# Patient Record
Sex: MALE | Race: WHITE | Employment: OTHER | URBAN - METROPOLITAN AREA
[De-identification: names, ages, dates, MRNs, and addresses within clinical notes are randomized per-mention and may not be internally consistent; named-entity substitution may affect disease eponyms.]

---

## 2017-05-04 DIAGNOSIS — M25.551 RIGHT HIP PAIN: Primary | ICD-10-CM

## 2017-05-04 PROCEDURE — 73502 X-RAY EXAM HIP UNI 2-3 VIEWS: CPT | Performed by: ORTHOPAEDIC SURGERY

## 2017-08-21 ENCOUNTER — TELEPHONE (OUTPATIENT)
Dept: CARDIOLOGY CLINIC | Age: 72
End: 2017-08-21

## 2017-08-31 ENCOUNTER — OFFICE VISIT (OUTPATIENT)
Dept: CARDIOLOGY CLINIC | Age: 72
End: 2017-08-31

## 2017-08-31 VITALS
WEIGHT: 161.2 LBS | HEIGHT: 74 IN | OXYGEN SATURATION: 98 % | SYSTOLIC BLOOD PRESSURE: 118 MMHG | BODY MASS INDEX: 20.69 KG/M2 | HEART RATE: 85 BPM | DIASTOLIC BLOOD PRESSURE: 62 MMHG

## 2017-08-31 DIAGNOSIS — E78.00 PURE HYPERCHOLESTEROLEMIA: ICD-10-CM

## 2017-08-31 DIAGNOSIS — I21.4 NSTEMI (NON-ST ELEVATED MYOCARDIAL INFARCTION) (HCC): Primary | ICD-10-CM

## 2017-08-31 DIAGNOSIS — C01 CARCINOMA OF BASE OF TONGUE (HCC): ICD-10-CM

## 2017-08-31 PROCEDURE — 1036F TOBACCO NON-USER: CPT | Performed by: NURSE PRACTITIONER

## 2017-08-31 PROCEDURE — 3017F COLORECTAL CA SCREEN DOC REV: CPT | Performed by: NURSE PRACTITIONER

## 2017-08-31 PROCEDURE — G8427 DOCREV CUR MEDS BY ELIG CLIN: HCPCS | Performed by: NURSE PRACTITIONER

## 2017-08-31 PROCEDURE — 99214 OFFICE O/P EST MOD 30 MIN: CPT | Performed by: NURSE PRACTITIONER

## 2017-08-31 PROCEDURE — G8420 CALC BMI NORM PARAMETERS: HCPCS | Performed by: NURSE PRACTITIONER

## 2017-08-31 PROCEDURE — 1111F DSCHRG MED/CURRENT MED MERGE: CPT | Performed by: NURSE PRACTITIONER

## 2017-08-31 PROCEDURE — 1123F ACP DISCUSS/DSCN MKR DOCD: CPT | Performed by: NURSE PRACTITIONER

## 2017-08-31 PROCEDURE — 4040F PNEUMOC VAC/ADMIN/RCVD: CPT | Performed by: NURSE PRACTITIONER

## 2017-08-31 RX ORDER — SIMVASTATIN 40 MG
40 TABLET ORAL DAILY
COMMUNITY
Start: 2016-09-01 | End: 2021-06-09

## 2017-12-05 DIAGNOSIS — R52 PAIN: Primary | ICD-10-CM

## 2018-11-29 DIAGNOSIS — M25.551 RIGHT HIP PAIN: Primary | ICD-10-CM

## 2019-07-30 ENCOUNTER — HOSPITAL ENCOUNTER (OUTPATIENT)
Dept: GENERAL RADIOLOGY | Age: 74
Discharge: HOME | End: 2019-07-30
Payer: MEDICARE

## 2019-07-30 ENCOUNTER — HOSPITAL ENCOUNTER (OUTPATIENT)
Dept: SPEECH THERAPY | Age: 74
Setting detail: THERAPIES SERIES
Discharge: HOME | End: 2019-07-30
Payer: MEDICARE

## 2019-07-30 DIAGNOSIS — R91.8 PULMONARY INFILTRATE: ICD-10-CM

## 2019-07-30 DIAGNOSIS — C02.9 CANCER OF TONGUE (HCC): ICD-10-CM

## 2019-07-30 PROCEDURE — 92611 MOTION FLUOROSCOPY/SWALLOW: CPT

## 2019-07-30 PROCEDURE — 74230 X-RAY XM SWLNG FUNCJ C+: CPT

## 2019-07-30 PROCEDURE — 92526 ORAL FUNCTION THERAPY: CPT

## 2019-12-05 DIAGNOSIS — M25.551 RIGHT HIP PAIN: Primary | ICD-10-CM

## 2020-08-26 ENCOUNTER — HOSPITAL ENCOUNTER (INPATIENT)
Age: 75
LOS: 1 days | Discharge: HOME | DRG: 871 | End: 2020-08-28
Attending: EMERGENCY MEDICINE | Admitting: INTERNAL MEDICINE
Payer: MEDICARE

## 2020-08-26 ENCOUNTER — APPOINTMENT (OUTPATIENT)
Dept: GENERAL RADIOLOGY | Age: 75
DRG: 871 | End: 2020-08-26
Payer: MEDICARE

## 2020-08-26 LAB
ANION GAP SERPL CALCULATED.3IONS-SCNC: 10 MMOL/L (ref 3–16)
BASOPHILS ABSOLUTE: 0 K/UL (ref 0–0.2)
BASOPHILS RELATIVE PERCENT: 0.2 %
BUN BLDV-MCNC: 21 MG/DL (ref 7–20)
CALCIUM SERPL-MCNC: 9.3 MG/DL (ref 8.3–10.6)
CHLORIDE BLD-SCNC: 99 MMOL/L (ref 99–110)
CO2: 26 MMOL/L (ref 21–32)
CREAT SERPL-MCNC: 0.9 MG/DL (ref 0.8–1.3)
EOSINOPHILS ABSOLUTE: 0.1 K/UL (ref 0–0.6)
EOSINOPHILS RELATIVE PERCENT: 0.8 %
GFR AFRICAN AMERICAN: >60
GFR NON-AFRICAN AMERICAN: >60
GLUCOSE BLD-MCNC: 173 MG/DL (ref 70–99)
HCT VFR BLD CALC: 41.3 % (ref 40.5–52.5)
HEMOGLOBIN: 13.4 G/DL (ref 13.5–17.5)
LACTIC ACID, SEPSIS: 1.1 MMOL/L (ref 0.4–1.9)
LYMPHOCYTES ABSOLUTE: 0.3 K/UL (ref 1–5.1)
LYMPHOCYTES RELATIVE PERCENT: 4.2 %
MCH RBC QN AUTO: 26.5 PG (ref 26–34)
MCHC RBC AUTO-ENTMCNC: 32.4 G/DL (ref 31–36)
MCV RBC AUTO: 81.6 FL (ref 80–100)
MONOCYTES ABSOLUTE: 0.3 K/UL (ref 0–1.3)
MONOCYTES RELATIVE PERCENT: 4.1 %
NEUTROPHILS ABSOLUTE: 6.9 K/UL (ref 1.7–7.7)
NEUTROPHILS RELATIVE PERCENT: 90.7 %
PDW BLD-RTO: 14.6 % (ref 12.4–15.4)
PLATELET # BLD: 178 K/UL (ref 135–450)
PMV BLD AUTO: 7.4 FL (ref 5–10.5)
POTASSIUM REFLEX MAGNESIUM: 3.9 MMOL/L (ref 3.5–5.1)
RBC # BLD: 5.07 M/UL (ref 4.2–5.9)
SODIUM BLD-SCNC: 135 MMOL/L (ref 136–145)
WBC # BLD: 7.6 K/UL (ref 4–11)

## 2020-08-26 PROCEDURE — 71046 X-RAY EXAM CHEST 2 VIEWS: CPT

## 2020-08-26 PROCEDURE — 84484 ASSAY OF TROPONIN QUANT: CPT

## 2020-08-26 PROCEDURE — 2580000003 HC RX 258: Performed by: EMERGENCY MEDICINE

## 2020-08-26 PROCEDURE — 83605 ASSAY OF LACTIC ACID: CPT

## 2020-08-26 PROCEDURE — 85025 COMPLETE CBC W/AUTO DIFF WBC: CPT

## 2020-08-26 PROCEDURE — 84145 PROCALCITONIN (PCT): CPT

## 2020-08-26 PROCEDURE — 99285 EMERGENCY DEPT VISIT HI MDM: CPT

## 2020-08-26 PROCEDURE — 80048 BASIC METABOLIC PNL TOTAL CA: CPT

## 2020-08-26 RX ORDER — 0.9 % SODIUM CHLORIDE 0.9 %
30 INTRAVENOUS SOLUTION INTRAVENOUS ONCE
Status: COMPLETED | OUTPATIENT
Start: 2020-08-26 | End: 2020-08-27

## 2020-08-26 RX ORDER — ACETAMINOPHEN 325 MG/1
650 TABLET ORAL EVERY 6 HOURS PRN
Status: DISCONTINUED | OUTPATIENT
Start: 2020-08-26 | End: 2020-08-27

## 2020-08-26 RX ORDER — ACETAMINOPHEN 650 MG/1
650 SUPPOSITORY RECTAL EVERY 6 HOURS PRN
Status: DISCONTINUED | OUTPATIENT
Start: 2020-08-26 | End: 2020-08-28 | Stop reason: HOSPADM

## 2020-08-26 RX ADMIN — SODIUM CHLORIDE 2067 ML: 9 INJECTION, SOLUTION INTRAVENOUS at 23:59

## 2020-08-27 PROBLEM — A41.9 SEPSIS (HCC): Status: ACTIVE | Noted: 2020-08-27

## 2020-08-27 PROBLEM — E44.0 MODERATE PROTEIN-CALORIE MALNUTRITION (HCC): Chronic | Status: ACTIVE | Noted: 2020-08-27

## 2020-08-27 LAB
A/G RATIO: 0.8 (ref 1.1–2.2)
ABO/RH: NORMAL
ALBUMIN SERPL-MCNC: 3 G/DL (ref 3.4–5)
ALP BLD-CCNC: 41 U/L (ref 40–129)
ALT SERPL-CCNC: 7 U/L (ref 10–40)
ANION GAP SERPL CALCULATED.3IONS-SCNC: 7 MMOL/L (ref 3–16)
ANTIBODY SCREEN: NORMAL
APTT: 28.6 SEC (ref 24.2–36.2)
AST SERPL-CCNC: 11 U/L (ref 15–37)
BASOPHILS ABSOLUTE: 0 K/UL (ref 0–0.2)
BASOPHILS RELATIVE PERCENT: 0.5 %
BILIRUB SERPL-MCNC: 0.5 MG/DL (ref 0–1)
BILIRUBIN URINE: NEGATIVE
BLOOD, URINE: ABNORMAL
BUN BLDV-MCNC: 20 MG/DL (ref 7–20)
CALCIUM SERPL-MCNC: 8.4 MG/DL (ref 8.3–10.6)
CHLORIDE BLD-SCNC: 102 MMOL/L (ref 99–110)
CLARITY: CLEAR
CO2: 24 MMOL/L (ref 21–32)
COLOR: YELLOW
CREAT SERPL-MCNC: 0.9 MG/DL (ref 0.8–1.3)
D DIMER: 737 NG/ML DDU (ref 0–229)
EKG ATRIAL RATE: 73 BPM
EKG DIAGNOSIS: NORMAL
EKG P AXIS: 4 DEGREES
EKG P-R INTERVAL: 158 MS
EKG Q-T INTERVAL: 408 MS
EKG QRS DURATION: 94 MS
EKG QTC CALCULATION (BAZETT): 449 MS
EKG R AXIS: 20 DEGREES
EKG T AXIS: 44 DEGREES
EKG VENTRICULAR RATE: 73 BPM
EOSINOPHILS ABSOLUTE: 0.1 K/UL (ref 0–0.6)
EOSINOPHILS RELATIVE PERCENT: 1 %
FERRITIN: 132.2 NG/ML (ref 30–400)
FIBRINOGEN: 432 MG/DL (ref 200–397)
GFR AFRICAN AMERICAN: >60
GFR NON-AFRICAN AMERICAN: >60
GLOBULIN: 3.9 G/DL
GLUCOSE BLD-MCNC: 136 MG/DL (ref 70–99)
GLUCOSE URINE: 100 MG/DL
HCT VFR BLD CALC: 35.8 % (ref 40.5–52.5)
HEMOGLOBIN: 11.7 G/DL (ref 13.5–17.5)
INR BLD: 1.16 (ref 0.86–1.14)
KETONES, URINE: NEGATIVE MG/DL
LACTATE DEHYDROGENASE: 111 U/L (ref 100–190)
LACTIC ACID, SEPSIS: 0.9 MMOL/L (ref 0.4–1.9)
LACTIC ACID, SEPSIS: 1 MMOL/L (ref 0.4–1.9)
LEUKOCYTE ESTERASE, URINE: NEGATIVE
LYMPHOCYTES ABSOLUTE: 0.6 K/UL (ref 1–5.1)
LYMPHOCYTES RELATIVE PERCENT: 7.8 %
MAGNESIUM: 1.8 MG/DL (ref 1.8–2.4)
MCH RBC QN AUTO: 26.7 PG (ref 26–34)
MCHC RBC AUTO-ENTMCNC: 32.5 G/DL (ref 31–36)
MCV RBC AUTO: 82.1 FL (ref 80–100)
MICROSCOPIC EXAMINATION: YES
MONOCYTES ABSOLUTE: 0.5 K/UL (ref 0–1.3)
MONOCYTES RELATIVE PERCENT: 5.8 %
NEUTROPHILS ABSOLUTE: 6.8 K/UL (ref 1.7–7.7)
NEUTROPHILS RELATIVE PERCENT: 84.9 %
NITRITE, URINE: NEGATIVE
PDW BLD-RTO: 14.3 % (ref 12.4–15.4)
PH UA: 6 (ref 5–8)
PHOSPHORUS: 3.3 MG/DL (ref 2.5–4.9)
PLATELET # BLD: 144 K/UL (ref 135–450)
PMV BLD AUTO: 7.2 FL (ref 5–10.5)
POTASSIUM REFLEX MAGNESIUM: 4 MMOL/L (ref 3.5–5.1)
PROCALCITONIN: 0.37 NG/ML (ref 0–0.15)
PROTEIN UA: NEGATIVE MG/DL
PROTHROMBIN TIME: 13.5 SEC (ref 10–13.2)
RBC # BLD: 4.36 M/UL (ref 4.2–5.9)
RBC UA: NORMAL /HPF (ref 0–4)
SARS-COV-2, PCR: NOT DETECTED
SODIUM BLD-SCNC: 133 MMOL/L (ref 136–145)
SPECIFIC GRAVITY UA: 1.01 (ref 1–1.03)
TOTAL PROTEIN: 6.9 G/DL (ref 6.4–8.2)
TROPONIN: 0.15 NG/ML
TROPONIN: 0.16 NG/ML
URINE TYPE: ABNORMAL
UROBILINOGEN, URINE: 1 E.U./DL
WBC # BLD: 8 K/UL (ref 4–11)
WBC UA: NORMAL /HPF (ref 0–5)

## 2020-08-27 PROCEDURE — 96368 THER/DIAG CONCURRENT INF: CPT

## 2020-08-27 PROCEDURE — 85025 COMPLETE CBC W/AUTO DIFF WBC: CPT

## 2020-08-27 PROCEDURE — 85610 PROTHROMBIN TIME: CPT

## 2020-08-27 PROCEDURE — 93005 ELECTROCARDIOGRAM TRACING: CPT | Performed by: PHYSICIAN ASSISTANT

## 2020-08-27 PROCEDURE — 87040 BLOOD CULTURE FOR BACTERIA: CPT

## 2020-08-27 PROCEDURE — 1200000000 HC SEMI PRIVATE

## 2020-08-27 PROCEDURE — U0003 INFECTIOUS AGENT DETECTION BY NUCLEIC ACID (DNA OR RNA); SEVERE ACUTE RESPIRATORY SYNDROME CORONAVIRUS 2 (SARS-COV-2) (CORONAVIRUS DISEASE [COVID-19]), AMPLIFIED PROBE TECHNIQUE, MAKING USE OF HIGH THROUGHPUT TECHNOLOGIES AS DESCRIBED BY CMS-2020-01-R: HCPCS

## 2020-08-27 PROCEDURE — 92526 ORAL FUNCTION THERAPY: CPT

## 2020-08-27 PROCEDURE — 6370000000 HC RX 637 (ALT 250 FOR IP): Performed by: INTERNAL MEDICINE

## 2020-08-27 PROCEDURE — 82728 ASSAY OF FERRITIN: CPT

## 2020-08-27 PROCEDURE — 6360000002 HC RX W HCPCS: Performed by: INTERNAL MEDICINE

## 2020-08-27 PROCEDURE — 2580000003 HC RX 258: Performed by: EMERGENCY MEDICINE

## 2020-08-27 PROCEDURE — 6360000002 HC RX W HCPCS: Performed by: EMERGENCY MEDICINE

## 2020-08-27 PROCEDURE — 83605 ASSAY OF LACTIC ACID: CPT

## 2020-08-27 PROCEDURE — 86901 BLOOD TYPING SEROLOGIC RH(D): CPT

## 2020-08-27 PROCEDURE — 2580000003 HC RX 258: Performed by: INTERNAL MEDICINE

## 2020-08-27 PROCEDURE — 86900 BLOOD TYPING SEROLOGIC ABO: CPT

## 2020-08-27 PROCEDURE — 81001 URINALYSIS AUTO W/SCOPE: CPT

## 2020-08-27 PROCEDURE — 85384 FIBRINOGEN ACTIVITY: CPT

## 2020-08-27 PROCEDURE — 83735 ASSAY OF MAGNESIUM: CPT

## 2020-08-27 PROCEDURE — 85730 THROMBOPLASTIN TIME PARTIAL: CPT

## 2020-08-27 PROCEDURE — 6370000000 HC RX 637 (ALT 250 FOR IP): Performed by: EMERGENCY MEDICINE

## 2020-08-27 PROCEDURE — 84100 ASSAY OF PHOSPHORUS: CPT

## 2020-08-27 PROCEDURE — 83615 LACTATE (LD) (LDH) ENZYME: CPT

## 2020-08-27 PROCEDURE — 93010 ELECTROCARDIOGRAM REPORT: CPT | Performed by: INTERNAL MEDICINE

## 2020-08-27 PROCEDURE — 96365 THER/PROPH/DIAG IV INF INIT: CPT

## 2020-08-27 PROCEDURE — 99222 1ST HOSP IP/OBS MODERATE 55: CPT | Performed by: INTERNAL MEDICINE

## 2020-08-27 PROCEDURE — 36415 COLL VENOUS BLD VENIPUNCTURE: CPT

## 2020-08-27 PROCEDURE — 84484 ASSAY OF TROPONIN QUANT: CPT

## 2020-08-27 PROCEDURE — 92610 EVALUATE SWALLOWING FUNCTION: CPT

## 2020-08-27 PROCEDURE — 85379 FIBRIN DEGRADATION QUANT: CPT

## 2020-08-27 PROCEDURE — 2580000003 HC RX 258

## 2020-08-27 PROCEDURE — 87449 NOS EACH ORGANISM AG IA: CPT

## 2020-08-27 PROCEDURE — 80053 COMPREHEN METABOLIC PANEL: CPT

## 2020-08-27 PROCEDURE — 86850 RBC ANTIBODY SCREEN: CPT

## 2020-08-27 RX ORDER — ACETAMINOPHEN 325 MG/1
650 TABLET ORAL EVERY 6 HOURS PRN
Status: DISCONTINUED | OUTPATIENT
Start: 2020-08-27 | End: 2020-08-28 | Stop reason: HOSPADM

## 2020-08-27 RX ORDER — ASPIRIN 81 MG/1
81 TABLET ORAL DAILY
Status: DISCONTINUED | OUTPATIENT
Start: 2020-08-27 | End: 2020-08-28 | Stop reason: HOSPADM

## 2020-08-27 RX ORDER — DEXAMETHASONE SODIUM PHOSPHATE 10 MG/ML
6 INJECTION, SOLUTION INTRAMUSCULAR; INTRAVENOUS EVERY 8 HOURS
Status: DISCONTINUED | OUTPATIENT
Start: 2020-08-27 | End: 2020-08-27

## 2020-08-27 RX ORDER — LEVOTHYROXINE SODIUM 0.12 MG/1
125 TABLET ORAL DAILY
Status: DISCONTINUED | OUTPATIENT
Start: 2020-08-27 | End: 2020-08-28 | Stop reason: HOSPADM

## 2020-08-27 RX ORDER — ACETAMINOPHEN 650 MG/1
650 SUPPOSITORY RECTAL EVERY 6 HOURS PRN
Status: DISCONTINUED | OUTPATIENT
Start: 2020-08-27 | End: 2020-08-27

## 2020-08-27 RX ORDER — PANTOPRAZOLE SODIUM 40 MG/1
40 TABLET, DELAYED RELEASE ORAL
Status: DISCONTINUED | OUTPATIENT
Start: 2020-08-27 | End: 2020-08-28 | Stop reason: HOSPADM

## 2020-08-27 RX ORDER — SODIUM CHLORIDE 0.9 % (FLUSH) 0.9 %
10 SYRINGE (ML) INJECTION PRN
Status: DISCONTINUED | OUTPATIENT
Start: 2020-08-27 | End: 2020-08-28 | Stop reason: HOSPADM

## 2020-08-27 RX ORDER — ACETAMINOPHEN 500 MG
1000 TABLET ORAL ONCE
Status: DISCONTINUED | OUTPATIENT
Start: 2020-08-27 | End: 2020-08-27

## 2020-08-27 RX ORDER — SODIUM CHLORIDE 0.9 % (FLUSH) 0.9 %
10 SYRINGE (ML) INJECTION EVERY 12 HOURS SCHEDULED
Status: DISCONTINUED | OUTPATIENT
Start: 2020-08-27 | End: 2020-08-28 | Stop reason: HOSPADM

## 2020-08-27 RX ORDER — SODIUM CHLORIDE 9 MG/ML
INJECTION, SOLUTION INTRAVENOUS
Status: COMPLETED
Start: 2020-08-27 | End: 2020-08-27

## 2020-08-27 RX ORDER — ATORVASTATIN CALCIUM 40 MG/1
40 TABLET, FILM COATED ORAL NIGHTLY
Status: DISCONTINUED | OUTPATIENT
Start: 2020-08-27 | End: 2020-08-28 | Stop reason: HOSPADM

## 2020-08-27 RX ADMIN — ENOXAPARIN SODIUM 30 MG: 30 INJECTION SUBCUTANEOUS at 15:39

## 2020-08-27 RX ADMIN — ATORVASTATIN CALCIUM 40 MG: 40 TABLET, FILM COATED ORAL at 21:13

## 2020-08-27 RX ADMIN — PIPERACILLIN SODIUM AND TAZOBACTAM SODIUM 4.5 G: 4; .5 INJECTION, POWDER, LYOPHILIZED, FOR SOLUTION INTRAVENOUS at 01:22

## 2020-08-27 RX ADMIN — Medication 10 ML: at 08:23

## 2020-08-27 RX ADMIN — DEXAMETHASONE SODIUM PHOSPHATE 6 MG: 10 INJECTION, SOLUTION INTRAMUSCULAR; INTRAVENOUS at 04:28

## 2020-08-27 RX ADMIN — LEVOTHYROXINE SODIUM 125 MCG: 125 TABLET ORAL at 13:06

## 2020-08-27 RX ADMIN — PANTOPRAZOLE SODIUM 40 MG: 40 TABLET, DELAYED RELEASE ORAL at 13:06

## 2020-08-27 RX ADMIN — ACETAMINOPHEN 650 MG: 650 SUPPOSITORY RECTAL at 01:23

## 2020-08-27 RX ADMIN — SODIUM CHLORIDE 250 ML: 9 INJECTION, SOLUTION INTRAVENOUS at 18:38

## 2020-08-27 RX ADMIN — SODIUM CHLORIDE 250 ML: 9 INJECTION, SOLUTION INTRAVENOUS at 10:55

## 2020-08-27 RX ADMIN — PIPERACILLIN SODIUM AND TAZOBACTAM SODIUM 3.38 G: 3; .375 INJECTION, POWDER, LYOPHILIZED, FOR SOLUTION INTRAVENOUS at 10:55

## 2020-08-27 RX ADMIN — DEXAMETHASONE SODIUM PHOSPHATE 6 MG: 10 INJECTION, SOLUTION INTRAMUSCULAR; INTRAVENOUS at 10:54

## 2020-08-27 RX ADMIN — ASPIRIN 81 MG: 81 TABLET, COATED ORAL at 13:06

## 2020-08-27 RX ADMIN — VANCOMYCIN HYDROCHLORIDE 1250 MG: 10 INJECTION, POWDER, LYOPHILIZED, FOR SOLUTION INTRAVENOUS at 00:30

## 2020-08-27 RX ADMIN — PIPERACILLIN SODIUM AND TAZOBACTAM SODIUM 3.38 G: 3; .375 INJECTION, POWDER, LYOPHILIZED, FOR SOLUTION INTRAVENOUS at 18:38

## 2020-08-27 RX ADMIN — ENOXAPARIN SODIUM 30 MG: 30 INJECTION SUBCUTANEOUS at 04:29

## 2020-08-27 RX ADMIN — VANCOMYCIN HYDROCHLORIDE 1250 MG: 10 INJECTION, POWDER, LYOPHILIZED, FOR SOLUTION INTRAVENOUS at 18:39

## 2020-08-27 ASSESSMENT — PAIN SCALES - GENERAL
PAINLEVEL_OUTOF10: 0
PAINLEVEL_OUTOF10: 0

## 2020-08-27 NOTE — PLAN OF CARE
Problem: Falls - Risk of:  Goal: Will remain free from falls  Description: Will remain free from falls  Outcome: Ongoing  Goal: Absence of physical injury  Description: Absence of physical injury  Outcome: Ongoing     Problem: Infection:  Goal: Will remain free from infection  Description: Will remain free from infection  Outcome: Ongoing     Problem: Safety:  Goal: Free from accidental physical injury  Description: Free from accidental physical injury  Outcome: Ongoing     Problem: Daily Care:  Goal: Daily care needs are met  Description: Daily care needs are met  Outcome: Ongoing     Problem: Pain:  Goal: Patient's pain/discomfort is manageable  Description: Patient's pain/discomfort is manageable  Outcome: Ongoing     Problem: Skin Integrity:  Goal: Skin integrity will stabilize  Description: Skin integrity will stabilize  Outcome: Ongoing     Problem: Discharge Planning:  Goal: Patients continuum of care needs are met  Description: Patients continuum of care needs are met  Outcome: Ongoing     Problem: Discharge Planning:  Goal: Discharged to appropriate level of care  Description: Discharged to appropriate level of care  Outcome: Ongoing     Problem: Gas Exchange - Impaired:  Goal: Levels of oxygenation will improve  Description: Levels of oxygenation will improve  Outcome: Ongoing     Problem: Infection, Septic Shock:  Goal: Will show no infection signs and symptoms  Description: Will show no infection signs and symptoms  Outcome: Ongoing     Problem: Serum Glucose Level - Abnormal:  Goal: Ability to maintain appropriate glucose levels will improve  Description: Ability to maintain appropriate glucose levels will improve  Outcome: Ongoing     Problem: Tissue Perfusion, Altered:  Goal: Circulatory function within specified parameters  Description: Circulatory function within specified parameters  Outcome: Ongoing     Problem: Venous Thromboembolism:  Goal: Will show no signs or symptoms of venous thromboembolism  Description: Will show no signs or symptoms of venous thromboembolism  Outcome: Ongoing  Goal: Absence of signs or symptoms of impaired coagulation  Description: Absence of signs or symptoms of impaired coagulation  Outcome: Ongoing

## 2020-08-27 NOTE — CARE COORDINATION
Case Management Assessment  Initial Evaluation      Patient Name: Rivka Banuelos  YOB: 1945  Diagnosis: Sepsis [facility]) [A41.9]  Date / Time: 8/26/2020 10:52 PM    Admission status/Date:  08/27/2020  Chart Reviewed: Yes      Patient Ramos Young: NO- did not answer phone  Family Interviewed:  William Aschoff via phone    Hospitalization in the last 30 days:  No    Health Care Decision Maker:       \"Who would you like to name as your primary health care decision-maker? \"               Name: Ginger Gillette       Relationship: spouse         Phone number: [phone]  Ethan Stanleyer this person be reached easily? \" Yes  \"Who would you like to name as your back-up decision maker? \"   Name: Mackenzie Swann        Relationship: Child       Phone number: [phone]  Ethan Stanleyer this person be reached easily? \" Yes    Met with: patients spouse  Interview conducted  (bedside/phone): phone    Current PCP: Sarah Zhou DO    Financial  [address] required for SNF : NONE        3 night stay required - WAIVER    ADLS  Support Systems/Care Needs: [facility], Children, Spouse/Significant Other  Transportation: family    Meal Preparation: self    Housing  Living Arrangements: lives w/sp Steps: NA  Intent for return to present living arrangements: Yes  Identified Issues: Lives here 6 months per year /[place] x 6 months/year    Home Care Information  Active with [address] : No Agency:(Services)  Type of Home Care Services: None  Passport/Waiver : No  :                      Phone Number:    Passport/Waiver Services: NA          Durable Medical Equiptment   DME Provider: YOEL  Equipment: NA  Walker___Cane___RTS___ BSC___Shower Chair___Hospital Bed___W/C____Other________  02 at ____Liter(s)---wears(frequency)_______ HHN ___ CPAP___ BiPap___   N/A____      Home O2 Use :  No - 97% RA      Community Service Affiliation  Dialysis:  No    · Agency:  · Location:  · Dialysis Schedule:  · Phone:   · Fax:     Other Community Services:  DISCHARGE PLAN: Explained Case Management role/services. Chart reviewed. Met with pt's spouse at bedside and explained the role of the CM. Plan: to return home. No HHC needs. IPTA.  +CM following

## 2020-08-27 NOTE — ED TRIAGE NOTES
Pt states he received injections today and now has a fever, has Hx of asp pneumonia and wanted to be evaluated for fever

## 2020-08-27 NOTE — PROGRESS NOTES
Am assessment completed. See flowsheet. Pt denies needs at this time. Call light within reach. Jalen Navarrete RN\

## 2020-08-27 NOTE — ED PROVIDER NOTES
[address] ED  EMERGENCY DEPARTMENT ENCOUNTER        Pt Name: Bell Rodgers  MRN: [id number]  [place] 1945  Date of evaluation: 8/26/2020  Provider: REHAN Phoenix  PCP: Paige Hemphill,     This patient was seen and evaluated by the attending physician Joaquín Glez MD.    I have evaluated this patient. My supervising physician was available for consultation. CHIEF COMPLAINT       Chief Complaint   Patient presents with    Fever       HISTORY OF PRESENT ILLNESS   (Location/Symptom, Timing/Onset, Context/Setting, Quality, Duration, Modifying Factors, Severity)  Note limiting factors. Bell Rodgers is a 76 y.o. 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 Patient developed a fever earlier today, it was 102 at home. He denies any recent change in symptoms however he notes that he knows that he aspirates, he commonly coughs after eating and his doctor always told him to come to the ER if he developed a fever from this. He was told that he needed a G-tube and that he needed to be put on a dysphagia diet however he refused. He denies any worsening cough. He denies any headaches runny nose nasal congestion sore throat. He denies any abdominal pain or vomiting but notes mild nausea when he spoke spiked a fever, he denies any nausea currently. He denies any dysuria hematuria urinary frequency urgency or diarrhea. He denies any recent travel or contact with significantly ill contacts. Nursing Notes were all reviewed and agreed with or any disagreements were addressed  in the HPI. REVIEW OF SYSTEMS    (2-9 systems for level 4, 10 or more for level 5)     Review of Systems    Positives and Pertinent negatives as per HPI.   Except as noted abovein the ROS, all other systems were reviewed and negative. PAST MEDICAL HISTORY     Past Medical History:   Diagnosis Date    Arthritis     Benign hypertrophy of prostate     Cancer ([facility]     growth on tongue    Dry mouth     s/p radiation treatment    DVT, lower extremity ([facility]     5/2011    GERD (gastroesophageal reflux disease)     acid reflux    Hip fracture ([facility] [address] [facility]     5/2011, RIGHT    Hx of blood clots     Hyperlipidemia     Pneumonia 6/2/2011    Prolonged emergence from general anesthesia     Thyroid disease          SURGICAL HISTORY     Past Surgical History:   Procedure Laterality Date    ABDOMEN SURGERY      gallbladder removed    CHOLECYSTECTOMY      COLONOSCOPY      EYE SURGERY Right     TORN RETINA, LASER    HIP ARTHROPLASTY Right 08/08/2016    HIP FRACTURE SURGERY Right     OTHER SURGICAL HISTORY  05/18/2011    right hip IM nailing    SHOULDER SURGERY Right rotater cuff    TONGUE BIOPSY  10/2010         CURRENTMEDICATIONS       Previous Medications    ASPIRIN 81 MG EC TABLET    Take 1 tablet by mouth daily    CALCIUM 500 MG CHEW CHEWABLE TABLET    Take 500 mg by mouth daily    COENZYME Q10 (COQ10 PO)    Take  by mouth. LEVOTHYROXINE (SYNTHROID) 88 MCG TABLET    Take 125 mcg by mouth daily. OMEPRAZOLE (PRILOSEC) 20 MG CAPSULE    Take 20 mg by mouth daily.       PROBIOTIC PRODUCT (PRO-BIOTIC BLEND) CAPS    Take 1 tablet by mouth daily    SIMVASTATIN (ZOCOR) 40 MG TABLET    Take 40 mg by mouth daily    VITAMIN B-12 (CYANOCOBALAMIN) 500 MCG TABLET    Take 500 mcg by mouth daily         ALLERGIES     Tamsulosin hcl and Tetanus toxoids    FAMILYHISTORY       Family History   Problem Relation Age of Onset    Cancer Mother     Depression Mother     High Blood Pressure Mother     Heart Disease Father     Stroke Maternal Grandmother     Diabetes Other           SOCIAL HISTORY       Social History     Socioeconomic History    Marital status:      Spouse name: None    Number of children: None  Years of education: None    Highest education level: None   Occupational History    None   Social Needs    Financial resource strain: None    Food insecurity     Worry: None     Inability: None    Transportation needs     Medical: None     Non-medical: None   Tobacco Use    Smoking status: Former Smoker     Years: 40.00     Last attempt to quit: 1971     Years since quittin.3    Smokeless tobacco: Never Used   Substance and Sexual Activity    Alcohol use: No     Alcohol/week: 0.0 standard drinks    Drug use: No    Sexual activity: Yes     Partners: Female   Lifestyle    Physical activity     Days per week: None     Minutes per session: None    Stress: None   Relationships    Social connections     Talks on phone: None     Gets together: None     Attends [employer] service: None     Active member of club or organization: None     Attends meetings of clubs or organizations: None     Relationship status: None    Intimate partner violence     Fear of current or ex partner: None     Emotionally abused: None     Physically abused: None     Forced sexual activity: None   Other Topics Concern    None   Social History Narrative    None       SCREENINGS             PHYSICAL EXAM    (up to 7 for level 4, 8 or more for level 5)     ED Triage Vitals [20 2150]   BP Temp Temp Source Pulse Resp SpO2 Height Weight   119/75 101.1 °F (38.4 °C) Oral 120 16 95 % 6' 2\" (1.88 m) 152 lb (68.9 kg)       Physical Exam  PHYSICAL EXAM  BP (!) 157/100   Pulse 94   Temp 101.1 °F (38.4 °C) (Oral)   Resp 21   Ht 6' 2\" (1.88 m)   Wt 152 lb (68.9 kg)   SpO2 94%   BMI 19.52 kg/m²   GENERAL APPEARANCE: Awake and alert. Cooperative. Thin adult male sitting upright in exam bed here he is nondiaphoretic breathing comfortably on room air showing no sign of acute respiratory distress. HEAD: Normocephalic. Atraumatic. EYES: PERRL. EOM's grossly intact.    ENT: Mucous membranes are moist.  Oropharynx nonerythematous nonedematous uvula midline. TMs intact bilaterally without effusion or drainage or canal edema. Tonye Cogan NECK: Supple. No JVD. No anterior cervical lymphadenopathy. HEART: RRR. No murmurs. Radial pulses 2+ symmetric, PT pulses 1+ symmetric  LUNGS: Respirations unlabored. CTAB, no cough or wheeze appreciated. Good air exchange. Speaking comfortably in full sentences. ABDOMEN: Soft. Non-distended. Non-tender. No masses. No organomegaly. No guarding or rebound. EXTREMITIES: No peripheral edema. Moves all extremities equally. All extremities neurovascularly intact. SKIN: Warm and dry. No acute rashes. NEUROLOGICAL: Alert and oriented. No gross facial drooping. Power intact upper and lower extremity sensation intact x4 no tremors or ataxia. PSYCHIATRIC: Normal mood and affect.     DIAGNOSTIC RESULTS   LABS:    Labs Reviewed   CBC WITH AUTO DIFFERENTIAL - Abnormal; Notable for the following components:       Result Value    Hemoglobin 13.4 (*)     Lymphocytes Absolute 0.3 (*)     All other components within normal limits    Narrative:     Performed at:  [place] [address],  [facility]   Phone [phone]   BASIC METABOLIC PANEL W/ REFLEX TO MG FOR LOW K - Abnormal; Notable for the following components:    Sodium 135 (*)     Glucose 173 (*)     BUN 21 (*)     All other components within normal limits    Narrative:     Performed at:  [place] [address],  [facility]   Phone [phone]   TROPONIN - Abnormal; Notable for the following components:    Troponin 0.16 (*)     All other components within normal limits    Narrative:     Performed at:  [place] [address],  [facility]   Phone [phone]   PROCALCITONIN - Abnormal; Notable for the following components:    Procalcitonin 0.37 (*)     All other components within normal limits PROVIDED HISTORY: Reason for exam:->sob Reason for Exam: Fever Acuity: Acute Type of Exam: Initial FINDINGS: There is infiltrate present in both lung bases. No pneumothorax. There is a trace amount of right pleural fluid. Normal cardiac size     Bibasilar pneumonia. Both bacterial and viral etiologies are considered.  Trace right pleural effusion          PROCEDURES   Unless otherwise noted below, none     Procedures    CRITICAL CARE TIME   N/A    CONSULTS:  IP CONSULT TO HOSPITALIST  PHARMACY TO DOSE VANCOMYCIN      EMERGENCY DEPARTMENT COURSE and DIFFERENTIALDIAGNOSIS/MDM:   Vitals:    Vitals:    08/26/20 2150 08/26/20 2357 08/26/20 2358   BP: 119/75  (!) 157/100   Pulse: 120 92 94   Resp: 16 23 21   Temp: 101.1 °F (38.4 °C)     TempSrc: Oral     SpO2: 95% 96% 94%   Weight: 152 lb (68.9 kg)     Height: 6' 2\" (1.88 m)         Patient was given thefollowing medications:  Medications   acetaminophen (TYLENOL) suppository 650 mg (650 mg Rectal Given 8/27/20 0123)   vancomycin (VANCOCIN) 1,250 mg in dextrose 5 % 250 mL IVPB (has no administration in time range)   sodium chloride flush 0.9 % injection 10 mL (has no administration in time range)   sodium chloride flush 0.9 % injection 10 mL (has no administration in time range)   acetaminophen (TYLENOL) tablet 650 mg (has no administration in time range)     Or   acetaminophen (TYLENOL) suppository 650 mg (has no administration in time range)   piperacillin-tazobactam (ZOSYN) 3.375 g in sodium chloride 0.9 % 100 mL IVPB extended infusion (mini-bag) (has no administration in time range)   vancomycin 1000 mg IVPB in 250 mL D5W addavial (has no administration in time range)   enoxaparin (LOVENOX) injection 30 mg (has no administration in time range)   dexamethasone (PF) (DECADRON) injection 6 mg (has no administration in time range)   0.9 % sodium chloride IV bolus 2,067 mL (2,067 mLs Intravenous New Bag 8/26/20 7069)   piperacillin-tazobactam (ZOSYN) 4.5 g in sodium chloride 0.9 % 100 mL IVPB (mini-bag) (4.5 g Intravenous New Bag 8/27/20 0122)       PDMP Monitoring:    Last PDMP Zayra Delgado as Reviewed [facility]):  Review User Review Instant Review Result          Last Controlled Substance Monitoring Documentation      ED from 8/9/2017 in [facility] ED   Comments  MHA filed at 08/09/2017 1539        Urine Drug Screenings (1 yr)     No resulted procedures found. Medication Contract and Consent for Opioid Use Documents Filed      No documents found                MDM:   Patient seen and evaluated. Old records reviewed. Diagnostic testing reviewed and results discussed. Patient is a 42-year-old male who presents for evaluation of fever. On exam he is alert oriented, febrile well-perfused. He is tachycardic, mildly tachypneic. Blood pressure with adequate map however he was started on IV fluids, he is meeting sirs sepsis criteria. In regards to source, chest x-ray reveals bibasilar pneumonia. I cannot rule out COVID at this time. Will start patient on broad-spectrum antibiotics for aspiration pneumonia. In the process of the work-up, troponin was ordered to assess for severity of potential COVID-19, patient does have an elevated troponin however he is not complaining of any chest pain. I recommend trending. He does have an elevated BUN, creatinine within normal limits at this time. His lactic is within normal limits, no acute severe sepsis at this time. All information including ED workup, results, treatment, diagnosis has been reviewed and discussed with ED attending physician and directly discussed with Hospitalist who is the admitting physician. Pt will be admitted in stable condition. Pt advised of admission and is in full agreement.       SEP-1 CORE MEASURE DATA    Classification: sepsis    Amount of fluids ordered: at least 30mL/kg based on entered actual weight at time of triage    Time at which sepsis was identified:  2250    Broad-spectrum

## 2020-08-27 NOTE — PROGRESS NOTES
4 Eyes Skin Assessment     The patient is being assess for   Admission    I agree that 2 RN's have performed a thorough Head to Toe Skin Assessment on the patient. ALL assessment sites listed below have been assessed. Areas assessed by both nurses:   [x]   Head, Face, and Ears   [x]   Shoulders, Back, and Chest, Abdomen  [x]   Arms, Elbows, and Hands   [x]   Coccyx, Sacrum, and Ischium  [x]   Legs, Feet, and Heels        Scattered scabs. 3 biopsy sites: small circular. BLE, outer. Left outer thigh. **SHARE this note so that the co-signing nurse is able to place an eSignature**    Co-signer eSignature: Electronically signed by Kimberly Deal RN on 8/27/20 at 6:47 AM EDT    Does the Patient have Skin Breakdown?   No          Eugene Prevention initiated:  No   Wound Care Orders initiated:  No      [facility] nurse consulted for Pressure Injury (Stage 3,4, Unstageable, DTI, NWPT, Complex wounds)and New or Established Ostomies:  No      Primary Nurse eSignature: Electronically signed by Musa Taylor RN on 8/27/20 at 5:57 AM EDT

## 2020-08-27 NOTE — FLOWSHEET NOTE
Pt A/Ox4. VSS. Pt orientated to room & call light. unlabored, respirations even & easy. No distress noted. Admission assessment complete. See flowsheet. AM meds given. See MAR. Pt educated to call out for assistance for bathroom needs. Fall contract signed, placed in [employer]. Denies needs at this time. Bed in low position. Bed alarm on. Call light within reach. Will continue to monitor.        08/27/20 0300   Vital Signs   Temp 97.7 °F (36.5 °C)   Temp Source Oral   Pulse 75   Heart Rate Source Monitor   Resp 18   /79   BP Location Right upper arm   Patient Position Semi fowlers   Level of Consciousness 0   MEWS Score 1   Patient Currently in Pain Denies   Height and Weight   Height 6' 2\" (1.88 m)   Weight 156 lb 3.2 oz (70.9 kg)   Weight Method Actual;Bed scale   BSA (Calculated - sq m) 1.92 sq meters   BMI (Calculated) 20.1   Pain Assessment   Pain Assessment 0-10   Pain Level 0   Oxygen Therapy   SpO2 98 %   O2 Device None (Room air)

## 2020-08-27 NOTE — CONSULTS
Pharmacy to Dose Vancomycin    Dx: PNA  Goal trough = > 15-20 mcg/mL  Pt wt = 68.9 kg  Estimated Creatinine Clearance: 69 mL/min (based on SCr of 0.9 mg/dL). Start Vancomycin 1250 mg IVPB q18h. Vancomycin trough prior to 4th dose (8/29 0800).   Janel Orta, Pharm D.8/27/2020 2:13 AM

## 2020-08-27 NOTE — CONSULTS
Pysch: Normal mood and affect   Neurologic: Normal gross motor and sensory exam.         Labs  CBC:   Lab Results   Component Value Date    WBC 8.0 08/27/2020    RBC 4.36 08/27/2020    RBC 4.97 06/21/2017    HGB 11.7 08/27/2020    HCT 35.8 08/27/2020    MCV 82.1 08/27/2020    RDW 14.3 08/27/2020     08/27/2020     CMP:    Lab Results   Component Value Date     08/27/2020    K 4.0 08/27/2020     08/27/2020    CO2 24 08/27/2020    BUN 20 08/27/2020    CREATININE 0.9 08/27/2020    GFRAA >60 08/27/2020    GFRAA >60 04/18/2013    AGRATIO 0.8 08/27/2020    LABGLOM >60 08/27/2020    GLUCOSE 136 08/27/2020    GLUCOSE 89 06/21/2017    PROT 6.9 08/27/2020    PROT 6.8 06/21/2017    CALCIUM 8.4 08/27/2020    BILITOT 0.5 08/27/2020    ALKPHOS 41 08/27/2020    AST 11 08/27/2020    ALT 7 08/27/2020     PT/INR:  No results found for: PTINR  Lab Results   Component Value Date    TROPONINI 0.15 (H) 08/27/2020       EKG:  I have reviewed EKG with the following interpretation:  Impression: 8.27.20  Diagnosis    Final  08/27/2020  1:30 AM  14    Normal sinus rhythmNonspecific T wave abnormalityAbnormal ECGNo previous ECGs availableConfirmed by Bailey Lozano MD, SHENA ([id number]) on 8/27/2020 6:35:43 AM          Summary echo 8.10.17   Technically difficult examination. Limited echo views. Left ventricular systolic function is normal with an ejection fraction of   60-65%. No wall motion abnormalities noted. Normal left ventricular size and wall   thickness. Normal left ventricular diastolic filling pressure.      CATH AUGUST 2017    Assessment  Acute myocardial injury  Acute sepsis due to pneumonia  Non obstructive CAD  Patient Active Problem List   Diagnosis    Pneumonia due to suspected gram-negative bacteria    Fever and chills    SOB (shortness of breath)    BPH (benign prostatic hyperplasia)    GERD (gastroesophageal reflux disease)    Benign prostatic hyperplasia    Hyperlipidemia    DVT, lower extremity ([address]  Hip fracture (HCC)    Dry mouth    Carcinoma of base of tongue ([facility]    Status post total replacement of right hip    NSTEMI (non-ST elevated myocardial infarction) ([facility] [facility]    Sepsis ([facility] [facility]    Thyroid disease    Moderate protein-calorie malnutrition ([facility] [facility]         Plan:    I had the opportunity to review the clinical symptoms and presentation of Erin Larios. I recommend that the patient undergo treatment of pneumonia  Rule out COVID once ruled out get an echocardiogram with doppler to assess left ventricular functon  Asa   statin. I will address the patient's cardiac risk factors and adjusted pharmacologic treatment as needed. In addition, I have reinforced the need for patient directed risk factor modification. Tobacco use was discussed with the patient and educated on the negative effects. I have asked the patient to not utilize these agents. Thank you for allowing to us to participate in the care or Erin Larios. Further evaluation will be based upon the patient's clinical course and testing results. All questions and concerns were addressed to the patient/family. Alternatives to my treatment were discussed. The note was completed using EMR. Every effort was made to ensure accuracy; however, inadvertent computerized transcription errors may be present.

## 2020-08-27 NOTE — PLAN OF CARE
Problem: Nutrition  Intervention: Swallowing evaluation  Note: SLP evaluation completed. All further notes may be found in EMR. Dalton Mauro. MANNY. [id number]    Intervention: Aspiration precautions  Note: SLP evaluation completed. All further notes may be found in EMR. Dalton Mauro. MANNY.  [address] [facility]  Speech-Language Pathologist [id number]

## 2020-08-27 NOTE — PROGRESS NOTES
Speech Language Pathology  Facility/Department: [facility]   CLINICAL BEDSIDE SWALLOW EVALUATION    NAME: Binh Kee  : 1945  MRN: [id number]    Recommendations:  -D/t hx of dysphagia, recommend NPO. Patient not in agreement with recommendation and continues to deny alternative means of nutrition. -D/t patient hx, recommend continuation of Regular textures with thin liquids with use of increased oral care prior to and after meals and head turn with simultaneous chin tuck  -Recommend FEES assessment to assess compensatory strategies use with compensatory strategies    ADMISSION DATE: 2020  ADMITTING DIAGNOSIS: has Pneumonia due to suspected gram-negative bacteria; Fever and chills; SOB (shortness of breath); BPH (benign prostatic hyperplasia); GERD (gastroesophageal reflux disease); Benign prostatic hyperplasia; Hyperlipidemia; DVT, lower extremity ([address] Hip fracture ([address] Dry mouth; Carcinoma of base of tongue ([address] Status post total replacement of right hip; NSTEMI (non-ST elevated myocardial infarction) ([address] Sepsis ([address] Thyroid disease; and Moderate protein-calorie malnutrition ([address] on their problem list.  ONSET DATE: 20 admission, dysphagia for approx 10 years    Recent Chest Xray/CT of Chest:   FINDINGS:    There is infiltrate present in both lung bases.  No pneumothorax. Jose Moat is a    trace amount of right pleural fluid.  Normal cardiac size              Impression    Bibasilar pneumonia.  Both bacterial and viral etiologies are considered.         Trace right pleural effusion       Date of Eval: 2020  Evaluating Therapist: Devin Patten    Current Diet level:  Current Diet : Regular(previous recommendation of NPO, but refused recommendation)  Current Liquid Diet : Thin      Primary Complaint  Per MD H&P \"This is a 76 y.o. male presenting with fever, 1 day, sudden onset, 101.2 °F, unchanged, no aggravators and no relievers.   Review of system is positive for chronic productive cough, exertional dyspnea, brownish-green phlegm, poor appetite, runny nose, neck pain, and bilateral neuropathic foot pain -all of which he reports is unchanged. He denies headache, neck stiffness, change in vision, lateral weakness, loss of consciousness, sore throat, change in appetite, chest pain, hemoptysis, vomiting, diarrhea, dysuria, hematuria, urgency. He has a medical history significant for recurrent aspiration pneumonia following head and neck cancer treatment and he was last on antibiotics in May. \"    Pain:  Pain Assessment  Pain Assessment: 0-10  Pain Level: 0    Reason for Referral  Cy Beasley was referred for a bedside swallow evaluation to assess the efficiency of his swallow function, identify signs and symptoms of aspiration and make recommendations regarding safe dietary consistencies, effective compensatory strategies, and safe eating environment. Impression  Dysphagia Diagnosis: Moderate oral stage dysphagia; Severe pharyngeal stage dysphagia  Dysphagia Outcome Severity Scale: Level 3: Moderate dysphagia- Total assisstance, supervision or strategies. Two or more diet consistencies restricted     RN approved entry to room. Patient with previous recommendation of NPO from [facility] 7/30/19 with summary copied below. Patient with significant improvement from MBSS. This date, patient presents with delayed throat clear with thin liquids this date. Did not occur with head turn with chin tuck to either side. Patient reports increased pharyngeal clearance with head turns and puree/solids. Oxygen stable and no change in RR during all PO trials. D/t significant dysphagia hx, recommend NPO, but d/t patient still refusing NPO recommendation, recommend continuation of Thin liquids and regular textures with use of a head turn. Recommend FEES assessment with use of compensatory strategies to reduce risk of aspiration while maintaining nutritional status.  RN made aware of current diet recommendations. If patient presents with overt s/s of aspiration or change in respiratory status, please downgrade to strict NPO and re-contact SLP    MBSS 7/30/19  \"Pt demonstrates mild oral dysphagia and severe pharyngeal dysphagia  · Pt's oral deficits characterized by weak lingual manipulation, reduced bolus control, and piecemeal swallowing with all PO trials. · Pt's severe pharyngeal deficits characterized by reduced hyolaryngeal elevation, minimal-absent epiglottic inversion, and poor pharyngeal peristalsis resulting in minimal passage of bolus to UES (majority of bolus remaining in pharynx with continued deep, silent penetration to level of vocal folds noted with subsequent swallows d/t severe pharyngeal residue). Eventual trace, silent aspiration observed post-swallow with puree trials. Cued cough was ineffective / did not successfully clear penetrated or aspirated material.  Also noted regurgitation of puree trial back into oral cavity several times with pt attempting repeat swallows d/t inability to clear bolus through pharynx. Pt stated \"I can't swallow it, I might have to spit it out\". Moderate-severe residue noted at valleculae and pyriforms and minimal also noted along PPW post-swallow with all PO trials. Cued effortful, double swallow and use of chin tuck strategy were ineffective at clearing residue. Suspect continued passive aspiration of PO after completion of study. · Minimal PO trials observed during study (x1 tsp NTL, x1 tsp TL, and x1 tsp puree) d/t pt's severe pharyngeal deficits and high aspiration risk. SLP recommended strict NPO with alternative means of nutrition and intensive dysphagia tx. SLP also recommended that pt immediately go to ED for further evaluation d/t severe dysphagia / high aspiration risk. Pt indicated that he would call his PCP.   \"    Treatment Plan  Requires SLP Intervention: Yes  Duration/Frequency of Treatment: 2-4x/wk for los  D/C Dysphagia History: Patient with significant dysphagia hx. Previous recommendaiton of NPO, view full report summary above  Consistencies Administered: Reg solid; Dysphagia Pureed (Dysphagia I); Thin - cup    Oral Motor Deficits  Oral/Motor  Oral Motor: Exceptions to [facility]  Lingual ROM: Reduced left; Reduced right    Oral Phase Dysfunction  Oral Phase  Oral Phase: Exceptions  Oral Phase Dysfunction  Impaired Mastication: Reg Solid(able to center with lingual sweep to swallow)  Decreased Anterior to Posterior Transit: All  Lingual/Palatal Residue: Reg solid  Oral Phase  Oral Phase - Comment: Patient presents with prolonged mastication, with requiring lingual sweep to center to increase ap propulsion along with liquid wash     Indicators of Pharyngeal Phase Dysfunction   Pharyngeal Phase  Pharyngeal Phase: Exceptions  Indicators of Pharyngeal Phase Dysfunction  Delayed Swallow: Thin - cup(3-5s)  Decreased Laryngeal Elevation: All(delayed as well with slight pause prior to anterior motion)  Wet Vocal Quality: Thin - cup  Throat Clearing - Delayed: Thin - cup  Pharyngeal Phase   Pharyngeal: Patient presents with delayed throat clearing with thin liquids via cup. Patient reports increased pharyngeal clearance with head turn right and left, states no difference from side-side. Reports occasionally a chin tuck helps as well    Prognosis  Prognosis  Prognosis for safe diet advancement: fair  Barriers to reach goals: severity of dysphagia;time post onset  Individuals consulted  Consulted and agree with results and recommendations: Patient;MD;RN    Education  Patient Education: Role of SLP, FEES assessment, POC, diet recommendations and compensatory strategies. Patient Education Response: Verbalizes understanding  Safety Devices in place: Yes  Type of devices: Bed alarm in place;Call light within reach;Nurse notified; Left in bed       Therapy Time  SLP Individual Minutes  Time In: 0605  Time Out: [facility]  Minutes: [address] Dee Dee HADLEY  CCC-SLP  Speech-Language Pathologist [id number]  Matthew Crenshaw, SLP  8/27/2020 1:37 PM

## 2020-08-27 NOTE — PLAN OF CARE
Pt was seen by nocturnist physician this AM (8/27/2020). Please see complete H&P from this AM. Chart reviewed as below:    HPI:  This is a 76 y.o. male presenting with fever, 1 day, sudden onset, 101.2 °F, unchanged, no aggravators and no relievers. Review of system is positive for chronic productive cough, exertional dyspnea, brownish-green phlegm, poor appetite, runny nose, neck pain, and bilateral neuropathic foot pain -all of which he reports is unchanged. He denies headache, neck stiffness, change in vision, lateral weakness, loss of consciousness, sore throat, change in appetite, chest pain, hemoptysis, vomiting, diarrhea, dysuria, hematuria, urgency. He has a medical history significant for recurrent aspiration pneumonia following head and neck cancer treatment and he was last on antibiotics in May.     Vitals:  /72   Pulse 74   Temp 96.8 °F (36 °C) (Oral)   Resp 16   Ht 6' 2\" (1.88 m)   Wt 156 lb 3.2 oz (70.9 kg)   SpO2 97%   BMI 20.05 kg/m²      Medications:  Current Facility-Administered Medications   Medication Dose Route Frequency Provider Last Rate Last Dose    sodium chloride flush 0.9 % injection 10 mL  10 mL Intravenous 2 times per day Suzie Olvera MD   10 mL at 08/27/20 0823    sodium chloride flush 0.9 % injection 10 mL  10 mL Intravenous PRN Suzie Olvera MD        acetaminophen (TYLENOL) tablet 650 mg  650 mg Oral Q6H PRN Suzie Olvera MD        piperacillin-tazobactam (ZOSYN) 3.375 g in sodium chloride 0.9 % 100 mL IVPB extended infusion (mini-bag)  3.375 g Intravenous Q8H Suzie Olvera MD 25 mL/hr at 08/27/20 1055 3.375 g at 08/27/20 1055    vancomycin (VANCOCIN) 1,250 mg in dextrose 5 % 250 mL IVPB  1,250 mg Intravenous Q18H Suzie Olvera MD        enoxaparin (LOVENOX) injection 30 mg  30 mg Subcutaneous Q12H Suzie Olvera MD   30 mg at 08/27/20 4458    aspirin EC tablet 81 mg  81 mg Oral Daily Faye Gomez MD   81 mg at 08/27/20 1306  atorvastatin (LIPITOR) tablet 40 mg  40 mg Oral Nightly Hammad Johnson MD        levothyroxine (SYNTHROID) tablet 125 mcg  125 mcg Oral Daily Hammad Johnson MD   125 mcg at 08/27/20 1306    pantoprazole (PROTONIX) tablet 40 mg  40 mg Oral QAM AC Hammad Johnson MD   40 mg at 08/27/20 1306    acetaminophen (TYLENOL) suppository 650 mg  650 mg Rectal Q6H PRN Piero Veras MD   650 mg at 08/27/20 0123     Microbiology:    Blood cx x2: pending    Urine legionella: pending    Strep pneumo antigen: pending    Radiology:    XR CHEST (2 VW)   Final Result   Bibasilar pneumonia. Both bacterial and viral etiologies are considered. Trace right pleural effusion           Assessment/Plan:    No Sepsis    Febrile Illness  COVID R/O  Bibasilar PNA -  Viral vs bacterial - GP, poss 2/2 aspiration  Hx of Silent Aspiration 2/2 tongue cancer  - CXR with bibasilar PNA, PCT: 0.37; on room air  - Admit to Med Surg  - droplet plus precautions, tele  - COVID test pending, strep pneumo, urine legionella, blood cx x 2  - cover with Vanc and Zosyn D#1, PRN Tylenol    Hx of Tongue Cancer  - s/p chemo & radiation > 10 years ago  - currently in remission    Elevated Troponin  Hx of NSTEMI  - cath 2017 non-obstructive  - 0.16, has been elevated previously in 2017  - will trend trops - 0.15  - ASA and statin    Hypothyroidism  - home dose of synthroid 125 mcg      Neuropathy  - has had side effects with Cymbalta and Gabapentin in past    GERD  - cont Protonix    Protein Calorie Malnutrition  - noted by PCP  - takes protein shakes on daily basis    DVT Prophylaxis: Lovenox  Code Status: Full  Diet: General    Pt seen this AM by nocturnist physician. Please see complete H&P from 8/27/2020. Internal medicine team will examine patient tomorrow during AM rounds.     uKsum Tanner PA-C 2:56 PM 8/27/2020

## 2020-08-27 NOTE — ED NOTES
Pt taken to floor via stretcher with COVID precautions. Vanc infusing in patent right PIV. Pt report given to Sherly Buckley RN.       Ramesh Velez RN  08/27/20 4369

## 2020-08-27 NOTE — H&P
History and Physical    Admit Date: 8/26/2020    Patient's PCP: Dr. Melquiades Zafar DO    Chief complaint:   Chief Complaint   Patient presents with    Fever       HISTORY OF PRESENT ILLNESS:      This is a 76 y.o. male presenting with fever, 1 day, sudden onset, 101.2 °F, unchanged, no aggravators and no relievers. Review of system is positive for chronic productive cough, exertional dyspnea, brownish-green phlegm, poor appetite, runny nose, neck pain, and bilateral neuropathic foot pain -all of which he reports is unchanged. He denies headache, neck stiffness, change in vision, lateral weakness, loss of consciousness, sore throat, change in appetite, chest pain, hemoptysis, vomiting, diarrhea, dysuria, hematuria, urgency. He has a medical history significant for recurrent aspiration pneumonia following head and neck cancer treatment and he was last on antibiotics in May. Past Medical / Surgical History:    Past Medical History:   Diagnosis Date    Arthritis     Benign hypertrophy of prostate     [facility]     growth on tongue    Dry mouth     s/p radiation treatment    DVT, lower extremity ([address]     5/2011    GERD (gastroesophageal reflux disease)     acid reflux    Hip fracture ([facility]     5/2011, RIGHT    Hx of blood clots     Hyperlipidemia     Pneumonia 6/2/2011    Prolonged emergence from general anesthesia     Thyroid disease        Past Surgical History:   Procedure Laterality Date    ABDOMEN SURGERY      gallbladder removed    CHOLECYSTECTOMY      COLONOSCOPY      EYE SURGERY Right     TORN RETINA, LASER    HIP ARTHROPLASTY Right 08/08/2016    HIP FRACTURE SURGERY Right     OTHER SURGICAL HISTORY  05/18/2011    right hip IM nailing    SHOULDER SURGERY Right rotater cuff    TONGUE BIOPSY  10/2010       Medications Prior to Admission:    No current facility-administered medications on file prior to encounter.       Current Outpatient Medications on File Prior to Encounter Medication Sig Dispense Refill    Probiotic Product (PRO-BIOTIC BLEND) CAPS Take 1 tablet by mouth daily      simvastatin (ZOCOR) 40 MG tablet Take 40 mg by mouth daily      aspirin 81 MG EC tablet Take 1 tablet by mouth daily 30 tablet 0    Calcium 500 MG CHEW chewable tablet Take 500 mg by mouth daily      vitamin B-12 (CYANOCOBALAMIN) 500 MCG tablet Take 500 mcg by mouth daily      Coenzyme Q10 (COQ10 PO) Take  by mouth.  omeprazole (PRILOSEC) 20 MG capsule Take 20 mg by mouth daily.  levothyroxine (SYNTHROID) 88 MCG tablet Take 125 mcg by mouth daily. Allergies:  Tamsulosin hcl and Tetanus toxoids    Social History:   TOBACCO:   reports that he quit smoking about 49 years ago. He quit after 40.00 years of use. He has never used smokeless tobacco.     ETOH:   reports no history of alcohol use. Family History:       Problem Relation Age of Onset   [facility] Cancer Mother     Depression Mother     High Blood Pressure Mother     Heart Disease Father     Stroke Maternal Grandmother     Diabetes Other        ROS: As stated in the HPI and the 12 point review of system is otherwise negative    PHYSICAL EXAM:  /79   Pulse 75   Temp 97.7 °F (36.5 °C) (Oral)   Resp 18   Ht 6' 2\" (1.88 m)   Wt 156 lb 3.2 oz (70.9 kg)   SpO2 98%   BMI 20.05 kg/m²     No results for input(s): POCGLU in the last 72 hours. General appearance: alert and interactive, no respiratory distress, he is cachectic  Eyes: Lids and conjunctiva normal, sclera anicteric, cornea clear, pupils equal and reactive to light,  Throat: Oral mucosa pink and moist, oropharynx patent and clear,  Neck: no JVD, trachea central, no goiter,  Lungs: Breath sounds symmetrically equal, bronchovesicular in quality, crackles are heard bilaterally from both lower lobes, expiratory wheezes are heard from the left lower lobe.   Heart: Pulse rhythm is regular, volume and rate are normal, S1 and S2 are normal, no murmurs  Abdomen: soft, non-tender; bowel sounds normal; no masses,  no organomegaly  Extremities: Warm, no edema, no clubbing, no cyanosis,  Skin: Warm, no jaundice, no rash, no wounds are ulcers  Lymph nodes: No cervical, axillary, or inguinal adenopathy  Neurologic: No dystonia, no dysarthria, cranial nerves II through XII grossly intact, motor and sensory exam are nonfocal, coordination grossly intact  MSK: No gross deformity of the spine or major joints, no effusions or tenderness, range of motion is grossly normal  Psychiatric: Alert and fully oriented, speech is coherent, mood is sad and affect is congruent, no hallucinations evident      LABS:  Recent Labs     08/26/20 2217   WBC 7.6   HGB 13.4*   HCT 41.3                                                                     Recent Labs     08/26/20 2217   *   K 3.9   CL 99   CO2 26   BUN 21*   CREATININE 0.9   GLUCOSE 173*     No results for input(s): AST, ALT, ALB, BILITOT, ALKPHOS in the last 72 hours. Recent Labs     08/26/20 2217   TROPONINI 0.16*     No results for input(s): BNP in the last 72 hours. No results found for: PHART, KAF5EKW, PO2ART  No results for input(s): INR in the last 72 hours. No results for input(s): NITRITE, COLORU, PHUR, LABCAST, WBCUA, RBCUA, MUCUS, TRICHOMONAS, YEAST, BACTERIA, CLARITYU, SPECGRAV, LEUKOCYTESUR, UROBILINOGEN, BILIRUBINUR, BLOODU, GLUCOSEU, AMORPHOUS in the last 72 hours. Invalid input(s): Kathy Nicole     EKG Independently reviewed: Sinus tachycardia    PCXR Independently reviewed: Bilateral lower lobe pneumonic infiltrate        Assessment & Plan:       Active Hospital Problems    Diagnosis Date Noted    Sepsis ([facility] [A41.9] 08/27/2020     Priority: High    Pneumonia due to suspected gram-negative bacteria [J18.9] 06/02/2011     Priority: High    Moderate protein-calorie malnutrition ([facility] [E44.0] 08/27/2020    Thyroid disease [E07.9]            1. IV fluid resuscitation therapy, blood culture and urine antigen, empiric broad-spectrum IV antibiotics  2. PRN supplemental oxygen, high-dose IV steroid burst, frequent inhaled bronchodilators, respiratory viral PCR studies including COVID-19  3. Continue Synthroid and check TSH  4. Dietary protein and vitamin supplementation, nutritionist consult invited  5. The patient and / or the family were informed of the results of any tests, a time was given to answer questions, a plan was proposed and they agreed with plan. Thank you Dr. Lia Armenta DO for the opportunity to be involved in this patients care. If you have any questions or concerns please feel free to contact me at [phone].   Full Code    Janelle Mckeon MD  8/27/2020

## 2020-08-28 VITALS
DIASTOLIC BLOOD PRESSURE: 75 MMHG | BODY MASS INDEX: 20.05 KG/M2 | RESPIRATION RATE: 16 BRPM | OXYGEN SATURATION: 98 % | WEIGHT: 156.2 LBS | TEMPERATURE: 97.3 F | SYSTOLIC BLOOD PRESSURE: 105 MMHG | HEIGHT: 74 IN | HEART RATE: 70 BPM

## 2020-08-28 LAB
A/G RATIO: 0.9 (ref 1.1–2.2)
ALBUMIN SERPL-MCNC: 3.3 G/DL (ref 3.4–5)
ALP BLD-CCNC: 39 U/L (ref 40–129)
ALT SERPL-CCNC: 6 U/L (ref 10–40)
ANION GAP SERPL CALCULATED.3IONS-SCNC: 7 MMOL/L (ref 3–16)
AST SERPL-CCNC: 9 U/L (ref 15–37)
BASOPHILS ABSOLUTE: 0 K/UL (ref 0–0.2)
BASOPHILS RELATIVE PERCENT: 0.2 %
BILIRUB SERPL-MCNC: 0.3 MG/DL (ref 0–1)
BUN BLDV-MCNC: 18 MG/DL (ref 7–20)
CALCIUM SERPL-MCNC: 8.8 MG/DL (ref 8.3–10.6)
CHLORIDE BLD-SCNC: 103 MMOL/L (ref 99–110)
CO2: 25 MMOL/L (ref 21–32)
CREAT SERPL-MCNC: 0.9 MG/DL (ref 0.8–1.3)
EOSINOPHILS ABSOLUTE: 0 K/UL (ref 0–0.6)
EOSINOPHILS RELATIVE PERCENT: 0 %
GFR AFRICAN AMERICAN: >60
GFR NON-AFRICAN AMERICAN: >60
GLOBULIN: 3.7 G/DL
GLUCOSE BLD-MCNC: 122 MG/DL (ref 70–99)
HCT VFR BLD CALC: 36.2 % (ref 40.5–52.5)
HEMOGLOBIN: 11.6 G/DL (ref 13.5–17.5)
L. PNEUMOPHILA SEROGP 1 UR AG: NORMAL
LYMPHOCYTES ABSOLUTE: 0.7 K/UL (ref 1–5.1)
LYMPHOCYTES RELATIVE PERCENT: 6.3 %
MCH RBC QN AUTO: 26 PG (ref 26–34)
MCHC RBC AUTO-ENTMCNC: 32.1 G/DL (ref 31–36)
MCV RBC AUTO: 80.7 FL (ref 80–100)
MONOCYTES ABSOLUTE: 0.6 K/UL (ref 0–1.3)
MONOCYTES RELATIVE PERCENT: 5.4 %
NEUTROPHILS ABSOLUTE: 9.2 K/UL (ref 1.7–7.7)
NEUTROPHILS RELATIVE PERCENT: 88.1 %
PDW BLD-RTO: 14.6 % (ref 12.4–15.4)
PLATELET # BLD: 179 K/UL (ref 135–450)
PMV BLD AUTO: 7.3 FL (ref 5–10.5)
POTASSIUM REFLEX MAGNESIUM: 4.1 MMOL/L (ref 3.5–5.1)
RBC # BLD: 4.49 M/UL (ref 4.2–5.9)
SODIUM BLD-SCNC: 135 MMOL/L (ref 136–145)
STREP PNEUMONIAE ANTIGEN, URINE: NORMAL
TOTAL PROTEIN: 7 G/DL (ref 6.4–8.2)
WBC # BLD: 10.5 K/UL (ref 4–11)

## 2020-08-28 PROCEDURE — 6370000000 HC RX 637 (ALT 250 FOR IP): Performed by: INTERNAL MEDICINE

## 2020-08-28 PROCEDURE — 92526 ORAL FUNCTION THERAPY: CPT

## 2020-08-28 PROCEDURE — 6360000002 HC RX W HCPCS: Performed by: INTERNAL MEDICINE

## 2020-08-28 PROCEDURE — 99232 SBSQ HOSP IP/OBS MODERATE 35: CPT | Performed by: INTERNAL MEDICINE

## 2020-08-28 PROCEDURE — 92612 ENDOSCOPY SWALLOW (FEES) VID: CPT

## 2020-08-28 PROCEDURE — 36415 COLL VENOUS BLD VENIPUNCTURE: CPT

## 2020-08-28 PROCEDURE — 2580000003 HC RX 258: Performed by: INTERNAL MEDICINE

## 2020-08-28 PROCEDURE — 80053 COMPREHEN METABOLIC PANEL: CPT

## 2020-08-28 PROCEDURE — 99238 HOSP IP/OBS DSCHRG MGMT 30/<: CPT | Performed by: INTERNAL MEDICINE

## 2020-08-28 PROCEDURE — 85025 COMPLETE CBC W/AUTO DIFF WBC: CPT

## 2020-08-28 RX ORDER — ASPIRIN 81 MG/1
81 TABLET ORAL DAILY
Qty: 30 TABLET | Refills: 0
Start: 2020-08-28 | End: 2020-09-14

## 2020-08-28 RX ORDER — AMOXICILLIN AND CLAVULANATE POTASSIUM 250; 62.5 MG/5ML; MG/5ML
25 POWDER, FOR SUSPENSION ORAL 2 TIMES DAILY
Qty: 247.8 ML | Refills: 0 | Status: SHIPPED | OUTPATIENT
Start: 2020-08-28 | End: 2020-09-04

## 2020-08-28 RX ADMIN — PIPERACILLIN SODIUM AND TAZOBACTAM SODIUM 3.38 G: 3; .375 INJECTION, POWDER, LYOPHILIZED, FOR SOLUTION INTRAVENOUS at 10:15

## 2020-08-28 RX ADMIN — ASPIRIN 81 MG: 81 TABLET, COATED ORAL at 10:15

## 2020-08-28 RX ADMIN — PIPERACILLIN SODIUM AND TAZOBACTAM SODIUM 3.38 G: 3; .375 INJECTION, POWDER, LYOPHILIZED, FOR SOLUTION INTRAVENOUS at 02:09

## 2020-08-28 RX ADMIN — PANTOPRAZOLE SODIUM 40 MG: 40 TABLET, DELAYED RELEASE ORAL at 07:18

## 2020-08-28 RX ADMIN — Medication 10 ML: at 10:15

## 2020-08-28 RX ADMIN — ENOXAPARIN SODIUM 30 MG: 30 INJECTION SUBCUTANEOUS at 02:09

## 2020-08-28 RX ADMIN — LEVOTHYROXINE SODIUM 125 MCG: 125 TABLET ORAL at 07:18

## 2020-08-28 NOTE — PROGRESS NOTES
Speech Language Pathology  Facility/Department: [facility]  Dysphagia Daily Treatment Note      Recommendations:  -D/t hx of dysphagia, recommend NPO. Patient not in agreement with recommendation and continues to deny alternative means of nutrition. -D/t patient hx, recommend continuation of Regular textures with thin liquids with use of increased oral care prior to and after meals and head turn with simultaneous chin tuck  -Recommend FEES assessment to assess compensatory strategies use with compensatory strategies. FEES to be completed later this date with results and recs to follow -- pt in agreement to study, signed consent form. RN aware. NAME: Raymond Zaldivar  : 1945  MRN: [id number]    Patient Diagnosis(es):   Patient Active Problem List    Diagnosis Date Noted    Pneumonia due to suspected gram-negative bacteria 2011     Priority: High    Carcinoma of base of tongue ([address] 10/19/2010     Priority: High    Sepsis ([address] 2020    Moderate protein-calorie malnutrition ([address] 2020    Thyroid disease     NSTEMI (non-ST elevated myocardial infarction) ([address]     Status post total replacement of right hip 2016    Fever and chills 2011    SOB (shortness of breath) 2011    BPH (benign prostatic hyperplasia) 2011    GERD (gastroesophageal reflux disease) 2011    Benign prostatic hyperplasia     Hyperlipidemia     DVT, lower extremity (HCC)     Hip fracture (HCC)     Dry mouth      Allergies: Allergies   Allergen Reactions    Tamsulosin Hcl Other (See Comments)    Tetanus Toxoids Nausea And Vomiting     Subjective: Pt seen upright in bed, alert and agreeable to f/u, RN OK'd SLP entry and therapy. Pain: No c/o pain    Current Diet: DIET GENERAL;    Diet Tolerance:  Patient tolerating current diet level without signs/symptoms of penetration / aspiration per chart.   Pt reports \"choking everyday since my cancer\"    P.O. Trials: no PO trials directly observed this date  Thin       Nectar / Mildly Thick       Honey / Moderately Thick       Pudding / Extremely Thick       Puree       Solid         Dysphagia Treatment and Impressions:  No PO trials directly observed this date, session spent on education with pt. SLP and pt discussed compensatory swallow strategies and pt's known oropharyngeal dysphagia at length. SLP encouraged pt to choose softer foods, take small bites / sips, alternate bites / sips, and use of double swallow with PO. He reported that he avoids certain foods d/t increased dysphagia (I.e. meats, hard fruits). SLP also emphasized completion of oral care at least 3x daily. Pt verbalized understanding regarding his increased risk of aspiration with PO intake. He is agreeable to completion of FEES procedure to trial compensatory strategies during PO intake (I.e. head turn, chin tuck) to assess for possible improved pharyngeal clearance, reduced risk of penetration / aspiration, etc.  Pt signed consent form. FEES to be completed later this date with results and recs to follow. RN aware. Dysphagia Goals:  Timeframe for Long-term Goals: 7 days (9/3)  Goal 1: Pt will tolerate LRD w/o s/s of dysphagia. 08/28: ongoing, see above. Short-term Goals  Timeframe for Short-term Goals: 6 days (9/2)  1) The patient will tolerate thin liquids without signs and symptoms of aspiration 10/10 via cup. 08/28: did not directly target this date. Session spent on education, FEES recommendation. 2) The patient will tolerate regular consistency solids 10/10. 08/28: did not directly trial this date. 3) The patient will tolerate instrumental swallowing procedure. 08/28: FEES to be completed later this date  4) The patient will recall and perform compensatory strategies, with no cues. 08/28: pt verbalized understanding of all reviewed information / strategies.     Speech/Language/Cog Goals: n/a    Recommendations:  -D/t hx of dysphagia, recommend NPO. Patient not in agreement with recommendation and continues to deny alternative means of nutrition. -D/t patient hx, recommend continuation of Regular textures with thin liquids with use of increased oral care prior to and after meals and head turn with simultaneous chin tuck  -Recommend FEES assessment to assess compensatory strategies use with compensatory strategies. FEES to be completed later this date with results and recs to follow -- pt in agreement to study, signed consent form. RN aware. Patient/Family/Caregiver Education:  See above    Compensatory Strategies:  HOB 90* and 30\" after meals; small bites/sips; alternate solids/liquids every 3-5 bites; oral care after every meal; choose softer foods; head turn with use of chin tuck with PO    Plan:    Continued Dysphagia treatment with goals per plan of care. Discharge Recommendations: Pt would benefit from continued dysphagia tx after discharge if agreeable    If pt discharges from hospital prior to Speech/Swallowing discharge, this note serves as tx and discharge summary.      Total Treatment Time / Charges     Time in Time out Total Time / units   Cognitive Tx         Speech Tx      Dysphagia Tx 0805 [id number] 30 min / 1 unit     Signature:  C[place] Raphael M.S. [id number] [facility]  Speech-language pathologist  [id number]

## 2020-08-28 NOTE — PROGRESS NOTES
08/28/20 0835   Vital Signs   Temp 97.1 °F (36.2 °C)   Temp Source Oral   Pulse 64   Heart Rate Source Monitor   Resp 16   /75   BP Location Right upper arm   Patient Position Semi fowlers   Level of Consciousness 0   MEWS Score 1   Oxygen Therapy   SpO2 98 %   O2 Device None (Room air)     Assessment completed. Patient seen sitting up in bed, alert and oriented. Patient denies needs, has call light within reach, and took his aspirin in applesauce with no complications. Patient no longer in droplet plus precautions and patient made aware of visitor policy.

## 2020-08-28 NOTE — PLAN OF CARE
Problem: Safety  Goal: Will remain free from injury  Note:   Bed in low locked position room near nurses station,      Problem: Pain Management  Goal: Pain level will decrease to patient's comfort goal  Note:   Pt medicated per mar and active MD order      Problem: Falls - Risk of:  Goal: Will remain free from falls  Description: Will remain free from falls  Outcome: Ongoing  Goal: Absence of physical injury  Description: Absence of physical injury  Outcome: Ongoing     Problem: Infection:  Goal: Will remain free from infection  Description: Will remain free from infection  Outcome: Ongoing     Problem: Safety:  Goal: Free from accidental physical injury  Description: Free from accidental physical injury  Outcome: Ongoing     Problem: Daily Care:  Goal: Daily care needs are met  Description: Daily care needs are met  Outcome: Ongoing     Problem: Pain:  Goal: Patient's pain/discomfort is manageable  Description: Patient's pain/discomfort is manageable  Outcome: Ongoing     Problem: Skin Integrity:  Goal: Skin integrity will stabilize  Description: Skin integrity will stabilize  Outcome: Ongoing     Problem: Discharge Planning:  Goal: Patients continuum of care needs are met  Description: Patients continuum of care needs are met  Outcome: Ongoing     Problem: Discharge Planning:  Goal: Discharged to appropriate level of care  Description: Discharged to appropriate level of care  Outcome: Ongoing     Problem: Gas Exchange - Impaired:  Goal: Levels of oxygenation will improve  Description: Levels of oxygenation will improve  Outcome: Ongoing     Problem: Infection, Septic Shock:  Goal: Will show no infection signs and symptoms  Description: Will show no infection signs and symptoms  Outcome: Ongoing     Problem: Serum Glucose Level - Abnormal:  Goal: Ability to maintain appropriate glucose levels will improve  Description: Ability to maintain appropriate glucose levels will improve  Outcome: Ongoing     Problem: Tissue Perfusion, Altered:  Goal: Circulatory function within specified parameters  Description: Circulatory function within specified parameters  Outcome: Ongoing     Problem: Venous Thromboembolism:  Goal: Will show no signs or symptoms of venous thromboembolism  Description: Will show no signs or symptoms of venous thromboembolism  Outcome: Ongoing  Goal: Absence of signs or symptoms of impaired coagulation  Description: Absence of signs or symptoms of impaired coagulation  Outcome: Ongoing

## 2020-08-28 NOTE — PROGRESS NOTES
Patient's COVID test came back negative. Can he be taken out of droplet plus precautions? Thank you. Above messages sent to Dr. Alla Bowling.

## 2020-08-28 NOTE — CARE COORDINATION
INTERDISCIPLINARY PLAN OF CARE CONFERENCE    Date/Time: 8/28/2020 10:52 AM  Completed by: Emmy Isaac Case Management      Patient Name:  Edil Andrews  YOB: 1945  Admitting Diagnosis: Sepsis [facility]) [A41.9]     Admit Date/Time:  8/26/2020 10:52 PM    Chart reviewed. Interdisciplinary team contacted or reviewed plan related to patient progress and discharge plans. Disciplines included Case Management, Nursing, and Dietitian. Current Status: IP 08/27/2020  PT/OT recommendation for discharge plan of care: NA    Expected D/C Disposition:  Home  Confirmed plan: Chart review    Discharge Plan Comments: Pt IPTA. Lives w/sp. Plans to return home. No DCP needs identified, CM following from periphery and will reassess.      Home O2 in place on admit: No

## 2020-08-28 NOTE — DISCHARGE SUMMARY
Name:  Patience Muñoz  Room:  0561/8351-53  MRN:    [id number]    IM Discharge Summary    Discharging Physician:  Rachid Ibrara MD    Admit: 8/26/2020    Discharge:      PCP      Lucía Perkins DO    Diagnoses and hospital course  this Admission        Sepsis - early  -sec to pna,  fever tachycardia. No hypotension, quickly improved symptoms . Fevers resolved. cx remain neg  covid neg    Pneumonia - likely aspirational with hx of dysphagia . Pt has hx of tongue ca s.p chemo and radiation. Refused any changes in diet per him  Imaging with right basilar infiltrate  SLP eval done, FEES procedure done and recommendations made  Treated with zosyn and vanc for possible gram positive organism  Fevers resolved. Pt remained stable on RA . covid neg  Dc home with aspiration precautions and augmentin suspension   Need repeat imaging , pt wishes to f.w Dr. Laurita Landis as outpt    Hypothyroid - on synthroid    Elevated troponin - thought to be from sepsis . Cards consulted. Pt denied any chest pain   No workup done, previous cath neg in 2017  Pt remained stable       HPI   76 y.o. male presenting with fever, 1 day, sudden onset, 101.2 °F, unchanged, no aggravators and no relievers. Review of system is positive for chronic productive cough, exertional dyspnea, brownish-green phlegm, poor appetite, runny nose, neck pain, and bilateral neuropathic foot pain -all of which he reports is unchanged. He denies headache, neck stiffness, change in vision, lateral weakness, loss of consciousness, sore throat, change in appetite, chest pain, hemoptysis, vomiting, diarrhea, dysuria, hematuria, urgency. He has a medical history significant for recurrent aspiration pneumonia following head and neck cancer treatment and he was last on antibiotics in May.     Physical Exam at Discharge:          General: thin elderly male,  Awake, alert and oriented.  Appears to be not in any distress  Mucous Membranes:  Pink , anicteric  Neck: No JVD, no Condition/Location: stable/*home      Follow Up:    Aspiration precautions -RECOMMENDATIONS:  · Pt is considered at a high risk of aspiration with all PO intake at this time. · Recommendation has been NPO with alternative means of nutrition, however, patient not in agreement with recommendation and continues to deny alternative means of nutrition. · Pt requests to continue on Regular solid textures with thin liquids with use of increased oral care prior to and after meals and head turn with simultaneous chin tuck. SLP emphasized importance of adherence to strict aspiration and reflux precautions and choosing softer foods. · Backflow of white-tinged secretions noted during study (pt endorses hx of GERD). Recommend continuing GI follow-up. ST to continue to follow for 1-2 total f/us for education and training of laryngeal / pharyngeal strengthening and TBR exercises      PCP in 1 weeks  F.w Dr. Piyush Gifford as planned. Need repeat imaging in 4 weeks    Time Spent on discharge is more than 28 minutes in the examination, evaluation, counseling and review of medications and discharge plan.       Ricki Richards MD 8/28/2020 1:27 PM

## 2020-08-28 NOTE — PROGRESS NOTES
[facility] Daily Progress Note      Admit Date:  8/26/2020    Subjective:  Mr. Sherri Melton is seen for cardiology   Reason for Consultation/Chief Complaint: \"I have been asked to see him for elevated troponin . \"  Covid testing neg. He has history of head and neck radiation for tongue cancer. Patient says he has chronic troponin elevation  Denies chest pain, shortness of breath, edema, dizziness, palpitations and syncope. History of Present Illness:  Virgen Steven is a 76 y.o. patient who presented to the hospital with complaints of fever 101.2 one day duration. He has chronic productive cough [place] brownish green flegm poor appetite runny nose neck pain. There is history of recurrent aspiration pneumonia. He history of head and neck cancer treatment with radiation for tongue cancer. He has hyperlipidemia and takes simvastatin.     I have been asked to provide consultation regarding further management and testing.     ROS:  12 point ROS negative in all areas as listed below except as in [place]  Constitutional, EENT, Cardiovascular, pulmonary, GI, , Musculoskeletal, skin, neurological, hematological, endocrine, Psychiatric    Past Medical History:   Diagnosis Date    Arthritis     Benign hypertrophy of prostate     Cancer ([address]     growth on tongue    Dry mouth     s/p radiation treatment    DVT, lower extremity ([address]     5/2011    GERD (gastroesophageal reflux disease)     acid reflux    Hip fracture ([address]     5/2011, RIGHT    Hx of blood clots     Hyperlipidemia     Pneumonia 6/2/2011    Prolonged emergence from general anesthesia     Thyroid disease      Past Surgical History:   Procedure Laterality Date    ABDOMEN SURGERY      gallbladder removed    CHOLECYSTECTOMY      COLONOSCOPY      EYE SURGERY Right     TORN RETINA, LASER    HERNIA REPAIR      HIP ARTHROPLASTY Right 08/08/2016    HIP FRACTURE SURGERY Right     JOINT REPLACEMENT      OTHER SURGICAL HISTORY  05/18/2011 CREATININE 0.9 0.9 0.9     LIVER PROFILE:   Recent Labs     08/27/20  0358 08/28/20  0610   AST 11* 9*   ALT 7* 6*   BILITOT 0.5 0.3   ALKPHOS 41 39*     PT/INR:   Recent Labs     08/27/20  0358   PROTIME 13.5*   INR 1.16*     APTT:   Recent Labs     08/27/20  0358   APTT 28.6     BNP:  No results for input(s): BNP in the last 72 hours. IMAGING:   EKG 8/26/20  Normal sinus rhythmNonspecific T wave abnormalityAbnormal ECGNo previous ECGs availableConfirmed by Merline Delton MD, SHENA ([id number]) on 8/27/2020 6:35:43 AM     CXR 8/26/20   Bibasilar pneumonia. Both bacterial and viral etiologies are considered. Trace right pleural effusion         Summary echo 8.10.17   Technically difficult examination. Limited echo views.   Left ventricular systolic function is normal with an ejection fraction of   60-65%.   No wall motion abnormalities noted. Normal left ventricular size and wall   thickness.   Normal left ventricular diastolic filling pressure.     CATH AUGUST 2017    Assessment:    Chronic troponin elevation in setting of pneumonia bilat  Non obstructive CAD  Patient Active Problem List    Diagnosis Date Noted    Pneumonia due to suspected gram-negative bacteria 06/02/2011     Priority: High    Carcinoma of base of tongue [facility] 10/19/2010     Priority: High    Sepsis ([facility] 08/27/2020    Moderate protein-calorie malnutrition ([facility] 08/27/2020    Thyroid disease     NSTEMI (non-ST elevated myocardial infarction) [facility]     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 Recommend asa and statin  2.  Treatment of pneumonia  Will sign off please call if needed    · I have spent 25   minutes of face to face time with the patient with more than 50% spent counseling and coordinating care for this patient  ·     Damaris Callejas MD 8/28/2020 9:15 AM

## 2020-08-28 NOTE — PROGRESS NOTES
Speech Language Pathology  THE [facility] THERAPY  Fiberoptic Endoscopic Evaluation of Swallowing  (FEES)        RECOMMENDATIONS:  · Pt is considered at a high risk of aspiration with all PO intake at this time. · Recommendation has been NPO with alternative means of nutrition, however, patient not in agreement with recommendation and continues to deny alternative means of nutrition. · Pt requests to continue on Regular solid textures with thin liquids with use of increased oral care prior to and after meals and head turn with simultaneous chin tuck. SLP emphasized importance of adherence to strict aspiration and reflux precautions and choosing softer foods. · Backflow of white-tinged secretions noted during study (pt endorses hx of GERD). Recommend continuing GI follow-up. · ST to continue to follow for 1-2 total f/us for education and training of laryngeal / pharyngeal strengthening and TBR exercises      NAME: Deanne Yao OF BIRTH: 1945  GENDER: male  MRN: [id number]  ACCOUNT #: [de-identified]  REFERRING PHYSICIAN: Dr. Ama Wolfe  DIAGNOSIS: Septicemia, PNA  Onset Date: Pt admitted to [facility] on 8/26/20  PAIN: No c/o pain    EXAM DATE: 8/28/20  EXAM LOCATION: [facility] [place]    CASE HISTORY: Pt is a pleasant 76year-old man. He has hx of tongue base cancer and recurrent aspiration PNA per chart. Per MD H&P, \"This is a 76 y.o. male presenting with fever, 1 day, sudden onset, 101.2 °F, unchanged, no aggravators and no relievers. Review of system is positive for chronic productive cough, exertional dyspnea, brownish-green phlegm, poor appetite, runny nose, neck pain, and bilateral neuropathic foot pain -all of which he reports is unchanged. He denies headache, neck stiffness, change in vision, lateral weakness, loss of consciousness, sore throat, change in appetite, chest pain, hemoptysis, vomiting, diarrhea, dysuria, hematuria, urgency.   He has a medical history significant for recurrent aspiration pneumonia following head and neck cancer treatment and he was last on antibiotics in May\". Per BSE completed yesterday (8/27), \"RN approved entry to room. Patient with previous recommendation of NPO from [facility] 7/30/19 with summary copied below. Patient with significant improvement from MBSS. This date, patient presents with delayed throat clear with thin liquids this date. Did not occur with head turn with chin tuck to either side. Patient reports increased pharyngeal clearance with head turns and puree/solids. Oxygen stable and no change in RR during all PO trials. D/t significant dysphagia hx, recommend NPO, but d/t patient still refusing NPO recommendation, recommend continuation of Thin liquids and regular textures with use of a head turn. Recommend FEES assessment with use of compensatory strategies to reduce risk of aspiration while maintaining nutritional status. RN made aware of current diet recommendations. If patient presents with overt s/s of aspiration or change in respiratory status, please downgrade to strict NPO and re-contact SLP\". Pt had MBSS completed on 7/30/19 with severe pharyngeal dysphagia reported and recommendation of strict NPO with alternative means of nutrition.         FLEXIBLE ENDOSCOPIC EXAM: Endoscope placed in pt's L nare and passed without difficulty. No bleeding. TESTING POSITION: Pt seated upright in bed, on RA. ORAL MOTOR FUNCTION:  WFL    BASE OF TONGUE RANGE OF MOTION: Reduced; of note, small yellow circular protrusion noted midline of BOT which remained throughout duration of study.     LATERAL PHARYNGEAL WALL RANGE OF MOTION: Reduced    VELOPHARYNGEAL FUNCTION: Mild narrowing    TRUE VOCAL FOLDS: Mobile bilaterally, apparent increased thickness of R TVF vs L    FALSE VOCAL FOLDS: Mild edema    ARYTENOID CARTILAGE: Edema noted     ARYEPIGLOTTIC FOLDS: Edema noted    INTERARYTENOID SPACE: Edema with posterior commissure hypertrophy    BASELINE SECRETIONS: No significant baseline secretions noted upon endoscope entry.     EPIGLOTTIC RIM RESTING POSITION: Off BOT    VOCAL PITCH: Adequate    VOCAL INTENSITY: Adequate    VOCAL QUALITY: Hoarse, somewhat rough    RESPIRATORY SUPPORT FOR PHONATION: Adequate    SUSTAINED PHONATION: ~9 seconds    COUGH ABILITY (VOLITIONAL / SPONTANEOUS): Pt demonstrated intermittent throat clearing during study with no penetration / aspiration observed (noted bilateral residual in pyriforms post-swallow with thin liquids, which may have lead to throat clear post-swallow). Adequate strong cued cough completed with pt expectorating thick white secretions from trachea. SALIVA SWALLOW ABILITY (VOLITIONAL / SPONTANEOUS): Pt completed spontaneous saliva swallows upon endoscope entry. Pt demonstrates weak cued swallow with signifcantly reduced ability to successfully clear pharyngeal residues (particularly with puree trials). See findings below. THROAT CLEAR ON CUE: NT    THROAT DISCOMFORT: None reported    SPEECH INTELLIGIBILITY: WFL        PO TRIALS    THIN LIQUID (CUP/STRAW): Premature spillage to the valleculae and to the pyriforms at times with pt completing multiple swallows per bolus. x1 instance of silent shallow penetration before the swallow that cleared with the swallow. No aspiration noted. Consistent min-mod residual at bilateral pyriforms (R > L)  and UES (instances reaching interarytenoid space) and minimal at valleculae with pt clearing majority of residual independently with repeat swallows. No bolus-colored backflow observed, however, noted backflow of white-tinged secretions at UES. PUREE FOOD TSP (x1 tsp only d/t severity of residuals initially): Swallow triggered at BOT, no penetration / aspiration observed. However, severe residual noted initially which filled entire valleculae. Residual noted along epiglottic rim and BOT as well (trace along PPW).   Pt completed multiple cued hard swallows, head Material does not enter the airway      IMPRESSION:  Thin liquids via cup and straw and x1 tsp of puree trialed during study. Soft and regular solid trials held during study d/t significant diffuse pharyngeal residue noted post-swallow with single tsp trial of puree. Pt required multiple cued effortful swallows, use of chin tuck, and head turn to assist with clearance of majority of puree residual.  x1 instance of silent shallow penetration before the swallow noted with thin liquid (cup, straw) in isolation. When thin liquid utilized for liquid wash in an effort to clear residual from puree, silent deep penetration over interarytenoid space and along underside of epiglottic rim, which did not successfully clear despite repeat swallow and cued throat clear. Eventual deep penetration with puree after use of liquid wash with staining of thin liquid noted at anterior aspect of TVFs -- possible trace aspiration. Backflow of white-tinged secretions noted during study (pt endorses hx of GERD). Recommend continuing GI follow-up. Pt is considered at a high risk of aspiration with all PO intake at this time. Recommendation has been NPO with alternative means of nutrition, however, patient not in agreement with recommendation and continues to deny alternative means of nutrition. Pt requests to continue on Regular solid textures with thin liquids with use of increased oral care prior to and after meals and head turn with simultaneous chin tuck. SLP emphasized importance of adherence to strict aspiration and reflux precautions and choosing softer foods. Dysphagia Treatment Goals  Long-term Goal:  Goal 1: Pt will tolerate LRD w/o s/s of dysphagia. Short-term Goals  1) The patient will tolerate thin liquids without signs and symptoms of aspiration 10/10 via cup  2) The patient will tolerate regular consistency solids 10/10. 3) The patient will tolerate instrumental swallowing procedure.    4) The patient will recall and perform compensatory strategies, with no cues. ENDOSCOPE REMOVAL: Endoscope was removed without incident, no adverse reactions. No bleeding. PRE TEST HR: 64  PRE TEST O2: 94%  POST TEST HR: 97%  POST TEST O2: 72    RECOMMENDATIONS:  Pt is considered at a high risk of aspiration with all PO intake at this time. Recommendation has been NPO with alternative means of nutrition, however, patient not in agreement with recommendation and continues to deny alternative means of nutrition. Pt requests to continue on Regular solid textures with thin liquids with use of increased oral care prior to and after meals and head turn with simultaneous chin tuck. SLP emphasized importance of adherence to strict aspiration and reflux precautions and choosing softer foods. RECOMMENDED COMPENSATORY STRATEGIES / POSTURAL CHANGES:  NPO recommendation with oral care at least 3x/day recommended per ST  Pt desires PO intake despite education / aspiration risk: HOB 90* and 30\" after meals; small bites/sips; alternate solids/liquids every 3-5 bites; oral care after every meal; head turn and chin tuck, double swallow, effortful swallow    EDUCATION:  Reviewed results and recommendations of this evaluation, signs, symptoms, and risk of aspiration, as well as diet recommendations and strategies. Reviewed and discussed goals and plan of care. SLP reviewed FEES video with pt at bedside after completion of study. He verbalized understanding of all reviewed recommendations / strategies.      TREATMENT TIME:  9565-1971 (40 minutes); FEES procedure and dysphagia tx    ELECTRONIC SIGNATURE:  Castillo Betts M.S. [id number] [facility]  Speech-language pathologist  [id number]

## 2020-08-28 NOTE — PROGRESS NOTES
IVs removed. Telemetry monitor removed. Patient verbalized understanding of discharge instructions. Patient denies needs at time of discharge, has his discharge paperwork and verbalized instructions about where to  his antibiotic. Patient will use call light to tell us when his ride arrives.

## 2020-08-31 LAB
BLOOD CULTURE, ROUTINE: NORMAL
CULTURE, BLOOD 2: NORMAL

## 2020-09-08 ENCOUNTER — HOSPITAL ENCOUNTER (OUTPATIENT)
Dept: GENERAL RADIOLOGY | Age: 75
Discharge: HOME | End: 2020-09-08
Payer: MEDICARE

## 2020-09-08 ENCOUNTER — HOSPITAL ENCOUNTER (OUTPATIENT)
Age: 75
Discharge: HOME | End: 2020-09-08
Payer: MEDICARE

## 2020-09-08 PROCEDURE — 71046 X-RAY EXAM CHEST 2 VIEWS: CPT

## 2020-09-14 ENCOUNTER — HOSPITAL ENCOUNTER (INPATIENT)
Age: 75
LOS: 4 days | Discharge: HOME HEALTH | DRG: 871 | End: 2020-09-18
Attending: STUDENT IN AN ORGANIZED HEALTH CARE EDUCATION/TRAINING PROGRAM | Admitting: INTERNAL MEDICINE
Payer: MEDICARE

## 2020-09-14 ENCOUNTER — APPOINTMENT (OUTPATIENT)
Dept: GENERAL RADIOLOGY | Age: 75
DRG: 871 | End: 2020-09-14
Payer: MEDICARE

## 2020-09-14 ENCOUNTER — TELEPHONE (OUTPATIENT)
Dept: OTHER | Facility: CLINIC | Age: 75
End: 2020-09-14

## 2020-09-14 PROBLEM — J18.9 PNEUMONIA: Status: ACTIVE | Noted: 2020-09-14

## 2020-09-14 LAB
A/G RATIO: 0.9 (ref 1.1–2.2)
ALBUMIN SERPL-MCNC: 3.9 G/DL (ref 3.4–5)
ALP BLD-CCNC: 62 U/L (ref 40–129)
ALT SERPL-CCNC: 9 U/L (ref 10–40)
ANION GAP SERPL CALCULATED.3IONS-SCNC: 12 MMOL/L (ref 3–16)
AST SERPL-CCNC: 16 U/L (ref 15–37)
BASOPHILS ABSOLUTE: 0 K/UL (ref 0–0.2)
BASOPHILS RELATIVE PERCENT: 0.3 %
BILIRUB SERPL-MCNC: 0.4 MG/DL (ref 0–1)
BILIRUBIN URINE: NEGATIVE
BLOOD, URINE: ABNORMAL
BUN BLDV-MCNC: 14 MG/DL (ref 7–20)
CALCIUM SERPL-MCNC: 9.7 MG/DL (ref 8.3–10.6)
CHLORIDE BLD-SCNC: 97 MMOL/L (ref 99–110)
CLARITY: CLEAR
CO2: 27 MMOL/L (ref 21–32)
COLOR: YELLOW
CREAT SERPL-MCNC: 0.9 MG/DL (ref 0.8–1.3)
EKG ATRIAL RATE: 95 BPM
EKG DIAGNOSIS: NORMAL
EKG P AXIS: 19 DEGREES
EKG P-R INTERVAL: 144 MS
EKG Q-T INTERVAL: 352 MS
EKG QRS DURATION: 90 MS
EKG QTC CALCULATION (BAZETT): 442 MS
EKG R AXIS: 17 DEGREES
EKG T AXIS: 64 DEGREES
EKG VENTRICULAR RATE: 95 BPM
EOSINOPHILS ABSOLUTE: 0.3 K/UL (ref 0–0.6)
EOSINOPHILS RELATIVE PERCENT: 3.9 %
EPITHELIAL CELLS, UA: ABNORMAL /HPF (ref 0–5)
GFR AFRICAN AMERICAN: >60
GFR NON-AFRICAN AMERICAN: >60
GLOBULIN: 4.5 G/DL
GLUCOSE BLD-MCNC: 96 MG/DL (ref 70–99)
GLUCOSE URINE: NEGATIVE MG/DL
HCT VFR BLD CALC: 39.5 % (ref 40.5–52.5)
HEMOGLOBIN: 13 G/DL (ref 13.5–17.5)
KETONES, URINE: 15 MG/DL
LACTIC ACID: 0.6 MMOL/L (ref 0.4–2)
LEUKOCYTE ESTERASE, URINE: NEGATIVE
LYMPHOCYTES ABSOLUTE: 0.3 K/UL (ref 1–5.1)
LYMPHOCYTES RELATIVE PERCENT: 4.6 %
MCH RBC QN AUTO: 26.5 PG (ref 26–34)
MCHC RBC AUTO-ENTMCNC: 33 G/DL (ref 31–36)
MCV RBC AUTO: 80.3 FL (ref 80–100)
MICROSCOPIC EXAMINATION: YES
MONOCYTES ABSOLUTE: 0.4 K/UL (ref 0–1.3)
MONOCYTES RELATIVE PERCENT: 6.3 %
MUCUS: ABNORMAL /LPF
NEUTROPHILS ABSOLUTE: 5.9 K/UL (ref 1.7–7.7)
NEUTROPHILS RELATIVE PERCENT: 84.9 %
NITRITE, URINE: NEGATIVE
PDW BLD-RTO: 14.5 % (ref 12.4–15.4)
PH UA: 7 (ref 5–8)
PLATELET # BLD: 226 K/UL (ref 135–450)
PMV BLD AUTO: 6.8 FL (ref 5–10.5)
POTASSIUM REFLEX MAGNESIUM: 4.7 MMOL/L (ref 3.5–5.1)
PRO-BNP: 148 PG/ML (ref 0–449)
PROCALCITONIN: 0.15 NG/ML (ref 0–0.15)
PROTEIN UA: NEGATIVE MG/DL
RBC # BLD: 4.92 M/UL (ref 4.2–5.9)
RBC UA: ABNORMAL /HPF (ref 0–4)
SODIUM BLD-SCNC: 136 MMOL/L (ref 136–145)
SPECIFIC GRAVITY UA: 1.01 (ref 1–1.03)
TOTAL PROTEIN: 8.4 G/DL (ref 6.4–8.2)
TROPONIN: 0.14 NG/ML
TROPONIN: 0.15 NG/ML
URINE TYPE: ABNORMAL
UROBILINOGEN, URINE: 0.2 E.U./DL
WBC # BLD: 7 K/UL (ref 4–11)
WBC UA: ABNORMAL /HPF (ref 0–5)

## 2020-09-14 PROCEDURE — 6360000002 HC RX W HCPCS: Performed by: STUDENT IN AN ORGANIZED HEALTH CARE EDUCATION/TRAINING PROGRAM

## 2020-09-14 PROCEDURE — 83605 ASSAY OF LACTIC ACID: CPT

## 2020-09-14 PROCEDURE — 80053 COMPREHEN METABOLIC PANEL: CPT

## 2020-09-14 PROCEDURE — 96368 THER/DIAG CONCURRENT INF: CPT

## 2020-09-14 PROCEDURE — 2580000003 HC RX 258: Performed by: NURSE PRACTITIONER

## 2020-09-14 PROCEDURE — 83880 ASSAY OF NATRIURETIC PEPTIDE: CPT

## 2020-09-14 PROCEDURE — 71045 X-RAY EXAM CHEST 1 VIEW: CPT

## 2020-09-14 PROCEDURE — U0003 INFECTIOUS AGENT DETECTION BY NUCLEIC ACID (DNA OR RNA); SEVERE ACUTE RESPIRATORY SYNDROME CORONAVIRUS 2 (SARS-COV-2) (CORONAVIRUS DISEASE [COVID-19]), AMPLIFIED PROBE TECHNIQUE, MAKING USE OF HIGH THROUGHPUT TECHNOLOGIES AS DESCRIBED BY CMS-2020-01-R: HCPCS

## 2020-09-14 PROCEDURE — 2580000003 HC RX 258: Performed by: STUDENT IN AN ORGANIZED HEALTH CARE EDUCATION/TRAINING PROGRAM

## 2020-09-14 PROCEDURE — 1200000000 HC SEMI PRIVATE

## 2020-09-14 PROCEDURE — 84484 ASSAY OF TROPONIN QUANT: CPT

## 2020-09-14 PROCEDURE — 99285 EMERGENCY DEPT VISIT HI MDM: CPT

## 2020-09-14 PROCEDURE — 84145 PROCALCITONIN (PCT): CPT

## 2020-09-14 PROCEDURE — 85025 COMPLETE CBC W/AUTO DIFF WBC: CPT

## 2020-09-14 PROCEDURE — 99223 1ST HOSP IP/OBS HIGH 75: CPT | Performed by: INTERNAL MEDICINE

## 2020-09-14 PROCEDURE — 93010 ELECTROCARDIOGRAM REPORT: CPT | Performed by: INTERNAL MEDICINE

## 2020-09-14 PROCEDURE — 6370000000 HC RX 637 (ALT 250 FOR IP): Performed by: NURSE PRACTITIONER

## 2020-09-14 PROCEDURE — 6360000002 HC RX W HCPCS: Performed by: NURSE PRACTITIONER

## 2020-09-14 PROCEDURE — 81001 URINALYSIS AUTO W/SCOPE: CPT

## 2020-09-14 PROCEDURE — 87040 BLOOD CULTURE FOR BACTERIA: CPT

## 2020-09-14 PROCEDURE — 93005 ELECTROCARDIOGRAM TRACING: CPT | Performed by: STUDENT IN AN ORGANIZED HEALTH CARE EDUCATION/TRAINING PROGRAM

## 2020-09-14 PROCEDURE — 99222 1ST HOSP IP/OBS MODERATE 55: CPT | Performed by: PHYSICIAN ASSISTANT

## 2020-09-14 PROCEDURE — 96365 THER/PROPH/DIAG IV INF INIT: CPT

## 2020-09-14 PROCEDURE — 36415 COLL VENOUS BLD VENIPUNCTURE: CPT

## 2020-09-14 RX ORDER — PANTOPRAZOLE SODIUM 40 MG/1
40 TABLET, DELAYED RELEASE ORAL
Status: DISCONTINUED | OUTPATIENT
Start: 2020-09-15 | End: 2020-09-18 | Stop reason: HOSPADM

## 2020-09-14 RX ORDER — SODIUM CHLORIDE 0.9 % (FLUSH) 0.9 %
10 SYRINGE (ML) INJECTION PRN
Status: DISCONTINUED | OUTPATIENT
Start: 2020-09-14 | End: 2020-09-18 | Stop reason: HOSPADM

## 2020-09-14 RX ORDER — SODIUM CHLORIDE 0.9 % (FLUSH) 0.9 %
10 SYRINGE (ML) INJECTION EVERY 12 HOURS SCHEDULED
Status: DISCONTINUED | OUTPATIENT
Start: 2020-09-14 | End: 2020-09-18 | Stop reason: HOSPADM

## 2020-09-14 RX ORDER — ACETAMINOPHEN 650 MG/1
650 SUPPOSITORY RECTAL EVERY 6 HOURS PRN
Status: DISCONTINUED | OUTPATIENT
Start: 2020-09-14 | End: 2020-09-18 | Stop reason: HOSPADM

## 2020-09-14 RX ORDER — ATORVASTATIN CALCIUM 40 MG/1
40 TABLET, FILM COATED ORAL DAILY
Status: DISCONTINUED | OUTPATIENT
Start: 2020-09-14 | End: 2020-09-18 | Stop reason: HOSPADM

## 2020-09-14 RX ORDER — CHOLECALCIFEROL (VITAMIN D3) 125 MCG
500 CAPSULE ORAL DAILY
Status: DISCONTINUED | OUTPATIENT
Start: 2020-09-15 | End: 2020-09-18 | Stop reason: HOSPADM

## 2020-09-14 RX ORDER — LEVOTHYROXINE SODIUM 0.12 MG/1
125 TABLET ORAL
Status: DISCONTINUED | OUTPATIENT
Start: 2020-09-14 | End: 2020-09-18 | Stop reason: HOSPADM

## 2020-09-14 RX ORDER — POLYETHYLENE GLYCOL 3350 17 G/17G
17 POWDER, FOR SOLUTION ORAL DAILY PRN
Status: DISCONTINUED | OUTPATIENT
Start: 2020-09-14 | End: 2020-09-18 | Stop reason: HOSPADM

## 2020-09-14 RX ORDER — PROMETHAZINE HYDROCHLORIDE 25 MG/1
12.5 TABLET ORAL EVERY 6 HOURS PRN
Status: DISCONTINUED | OUTPATIENT
Start: 2020-09-14 | End: 2020-09-18 | Stop reason: HOSPADM

## 2020-09-14 RX ORDER — ACETAMINOPHEN 325 MG/1
650 TABLET ORAL EVERY 6 HOURS PRN
Status: DISCONTINUED | OUTPATIENT
Start: 2020-09-14 | End: 2020-09-18 | Stop reason: HOSPADM

## 2020-09-14 RX ORDER — ONDANSETRON 2 MG/ML
4 INJECTION INTRAMUSCULAR; INTRAVENOUS EVERY 6 HOURS PRN
Status: DISCONTINUED | OUTPATIENT
Start: 2020-09-14 | End: 2020-09-18 | Stop reason: HOSPADM

## 2020-09-14 RX ADMIN — VANCOMYCIN HYDROCHLORIDE 1250 MG: 10 INJECTION, POWDER, LYOPHILIZED, FOR SOLUTION INTRAVENOUS at 13:08

## 2020-09-14 RX ADMIN — CEFEPIME 2 G: 2 INJECTION, POWDER, FOR SOLUTION INTRAVENOUS at 13:08

## 2020-09-14 RX ADMIN — Medication 10 ML: at 21:12

## 2020-09-14 RX ADMIN — ACETAMINOPHEN 650 MG: 325 TABLET ORAL at 21:12

## 2020-09-14 RX ADMIN — ACETAMINOPHEN 650 MG: 325 TABLET ORAL at 15:15

## 2020-09-14 RX ADMIN — ENOXAPARIN SODIUM 40 MG: 40 INJECTION SUBCUTANEOUS at 15:14

## 2020-09-14 ASSESSMENT — PAIN SCALES - GENERAL
PAINLEVEL_OUTOF10: 0
PAINLEVEL_OUTOF10: 4
PAINLEVEL_OUTOF10: 0
PAINLEVEL_OUTOF10: 5
PAINLEVEL_OUTOF10: 8

## 2020-09-14 ASSESSMENT — PAIN DESCRIPTION - LOCATION
LOCATION: CHEST;FOOT
LOCATION: GENERALIZED

## 2020-09-14 ASSESSMENT — PAIN DESCRIPTION - FREQUENCY: FREQUENCY: CONTINUOUS

## 2020-09-14 ASSESSMENT — PAIN - FUNCTIONAL ASSESSMENT: PAIN_FUNCTIONAL_ASSESSMENT: PREVENTS OR INTERFERES SOME ACTIVE ACTIVITIES AND ADLS

## 2020-09-14 ASSESSMENT — PAIN DESCRIPTION - PAIN TYPE
TYPE: ACUTE PAIN;CHRONIC PAIN;NEUROPATHIC PAIN
TYPE: ACUTE PAIN

## 2020-09-14 ASSESSMENT — PAIN DESCRIPTION - ONSET
ONSET: ON-GOING
ONSET: ON-GOING

## 2020-09-14 ASSESSMENT — PAIN DESCRIPTION - DESCRIPTORS: DESCRIPTORS: ACHING

## 2020-09-14 NOTE — ED PROVIDER NOTES
Surgical History:   Procedure Laterality Date    ABDOMEN SURGERY      gallbladder removed    CHOLECYSTECTOMY      COLONOSCOPY      EYE SURGERY Right     TORN RETINA, LASER    HERNIA REPAIR      HIP ARTHROPLASTY Right 2016    HIP FRACTURE SURGERY Right     JOINT REPLACEMENT      OTHER SURGICAL HISTORY  2011    right hip IM nailing    SHOULDER SURGERY Right rotater cuff    TONGUE BIOPSY  10/2010     Family History   Problem Relation Age of Onset    Cancer Mother     Depression Mother     High Blood Pressure Mother     Heart Disease Father     Stroke Maternal Grandmother     Diabetes Other      Social History     Socioeconomic History    Marital status:      Spouse name: Not on file    Number of children: Not on file    Years of education: Not on file    Highest education level: Not on file   Occupational History    Not on file   Social Needs    Financial resource strain: Not on file    Food insecurity     Worry: Not on file     Inability: Not on file    Transportation needs     Medical: Not on file     Non-medical: Not on file   Tobacco Use    Smoking status: Former Smoker     Years: 40.00     Last attempt to quit: 1971     Years since quittin.4    Smokeless tobacco: Never Used   Substance and Sexual Activity    Alcohol use:  Yes     Alcohol/week: 0.0 standard drinks     Comment: x1 half glass before bed    Drug use: No    Sexual activity: Yes     Partners: Female   Lifestyle    Physical activity     Days per week: Not on file     Minutes per session: Not on file    Stress: Not on file   Relationships    Social connections     Talks on phone: Not on file     Gets together: Not on file     Attends Congregation service: Not on file     Active member of club or organization: Not on file     Attends meetings of clubs or organizations: Not on file     Relationship status: Not on file    Intimate partner violence     Fear of current or ex partner: Not on file Emotionally abused: Not on file     Physically abused: Not on file     Forced sexual activity: Not on file   Other Topics Concern    Not on file   Social History Narrative    Not on file     No current facility-administered medications for this encounter. Current Outpatient Medications   Medication Sig Dispense Refill    Probiotic Product (PRO-BIOTIC BLEND) CAPS Take 1 tablet by mouth daily      simvastatin (ZOCOR) 40 MG tablet Take 40 mg by mouth daily      vitamin B-12 (CYANOCOBALAMIN) 500 MCG tablet Take 500 mcg by mouth daily      omeprazole (PRILOSEC) 20 MG capsule Take 20 mg by mouth daily.  levothyroxine (SYNTHROID) 88 MCG tablet Take 125 mcg by mouth daily.  Coenzyme Q10 (COQ10 PO) Take  by mouth. Allergies   Allergen Reactions    Tamsulosin Hcl Other (See Comments)    Tetanus Toxoids Nausea And Vomiting       REVIEW OF SYSTEMS  10 systems reviewed, pertinent positives per HPI otherwise noted to be negative. PHYSICAL EXAM  /77   Pulse 105   Temp 101.3 °F (38.5 °C) (Oral)   Resp 18   Ht 6' 2\" (1.88 m)   Wt 152 lb (68.9 kg)   SpO2 91%   BMI 19.52 kg/m²    GENERAL APPEARANCE: Awake and alert. Cooperative. In no acute distress. HENT: Normocephalic. Atraumatic. Mucous membranes are dry  NECK: Supple. Full range of motion of the neck without stiffness or pain. EYES: PERRL. EOM's grossly intact. HEART/CHEST: RRR. No murmurs. LUNGS: Respirations unlabored. Coarse breath sounds bilaterally. Speaking comfortably in full sentences. ABDOMEN: No tenderness. Soft. Non-distended. No masses. No organomegaly. No guarding or rebound. MUSCULOSKELETAL: No extremity edema. Compartments soft. No deformity. No tenderness in the extremities. All extremities neurovascularly intact. SKIN: Warm and dry. No acute rashes. NEUROLOGICAL: Alert and oriented. CN's 2-12 intact. No gross facial drooping. Strength 5/5, sensation intact.    PSYCHIATRIC: Normal mood and affect. LABS  I have reviewed all labs for this visit.    Results for orders placed or performed during the hospital encounter of 09/14/20   CBC Auto Differential   Result Value Ref Range    WBC 7.0 4.0 - 11.0 K/uL    RBC 4.92 4.20 - 5.90 M/uL    Hemoglobin 13.0 (L) 13.5 - 17.5 g/dL    Hematocrit 39.5 (L) 40.5 - 52.5 %    MCV 80.3 80.0 - 100.0 fL    MCH 26.5 26.0 - 34.0 pg    MCHC 33.0 31.0 - 36.0 g/dL    RDW 14.5 12.4 - 15.4 %    Platelets 992 561 - 766 K/uL    MPV 6.8 5.0 - 10.5 fL    Neutrophils % 84.9 %    Lymphocytes % 4.6 %    Monocytes % 6.3 %    Eosinophils % 3.9 %    Basophils % 0.3 %    Neutrophils Absolute 5.9 1.7 - 7.7 K/uL    Lymphocytes Absolute 0.3 (L) 1.0 - 5.1 K/uL    Monocytes Absolute 0.4 0.0 - 1.3 K/uL    Eosinophils Absolute 0.3 0.0 - 0.6 K/uL    Basophils Absolute 0.0 0.0 - 0.2 K/uL   Comprehensive Metabolic Panel w/ Reflex to MG   Result Value Ref Range    Sodium 136 136 - 145 mmol/L    Potassium reflex Magnesium 4.7 3.5 - 5.1 mmol/L    Chloride 97 (L) 99 - 110 mmol/L    CO2 27 21 - 32 mmol/L    Anion Gap 12 3 - 16    Glucose 96 70 - 99 mg/dL    BUN 14 7 - 20 mg/dL    CREATININE 0.9 0.8 - 1.3 mg/dL    GFR Non-African American >60 >60    GFR African American >60 >60    Calcium 9.7 8.3 - 10.6 mg/dL    Total Protein 8.4 (H) 6.4 - 8.2 g/dL    Alb 3.9 3.4 - 5.0 g/dL    Albumin/Globulin Ratio 0.9 (L) 1.1 - 2.2    Total Bilirubin 0.4 0.0 - 1.0 mg/dL    Alkaline Phosphatase 62 40 - 129 U/L    ALT 9 (L) 10 - 40 U/L    AST 16 15 - 37 U/L    Globulin 4.5 g/dL   Troponin   Result Value Ref Range    Troponin 0.15 (H) <0.01 ng/mL   Urinalysis, reflex to microscopic   Result Value Ref Range    Color, UA Yellow Straw/Yellow    Clarity, UA Clear Clear    Glucose, Ur Negative Negative mg/dL    Bilirubin Urine Negative Negative    Ketones, Urine 15 (A) Negative mg/dL    Specific Gravity, UA 1.015 1.005 - 1.030    Blood, Urine MODERATE (A) Negative    pH, UA 7.0 5.0 - 8.0    Protein, UA Negative Negative mg/dL Urobilinogen, Urine 0.2 <2.0 E.U./dL    Nitrite, Urine Negative Negative    Leukocyte Esterase, Urine Negative Negative    Microscopic Examination YES     Urine Type NotGiven    Microscopic Urinalysis   Result Value Ref Range    Mucus, UA 1+ (A) None Seen /LPF    WBC, UA 3-5 0 - 5 /HPF    RBC, UA 21-50 (A) 0 - 4 /HPF    Epithelial Cells, UA 2-5 0 - 5 /HPF   Lactic acid, plasma   Result Value Ref Range    Lactic Acid 0.6 0.4 - 2.0 mmol/L   Procalcitonin   Result Value Ref Range    Procalcitonin 0.15 0.00 - 0.15 ng/mL   EKG 12 Lead   Result Value Ref Range    Ventricular Rate 95 BPM    Atrial Rate 95 BPM    P-R Interval 144 ms    QRS Duration 90 ms    Q-T Interval 352 ms    QTc Calculation (Bazett) 442 ms    P Axis 19 degrees    R Axis 17 degrees    T Axis 64 degrees    Diagnosis       Normal sinus rhythmBaseline artifactNonspecific ST abnormalityWhen compared with ECG of 14-SEP-2020 10:12, (unconfirmed)No significant change was foundConfirmed by APOLONIA SELF MD ([id number]) on 9/14/2020 11:51:33 AM       ECG  The Ekg interpreted by me shows  normal sinus rhythm with a rate of 95  Axis is   Normal  QTc is  normal  Intervals and Durations are unremarkable. ST Segments: nonspecific changes  No significant change from prior EKG dated 8/27/20    RADIOLOGY    XR CHEST PORTABLE   Final Result   Slight increase in bibasilar infiltrates when compared to September 8, 2020              ED COURSE  Patient seen and evaluated. Old records reviewed. Labs and imaging reviewed and results discussed with patient. Overall patient in no acute distress, presenting for fever, cough, and shortness of breath. History of present illness significant for pneumonia and sepsis. Physical exam remarkable for coarse breath sounds bilaterally and tachycardia.     Differential diagnosis includes but is not limited to: Pneumonia, pneumothorax, pleural effusion, ACS, CHF exacerbation, COPD exacerbation, asthma, pulmonary embolism, arrhythmia, anemia     EKG, laboratory studies, and chest x-ray obtained. During the patient's ED course, the patient was given:  Medications   cefepime (MAXIPIME) 2 g IVPB minibag (0 g Intravenous Stopped 9/14/20 1338)   vancomycin (VANCOCIN) 1,250 mg in dextrose 5 % 250 mL IVPB (0 mg Intravenous Stopped 9/14/20 1438)        MDM  This x-ray showed evidence of pneumonia. More importantly, it showed that it has progressed since his repeat chest x-ray on 9/8. This paired with his symptoms of fever, cough, and shortness of breath make pneumonia unlikely cause of his symptoms. Patient placed on antibiotics. Coronavirus swab obtained symptoms could also be related to coronavirus. Patient at this time is not requiring oxygen. At this time I have lower concern for pulmonary embolism. Patient has had a previous blood clot but it was when he was having active malignancy. Patient has been in remission for 11 years. Patient denies other risk factors for pulmonary embolism. Patient does not have any evidence of DVT on exam.  Patient is moderate risk on Wells criteria. At this time, do feel that patient more likely has pneumonia as he does have fever and infiltrates on chest x-ray as well as recent diagnosis of pneumonia. However, if patient has hemodynamic instability or lack of improvement on antibiotics, further evaluation for pulmonary embolism would be reasonable. Furthermore, if patient is coronavirus positive, this would place him at high risk for PE and further consideration of pulmonary embolism would be appropriate. EKG showed no evidence of ischemia. Troponin was elevated, stable from previous admission. Patient is not complaining of chest pain. Suspect that his symptoms are related to pneumonia rather than ACS. P troponin downtrending. BNP was within normal limits, low suspicion for CHF. Chest x-ray showed no evidence of pleural effusion or pneumothorax.     Patient does not have a history of COPD. He has no wheezing on exam.    Low suspicion for aortic pathology as patient is not having any abdominal pain. Patient is not hypertensive. Patient has strong pulses in the bilateral radial and femoral arteries. Pain was not maximal at onset. There is no evidence of mediastinal widening on chest x-ray. Patient does not have any neurologic deficits. The evidence indicates that the patient is very low risk for Aortic Dissection and this is consistent with my clinical intuition. The risk of further workup or hospitalization for aortic dissection is likely higher than the risk of the patient having an aortic dissection. Urinalysis does show evidence of blood, no evidence of infection. The cause of the patient's hematuria should be futher investigated on an inpatient basis. No significant electrolyte abnormalities or evidence of kidney dysfunction. No leukocytosis, anemia, or thrombocytopenia. Based on results of work-up, I am concerned for pneumonia. Patient's age as well as his recent sepsis from pneumonia with recent antibiotics make me consider this complicated high risk pneumonia. At this time, do feel the patient requires admission for further work-up and management. Patient does not show improvement, evaluation for other etiologies such as pulmonary embolism would be appropriate. Discussed the patient with hospital team.    CLINICAL IMPRESSION  1. Pneumonia due to organism        Blood pressure 129/77, pulse 105, temperature 101.3 °F (38.5 °C), temperature source Oral, resp. rate 18, height 6' 2\" (1.88 m), weight 152 lb (68.9 kg), SpO2 91 %. Benito King was admitted in stable condition. DISCLAIMER: This chart was created using Dragon dictation software. Efforts were made by me to ensure accuracy, however some errors may be present due to limitations of this technology and occasionally words are not transcribed correctly.             Gen Garsia MD  09/14/20 2030

## 2020-09-14 NOTE — PROGRESS NOTES
Patient admitted to room 0206-01, alert and oriented, in good spirits. VSS. Oriented to room, call light within reach, bed in low position and bed brake on.

## 2020-09-14 NOTE — H&P
Hospital Medicine History & Physical      PCP: Romayne Skiff, DO    Date of Admission: 9/14/2020    Date of Service: Pt seen/examined on 9/14/2020    Chief Complaint:    Chief Complaint   Patient presents with    Cough    Fever     recent admit for pneumonia     History Of Present Illness: The patient is a 76 y.o. male with BPH, prior DVT, GERD, hyperlipidemia, hypothyroidism who presented to [facility] ED with complaint of fever and shortness of breath. Patient reports that he was recently admitted for pneumonia and was discharged home on Augmentin. He states he took the full course of the antibiotics but never felt any better. He states over the last week he has been feeling worse with increased shortness of breath and cough does report that it is a nonproductive cough. He states he does have silent aspiration has been noticing some coughing with eating at home but has been trying to use the techniques taught to him by speech therapy during his last admission. He reports fevers up to 100 at home. He states he did have a repeat chest x-ray last week which showed no significant improvement in his pneumonia despite completing his antibiotics. Patient denies any associated chest pain. He has been out in public but has been wearing a mask. He has not had any known covert exposures. He does not use supplemental O2 at home.     Past Medical History:        Diagnosis Date    Arthritis     Benign hypertrophy of prostate     Cancer ([address]     growth on tongue    Dry mouth     s/p radiation treatment    DVT, lower extremity ([address]     5/2011    GERD (gastroesophageal reflux disease)     acid reflux    Hip fracture ([address]     5/2011, RIGHT    Hx of blood clots     Hyperlipidemia     Pneumonia 6/2/2011    Prolonged emergence from general anesthesia     Thyroid disease        Past Surgical History:        Procedure Laterality Date    ABDOMEN SURGERY      gallbladder removed    CHOLECYSTECTOMY      Hematological: Negative for adenopathy   Psychiatric/Behavorial: Negative for anxiety    PHYSICAL EXAM:    /77   Pulse 105   Temp 101.3 °F (38.5 °C) (Oral)   Resp 18   Ht 6' 2\" (1.88 m)   Wt 152 lb (68.9 kg)   SpO2 91%   BMI 19.52 kg/m²     Gen: No distress. Alert. Eyes: No conjunctival injection. ENT: No discharge. Pharynx clear. Neck:  Trachea midline. Resp: No accessory muscle use. No crackles. + Wheezes. No rhonchi. Diminished breath sounds in bilateral bases  CV: Regular rate. Regular rhythm. No murmur. No rub. No edema. Capillary Refill: Brisk,< 3 seconds   Peripheral Pulses: +2 palpable, equal bilaterally   GI: Non-tender. Non-distendedNormal bowel sounds. Skin: Warm and dry. M/S: No cyanosis. No joint deformity. No clubbing. Neuro: Awake. Grossly nonfocal    Psych: Oriented x 3. No anxiety or agitation.      CBC:   Recent Labs     09/14/20  1104   WBC 7.0   HGB 13.0*   HCT 39.5*   MCV 80.3        BMP:   Recent Labs     09/14/20  1104      K 4.7   CL 97*   CO2 27   BUN 14   CREATININE 0.9     LIVER PROFILE:   Recent Labs     09/14/20  1104   AST 16   ALT 9*   BILITOT 0.4   ALKPHOS 62       Recent Labs     09/14/20  1323   COLORU Yellow   PHUR 7.0   WBCUA 3-5   RBCUA 21-50*   MUCUS 1+*   CLARITYU Clear   SPECGRAV 1.015   LEUKOCYTESUR Negative   UROBILINOGEN 0.2   BILIRUBINUR Negative   BLOODU MODERATE*   GLUCOSEU Negative     CARDIAC ENZYMES  Recent Labs     09/14/20  1104   TROPONINI 0.15*       CULTURES  Blood Cx: pending   Resp Cx: pending   COVID-19: pending     EKG:  I have reviewed the EKG with the following interpretation:   Normal sinus rhythm  Baseline artifact  Nonspecific ST abnormality    RADIOLOGY  XR CHEST PORTABLE   Final Result   Slight increase in bibasilar infiltrates when compared to September 8, 2020           Pertinent previous results reviewed   None     ASSESSMENT/PLAN:  HCAP vs aspiration PNA  SOB  Acute Hypoxic RF  -Recent admission for pneumonia, suspected to be secondary to dysphasia  -Was treated with Vanco and Zosyn and discharged home on Augmentin  -Now with worsening bibasilar infiltrates on chest x-ray, cover for gram negative and possible MRSA, continue Vanc/Cefepime D#1  - SLP evaluation after COVID results   - Pulmonology consult   - Resp Cx  - CAN NOT RULE OUT COVID-19   - DROPLET + PRECAUTIONS   - COVID-19 TESTING PENDING     Chronically Elevated troponin  -Troponin on admission 0.15  - Baseline troponin ~0.13  -Telemetry monitor  -EKG with no acute changes   -Trend troponin    Hematuria  - monitor   - Can follow up OP for repeat labs     Hypothyroidism  - Continue Synthroid     Dysphagia  - SLP when COVID is ruled out   - Hx of silent aspiration    Hx of head and neck cancer/lymphoepithelial carcinoma  - s/p chemo and radiation     DVT Prophylaxis: Lovenox  Diet: DIET GENERAL;   Code Status: Full Code      Marianne Ch PA-C  9/14/2020 6:03 PM

## 2020-09-14 NOTE — TELEPHONE ENCOUNTER
Writer contacted Dr. Blayne Carcamo,  ED provider to inform of 30 day readmission risk. ED provider informed writer of readmission.

## 2020-09-14 NOTE — CONSULTS
Patient is being seen at the request of Danilo Keane for a consultation for worsening pneumonia    HISTORY OF PRESENT ILLNESS: This is a 70-year-old male with history of head and neck cancer who was recently admitted 8/26/2020-8/28/2020 with suspected aspiration pneumonia treated with Zosyn and vancomycin and discharged with p.o. Augmentin. While admitted, he was seen by speech therapy and a FEES was performed with a recommendation for n.p.o. status. The patient refused that recommendation. He represented to the emergency department on 9/14/2020 with at least a one-week history of not feeling well, he feels he did not recover completely from his prior pneumonia. He then developed a 1 day history of acute onset fever, at home, T-max 101, associated with shortness of breath, similar to prior pneumonia. He does not feel better after treatment in the emergency department with antibiotics.     PAST MEDICAL HISTORY:  Past Medical History:   Diagnosis Date    Arthritis     Benign hypertrophy of prostate     Cancer ([address]     growth on tongue    Dry mouth     s/p radiation treatment    DVT, lower extremity ([address]     5/2011    GERD (gastroesophageal reflux disease)     acid reflux    Hip fracture ([address]     5/2011, RIGHT    Hx of blood clots     Hyperlipidemia     Pneumonia 6/2/2011    Prolonged emergence from general anesthesia     Thyroid disease      PAST SURGICAL HISTORY:  Past Surgical History:   Procedure Laterality Date    ABDOMEN SURGERY      gallbladder removed    CHOLECYSTECTOMY      COLONOSCOPY      EYE SURGERY Right     TORN RETINA, LASER    HERNIA REPAIR      HIP ARTHROPLASTY Right 08/08/2016    HIP FRACTURE SURGERY Right     JOINT REPLACEMENT      OTHER SURGICAL HISTORY  05/18/2011    right hip IM nailing    SHOULDER SURGERY Right rotater cuff    TONGUE BIOPSY  10/2010       FAMILY HISTORY:  family history includes Cancer in his mother; Depression in his mother; Diabetes in an other family member; Heart Disease in his father; High Blood Pressure in his mother; Stroke in his maternal grandmother. SOCIAL HISTORY:   reports that he quit smoking about 49 years ago. He quit after 40.00 years of use. He has never used smokeless tobacco.    Scheduled Meds:   levothyroxine  125 mcg Oral QAM AC    [START ON 9/15/2020] pantoprazole  40 mg Oral QAM AC    atorvastatin  40 mg Oral Daily    [START ON 9/15/2020] vitamin B-12  500 mcg Oral Daily    sodium chloride flush  10 mL Intravenous 2 times per day    enoxaparin  40 mg Subcutaneous Daily    [START ON 9/15/2020] cefepime  2 g Intravenous Q12H    [START ON 9/15/2020] vancomycin  1,000 mg Intravenous Q12H     Continuous Infusions:    PRN Meds:  sodium chloride flush, acetaminophen **OR** acetaminophen, polyethylene glycol, promethazine **OR** ondansetron    ALLERGIES:  Patient is allergic to tamsulosin hcl and tetanus toxoids. REVIEW OF SYSTEMS:  Constitutional: + for fever  HENT: Negative for sore throat  Eyes: Negative for redness   Respiratory: + for dyspnea, cough  Cardiovascular: Negative for chest pain  Gastrointestinal: Negative for vomiting, diarrhea   Genitourinary: Negative for hematuria   Musculoskeletal: Negative for arthralgias   Skin: Negative for rash  Neurological: Negative for syncope  Hematological: Negative for adenopathy  Psychiatric/Behavorial: Negative for anxiety    PHYSICAL EXAM:  Blood pressure 129/77, pulse 105, temperature 102.1 °F (38.9 °C), temperature source Oral, resp. rate 18, height 6' 2\" (1.88 m), weight 152 lb (68.9 kg), SpO2 91 %.' on RA  Gen: No distress. Eyes: PERRL. No sclera icterus. No conjunctival injection. ENT: No discharge. Pharynx clear. Neck: Trachea midline. No obvious mass. Resp: No accessory muscle use. No crackles. No wheezes. + rhonchi. No dullness on percussion. CV: Regular rate. Regular rhythm. No murmur or rub. No edema. Peripheral pulses are 2+.   Capillary refill is less than 3 seconds. GI: Non-tender. Non-distended. No hernia. Skin: Warm and dry. No nodule on exposed extremities. Lymph: No cervical LAD. No supraclavicular LAD. M/S: No cyanosis. No joint deformity. No clubbing. Neuro: Awake. Alert. Moves all four extremities. Psych: Oriented x 3. No anxiety. LABS:  CBC:   Recent Labs     09/14/20  1104   WBC 7.0   HGB 13.0*   HCT 39.5*   MCV 80.3        BMP:   Recent Labs     09/14/20  1104      K 4.7   CL 97*   CO2 27   BUN 14   CREATININE 0.9     LIVER PROFILE:   Recent Labs     09/14/20  1104   AST 16   ALT 9*   BILITOT 0.4   ALKPHOS 62     PT/INR: No results for input(s): PROTIME, INR in the last 72 hours. APTT: No results for input(s): APTT in the last 72 hours. UA:  Recent Labs     09/14/20  1323   COLORU Yellow   PHUR 7.0   WBCUA 3-5   RBCUA 21-50*   MUCUS 1+*   CLARITYU Clear   SPECGRAV 1.015   LEUKOCYTESUR Negative   UROBILINOGEN 0.2   BILIRUBINUR Negative   BLOODU MODERATE*   GLUCOSEU Negative     No results for input(s): PHART, KBA3OCI, PO2ART in the last 72 hours. Chest imaging was reviewed by me and showed   9/14/2020 CXR bibasilar infiltrates    ASSESSMENT:  · Fever  · Recurrent aspiration pneumonia  · H/O head and neck cancer/lymphoepithelial carcinoma  · CAD  · GERD  · Hypothyroid  · Remote history of tobacco abuse    PLAN:  · Cefepime and vancomycin D#1. Will need to complete course of IV antibiotics  · Monitoring of vancomycin levels to prevent toxicity. · Speech therapy to see  · Pulmonary f/u will be with Dr. Stacy Acosta.

## 2020-09-14 NOTE — PROGRESS NOTES
Pharmacy Note  Vancomycin Consult    Edil Andrews is a 76 y.o. male started on Vancomycin for pna; consult received from Holland Valenzuela to manage therapy. Also receiving the following antibiotics: Cefepime. Patient Active Problem List   Diagnosis    Pneumonia due to organism    Fever and chills    SOB (shortness of breath)    BPH (benign prostatic hyperplasia)    GERD (gastroesophageal reflux disease)    Benign prostatic hyperplasia    Hyperlipidemia    DVT, lower extremity (HCC)    Hip fracture (HCC)    Dry mouth    Carcinoma of base of tongue (HCC)    Status post total replacement of right hip    NSTEMI (non-ST elevated myocardial infarction) ([facility] [address]    Septicemia (HCC)    Thyroid disease    Moderate protein-calorie malnutrition (HCC)    Elevated troponin    Pneumonia       Allergies:  Tamsulosin hcl and Tetanus toxoids     Temp max:     Recent Labs     09/14/20  1104   BUN 14       Recent Labs     09/14/20  1104   CREATININE 0.9       Recent Labs     09/14/20  1104   WBC 7.0       No intake or output data in the 24 hours ending 09/14/20 1515    Culture Date      Source                       Results  NGTD    Ht Readings from Last 1 Encounters:   09/14/20 6' 2\" (1.88 m)        Wt Readings from Last 1 Encounters:   09/14/20 152 lb (68.9 kg)         Body mass index is 19.52 kg/m². Estimated Creatinine Clearance: 69 mL/min (based on SCr of 0.9 mg/dL).     Goal Trough Level: 15-18 mcg/mL    Assessment/Plan:  Vanc 1250mg times one given in ED, will start Vanc 1gm q12h and get trough on 9/16 at [address] 9/14/20203:17 PM  .

## 2020-09-14 NOTE — PROGRESS NOTES
Admit to med-surg  Worsening pneumonia  COVID rule out    [facility]  9/14/2020

## 2020-09-15 LAB
A/G RATIO: 0.9 (ref 1.1–2.2)
ALBUMIN SERPL-MCNC: 3.5 G/DL (ref 3.4–5)
ALP BLD-CCNC: 47 U/L (ref 40–129)
ALT SERPL-CCNC: 9 U/L (ref 10–40)
ANION GAP SERPL CALCULATED.3IONS-SCNC: 10 MMOL/L (ref 3–16)
AST SERPL-CCNC: 13 U/L (ref 15–37)
BILIRUB SERPL-MCNC: 0.4 MG/DL (ref 0–1)
BUN BLDV-MCNC: 17 MG/DL (ref 7–20)
CALCIUM SERPL-MCNC: 8.9 MG/DL (ref 8.3–10.6)
CHLORIDE BLD-SCNC: 97 MMOL/L (ref 99–110)
CO2: 28 MMOL/L (ref 21–32)
CREAT SERPL-MCNC: 1 MG/DL (ref 0.8–1.3)
GFR AFRICAN AMERICAN: >60
GFR NON-AFRICAN AMERICAN: >60
GLOBULIN: 4 G/DL
GLUCOSE BLD-MCNC: 99 MG/DL (ref 70–99)
HCT VFR BLD CALC: 36.8 % (ref 40.5–52.5)
HEMOGLOBIN: 12.1 G/DL (ref 13.5–17.5)
MCH RBC QN AUTO: 26.6 PG (ref 26–34)
MCHC RBC AUTO-ENTMCNC: 32.7 G/DL (ref 31–36)
MCV RBC AUTO: 81.2 FL (ref 80–100)
PDW BLD-RTO: 14.7 % (ref 12.4–15.4)
PLATELET # BLD: 212 K/UL (ref 135–450)
PMV BLD AUTO: 6.6 FL (ref 5–10.5)
POTASSIUM REFLEX MAGNESIUM: 4.3 MMOL/L (ref 3.5–5.1)
RBC # BLD: 4.54 M/UL (ref 4.2–5.9)
SARS-COV-2, PCR: NOT DETECTED
SODIUM BLD-SCNC: 135 MMOL/L (ref 136–145)
TOTAL PROTEIN: 7.5 G/DL (ref 6.4–8.2)
TROPONIN: 0.15 NG/ML
WBC # BLD: 8.6 K/UL (ref 4–11)

## 2020-09-15 PROCEDURE — 2580000003 HC RX 258: Performed by: INTERNAL MEDICINE

## 2020-09-15 PROCEDURE — 1200000000 HC SEMI PRIVATE

## 2020-09-15 PROCEDURE — 80053 COMPREHEN METABOLIC PANEL: CPT

## 2020-09-15 PROCEDURE — 99233 SBSQ HOSP IP/OBS HIGH 50: CPT | Performed by: INTERNAL MEDICINE

## 2020-09-15 PROCEDURE — 6360000002 HC RX W HCPCS: Performed by: INTERNAL MEDICINE

## 2020-09-15 PROCEDURE — 99232 SBSQ HOSP IP/OBS MODERATE 35: CPT | Performed by: INTERNAL MEDICINE

## 2020-09-15 PROCEDURE — 6370000000 HC RX 637 (ALT 250 FOR IP): Performed by: NURSE PRACTITIONER

## 2020-09-15 PROCEDURE — 36415 COLL VENOUS BLD VENIPUNCTURE: CPT

## 2020-09-15 PROCEDURE — 85027 COMPLETE CBC AUTOMATED: CPT

## 2020-09-15 PROCEDURE — 2580000003 HC RX 258: Performed by: NURSE PRACTITIONER

## 2020-09-15 PROCEDURE — 6360000002 HC RX W HCPCS: Performed by: NURSE PRACTITIONER

## 2020-09-15 PROCEDURE — 84484 ASSAY OF TROPONIN QUANT: CPT

## 2020-09-15 RX ADMIN — Medication 10 ML: at 09:32

## 2020-09-15 RX ADMIN — ENOXAPARIN SODIUM 40 MG: 40 INJECTION SUBCUTANEOUS at 18:50

## 2020-09-15 RX ADMIN — VANCOMYCIN HYDROCHLORIDE 1000 MG: 1 INJECTION, POWDER, LYOPHILIZED, FOR SOLUTION INTRAVENOUS at 13:57

## 2020-09-15 RX ADMIN — ACETAMINOPHEN 650 MG: 325 TABLET ORAL at 12:52

## 2020-09-15 RX ADMIN — ACETAMINOPHEN 650 MG: 325 TABLET ORAL at 18:50

## 2020-09-15 RX ADMIN — LEVOTHYROXINE SODIUM 125 MCG: 125 TABLET ORAL at 06:48

## 2020-09-15 RX ADMIN — CYANOCOBALAMIN TAB 500 MCG 500 MCG: 500 TAB at 09:32

## 2020-09-15 RX ADMIN — VANCOMYCIN HYDROCHLORIDE 1000 MG: 1 INJECTION, POWDER, LYOPHILIZED, FOR SOLUTION INTRAVENOUS at 01:20

## 2020-09-15 RX ADMIN — CEFEPIME 2 G: 2 INJECTION, POWDER, FOR SOLUTION INTRAVENOUS at 12:45

## 2020-09-15 RX ADMIN — MEROPENEM 1 G: 1 INJECTION, POWDER, FOR SOLUTION INTRAVENOUS at 21:19

## 2020-09-15 RX ADMIN — ATORVASTATIN CALCIUM 40 MG: 40 TABLET, FILM COATED ORAL at 09:32

## 2020-09-15 RX ADMIN — PANTOPRAZOLE SODIUM 40 MG: 40 TABLET, DELAYED RELEASE ORAL at 06:48

## 2020-09-15 RX ADMIN — Medication 10 ML: at 12:45

## 2020-09-15 RX ADMIN — CEFEPIME 2 G: 2 INJECTION, POWDER, FOR SOLUTION INTRAVENOUS at 01:20

## 2020-09-15 ASSESSMENT — PAIN SCALES - GENERAL
PAINLEVEL_OUTOF10: 0
PAINLEVEL_OUTOF10: 0

## 2020-09-15 NOTE — PROGRESS NOTES
Progress Note    Admit Date:  9/14/2020       pt with oral CA, S/P Chemo/ XRT . with chronic aspiration. refuses NPO recommendation     Subjective:  Mr. Olivier Rosario is spiking fevers to 101.7 °F.   complains of fevers and chills    cough and shortness of breath better. Refuses n.p.o. status,  he is on a regular diet. Appears ill    Rule out COVID-19,  patient is on droplet plus precautions      Objective:   /70   Pulse 91   Temp 101.7 °F (38.7 °C) (Oral)   Resp 16   Ht 6' 2\" (1.88 m)   Wt 152 lb (68.9 kg)   SpO2 92%   BMI 19.52 kg/m²       Intake/Output Summary (Last 24 hours) at 9/15/2020 1333  Last data filed at 9/14/2020 2112  Gross per 24 hour   Intake 430 ml   Output 250 ml   Net 180 ml         Physical Exam:  General:  Awake, alert, NAD  Oriented X3   appears ill  Skin:  Warm and dry  Neck:  JVD absent. Neck supple  Chest: diminished breath sounds , mild rhonchi . No wheezes, rales  Cardiovascular:  RRR ,S1S2 normal  Abdomen:  Soft, non tender, non distended, BS +  Extremities:  No edema. Intact peripheral pulses. Brisk cap refill, < 2 secs  Neuro: non focal      Medications:   Scheduled Meds:   levothyroxine  125 mcg Oral QAM AC    pantoprazole  40 mg Oral QAM AC    atorvastatin  40 mg Oral Daily    vitamin B-12  500 mcg Oral Daily    sodium chloride flush  10 mL Intravenous 2 times per day    enoxaparin  40 mg Subcutaneous Daily    cefepime  2 g Intravenous Q12H    vancomycin  1,000 mg Intravenous Q12H       Continuous Infusions:      Data:  CBC:   Recent Labs     09/14/20  1104 09/15/20  0707   WBC 7.0 8.6   RBC 4.92 4.54   HGB 13.0* 12.1*   HCT 39.5* 36.8*   MCV 80.3 81.2   RDW 14.5 14.7    212     BMP:   Recent Labs     09/14/20  1104 09/15/20  0707    135*   K 4.7 4.3   CL 97* 97*   CO2 27 28   BUN 14 17   CREATININE 0.9 1.0     BNP: No results for input(s): BNP in the last 72 hours. PT/INR: No results for input(s): PROTIME, INR in the last 72 hours.   APTT: No results for input(s): APTT in the last 72 hours. CARDIAC ENZYMES:   Recent Labs     09/14/20  1104 09/14/20  1939 09/15/20  0155   TROPONINI 0.15* 0.14* 0.15*     FASTING LIPID PANEL:  Lab Results   Component Value Date    CHOL 98 08/10/2017    HDL 45 08/10/2017    TRIG 67 08/10/2017     LIVER PROFILE:   Recent Labs     09/14/20  1104 09/15/20  0707   AST 16 13*   ALT 9* 9*   BILITOT 0.4 0.4   ALKPHOS 62 47        Radiology  XR CHEST PORTABLE   Final Result   Slight increase in bibasilar infiltrates when compared to September 8, 2020               Assessment:  Active Problems:    Hypothyroidism    Pneumonia    Fever    Hematuria    Dysphagia  Resolved Problems:    * No resolved hospital problems.  *      Plan:    HCAP vs aspiration PNA  SOB  Acute Hypoxic RF  -Recent admission for pneumonia, suspected to be secondary to dysphagia  -Was treated with Vanco and Zosyn and discharged home on Augmentin  -Now with worsening bibasilar infiltrates on chest x-ray, cover for gram negative and possible MRSA  - continue Vanc/Cefepime D#2  - SLP evaluation after COVID results   - Pulmonology consult   - Resp Cx  - CAN NOT RULE OUT COVID-19   - DROPLET + PRECAUTIONS   - COVID-19 TESTING PENDING    Sepsis  - POA  - due to PNA       Chronically Elevated troponin  -Troponin on admission 0.15, 0.14, 0.15  - Baseline troponin ~0.13  -Telemetry monitor  -EKG with no acute changes   -Trended troponin     Hematuria  - monitor   - Can follow up OP for repeat labs      Hypothyroidism  - Continue Synthroid      Dysphagia  - SLP when COVID is ruled out   - Hx of silent aspiration     Hx of head and neck cancer/lymphoepithelial carcinoma  - s/p chemo and radiation      DVT Prophylaxis: Lovenox  Diet: DIET GENERAL;   Code Status: Full Code          Kristopher Simon MD 9/15/2020 1:33 PM

## 2020-09-15 NOTE — PROGRESS NOTES
Notified my charge nurse Thi Prieto RN, Norberto Avila, and clinical  [facility] of patient's MEWs score, and fever. Per Juventino Griffin, cannot give ibuprofen or aspirin for fever due to hematuria. Awaiting next Tylenol dose to treat fever.

## 2020-09-15 NOTE — PROGRESS NOTES
Physician Progress Note      Michaelle RICHEY #:                  [id number]  :                       1945  ADMIT DATE:       2020 9:42 AM  DISCH DATE:  RESPONDING  PROVIDER #:        Janes Justin MD          QUERY TEXT:    Pt admitted with aspiration pneumonitis versus HCAP possible MRSA pneumonia. Noted documentation of sepsis with unclear status in ER record as follows: \"History of present illness significant for pneumonia and sepsis. \"  If   possible, please document in progress notes and discharge summary:    The medical record reflects the following:  Risk Factors: 76 y.o. male with recurrent aspiration pneumonitis possible   HCAP/MRSA pneumonia, hx head and neck cancer with silent aspiration  Clinical Indicators: day 1 hr 110s, tmax 102.1, wbc 7 _->8.6, LA 0.6, pct 0.15  Treatment: blood cultures, IV antibiotics, serial labs, imaging, monitoring   and ongoing supportive care    Thank you,  Son Jimenez RN CDS  [phone]  Options provided:  -- Sepsis confirmed present on arrival  -- Sepsis ruled out  -- Other - I will add my own diagnosis  -- Disagree - Not applicable / Not valid  -- Disagree - Clinically unable to determine / Unknown  -- Refer to Clinical Documentation Reviewer    PROVIDER RESPONSE TEXT:    The diagnosis of sepsis was confirmed as present on arrival.    Query created by:  Kenya Delgado on 9/15/2020 1:46 PM      Electronically signed by:  Janes Justin MD 9/15/2020 1:50 PM

## 2020-09-15 NOTE — PROGRESS NOTES
Pulmonary Progress Note    CC: Fever    Subjective: Persistent fever, actually worse    IV line: Peripheral      Intake/Output Summary (Last 24 hours) at 9/15/2020 2023  Last data filed at 9/15/2020 1953  Gross per 24 hour   Intake 370 ml   Output 550 ml   Net -180 ml       Exam:   BP (!) 98/58   Pulse 88   Temp 100.6 °F (38.1 °C) (Oral)   Resp 17   Ht 6' 2\" (1.88 m)   Wt 152 lb (68.9 kg)   SpO2 92%   BMI 19.52 kg/m²  on room air  Gen: No distress. Alert. Eyes: PERRL. No sclera icterus. No conjunctival injection. ENT: No discharge. Pharynx clear. Neck: Trachea midline. Normal thyroid. Resp: No accessory muscle use. No crackles. No wheezes. Bilateral rhonchi. No dullness on percussion. CV: Regular rate. Regular rhythm. No murmur or rub. No edema. GI: Non-tender. Non-distended. No masses. No organomegaly. Normal bowel sounds. No hernia. Skin: Warm and dry. No nodule on exposed extremities. No rash on exposed extremities. Lymph: No cervical LAD. No supraclavicular LAD. M/S: No cyanosis. No joint deformity. No clubbing. Neuro: Awake. Moves all extremities. CN grossly intact. Psych: Oriented x 3. No anxiety or agitation.      Scheduled Meds:   levothyroxine  125 mcg Oral QAM AC    pantoprazole  40 mg Oral QAM AC    atorvastatin  40 mg Oral Daily    vitamin B-12  500 mcg Oral Daily    sodium chloride flush  10 mL Intravenous 2 times per day    enoxaparin  40 mg Subcutaneous Daily    cefepime  2 g Intravenous Q12H    vancomycin  1,000 mg Intravenous Q12H     Continuous Infusions:    PRN Meds:  sodium chloride flush, acetaminophen **OR** acetaminophen, polyethylene glycol, promethazine **OR** ondansetron    Labs:  CBC:   Recent Labs     09/14/20  1104 09/15/20  0707   WBC 7.0 8.6   HGB 13.0* 12.1*   HCT 39.5* 36.8*   MCV 80.3 81.2    212     BMP:   Recent Labs     09/14/20  1104 09/15/20  0707    135*   K 4.7 4.3   CL 97* 97*   CO2 27 28   BUN 14 17   CREATININE 0.9 1.0 LIVER PROFILE:   Recent Labs     09/14/20  1104 09/15/20  0707   AST 16 13*   ALT 9* 9*   BILITOT 0.4 0.4   ALKPHOS 62 47     PT/INR: No results for input(s): PROTIME, INR in the last 72 hours. APTT: No results for input(s): APTT in the last 72 hours. UA:  Recent Labs     09/14/20  1323   COLORU Yellow   PHUR 7.0   WBCUA 3-5   RBCUA 21-50*   MUCUS 1+*   CLARITYU Clear   SPECGRAV 1.015   LEUKOCYTESUR Negative   UROBILINOGEN 0.2   BILIRUBINUR Negative   BLOODU MODERATE*   GLUCOSEU Negative     No results for input(s): PHART, BDH2EQP, PO2ART in the last 72 hours. Cultures:   9/14/2020 SARS-CoV-2 negative  9/14/2020 blood no growth to date  Unable to bring up sputum    Films:  9/14/2020 CXR bibasilar infiltrates    ASSESSMENT:  · Fever  · Recurrent aspiration pneumonia  · H/O head and neck cancer/lymphoepithelial carcinoma  · CAD  · GERD  · Hypothyroid  · Remote history of tobacco abuse    PLAN:  · Cefepime and vancomycin D#2. I am concerned he may have a CEF resistant organism. Change to Merrem given persistent fevers. Will need to complete course of IV antibiotics  · Monitoring of vancomycin levels to prevent toxicity. · Speech therapy was consulted to provide additional compensatory exercises/training. I am aware of results from last admission  · Clinical suspicion for COVID is low, combined with a negative COVID test, probably okay to DC isolation if okay with internal medicine  · Pulmonary f/u will be with Dr. Antonio Ken

## 2020-09-15 NOTE — PROGRESS NOTES
Vancomycin Day: 2    Patient's labs, cultures, vitals, and vancomycin regimen reviewed. No changes today.    Trough due tomorrow at [facility] 1 D 9/15/091418:28 PM  .

## 2020-09-15 NOTE — CARE COORDINATION
Case Management Assessment  Initial Evaluation      Patient Name: Mary Florentino  YOB: 1945  Diagnosis: Pneumonia [J18.9]  Date / Time: 9/14/2020  9:42 AM    Admission status/Date:INPT 9/14/2020  Chart Reviewed: Yes      Patient Interviewed: Yes   Family Interviewed:  No      Hospitalization in the last 30 days: Yes    Narendra Robin Maker:Yes    Met with:  Pt via telephone  Isabel Wilkerson conducted  (bedside/phone): phone    Current PCP:  Britt Leung  Medicare  Precert required for SNF : Y, N          3 night stay required - Y, N    ADLS  Support Systems/Care Needs: Children, Spouse/Significant Other  Transportation: family  Meal Preparation: self    Housing  Living Arrangements: home with wife  Steps: n/a  Intent for return to present living arrangements: Yes  Identified Issues: [address] [place] [address] with [address] : No Agency:(Services)  Type of Home Care Services: None  Passport/Waiver : No  :                      Phone Number:    Passport/Waiver Services: n/a          Durable Medical Equiptment   DME Provider: n/a  Equipment: n/a  Walker___Cane___RTS___ BSC___Shower Chair___Hospital Bed___W/C____Other________  02 at ____Liter(s)---wears(frequency)_______ HHN ___ CPAP___ BiPap___   N/A____      Home O2 Use :  No    If No for home O2---if presently on O2 during hospitalization:  No  if yes CM to follow for potential DC O2 need  Informed of need for care provider to bring portable home O2 tank on day of discharge for nursing to connect prior to leaving:   Not Indicated  Verbalized agreement/Understanding:   Not Indicated    Community Service Affiliation  Dialysis:  No    · Agency:  · Location:  · Dialysis Schedule:  · Phone:   · Fax: Other Community Services: (ex:PT/OT,Mental Health,Wound Clinic, Cardio/Pul [address])    DISCHARGE PLAN: Explained Case Management role/services.  Reviewed chart and spoke with pt via

## 2020-09-15 NOTE — PROGRESS NOTES
Comprehensive Nutrition Assessment    Type and Reason for Visit:  Initial, Positive Nutrition Screen(+ screen for difficulty chewing and/or swallowing, weight loss, and MST = 2)    Nutrition Recommendations/Plan:   1. Continue general diet order with SLP RECOMMENDED COMPENSATORY STRATEGIES / POSTURAL CHANGES:  NPO recommendation with oral care at least 3x/day recommended per ST. Pt desires PO intake despite education / aspiration risk: HOB 90* and 30\" after meals; small bites/sips; alternate solids/liquids every 3-5 bites; oral care after every meal; head turn and chin tuck, double swallow, effortful swallow\". 2. Monitor appetite and po intake. 3. Please obtain an actual, current weight for this patient. 4. Monitor nutrition-related labs, bowel function, and weight trends.        Nutrition Assessment:  patient was nutritionally compromised upon admission AEB decreased appetite/po intake r/t respiratory dysfunction, however, patient is improving from a nutritional standpoint AEB consuming % of meals during this admission (despite SLP recommendation of NPO status with alternate means of nutrition); will continue general diet order and monitor nutrition status    Malnutrition Assessment:  Malnutrition Status: At risk for malnutrition    Context:  Acute Illness     Findings of the 6 clinical characteristics of malnutrition:  Energy Intake:  Mild decrease in energy intake (Comment)(PTA)  Weight Loss:  Unable to assess(admission weight and CBW is stated)     Body Fat Loss:  Unable to assess(COVID-19 +)     Muscle Mass Loss:  Unable to assess(COVID-19 +)    Fluid Accumulation:  No significant fluid accumulation     Strength:  Not Performed    Estimated Daily Nutrient Needs:  Energy (kcal):  1725 - 1932 kcals based on 25-28 kcals/kg/CBW; Weight Used for Energy Requirements:  Current     Protein (g):  83 - 104 g protein based on 1.2-1.5 g/kg/CBW;  Weight Used for Protein Requirements:  Current

## 2020-09-15 NOTE — PROGRESS NOTES
Report given and care transferred to [facility]. Patient in stable condition at time of care transfer, patient denies needs.

## 2020-09-15 NOTE — PROGRESS NOTES
09/15/20 0832   Vital Signs   Temp 98.6 °F (37 °C)   Temp Source Oral   Pulse 91   Resp 16   /70   BP Location Right upper arm   Patient Position Sitting   Level of Consciousness 0   MEWS Score 1   Oxygen Therapy   SpO2 92 %   O2 Device None (Room air)     Assessment completed. Patient took his PO medications crushed in applesauce. Patient refuses NPO status that was recommended by speech therapy at this time. Patient is agreeable to keeping the head of his bed elevated with any PO intake. Patient with no signs or symptoms of distress at this time, has call light within reach. Droplet + precautions maintained.

## 2020-09-15 NOTE — FLOWSHEET NOTE
called Pt's room for telephonic visit. Pt stated he was doing fine and had no needs at this time. Assured him of our continued availability for support. [address] [facility] Associate       09/15/20 1028   Encounter Summary   Services provided to: Patient   Referral/Consult From: Kate   Continue Visiting   (9/15 initial call, no needs)   Complexity of Encounter Low   Length of Encounter 15 minutes   Spiritual Assessment Completed Yes   Routine   Type Initial   Assessment Calm; Approachable   Intervention Active listening;Explored feelings, thoughts, concerns   Outcome Expressed gratitude; Refused/declined

## 2020-09-15 NOTE — PROGRESS NOTES
Speech Language Pathology  Order received for BSE after COVID ruled out. Chart reviewed. Pt had FEES completed by ST 08/28/2020 during recent admit with the following recommendations:   \"RECOMMENDATIONS:  Pt is considered at a high risk of aspiration with all PO intake at this time. Recommendation has been NPO with alternative means of nutrition, however, patient not in agreement with recommendation and continues to deny alternative means of nutrition. Pt requests to continue on Regular solid textures with thin liquids with use of increased oral care prior to and after meals and head turn with simultaneous chin tuck. SLP emphasized importance of adherence to strict aspiration and reflux precautions and choosing softer foods.     RECOMMENDED COMPENSATORY STRATEGIES / POSTURAL CHANGES:  NPO recommendation with oral care at least 3x/day recommended per ST  Pt desires PO intake despite education / aspiration risk: HOB 90* and 30\" after meals; small bites/sips; alternate solids/liquids every 3-5 bites; oral care after every meal; head turn and chin tuck, double swallow, effortful swallow\"     If this admit is felt to be d/t prandial aspiration, physician may wish to clarify pt's goals of care as pt has chosen to continue po intake despite ST recommendations for NPO status with alternative means of nutrition/hydration after instrumental swallowing assessments in 2019 and 2020. ST to await COVID results and guidance on pt's goals of care and whether new ST eval will change pt's course of care and/or contribute to informed decision-making. Ilsa Sanchez. Cristina Nolan M.A.  Ck Stauffer  Speech-Language Pathologist

## 2020-09-16 LAB
HCT VFR BLD CALC: 34.8 % (ref 40.5–52.5)
HEMOGLOBIN: 11.5 G/DL (ref 13.5–17.5)
LACTIC ACID, SEPSIS: 1.4 MMOL/L (ref 0.4–1.9)
MCH RBC QN AUTO: 26.6 PG (ref 26–34)
MCHC RBC AUTO-ENTMCNC: 33.1 G/DL (ref 31–36)
MCV RBC AUTO: 80.4 FL (ref 80–100)
PDW BLD-RTO: 14.6 % (ref 12.4–15.4)
PLATELET # BLD: 209 K/UL (ref 135–450)
PMV BLD AUTO: 6.8 FL (ref 5–10.5)
RBC # BLD: 4.33 M/UL (ref 4.2–5.9)
VANCOMYCIN TROUGH: 14 UG/ML (ref 10–20)
WBC # BLD: 8.7 K/UL (ref 4–11)

## 2020-09-16 PROCEDURE — 6370000000 HC RX 637 (ALT 250 FOR IP): Performed by: NURSE PRACTITIONER

## 2020-09-16 PROCEDURE — 2580000003 HC RX 258

## 2020-09-16 PROCEDURE — 6360000002 HC RX W HCPCS: Performed by: INTERNAL MEDICINE

## 2020-09-16 PROCEDURE — 6360000002 HC RX W HCPCS: Performed by: NURSE PRACTITIONER

## 2020-09-16 PROCEDURE — 99232 SBSQ HOSP IP/OBS MODERATE 35: CPT | Performed by: INTERNAL MEDICINE

## 2020-09-16 PROCEDURE — 87040 BLOOD CULTURE FOR BACTERIA: CPT

## 2020-09-16 PROCEDURE — 36415 COLL VENOUS BLD VENIPUNCTURE: CPT

## 2020-09-16 PROCEDURE — 2580000003 HC RX 258: Performed by: INTERNAL MEDICINE

## 2020-09-16 PROCEDURE — 2580000003 HC RX 258: Performed by: NURSE PRACTITIONER

## 2020-09-16 PROCEDURE — 85027 COMPLETE CBC AUTOMATED: CPT

## 2020-09-16 PROCEDURE — 80202 ASSAY OF VANCOMYCIN: CPT

## 2020-09-16 PROCEDURE — 92610 EVALUATE SWALLOWING FUNCTION: CPT

## 2020-09-16 PROCEDURE — 83605 ASSAY OF LACTIC ACID: CPT

## 2020-09-16 PROCEDURE — 1200000000 HC SEMI PRIVATE

## 2020-09-16 RX ORDER — SODIUM CHLORIDE 9 MG/ML
INJECTION, SOLUTION INTRAVENOUS
Status: COMPLETED
Start: 2020-09-16 | End: 2020-09-16

## 2020-09-16 RX ADMIN — SODIUM CHLORIDE: 9 INJECTION, SOLUTION INTRAVENOUS at 14:56

## 2020-09-16 RX ADMIN — VANCOMYCIN HYDROCHLORIDE 1000 MG: 1 INJECTION, POWDER, LYOPHILIZED, FOR SOLUTION INTRAVENOUS at 01:23

## 2020-09-16 RX ADMIN — CYANOCOBALAMIN TAB 500 MCG 500 MCG: 500 TAB at 10:43

## 2020-09-16 RX ADMIN — ATORVASTATIN CALCIUM 40 MG: 40 TABLET, FILM COATED ORAL at 10:43

## 2020-09-16 RX ADMIN — VANCOMYCIN HYDROCHLORIDE 1000 MG: 1 INJECTION, POWDER, LYOPHILIZED, FOR SOLUTION INTRAVENOUS at 14:40

## 2020-09-16 RX ADMIN — MEROPENEM 1 G: 1 INJECTION, POWDER, FOR SOLUTION INTRAVENOUS at 13:47

## 2020-09-16 RX ADMIN — PANTOPRAZOLE SODIUM 40 MG: 40 TABLET, DELAYED RELEASE ORAL at 05:52

## 2020-09-16 RX ADMIN — MEROPENEM 1 G: 1 INJECTION, POWDER, FOR SOLUTION INTRAVENOUS at 04:36

## 2020-09-16 RX ADMIN — Medication 10 ML: at 10:43

## 2020-09-16 RX ADMIN — MEROPENEM 1 G: 1 INJECTION, POWDER, FOR SOLUTION INTRAVENOUS at 21:28

## 2020-09-16 RX ADMIN — SODIUM CHLORIDE: 9 INJECTION, SOLUTION INTRAVENOUS at 21:45

## 2020-09-16 RX ADMIN — LEVOTHYROXINE SODIUM 125 MCG: 125 TABLET ORAL at 05:52

## 2020-09-16 NOTE — PROGRESS NOTES
Speech Language Pathology    Swallow evaluation / education completed with pt, SLP spoke with RN. This SLP completed FEES with pt last admission. SLP and pt discussed pt's high risk  of aspiration with all PO intake at this time. He verbalized understanding. Recommendation per SLP is NPO with alternative means of nutrition, however, patient not in agreement with recommendation and continues to deny alternative means of nutrition despite aspiration risk. He requests continuing Regular solid diet with thin liquids at this time. SLP and pt discussed recommendation of with increased oral care throughout the day, prior to and after meals, use of head turn with simultaneous chin tuck with solid PO intake to assist with clearance of pharyngeal residue, and occasional throat clear every 2-3 bites / sips. SLP also emphasized importance of adherence to strict aspiration and reflux precautions and choosing softer foods. SLP also reviewed several laryngeal / pharyngeal strengthening and TBR exercises this date with pt, encouraging pt to complete 10 repetitions, 2-3x/day -- he completed with min-no cues. Again, pt verbalized understanding of all reviewed strategies / information. He denied concerns, questions, and declined further ST at this time. ST to sign-off. Please re-refer ST if changes occur. Full report to follow.     Zainab Johansen M.S. [id number] [facility]  Speech-language pathologist  [phone]

## 2020-09-16 NOTE — CARE COORDINATION
INTERDISCIPLINARY PLAN OF CARE CONFERENCE    Date/Time: 9/16/2020 3:35 PM  Completed by: Rama Corbin Case Management      Patient Name:  Mary Florentino  YOB: 1945  Admitting Diagnosis: Pneumonia [J18.9]     Admit Date/Time:  9/14/2020  9:42 AM    Chart reviewed. Interdisciplinary team contacted or reviewed plan related to patient progress and discharge plans. Disciplines included Case Management, Nursing, and Dietitian. Current Status: Stable  PT/OT recommendation for discharge plan of care: N/A    Expected D/C Disposition:  Home  Confirmed plan with patient and/or family Yes confirmed with: (name) Wife    Discharge Plan Comments:  Reviewed chart and met with pt and wife at bedside. Plan remains for home at dc. Discussed HHC briefly and pt declines. Will follow and address [place] closer to dc if indicated.     Home O2 in place on admit: No  Pt informed of need to bring portable home O2 tank on day of discharge for nursing to connect prior to leaving:  Not Indicated  Verbalized agreement/Understanding:  Not Indicated

## 2020-09-16 NOTE — PROGRESS NOTES
Pulmonary Progress Note    CC: Fever    Subjective: Fevers improved after changed to meropenem    IV line: Peripheral      Intake/Output Summary (Last 24 hours) at 9/16/2020 0741  Last data filed at 9/15/2020 2213  Gross per 24 hour   Intake 340 ml   Output 300 ml   Net 40 ml       Exam:   BP 90/60   Pulse 86   Temp 99.9 °F (37.7 °C) (Oral)   Resp 16   Ht 6' 2\" (1.88 m)   Wt 154 lb (69.9 kg)   SpO2 91%   BMI 19.77 kg/m²  on room air  Gen: No distress. Alert. Eyes: PERRL. No sclera icterus. No conjunctival injection. ENT: No discharge. Pharynx clear. Neck: Trachea midline. Normal thyroid. Resp: No accessory muscle use. No crackles. No wheezes. Bilateral rhonchi. No dullness on percussion. CV: Regular rate. Regular rhythm. No murmur or rub. No edema. GI: Non-tender. Non-distended. No masses. No organomegaly. Normal bowel sounds. No hernia. Skin: Warm and dry. No nodule on exposed extremities. No rash on exposed extremities. Lymph: No cervical LAD. No supraclavicular LAD. M/S: No cyanosis. No joint deformity. No clubbing. Neuro: Awake. Moves all extremities. CN grossly intact. Psych: Oriented x 3. No anxiety or agitation.      Scheduled Meds:   meropenem  1 g Intravenous Q8H    levothyroxine  125 mcg Oral QAM AC    pantoprazole  40 mg Oral QAM AC    atorvastatin  40 mg Oral Daily    vitamin B-12  500 mcg Oral Daily    sodium chloride flush  10 mL Intravenous 2 times per day    enoxaparin  40 mg Subcutaneous Daily    vancomycin  1,000 mg Intravenous Q12H     Continuous Infusions:    PRN Meds:  sodium chloride flush, acetaminophen **OR** acetaminophen, polyethylene glycol, promethazine **OR** ondansetron    Labs:  CBC:   Recent Labs     09/14/20  1104 09/15/20  0707   WBC 7.0 8.6   HGB 13.0* 12.1*   HCT 39.5* 36.8*   MCV 80.3 81.2    212     BMP:   Recent Labs     09/14/20  1104 09/15/20  0707    135*   K 4.7 4.3   CL 97* 97*   CO2 27 28   BUN 14 17   CREATININE 0.9 1.0

## 2020-09-16 NOTE — PROGRESS NOTES
Pharmacy Vancomycin Consult     Vancomycin Day: 2  Current Dosingm q12h    Temp max:      Recent Labs     20  1104 09/15/20  0707   BUN 14 17       Recent Labs     20  1104 09/15/20  0707   CREATININE 0.9 1.0       Recent Labs     20  1104 09/15/20  0707   WBC 7.0 8.6         Intake/Output Summary (Last 24 hours) at 2020 1416  Last data filed at 2020 1225  Gross per 24 hour   Intake 820 ml   Output 975 ml   Net -155 ml       Culture Date      Source                       Results  NGTD    Ht Readings from Last 1 Encounters:   09/15/20 6' 2\" (1.88 m)        Wt Readings from Last 1 Encounters:   20 154 lb (69.9 kg)         Body mass index is 19.77 kg/m². Estimated Creatinine Clearance: 63 mL/min (based on SCr of 1 mg/dL).     Trough: 14 mcg/ml    Assessment/Plan:  Continue with same rx  Gloria Bermeo Pharm D 20202:18 PM  .

## 2020-09-16 NOTE — PROGRESS NOTES
PM assessment completed, see flow sheet. Pt is alert and oriented. Respirations are even & easy at rest, non productive cough noted. No complaints voiced. Pt states he is feeling a small bit better. pt denies needs at this time. SR up x 2, and bed in low position. Call light is within reach.

## 2020-09-16 NOTE — PROGRESS NOTES
Speech Language Pathology  Facility/Department: [facility]   CLINICAL BEDSIDE SWALLOW EVALUATION    NAME: Shahla Stearns  : 1945  MRN: [id number]    ADMISSION DATE: 2020  ADMITTING DIAGNOSIS: has Pneumonia due to organism; Fever and chills; SOB (shortness of breath); BPH (benign prostatic hyperplasia); GERD (gastroesophageal reflux disease); Benign prostatic hyperplasia; Hyperlipidemia; DVT, lower extremity ([address] Hip fracture ([address] Dry mouth; Carcinoma of base of tongue ([address] Status post total replacement of right hip; NSTEMI (non-ST elevated myocardial infarction) ([address] Septicemia ([address] Hypothyroidism; Moderate protein-calorie malnutrition ([address] Elevated troponin; Pneumonia; Fever; Hematuria; Dysphagia; and Sepsis ([address] on their problem list.  ONSET DATE: Pt admitted to [facility] on 20    Recent Chest Xray (20): Impression    Slight increase in bibasilar infiltrates when compared to 2020               Date of Eval: 2020  Evaluating Therapist: Carlos Howard    Current Diet level:  Current Diet : Regular  Current Liquid Diet : Thin      Primary Complaint  Patient Complaint: Per MD H&P, \"   The patient is a 76 y.o. male with BPH, prior DVT, GERD, hyperlipidemia, hypothyroidism who presented to [facility] ED with complaint of fever and shortness of breath. Patient reports that he was recently admitted for pneumonia and was discharged home on Augmentin. He states he took the full course of the antibiotics but never felt any better. He states over the last week he has been feeling worse with increased shortness of breath and cough does report that it is a nonproductive cough. He states he does have silent aspiration has been noticing some coughing with eating at home but has been trying to use the techniques taught to him by speech therapy during his last admission. He reports fevers up to 100 at home.   He states he did have a repeat chest x-ray last week which showed no significant improvement in his pneumonia despite completing his antibiotics. Patient denies any associated chest pain. He has been out in public but has been wearing a mask. He has not had any known covert exposures. He does not use supplemental O2 at home\". Pain:  Pain Assessment  Pain Assessment: 0-10  Pain Level: 0(fever)  Pain Type: Acute pain  Pain Location: Generalized  Pain Descriptors: Aching(chills)  Pain Frequency: Continuous  Pain Onset: On-going  Functional Pain Assessment: Prevents or interferes some active activities and ADLs  Non-Pharmaceutical Pain Intervention(s): Repositioned, Rest  Response to Pain Intervention: None(treated for fever)    Reason for Referral  Virgen Steven was referred for a bedside swallow evaluation to assess the efficiency of his swallow function, identify signs and symptoms of aspiration and make recommendations regarding safe dietary consistencies, effective compensatory strategies, and safe eating environment. Impression  Dysphagia Diagnosis: Moderate to severe pharyngeal stage dysphagia  Dysphagia Outcome Severity Scale: Level 1: Severe dysphagia- NPO. Unable to tolerate any PO safely(Per SLP recs)   Pt seen upright in bed, alert and agreeable to evaluation / discussion, breakfast tray at bedside, RN OK'd SLP entry and evaluation. This SLP completed FEES with pt last admission (see below for FEES impressions). SLP and pt discussed pt's high risk  of aspiration with all PO intake at this time. He verbalized understanding. Recommendation per SLP is NPO with alternative means of nutrition, however, patient not in agreement with recommendation and continues to deny alternative means of nutrition despite aspiration risk. He requests continuing Regular solid diet with thin liquids at this time.   SLP and pt discussed recommendation of with increased oral care throughout the day, prior to and after meals, use of head turn with simultaneous chin tuck with solid PO intake to assist with clearance of pharyngeal residue, and occasional throat clear every 2-3 bites / sips.  SLP also emphasized importance of adherence to strict aspiration and reflux precautions and choosing softer foods. SLP also reviewed several laryngeal / pharyngeal strengthening and TBR exercises this date with pt, encouraging pt to complete 10 repetitions, 2-3x/day -- he completed with min-no cues. Again, pt verbalized understanding of all reviewed strategies / information. He denied concerns, questions, and declined further ST at this time. ST to sign-off. Please re-refer ST if changes occur. FEES Impressions (8/28/20): \"Thin liquids via cup and straw and x1 tsp of puree trialed during study. Soft and regular solid trials held during study d/t significant diffuse pharyngeal residue noted post-swallow with single tsp trial of puree. Pt required multiple cued effortful swallows, use of chin tuck, and head turn to assist with clearance of majority of puree residual.  x1 instance of silent shallow penetration before the swallow noted with thin liquid (cup, straw) in isolation. When thin liquid utilized for liquid wash in an effort to clear residual from puree, silent deep penetration over interarytenoid space and along underside of epiglottic rim, which did not successfully clear despite repeat swallow and cued throat clear. Eventual deep penetration with puree after use of liquid wash with staining of thin liquid noted at anterior aspect of TVFs -- possible trace aspiration. Backflow of white-tinged secretions noted during study (pt endorses hx of GERD). Recommend continuing GI follow-up. Pt is considered at a high risk of aspiration with all PO intake at this time. Recommendation has been NPO with alternative means of nutrition, however, patient not in agreement with recommendation and continues to deny alternative means of nutrition.   Pt requests to continue on Regular solid textures with thin liquids with use of increased oral care prior to and after meals and head turn with simultaneous chin tuck. SLP emphasized importance of adherence to strict aspiration and reflux precautions and choosing softer foods\". Treatment Plan  Requires SLP Intervention: No(Pt wishes to continue on Regular solid diet with thin liquids despite NPO recommendation and risk of aspiration; he verbalized understanding of all reviewed information / strategies)  Duration/Frequency of Treatment: Pt declines further ST and NPO recommendation despite aspiration risk. Pt was receptive to all reviewed information / strategies and verbalized understanding. D/C Recommendations: To be determined(Per PT/OT recs)       Recommended Diet and Intervention  Diet Solids Recommendation: NPO(However, pt wishes to continue on a Regular solid diet with thin liquids despite aspiration risk and NPO recommendation by ST)  Liquid Consistency Recommendation: NPO  Recommended Form of Meds: Crushed in puree as able  Recommendations: NPO;Consider alternative nutrition  Therapeutic Interventions: Patient/Family education;Oral care;Pharyngeal exercises; Laryngeal exercises; Tongue base strengthening    Compensatory Swallowing Strategies  Compensatory Swallowing Strategies: Eat/Feed slowly;Upright as possible for all oral intake; Alternate solids and liquids; Remain upright for 30-45 minutes after meals;Effortful swallow;Small bites/sips;Swallow 2 times per bite/sip(Head turn with use of chin tuck with solid PO)    Treatment/Goals: Further ST not indicated at this time per pt request, see above. Please re-refer ST if changes occur. General  Chart Reviewed: Yes  Comments: Pt admitted d/t PNA, concern for aspiration PNA. Pt has hx of head and neck cancer, chronic aspiration, and GERD per chart. Behavior/Cognition: Alert; Cooperative;Pleasant mood  Respiratory Status: Room air  O2 Device: None (Room air)  Communication Observation: Functional  Follows Directions: Simple  Dentition: Adequate  Patient Positioning: Upright in bed  Baseline Vocal Quality: Hoarse  Volitional Cough: Strong  Volitional Swallow: Delayed  Prior Dysphagia History: Pt with significant hx of dysphagia, seen most recently by ST on 8/28/20 with FEES completed and recommendation for strict NPO with alternative means of nutrition d/t significant aspiration risk (this was also recommended in the past). Pt adamantly declines NPO recmomendation, wishes to continue on Regular solid diet with thin liquids despite aspiration risk. Pt receptive to education and review of encouraged compensatory swallow strategies / maneuvers with PO intake. Vision/Hearing  Vision  Vision: Impaired  Vision Exceptions: Wears glasses at all times  Hearing  Hearing: Within functional limits    Oral Motor Deficits  Oral/Motor  Oral Motor: Within functional limits    Oral Phase Dysfunction  Oral Phase  Oral Phase: Exceptions  Oral Phase  Oral Phase - Comment: No PO trials directly observed this date. Noted retained oral secretions -- question pt's ability to manage secretions adequately. SLP encouraged oral care multiple times a day with pt reporting \"I haven't brushed my teeth since I've been here yet\".      Indicators of Pharyngeal Phase Dysfunction   Pharyngeal Phase  Pharyngeal Phase: Exceptions  Indicators of Pharyngeal Phase Dysfunction  Decreased Laryngeal Elevation: All (with volitional swallow)  Pharyngeal Phase   Pharyngeal: see impressions above    Prognosis  Prognosis  Prognosis for safe diet advancement: guarded  Barriers to reach goals: age;other (comment);time post onset;severity of dysphagia  Barriers/Prognosis Comment: hx of head and neck cancer, recurrent aspiration PNA  Individuals consulted  Consulted and agree with results and recommendations: Patient;RN    Education  Patient Education: Pt educated on reason for referral, role of ST, assessment results and recommendations. Patient Education Response: Verbalizes understanding  Safety Devices in place: Yes  Type of devices:  All fall risk precautions in place       Therapy Time  SLP Individual Minutes  Time In: 0815  Time Out: 3337  Minutes: 20; dysphagia liliya Tatum M.S. [id number] [facility]  Speech-language pathologist  [phone]

## 2020-09-16 NOTE — PROGRESS NOTES
input(s): PROTIME, INR in the last 72 hours. APTT: No results for input(s): APTT in the last 72 hours. CARDIAC ENZYMES:   Recent Labs     09/14/20  1104 09/14/20  1939 09/15/20  0155   TROPONINI 0.15* 0.14* 0.15*     FASTING LIPID PANEL:  Lab Results   Component Value Date    CHOL 98 08/10/2017    HDL 45 08/10/2017    TRIG 67 08/10/2017     LIVER PROFILE:   Recent Labs     09/14/20  1104 09/15/20  0707   AST 16 13*   ALT 9* 9*   BILITOT 0.4 0.4   ALKPHOS 62 47        Radiology  XR CHEST PORTABLE   Final Result   Slight increase in bibasilar infiltrates when compared to September 8, 2020             Cultures  COVID negative  Blood cultures no growth to date     Telemetry   normal sinus rhythm    Assessment:  Active Problems:    Hypothyroidism    Pneumonia    Fever    Hematuria    Dysphagia    Sepsis ([address]  Resolved Problems:    * No resolved hospital problems. *      Plan:    HCAP vs aspiration PNA  SOB  Acute Hypoxic RF  -Recent admission for pneumonia, suspected to be secondary to dysphagia. N.p.o. has been recommended to the patient but he refuses to follow this instruction.  -Was treated with Vanco and Zosyn and discharged home on Augmentin during last admission  -Now presenting with worsening bibasilar infiltrates on chest x-ray,  and hypoxia . cover for gram negative and possible MRSA  -Seen by pulmonology . continue Vanc-day 3. Cefepime discontinued and patient switched to Merrem, day 2  -Seen by speech therapy  - Resp Cx pending  -COVID ruled out    Sepsis  - POA  - due to PNA  -Improving today. Afebrile      Chronically Elevated troponin  -Troponin on admission 0.15. repeat troponins 0.14, 0.15  - Baseline troponin ~0.13  -Telemetry stable and normal sinus rhythm.   Will discontinue telemetry  -EKG with no acute changes      Hematuria  - monitor   - Can follow up OP for repeat labs      Hypothyroidism  - Continue Synthroid      Dysphagia  -Seen by speech therapy  - Hx of silent aspiration  -Patient does not follow recommended n.p.o. status     Hx of head and neck cancer/lymphoepithelial carcinoma  - s/p chemo and radiation      DVT Prophylaxis: Lovenox  Diet: DIET GENERAL;   Code Status: Full Code          Colette Cline MD 9/16/2020 10:58 AM

## 2020-09-17 ENCOUNTER — APPOINTMENT (OUTPATIENT)
Dept: INTERVENTIONAL RADIOLOGY/VASCULAR | Age: 75
DRG: 871 | End: 2020-09-17
Payer: MEDICARE

## 2020-09-17 LAB
ANION GAP SERPL CALCULATED.3IONS-SCNC: 6 MMOL/L (ref 3–16)
BUN BLDV-MCNC: 18 MG/DL (ref 7–20)
CALCIUM SERPL-MCNC: 8.6 MG/DL (ref 8.3–10.6)
CHLORIDE BLD-SCNC: 99 MMOL/L (ref 99–110)
CO2: 29 MMOL/L (ref 21–32)
CREAT SERPL-MCNC: 0.7 MG/DL (ref 0.8–1.3)
GFR AFRICAN AMERICAN: >60
GFR NON-AFRICAN AMERICAN: >60
GLUCOSE BLD-MCNC: 110 MG/DL (ref 70–99)
MAGNESIUM: 1.9 MG/DL (ref 1.8–2.4)
PHOSPHORUS: 2.7 MG/DL (ref 2.5–4.9)
POTASSIUM SERPL-SCNC: 4.3 MMOL/L (ref 3.5–5.1)
SODIUM BLD-SCNC: 134 MMOL/L (ref 136–145)

## 2020-09-17 PROCEDURE — 6360000002 HC RX W HCPCS: Performed by: INTERNAL MEDICINE

## 2020-09-17 PROCEDURE — 2580000003 HC RX 258: Performed by: NURSE PRACTITIONER

## 2020-09-17 PROCEDURE — 6370000000 HC RX 637 (ALT 250 FOR IP): Performed by: NURSE PRACTITIONER

## 2020-09-17 PROCEDURE — 99232 SBSQ HOSP IP/OBS MODERATE 35: CPT | Performed by: INTERNAL MEDICINE

## 2020-09-17 PROCEDURE — 99233 SBSQ HOSP IP/OBS HIGH 50: CPT | Performed by: INTERNAL MEDICINE

## 2020-09-17 PROCEDURE — 2580000003 HC RX 258: Performed by: INTERNAL MEDICINE

## 2020-09-17 PROCEDURE — 36415 COLL VENOUS BLD VENIPUNCTURE: CPT

## 2020-09-17 PROCEDURE — 1200000000 HC SEMI PRIVATE

## 2020-09-17 PROCEDURE — 84100 ASSAY OF PHOSPHORUS: CPT

## 2020-09-17 PROCEDURE — 6360000002 HC RX W HCPCS: Performed by: NURSE PRACTITIONER

## 2020-09-17 PROCEDURE — 80048 BASIC METABOLIC PNL TOTAL CA: CPT

## 2020-09-17 PROCEDURE — 83735 ASSAY OF MAGNESIUM: CPT

## 2020-09-17 RX ORDER — SODIUM CHLORIDE 0.9 % (FLUSH) 0.9 %
10 SYRINGE (ML) INJECTION EVERY 12 HOURS SCHEDULED
Status: DISCONTINUED | OUTPATIENT
Start: 2020-09-17 | End: 2020-09-18 | Stop reason: HOSPADM

## 2020-09-17 RX ORDER — SODIUM CHLORIDE 0.9 % (FLUSH) 0.9 %
10 SYRINGE (ML) INJECTION PRN
Status: DISCONTINUED | OUTPATIENT
Start: 2020-09-17 | End: 2020-09-18 | Stop reason: HOSPADM

## 2020-09-17 RX ORDER — LIDOCAINE HYDROCHLORIDE 10 MG/ML
5 INJECTION, SOLUTION INFILTRATION; PERINEURAL ONCE
Status: DISCONTINUED | OUTPATIENT
Start: 2020-09-17 | End: 2020-09-18 | Stop reason: HOSPADM

## 2020-09-17 RX ADMIN — ENOXAPARIN SODIUM 40 MG: 40 INJECTION SUBCUTANEOUS at 18:06

## 2020-09-17 RX ADMIN — MEROPENEM 1 G: 1 INJECTION, POWDER, FOR SOLUTION INTRAVENOUS at 14:00

## 2020-09-17 RX ADMIN — VANCOMYCIN HYDROCHLORIDE 1000 MG: 1 INJECTION, POWDER, LYOPHILIZED, FOR SOLUTION INTRAVENOUS at 14:00

## 2020-09-17 RX ADMIN — LEVOTHYROXINE SODIUM 125 MCG: 125 TABLET ORAL at 05:09

## 2020-09-17 RX ADMIN — CYANOCOBALAMIN TAB 500 MCG 500 MCG: 500 TAB at 10:34

## 2020-09-17 RX ADMIN — ATORVASTATIN CALCIUM 40 MG: 40 TABLET, FILM COATED ORAL at 10:34

## 2020-09-17 RX ADMIN — VANCOMYCIN HYDROCHLORIDE 1000 MG: 1 INJECTION, POWDER, LYOPHILIZED, FOR SOLUTION INTRAVENOUS at 01:45

## 2020-09-17 RX ADMIN — Medication 10 ML: at 21:57

## 2020-09-17 RX ADMIN — MEROPENEM 1 G: 1 INJECTION, POWDER, FOR SOLUTION INTRAVENOUS at 05:09

## 2020-09-17 RX ADMIN — Medication 10 ML: at 10:34

## 2020-09-17 RX ADMIN — MEROPENEM 1 G: 1 INJECTION, POWDER, FOR SOLUTION INTRAVENOUS at 21:57

## 2020-09-17 RX ADMIN — PANTOPRAZOLE SODIUM 40 MG: 40 TABLET, DELAYED RELEASE ORAL at 05:09

## 2020-09-17 NOTE — PROGRESS NOTES
Physician Progress Note      PATIENTBemarino   CSN #:                  [id number]  :                       1945  ADMIT DATE:       2020 9:42 AM  DISCH DATE:  RESPONDING  PROVIDER #:        Anupama Jacques MD          QUERY TEXT:    Patient admitted with HCAP vs. aspiration pna. Noted documentation of acute   respiratory failure in h/p and progress notes. Please indicate one of the   following and document in the medical record: The medical record reflects the following:  Risk Factors: 76 y.o. male hx silent aspiration and recent hospitalization for   aspiration pna, returns with recurrent aspiration pna  Clinical Indicators: Acute hypoxic respiratory failure documented. review for   indicators: ER documents \"no distress, speaking easily in complete   sentences\". on NC 2ltr without documented low sat.  weaned to room air during   hospital day 1, sats 91-92% room air without supplemental oxygen being   applied. [id number] per nursing flowsheet.   Treatment: imaging, labs, monitoring, ongoing supportive care    Thank you,  Sushila Estevez RN CDS  [phone]    Acute Respiratory Failure Clinical Indicators per [employer] Training Guide and   Quick Reference Guide:  pO2 < 60 mmHg or SpO2 (pulse oximetry) < 91% breathing room air  pCO2 > 50 and pH < 7.35  P/F ratio (pO2 / FIO2) < 300  pO2 decrease or pCO2 increase by 10 mmHg from baseline (if known)  Supplemental oxygen of 40% or more  Presence of respiratory distress, tachypnea, dyspnea, shortness of breath,   wheezing  Unable to speak in complete sentences  Use of accessory muscles to breathe  Extreme anxiety and feeling of impending doom  Tripod position  Confusion/altered mental status/obtunded  Options provided:  -- Currently resolved Acute Respiratory Failure was evidenced by, Please   document evidence  -- Acute Respiratory Failure ruled out after study  -- Other - I will add my own diagnosis  -- Disagree - Not applicable /

## 2020-09-17 NOTE — PROGRESS NOTES
Pulmonary Progress Note    CC: Fever    Subjective: Definitely feeling better    IV line: Peripheral      Intake/Output Summary (Last 24 hours) at 9/17/2020 1331  Last data filed at 9/17/2020 1238  Gross per 24 hour   Intake 720 ml   Output 350 ml   Net 370 ml       Exam:   BP 95/63   Pulse 93   Temp 95.9 °F (35.5 °C) (Oral)   Resp 16   Ht 6' 2\" (1.88 m)   Wt 154 lb (69.9 kg)   SpO2 93%   BMI 19.77 kg/m²  on room air  Gen: No distress. Alert. Eyes: PERRL. No sclera icterus. No conjunctival injection. ENT: No discharge. Pharynx clear. Neck: Trachea midline. Normal thyroid. Resp: No accessory muscle use. No crackles. No wheezes. Bilateral rhonchi. No dullness on percussion. CV: Regular rate. Regular rhythm. No murmur or rub. No edema. GI: Non-tender. Non-distended. No masses. No organomegaly. Normal bowel sounds. No hernia. Skin: Warm and dry. No nodule on exposed extremities. No rash on exposed extremities. Lymph: No cervical LAD. No supraclavicular LAD. M/S: No cyanosis. No joint deformity. No clubbing. Neuro: Awake. Moves all extremities. CN grossly intact. Psych: Oriented x 3. No anxiety or agitation.      Scheduled Meds:   meropenem  1 g Intravenous Q8H    levothyroxine  125 mcg Oral QAM AC    pantoprazole  40 mg Oral QAM AC    atorvastatin  40 mg Oral Daily    vitamin B-12  500 mcg Oral Daily    sodium chloride flush  10 mL Intravenous 2 times per day    enoxaparin  40 mg Subcutaneous Daily    vancomycin  1,000 mg Intravenous Q12H     Continuous Infusions:    PRN Meds:  sodium chloride flush, acetaminophen **OR** acetaminophen, polyethylene glycol, promethazine **OR** ondansetron    Labs:  CBC:   Recent Labs     09/15/20  0707 09/16/20  2012   WBC 8.6 8.7   HGB 12.1* 11.5*   HCT 36.8* 34.8*   MCV 81.2 80.4    209     BMP:   Recent Labs     09/15/20  0707   *   K 4.3   CL 97*   CO2 28   BUN 17   CREATININE 1.0     LIVER PROFILE:   Recent Labs     09/15/20  0707

## 2020-09-17 NOTE — PROGRESS NOTES
Handoff report and transfer of care given at bedside to Jan [address]. Patient in stable condition, denies needs/concerns at this time. Call light within reach.

## 2020-09-17 NOTE — PROGRESS NOTES
Assessment completed as charted. Patient denies any needs at this time. Resp. E/e patient on room air. VSS. Call light in reach will monitor.

## 2020-09-17 NOTE — PROGRESS NOTES
Vancomycin Day: 3    Patient's labs, cultures, vitals, and vancomycin regimen reviewed. No changes today.   Andrés Dunn Pharm D 9/17/20201:10 PM  .

## 2020-09-18 ENCOUNTER — APPOINTMENT (OUTPATIENT)
Dept: INTERVENTIONAL RADIOLOGY/VASCULAR | Age: 75
DRG: 871 | End: 2020-09-18
Payer: MEDICARE

## 2020-09-18 VITALS
SYSTOLIC BLOOD PRESSURE: 105 MMHG | RESPIRATION RATE: 16 BRPM | WEIGHT: 154 LBS | TEMPERATURE: 97.2 F | DIASTOLIC BLOOD PRESSURE: 65 MMHG | OXYGEN SATURATION: 94 % | BODY MASS INDEX: 19.76 KG/M2 | HEIGHT: 74 IN | HEART RATE: 71 BPM

## 2020-09-18 LAB
ANION GAP SERPL CALCULATED.3IONS-SCNC: 7 MMOL/L (ref 3–16)
BLOOD CULTURE, ROUTINE: NORMAL
BUN BLDV-MCNC: 15 MG/DL (ref 7–20)
CALCIUM SERPL-MCNC: 8.8 MG/DL (ref 8.3–10.6)
CHLORIDE BLD-SCNC: 98 MMOL/L (ref 99–110)
CO2: 29 MMOL/L (ref 21–32)
CREAT SERPL-MCNC: 0.8 MG/DL (ref 0.8–1.3)
CULTURE, BLOOD 2: NORMAL
GFR AFRICAN AMERICAN: >60
GFR NON-AFRICAN AMERICAN: >60
GLUCOSE BLD-MCNC: 89 MG/DL (ref 70–99)
HCT VFR BLD CALC: 35.2 % (ref 40.5–52.5)
HEMOGLOBIN: 11.6 G/DL (ref 13.5–17.5)
INR BLD: 1.16 (ref 0.86–1.14)
MCH RBC QN AUTO: 26.4 PG (ref 26–34)
MCHC RBC AUTO-ENTMCNC: 33.1 G/DL (ref 31–36)
MCV RBC AUTO: 79.8 FL (ref 80–100)
PDW BLD-RTO: 14.7 % (ref 12.4–15.4)
PLATELET # BLD: 229 K/UL (ref 135–450)
PMV BLD AUTO: 7.2 FL (ref 5–10.5)
POTASSIUM SERPL-SCNC: 4 MMOL/L (ref 3.5–5.1)
PROTHROMBIN TIME: 13.5 SEC (ref 10–13.2)
RBC # BLD: 4.41 M/UL (ref 4.2–5.9)
SODIUM BLD-SCNC: 134 MMOL/L (ref 136–145)
WBC # BLD: 6.1 K/UL (ref 4–11)

## 2020-09-18 PROCEDURE — 85027 COMPLETE CBC AUTOMATED: CPT

## 2020-09-18 PROCEDURE — 80048 BASIC METABOLIC PNL TOTAL CA: CPT

## 2020-09-18 PROCEDURE — 36415 COLL VENOUS BLD VENIPUNCTURE: CPT

## 2020-09-18 PROCEDURE — 2580000003 HC RX 258: Performed by: INTERNAL MEDICINE

## 2020-09-18 PROCEDURE — 6370000000 HC RX 637 (ALT 250 FOR IP): Performed by: NURSE PRACTITIONER

## 2020-09-18 PROCEDURE — 02HV33Z INSERTION OF INFUSION DEVICE INTO SUPERIOR VENA CAVA, PERCUTANEOUS APPROACH: ICD-10-PCS | Performed by: INTERNAL MEDICINE

## 2020-09-18 PROCEDURE — C1751 CATH, INF, PER/CENT/MIDLINE: HCPCS

## 2020-09-18 PROCEDURE — 6360000002 HC RX W HCPCS: Performed by: NURSE PRACTITIONER

## 2020-09-18 PROCEDURE — 6360000002 HC RX W HCPCS: Performed by: INTERNAL MEDICINE

## 2020-09-18 PROCEDURE — 99232 SBSQ HOSP IP/OBS MODERATE 35: CPT | Performed by: INTERNAL MEDICINE

## 2020-09-18 PROCEDURE — 36573 INSJ PICC RS&I 5 YR+: CPT

## 2020-09-18 PROCEDURE — 85610 PROTHROMBIN TIME: CPT

## 2020-09-18 PROCEDURE — 99238 HOSP IP/OBS DSCHRG MGMT 30/<: CPT | Performed by: INTERNAL MEDICINE

## 2020-09-18 PROCEDURE — 2580000003 HC RX 258: Performed by: NURSE PRACTITIONER

## 2020-09-18 RX ADMIN — Medication 10 ML: at 09:50

## 2020-09-18 RX ADMIN — VANCOMYCIN HYDROCHLORIDE 1000 MG: 1 INJECTION, POWDER, LYOPHILIZED, FOR SOLUTION INTRAVENOUS at 00:52

## 2020-09-18 RX ADMIN — VANCOMYCIN HYDROCHLORIDE 1000 MG: 1 INJECTION, POWDER, LYOPHILIZED, FOR SOLUTION INTRAVENOUS at 13:13

## 2020-09-18 RX ADMIN — LEVOTHYROXINE SODIUM 125 MCG: 125 TABLET ORAL at 05:01

## 2020-09-18 RX ADMIN — CYANOCOBALAMIN TAB 500 MCG 500 MCG: 500 TAB at 09:49

## 2020-09-18 RX ADMIN — MEROPENEM 1 G: 1 INJECTION, POWDER, FOR SOLUTION INTRAVENOUS at 13:13

## 2020-09-18 RX ADMIN — MEROPENEM 1 G: 1 INJECTION, POWDER, FOR SOLUTION INTRAVENOUS at 04:56

## 2020-09-18 RX ADMIN — PANTOPRAZOLE SODIUM 40 MG: 40 TABLET, DELAYED RELEASE ORAL at 05:01

## 2020-09-18 RX ADMIN — ATORVASTATIN CALCIUM 40 MG: 40 TABLET, FILM COATED ORAL at 09:49

## 2020-09-18 RX ADMIN — ACETAMINOPHEN 650 MG: 325 TABLET ORAL at 10:41

## 2020-09-18 ASSESSMENT — PAIN SCALES - GENERAL
PAINLEVEL_OUTOF10: 0
PAINLEVEL_OUTOF10: 5

## 2020-09-18 NOTE — CARE COORDINATION
[facility]  Received referral regarding Home IV infusion from [address] [place] [address]. LM for [facility] requesting IV abx information form benefit verification.     Electronically signed by Claudette Mercado RN on 9/18/2020 at 1:48 PM

## 2020-09-18 NOTE — FLOWSHEET NOTE
09/18/20 0945   Vital Signs   Temp 97.9 °F (36.6 °C)   Temp Source Oral   Pulse 76   Heart Rate Source Monitor   Resp 14   BP 92/61   BP Location Left upper arm   BP Upper/Lower Upper   Patient Position Semi fowlers   Level of Consciousness 0   MEWS Score 1   Patient Currently in Pain Denies   Oxygen Therapy   SpO2 93 %   O2 Device None (Room air)     Patient BP is low this morning but pts BP does run low on the usual basis. Plan is for patient to discharge home on Iv antibiotics today with home health care. PICC line was placed in patients right arm. Morning medications were given crushed in applesauce. Remains on room air with a spontaneous cough. Denied any other questions or needs this AM. Call light in reach with bed alarm on.

## 2020-09-18 NOTE — PROGRESS NOTES
Pulmonary Progress Note    CC: Fever    Subjective: Definitely feeling better, ready to go home    IV line: Peripheral      Intake/Output Summary (Last 24 hours) at 9/18/2020 1430  Last data filed at 9/18/2020 0502  Gross per 24 hour   Intake --   Output 475 ml   Net -475 ml       Exam:   BP (!) 83/55   Pulse 70   Temp 97.2 °F (36.2 °C) (Oral)   Resp 16   Ht 6' 2\" (1.88 m)   Wt 154 lb (69.9 kg)   SpO2 94%   BMI 19.77 kg/m²  on room air  Constitutional:  No acute distress. HENT:  Oropharynx is clear and moist.   Neck: No tracheal deviation present. Cardiovascular: Normal heart sounds. No lower extremity edema. Pulmonary/Chest: No wheezes. No rhonchi. No rales. No decreased breath sounds. No accessory muscle usage or stridor. Musculoskeletal: No cyanosis. No clubbing. Skin: Skin is warm and dry. Psychiatric: Normal mood and affect.   Neurologic: speech fluent, alert and oriented, strength symmetric      Scheduled Meds:   lidocaine 1 % injection  5 mL Intradermal Once    sodium chloride flush  10 mL Intravenous 2 times per day    meropenem  1 g Intravenous Q8H    levothyroxine  125 mcg Oral QAM AC    pantoprazole  40 mg Oral QAM AC    atorvastatin  40 mg Oral Daily    vitamin B-12  500 mcg Oral Daily    sodium chloride flush  10 mL Intravenous 2 times per day    enoxaparin  40 mg Subcutaneous Daily    vancomycin  1,000 mg Intravenous Q12H     Continuous Infusions:    PRN Meds:  sodium chloride flush, sodium chloride flush, acetaminophen **OR** acetaminophen, polyethylene glycol, promethazine **OR** ondansetron    Labs:  CBC:   Recent Labs     09/16/20 2012 09/18/20  0542   WBC 8.7 6.1   HGB 11.5* 11.6*   HCT 34.8* 35.2*   MCV 80.4 79.8*    229     BMP:   Recent Labs     09/17/20  1610 09/18/20  0542   * 134*   K 4.3 4.0   CL 99 98*   CO2 29 29   PHOS 2.7  --    BUN 18 15   CREATININE 0.7* 0.8     LIVER PROFILE:   No results for input(s): AST, ALT, LIPASE, BILIDIR, BILITOT, ALKPHOS in the last 72 hours. Invalid input(s): AMYLASE,  ALB  PT/INR:   Recent Labs     09/18/20  0818   PROTIME 13.5*   INR 1.16*     APTT: No results for input(s): APTT in the last 72 hours. UA:  No results for input(s): NITRITE, COLORU, PHUR, LABCAST, WBCUA, RBCUA, MUCUS, TRICHOMONAS, YEAST, BACTERIA, CLARITYU, SPECGRAV, LEUKOCYTESUR, UROBILINOGEN, BILIRUBINUR, BLOODU, GLUCOSEU, AMORPHOUS in the last 72 hours. Invalid input(s): KETONESU  No results for input(s): PHART, QWP8INS, PO2ART in the last 72 hours. Cultures:   9/14/2020 SARS-CoV-2 negative  9/14/2020 blood no growth to date  Unable to bring up sputum    Films:  9/14/2020 CXR bibasilar infiltrates    ASSESSMENT:  · Fever  · Recurrent aspiration pneumonia  · H/O head and neck cancer/lymphoepithelial carcinoma  · CAD  · GERD  · Hypothyroid  · Remote history of tobacco abuse    PLAN:  · Meropenem D#4/14, Vancomycin D#5/7. Had persistent fevers while on Cefepime/Vanc. Will plan to complete course of IV antibiotics  · Monitoring of vancomycin levels to prevent toxicity. · Pulmonary f/u will be with Dr. Laurita Landis.   · D/C planning as long as remains afebrile

## 2020-09-18 NOTE — CARE COORDINATION
DISCHARGE ORDER  Date/Time 2020 10:03 AM  Completed by: Lowell Brewster, Case Management    Patient Name: Evelyn Cheema      : 1945  Admitting Diagnosis: Pneumonia [J18.9]      Admit order Date and Status: 20 inpt  (verify MD's last order for status of admission)      Noted discharge order. If applicable PT/OT recommendation at Discharge: N/A  DME recommendation by PT/OT:N/A  Confirmed discharge plan  : Yes  with patient  If pt confirmed DC plan does family need to be contacted by CM No   Discharge Plan: Reviewed chart and order for dc noted. Spoke with pt as will dc on IVABx and pt is agreeable. Declines list and chooses [facility] for [facility] and [facility] for home infusion. Referral called to [facility] who will check benefits and arrange [facility]. Chart reviewed and no other dc needs identified. Reviewed chart. Role of discharge planner explained and patient verbalized understanding. Discharge order is noted. Has Home O2 in place on admit:  No  Informed of need to bring portable home O2 tank on day of discharge for nursing to connect prior to leaving:   Not Indicated  Verbalized agreement/Understanding:   Not Indicated  Pt is being d/c'd to home today. Pt's O2 sats are 93% on RA. Discharge timeout done with nsg, CM and pt. All discharge needs and concerns addressed.

## 2020-09-18 NOTE — DISCHARGE INSTR - COC
Continuity of Care Form    Patient Name: Rivka Banuelos   :  1945  MRN:  [id number]    Admit date:  2020  Discharge date:  20    Code Status Order: Full Code   Advance Directives:   Advance Care Flowsheet Documentation       Date/Time Healthcare Directive Type of Healthcare Directive Copy in [address] Agent's Name Healthcare Agent's Phone Number    20 1532  Yes, patient has an advance directive for healthcare treatment  Living will  No, copy requested from family  Spouse  --  --            Admitting Physician:  Martita Zimmerman MD  PCP: Kim Yoon DO    Discharging Nurse: Jeanine Salazar 23 Unit/Room#: 0206/0206-01  Discharging Unit Phone Number: [phone]    Emergency Contact:   Extended Emergency Contact Information  Primary Emergency Contact: Wendy Valverde  Address: [address] [place] [address] [place]  Phone: [phone]  Mobile Phone: [phone]  Relation: Spouse  Secondary Emergency Contact: [address] Phone: [phone]  Relation: Child    Past Surgical History:  Past Surgical History:   Procedure Laterality Date    ABDOMEN SURGERY      gallbladder removed    CHOLECYSTECTOMY      COLONOSCOPY      EYE SURGERY Right     TORN RETINA, LASER    HERNIA REPAIR      HIP ARTHROPLASTY Right 2016    HIP FRACTURE SURGERY Right     JOINT REPLACEMENT      OTHER SURGICAL HISTORY  2011    right hip IM nailing    SHOULDER SURGERY Right rotater cuff    TONGUE BIOPSY  10/2010       Immunization History:   Immunization History   Administered Date(s) Administered    Influenza Whole 10/06/2010    Pneumococcal Polysaccharide ([id number]) 10/06/2010       Active Problems:  Patient Active Problem List   Diagnosis Code    Pneumonia due to organism J18.9    Fever and chills R50.9    SOB (shortness of breath) R06.02    BPH (benign prostatic hyperplasia) N40.0    GERD (gastroesophageal reflux disease) K21.9    Benign prostatic hyperplasia N40.0    Hyperlipidemia E78.5    DVT, lower extremity ([facility]) I82.409    Hip fracture ([facility]) S72.009A    Dry mouth R68.2    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    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    NSTEMI (non-ST elevated myocardial infarction) ([facility] [address] I21.4    Septicemia ([facility]) A41.9    Hypothyroidism E03.9    Moderate protein-calorie malnutrition (HCC) E44.0    Elevated troponin R79.89    Pneumonia J18.9    Fever R50.9    Hematuria R31.9    Dysphagia R13.10    Sepsis ([facility]) A41.9       Isolation/Infection:   Isolation            No Isolation          Patient Infection Status       Infection Onset Added Last Indicated Last Indicated By Review Planned Expiration Resolved Resolved By    None active    Resolved    COVID-19 Rule Out 09/14/20 09/14/20 09/14/20 COVID-19 (Ordered)   09/15/20 Rule-Out Test Resulted    COVID-19 Rule Out 08/27/20 08/27/20 08/27/20 COVID-19 (Ordered)   08/27/20 Rule-Out Test Resulted            Nurse Assessment:  Last Vital Signs: BP 92/61   Pulse 76   Temp 97.9 °F (36.6 °C) (Oral)   Resp 14   Ht 6' 2\" (1.88 m)   Wt 154 lb (69.9 kg)   SpO2 93%   BMI 19.77 kg/m²     Last documented pain score (0-10 scale): Pain Level: 0  Last Weight:   Wt Readings from Last 1 Encounters:   09/16/20 154 lb (69.9 kg)     Mental Status:  alert    IV Access:  - PICC - site  R Upper Arm, insertion date: 9/18/20    Nursing Mobility/ADLs:  Walking   Independent  Transfer  Independent  Bathing  Independent  Dressing  Independent  Toileting  Independent  Feeding  [address]  Independent  Med Delivery   whole    Wound Care Documentation and Therapy:        Elimination:  Continence:   · Bowel:  Yes  · Bladder: Yes  Urinary Catheter: None   Colostomy/Ileostomy/Ileal Conduit: No         Intake/Output Summary (Last 24 hours) at 9/18/2020 0958  Last data filed at 9/18/2020 0502  Gross per 24 hour   Intake signature: Electronically signed by Austyn Galloway, DES on 9/18/20 at 10:01 AM EDT    PHYSICIAN SECTION    Prognosis: Good    Condition at Discharge: Stable    Rehab Potential (if transferring to Rehab): Good    Recommended Labs or Other Treatments After Discharge:    Home IV abx    Vancomycin 1 gm IV q 12 for 5 more doses . Vanc troughnlevel to be drawn  prior to  3rd dose    Merrem 1 gm IV q 8 for 11 days . Can DC PICC line after completion of IV abx    Physician Certification: I certify the above information and transfer of Sheeba Pryor  is necessary for the continuing treatment of the diagnosis listed and that he requires [address] for less 30 days.      Update Admission H&P: No change in H&P    PHYSICIAN SIGNATURE:  Electronically signed by Tana Dela Cruz MD on 9/18/20 at 12:54 PM EDT

## 2020-09-18 NOTE — CARE COORDINATION
[facility]  Spoke with Nilda Coley regarding home IV infusion. Sent benefit verification to [facility] on Merrem 1g IV Q8 and Vancomycin 1g IV Q12. Liaison to follow up with CM when coverage information received. Sent request to [facility] for IV SOC coverage for this evening. [facility] is able to see pt tonight for IV SOC.    2:12 Received coverage information back from Chrissie Rush with [facility]. Pt will have a copay for Merrem of $427 per week and Vancomycin $228 per week. [facility] to cover IV supplies. Insurance Company: Part D/ Nursing  Primary / Secondary  Contact Name: [facility] Reference #:  Phone#:  Therapy: Merrem 1 gm Q8 Vanco 1 gm Q12    Billed Amt: Merrem = 427/week Vanco = 228/week Supplies= 20/day    Pt. Copay: Kathlean Samples = 427/week Vanco = 228/week Supplies= 0/day  Additional Comments: Drug copays are being applied to Part D deductible. LM for Nilda Coley with coverage information. Spoke with pt in follow up. Pt is aware of out of pocket cost for home IV abx as stated above and is agreeable. Pt and wife are teachable for home IV abx. Verified demographics. IV SOC in am 9/19. Spoke with Jaron COLEY in follow up. CM agree with plan for IV SOC in am 9/19 due to when next doses are due. Faxed script to [facility]. Faxed referral with orders to [facility] for IV SOC.       Electronically signed by Gillermo Epley, RN on 9/18/2020 at 1:50 PM

## 2020-09-19 ENCOUNTER — CARE COORDINATION (OUTPATIENT)
Dept: CASE MANAGEMENT | Age: 75
End: 2020-09-19

## 2020-09-19 NOTE — CARE COORDINATION
[facility] Transitions Initial Follow Up Call    Call within 2 business days of discharge: Yes    Patient: Antoni Adkins Patient : 1945   MRN: <[id number]>  Reason for Admission: Pneumonia due to organism  Discharge Date: 20 RARS: Readmission Risk Score: 19      Last Discharge [facility]       Complaint Diagnosis Description Type Department Provider    20 Cough; Fever Pneumonia due to organism ED to Hosp-Admission (Discharged) (ADMITTED) [facility] MD Jamaal; [facility],... Attempted 24 hour call. Left message to return my call. Care Transitions 24 Hour Call    Care Transitions Interventions         Follow Up  No future appointments.     Carmen Cespedes RN

## 2020-09-20 ENCOUNTER — HOSPITAL ENCOUNTER (OUTPATIENT)
Age: 75
Discharge: HOME | End: 2020-09-20
Payer: MEDICARE

## 2020-09-20 LAB
BLOOD CULTURE, ROUTINE: NORMAL
CULTURE, BLOOD 2: NORMAL
VANCOMYCIN TROUGH: 11.5 UG/ML (ref 10–20)

## 2020-09-20 PROCEDURE — 80202 ASSAY OF VANCOMYCIN: CPT

## 2020-09-20 PROCEDURE — 36415 COLL VENOUS BLD VENIPUNCTURE: CPT

## 2020-09-20 NOTE — DISCHARGE SUMMARY
Name:  Editha Lesches  Room:  0206/0206-01  MRN:    [id number]    Discharge Summary      This discharge summary is in conjunction with a complete physical exam done on the day of discharge. Discharging Physician: Dr. Urbano Ground: 9/14/2020  Discharge:  9/18/2020    HPI taken from admission H&P:    The patient is a 76 y.o. male with BPH, prior DVT, GERD, hyperlipidemia, hypothyroidism who presented to [facility] ED with complaint of fever and shortness of breath. Patient reports that he was recently admitted for pneumonia and was discharged home on Augmentin. He states he took the full course of the antibiotics but never felt any better. He states over the last week he has been feeling worse with increased shortness of breath and cough does report that it is a nonproductive cough. He states he does have silent aspiration has been noticing some coughing with eating at home but has been trying to use the techniques taught to him by speech therapy during his last admission. He reports fevers up to 100 at home. He states he did have a repeat chest x-ray last week which showed no significant improvement in his pneumonia despite completing his antibiotics. Patient denies any associated chest pain. He has been out in public but has been wearing a mask. He has not had any known covert exposures. He does not use supplemental O2 at home. Diagnoses this Admission and Hospital Course     HCAP vs aspiration PNA  SOB  Acute Hypoxic RF-- > ruled out  -Recent admission for pneumonia, suspected to be secondary to dysphagia. N.p.o. has been recommended to the patient but he refuses to follow this instruction.  -Was treated with Vanco and Zosyn and discharged home on Augmentin during last admission  -Now presenting with worsening bibasilar infiltrates on chest x-ray, and hypoxia . covered for gram negative and possible MRSA  -Seen by pulmonology . continued Vanc-day 4.   Cefepime discontinued and patient switched to Merrem, day 3  -Seen by speech therapy  -COVID ruled out  -Oxygen saturations are stable, on room air  - Improved, Plan is to discharge home with IV antibiotics vancomycin and Merrem. PICC line was placed      Sepsis  - POA  - due to PNA  -Improving today. Afebrile     Chronically Elevated troponin  -Troponin on admission 0.15. repeat troponins 0.14, 0.15  - Baseline troponin ~0.13  -Telemetry stable and normal sinus rhythm. Will discontinue telemetry  -EKG with no acute changes      Hematuria  - monitor   - Can follow up OP for repeat labs      Hypothyroidism  - Continue Synthroid      Dysphagia  -Seen by speech therapy  - Hx of silent aspiration  -Patient does not follow recommended n.p.o. status     Hx of head and neck cancer/lymphoepithelial carcinoma  - s/p chemo and radiation        Procedures (Please Review Full Report for Details)  PICC Line placement     Consults    pulm  cardio    Physical Exam at Discharge:    /65   Pulse 71   Temp 97.2 °F (36.2 °C) (Oral)   Resp 16   Ht 6' 2\" (1.88 m)   Wt 154 lb (69.9 kg)   SpO2 94%   BMI 19.77 kg/m²     General:  Awake, alert, NAD  Oriented X3  Skin:  Warm and dry  Neck:  JVD absent. Neck supple  Chest: diminished breath sounds. No wheezes rales or rhonchi today  Cardiovascular:  RRR ,S1S2 normal  Abdomen:  Soft, non tender, non distended, BS +  Extremities:  No edema. Intact peripheral pulses. Brisk cap refill, < 2 secs  Neuro: non focal       CBC:   Recent Labs     09/18/20  0542   WBC 6.1   HGB 11.6*   HCT 35.2*   MCV 79.8*        BMP:   Recent Labs     09/17/20  1610 09/18/20  0542   * 134*   K 4.3 4.0   CL 99 98*   CO2 29 29   PHOS 2.7  --    BUN 18 15   CREATININE 0.7* 0.8     LIVER PROFILE: No results for input(s): AST, ALT, LIPASE, BILIDIR, BILITOT, ALKPHOS in the last 72 hours. Invalid input(s): AMYLASE,  ALB  PT/INR:   Recent Labs     09/18/20  0818   PROTIME 13.5*   INR 1.16*     APTT: No results for input(s):  APTT in the

## 2020-09-21 ENCOUNTER — CARE COORDINATION (OUTPATIENT)
Dept: CASE MANAGEMENT | Age: 75
End: 2020-09-21

## 2020-09-21 NOTE — CARE COORDINATION
healthcare. Medication reconciliation was performed with patient, who verbalizes understanding of administration of home medications. Covid Risk Education    Patient has following COVID-19 risk factors of: heart failure, pneumonia and cancer. Education provided regarding infection prevention, and signs and symptoms of COVID-19 and when to seek medical attention with patient who verbalized understanding. Discussed exposure protocols and quarantine From CDC: Are you at higher risk for severe illness?   and given an opportunity for questions and concerns. The patient agrees to contact the COVID-19 hotline [phone] or PCP office for questions related to COVID-19. For more information on steps you can take to protect yourself, see CDC's How to Protect Yourself     Discussed follow-up appointments. If no appointment was previously scheduled, appointment scheduling offered: Yes. Is follow up appointment scheduled within 7 days of discharge? No  Non-[facility] follow up appointment(s): he plans to establish with Dr Tracy Pan as pulm and CTN provided him with office number for scheduling follow up    Plan for follow-up call in 7-10 days based on severity of symptoms and risk factors. Plan for next call: self management-PNA    Still weak, but no more fever. Able to work in the yard today. Does not intend to pursue ST for aspiration pna - states he has done that before and didn't feel the benefit. States he is \"very careful\" and knows exercises to do. [facility] has been out. He completed one abx but another continues for another week. IV line is WNL without concerns. He knows he can contact [facility] 24/7 if concerns regarding line or symptoms. He denies needs today. CTN provided contact information for future needs.     Care Transitions 24 Hour Call    Schedule Follow Up Appointment with PCP:  Completed  Do you have any ongoing symptoms?:  No  Do you have a copy of your discharge instructions?:  Yes  Do you have all of your prescriptions and are they filled?:  Yes  Have you been contacted by a [address]?:  No  Have you scheduled your follow up appointment?:  Yes  How are you going to get to your appointment?:  Car - drive self  Were you discharged with any Home Care or Post Acute Services:  Yes  Post Acute Services:  Home Health (Comment: [facility] and [facility])  Do you feel like you have everything you need to keep you well at home?:  Yes  Care Transitions Interventions     Other Therapy Services:  Declined          Follow Up  No future appointments.     Agnieszka Pompa RN

## 2020-09-29 ENCOUNTER — TELEPHONE (OUTPATIENT)
Dept: PULMONOLOGY | Age: 75
End: 2020-09-29

## 2020-09-29 NOTE — TELEPHONE ENCOUNTER
Patient calling in asking if he needs to make a fua with Dr. Paul Hall due to the recent hospitalization. I informed him in the note it states to follow up with his current pulmonologist Dr. Sherryle Dawes. Patient is asking if he can transfer from Dr. Sherryle Dawes states he has to drive to [facility] which is a drive for him. He states he only lives a few minutes from our [address]. He stated he was very happy with the care he had received from Dr. Paul Hall while in the hospital. Please advise. LOV: Hospital consult 9/14/20    ASSESSMENT:  · Fever  · Recurrent aspiration pneumonia  · H/O head and neck cancer/lymphoepithelial carcinoma  · CAD  · GERD  · Hypothyroid  · Remote history of tobacco abuse     PLAN:  · Cefepime and vancomycin D#1. Will need to complete course of IV antibiotics  ? Monitoring of vancomycin levels to prevent toxicity.    · Speech therapy to see  · Pulmonary f/u will be with Dr. Sherryle Dawes.

## 2020-09-29 NOTE — TELEPHONE ENCOUNTER
Within this Telehealth Consent, the terms you and yours refer to the person using the Telehealth Service (Service), or in the case of a use of the Service by or on behalf of a minor, you and yours refer to and include (i) the parent or legal guardian who provides consent to the use of the Service by such minor or uses the Service on behalf of such minor, and (ii) the minor for whom consent is being provided or on whose behalf the Service is being utilized. When using Service, you will be consulting with your health care providers via the use of Telehealth.   Telehealth involves the delivery of healthcare services using electronic communications, information technology or other means between a healthcare provider and a patient who are not in the same physical location. Telehealth may be used for diagnosis, treatment, follow-up and/or patient education, and may include, but is not limited to, one or more of the following:    Electronic transmission of medical records, photo images, personal health information or other data between a patient and a healthcare provider    Interactions between a patient and healthcare provider via audio, video and/or data communications    Use of output data from medical devices, sound and video files    Anticipated Benefits   The use of Telehealth by your Provider(s) through the Service may have the following possible benefits:    Making it easier and more efficient for you to access medical care and treatment for the conditions treated by such Provider(s) utilizing the Service    Allowing you to obtain medical care and treatment by Provider(s) at times that are convenient for you    Enabling you to interact with Provider(s) without the necessity of an in-office appointment     Possible Risks   While the use of Telehealth can provide potential benefits for you, there are also potential risks associated with the use of Telehealth.  These risks include, but may not be limited to the following:    Your Provider(s) may not able to provide medical treatment for your particular condition and you may be required to seek alternative healthcare or emergency care services.  The electronic systems or other security protocols or safeguards used in the Service could fail, causing a breach of privacy of your medical or other information.  Given regulatory requirements in certain jurisdictions, your Provider(s) diagnosis and/or treatment options, especially pertaining to certain prescriptions, may be limited. Acceptance   1. You understand that Services will be provided via Telehealth. This process involves the use of HIPAA compliant and secure, real-time audio-visual interfacing with a qualified and appropriately trained provider located at [facility]. 2. You understand that, under no circumstances, will this session be recorded. 3. You understand that the Provider(s) at [facility] and other clinical participants will be party to the information obtained during the Telehealth session in accordance with best medical practices. 4. You understand that the information obtained during the Telehealth session will be used to help determine the most appropriate treatment options. 5. You understand that You have the right to revoke this consent at any point in time. 6. You understand that Telehealth is voluntary, and that continued treatment is not dependent upon consent. 7. You understand that, in the event of non-consent to Telehealth services and/or technical difficulties, you will obtain services as typically provided in the absence of Telehealth technology. 8. You understand that this consent will be kept in Your medical record. 9. No potential benefits from the use of Telehealth or specific results can be guaranteed. Your condition may not be cured or improved and, in some cases, may get worse.    10. There are limitations in the provision of medical care and treatment via Telehealth and the Service and you may not be able to receive diagnosis and/or treatment through the Service for every condition for which you seek diagnosis and/or treatment. 11. There are potential risks to the use of Telehealth, including but not limited to the risks described in this Telehealth Consent. 12. Your Provider(s) have discussed the use of Telehealth and the Service with you, including the benefits and risks of such and you have provided oral consent to your Provider(s) for the use of Telehealth and the Service. 15. You understand that it is your duty to provide your Provider(s) truthful, accurate and complete information, including all relevant information regarding care that you may have received or may be receiving from other healthcare providers outside of the Service. 14. You understand that each of your Provider(s) may determine in his or sole discretion that your condition is not suitable for diagnosis and/or treatment using the Service, and that you may need to seek medical care and treatment a specialist or other healthcare provider, outside of the Service. 15. You understand that you are fully responsible for payment for all services provided by Provider(s) or through use of the Service and that you may not be able to use third-party insurance. 16. You represent that (a) you have read this Telehealth Consent carefully, (b) you understand the risks and benefits of the Service and the use of Telehealth in the medical care and treatment provided to you by Provider(s) using the Service, and (c) you have the legal capacity and authority to provide this consent for yourself and/or the minor for which you are consenting under applicable federal and state laws, including laws relating to the age of [de-identified] and/or parental/guardian consent.    17. You give your informed consent to the use of Telehealth by Provider(s) using the Service under the terms described in the Terms of Service and this Telehealth Consent. The patient was read the following statement and has consented to the visit as of 9/29/20. The patient has been scheduled for their first telehealth visit on 10/28/20 with Dr Jeffery Love.

## 2020-09-30 ENCOUNTER — CARE COORDINATION (OUTPATIENT)
Dept: CASE MANAGEMENT | Age: 75
End: 2020-09-30

## 2020-09-30 NOTE — CARE COORDINATION
[facility] Transitions Follow Up Call    2020    Patient: Quintin Muhammad  Patient : 1945   MRN: [id number]  Reason for Admission:   Discharge Date: 20 RARS: Readmission Risk Score: 19    Follow Up: Attempted to contact patient for BPCI-A follow up. Unable to reach patient. Left message with contact information and request for call back.       Future Appointments   Date Time Provider Mary Snell   10/28/2020  9:15 AM Nava Thomas MD [facility]       Rogelio Luna RN

## 2020-09-30 NOTE — CARE COORDINATION
[facility] Transitions Follow Up Call    2020    Patient: Monet Hicks  Patient : 1945   MRN: [id number]  Reason for Admission:   Discharge Date: 20 RARS: Readmission Risk Score: 19      Needs to be reviewed by the provider   Additional needs identified to be addressed with provider No  none  Discussed COVID-19 related testing which was available at this time. Test results were negative. Patient informed of results, if available? Yes         Method of communication with provider : none    Care Transition Nurse (CTN) contacted the patient by telephone to follow up after admission on 20 and 20. Verified name and  with patient as identifiers. Addressed changes since last contact: symptom management-continues to have thrush. using nystatin swish. Discharged needs reviewed: none  Follow up appointment completed? Yes    CTN reviewed discharge instructions, medical action plan and red flags with patient and discussed any barriers to care and/or understanding of plan of care after discharge. Discussed appropriate site of care based on symptoms and resources available to patient including: PCP. The patient agrees to contact the PCP office for questions related to their healthcare. Patients top risk factors for readmission: medical condition  Interventions to address risk factors: Reviewed signs/symptoms and when to contact MD    Discussed follow-up appointments. If no appointment was previously scheduled, appointment scheduling offered: Yes Is follow up appointment scheduled within 7 days of discharge? Yes    Plan for follow-up call in 7-10 days based on severity of symptoms and risk factors. Plan for next call: symptom management-Follow up on thrush and any new or worsening symptoms  CTN provided contact information for future needs.     Care Transitions Subsequent and Final Call    Subsequent and Final Calls  Have your medications changed?:  No  Do you have any questions related to your medications?:  No  Are you currently active with any services?:  Home Health  Do you have any needs or concerns that I can assist you with?:  No  Identified Barriers:  None  Care Transitions Interventions     Other Therapy Services:  Declined   Other Interventions: Follow Up:  Received return phone call from patient for BPCI-A follow up. Cas Johnson stated he is doing okay besides the thrush. Continues to have thrush. Will be getting a refill on Nystatin swish today. He denies having any chest pain/discomfort, increased shortness of breath, cough, fever, chills. Reports he is eating but not as much as he use to. He is on a soft diet because he was having issues with aspiration. Patient stated he is currently at the neurology office waiting. Discussed signs/symptoms and when to contact MD.  He verbalized understanding. He does not have any questions or concerns at this time. Will continue to follow.       Future Appointments   Date Time Provider Mary Snell   10/28/2020  9:15 AM Chari Gorman MD [facility]       Grecia Mc RN

## 2020-10-09 ENCOUNTER — CARE COORDINATION (OUTPATIENT)
Dept: CASE MANAGEMENT | Age: 75
End: 2020-10-09

## 2020-10-27 ENCOUNTER — HOSPITAL ENCOUNTER (OUTPATIENT)
Age: 75
Discharge: HOME | End: 2020-10-27
Payer: MEDICARE

## 2020-10-27 ENCOUNTER — HOSPITAL ENCOUNTER (OUTPATIENT)
Dept: GENERAL RADIOLOGY | Age: 75
Discharge: HOME | End: 2020-10-27
Payer: MEDICARE

## 2020-10-27 PROCEDURE — 71046 X-RAY EXAM CHEST 2 VIEWS: CPT

## 2020-10-28 ENCOUNTER — VIRTUAL VISIT (OUTPATIENT)
Dept: PULMONOLOGY | Age: 75
End: 2020-10-28
Payer: MEDICARE

## 2020-10-28 PROCEDURE — 99442 PR PHYS/QHP TELEPHONE EVALUATION 11-20 MIN: CPT | Performed by: INTERNAL MEDICINE

## 2020-10-28 RX ORDER — ACETAMINOPHEN,DIPHENHYDRAMINE HCL 500; 25 MG/1; MG/1
2 TABLET, FILM COATED ORAL NIGHTLY PRN
COMMUNITY
End: 2021-06-09

## 2020-10-28 RX ORDER — FLUTICASONE PROPIONATE 50 MCG
2 SPRAY, SUSPENSION (ML) NASAL DAILY
Status: ON HOLD | COMMUNITY
Start: 2020-05-11 | End: 2022-09-19 | Stop reason: HOSPADM

## 2020-10-28 RX ORDER — ALBUTEROL SULFATE 90 UG/1
2 AEROSOL, METERED RESPIRATORY (INHALATION) 4 TIMES DAILY PRN
Qty: 1 INHALER | Refills: 0 | Status: SHIPPED | OUTPATIENT
Start: 2020-10-28 | End: 2020-11-16

## 2020-10-28 NOTE — PROGRESS NOTES
minutes. I affirm this is a Patient Initiated Episode with a Patient who has not had a related appointment within my department in the past 7 days or scheduled within the next 24 hours. Patient identification was verified at the start of the visit: YES  Pursuant to the emergency declaration under the [facility], [address] [place] [address] authority and the [employer] Act, this Telephone Visit was conducted with patient's (and/or legal guardian's) consent, to reduce the patient's risk of exposure to COVID-19 and provide necessary medical care.   The patient (and/or legal guardian) his advised to contact this office for worsening conditions or problems, and seek emergency medical treatment and/or call 911 if urgent care needed,

## 2020-10-29 ENCOUNTER — CARE COORDINATION (OUTPATIENT)
Dept: CASE MANAGEMENT | Age: 75
End: 2020-10-29

## 2020-10-29 NOTE — CARE COORDINATION
[facility] Transitions Follow Up Call    10/29/2020    Patient: Lucero Ochoa  Patient : 1945   MRN: <[id number]>  Reason for Admission:   Discharge Date: 20 RARS: Readmission Risk Score: 19      Attempted to reach patient by phone for follow up; BPCI-A. HIPAA compliant message left on patient's voicemail; CTN contact information provided.         Brandie Pacheco RN

## 2020-10-29 NOTE — CARE COORDINATION
CTN received message from patient sating that he is doing fine.   -checked in with Pulmonologist yesterday  -states needs no further assistance

## 2020-11-03 ENCOUNTER — CARE COORDINATION (OUTPATIENT)
Dept: CASE MANAGEMENT | Age: 75
End: 2020-11-03

## 2020-11-03 NOTE — CARE COORDINATION
[facility] Transitions Follow Up Call    11/3/2020    Patient: Blue Cohen  Patient : 1945   MRN: <[id number]>  Reason for Admission:   Discharge Date: 20 RARS: Readmission Risk Score: 19       Attempted to contact patient for follow up BPCI-A transition call. Left voicemail message to return call with an update on condition since discharge. Contact information provided. Will continue to follow up. Patient returned call. Patient reports he is doing fine. He has been in contact with his pulmonologist. He was told to call if he needed him. He has scarring in his lungs from previously. Patient states he has sob. He has an inhaler but he hasn't used it. He eats just because he has to. He doesn't have a desire to eat. Fluid intake is ok. Bladder and bowel elimination normal. Patient request that no more calls be made to him. Care Transitions Subsequent and Final Call    Subsequent and Final Calls  Care Transitions Interventions  Other Interventions: Follow Up  No future appointments.     Yue Lloyd LPN

## 2020-11-16 RX ORDER — ALBUTEROL SULFATE 90 UG/1
2 AEROSOL, METERED RESPIRATORY (INHALATION) 4 TIMES DAILY PRN
Qty: 1 INHALER | Refills: 5 | Status: ON HOLD | OUTPATIENT
Start: 2020-11-16 | End: 2022-09-19 | Stop reason: HOSPADM

## 2020-11-24 ENCOUNTER — CARE COORDINATION (OUTPATIENT)
Dept: CASE MANAGEMENT | Age: 75
End: 2020-11-24

## 2020-11-24 NOTE — CARE COORDINATION
[facility] Transitions Follow Up Call    2020    Patient: Scarlet Schirmer  Patient : 1945   MRN: <[id number]>  Reason for Admission:   Discharge Date: 20 RARS: Readmission Risk Score: 19      Attempted to reach patient by phone for follow up; BPCI-A. HIPAA compliant message left on patient's voicemail; CTN contact information provided.         Gemma Driver RN

## 2020-12-07 ENCOUNTER — APPOINTMENT (OUTPATIENT)
Dept: GENERAL RADIOLOGY | Age: 75
End: 2020-12-07
Payer: MEDICARE

## 2020-12-07 ENCOUNTER — APPOINTMENT (OUTPATIENT)
Dept: CT IMAGING | Age: 75
End: 2020-12-07
Payer: MEDICARE

## 2020-12-07 ENCOUNTER — HOSPITAL ENCOUNTER (EMERGENCY)
Age: 75
Discharge: HOME | End: 2020-12-07
Attending: STUDENT IN AN ORGANIZED HEALTH CARE EDUCATION/TRAINING PROGRAM
Payer: MEDICARE

## 2020-12-07 VITALS
DIASTOLIC BLOOD PRESSURE: 79 MMHG | TEMPERATURE: 99.8 F | WEIGHT: 147 LBS | HEIGHT: 74 IN | BODY MASS INDEX: 18.87 KG/M2 | RESPIRATION RATE: 14 BRPM | OXYGEN SATURATION: 95 % | HEART RATE: 103 BPM | SYSTOLIC BLOOD PRESSURE: 135 MMHG

## 2020-12-07 LAB
A/G RATIO: 0.9 (ref 1.1–2.2)
ALBUMIN SERPL-MCNC: 3.8 G/DL (ref 3.4–5)
ALP BLD-CCNC: 57 U/L (ref 40–129)
ALT SERPL-CCNC: 8 U/L (ref 10–40)
ANION GAP SERPL CALCULATED.3IONS-SCNC: 10 MMOL/L (ref 3–16)
AST SERPL-CCNC: 14 U/L (ref 15–37)
BASE EXCESS VENOUS: 1.1 MMOL/L (ref -3–3)
BASOPHILS ABSOLUTE: 0 K/UL (ref 0–0.2)
BASOPHILS RELATIVE PERCENT: 0.5 %
BILIRUB SERPL-MCNC: 0.4 MG/DL (ref 0–1)
BUN BLDV-MCNC: 16 MG/DL (ref 7–20)
CALCIUM SERPL-MCNC: 9.1 MG/DL (ref 8.3–10.6)
CARBOXYHEMOGLOBIN: 3.1 % (ref 0–1.5)
CHLORIDE BLD-SCNC: 99 MMOL/L (ref 99–110)
CO2: 26 MMOL/L (ref 21–32)
CREAT SERPL-MCNC: 0.9 MG/DL (ref 0.8–1.3)
EKG ATRIAL RATE: 90 BPM
EKG DIAGNOSIS: NORMAL
EKG P AXIS: 73 DEGREES
EKG P-R INTERVAL: 152 MS
EKG Q-T INTERVAL: 364 MS
EKG QRS DURATION: 94 MS
EKG QTC CALCULATION (BAZETT): 445 MS
EKG R AXIS: 57 DEGREES
EKG T AXIS: 77 DEGREES
EKG VENTRICULAR RATE: 90 BPM
EOSINOPHILS ABSOLUTE: 0.1 K/UL (ref 0–0.6)
EOSINOPHILS RELATIVE PERCENT: 1.6 %
GFR AFRICAN AMERICAN: >60
GFR NON-AFRICAN AMERICAN: >60
GLOBULIN: 4.4 G/DL
GLUCOSE BLD-MCNC: 127 MG/DL (ref 70–99)
HCO3 VENOUS: 25.7 MMOL/L (ref 23–29)
HCT VFR BLD CALC: 37.4 % (ref 40.5–52.5)
HEMOGLOBIN: 12.1 G/DL (ref 13.5–17.5)
LACTIC ACID, SEPSIS: 1.2 MMOL/L (ref 0.4–1.9)
LYMPHOCYTES ABSOLUTE: 0.2 K/UL (ref 1–5.1)
LYMPHOCYTES RELATIVE PERCENT: 3.6 %
MCH RBC QN AUTO: 26.3 PG (ref 26–34)
MCHC RBC AUTO-ENTMCNC: 32.5 G/DL (ref 31–36)
MCV RBC AUTO: 80.9 FL (ref 80–100)
METHEMOGLOBIN VENOUS: 0.3 %
MONOCYTES ABSOLUTE: 0.5 K/UL (ref 0–1.3)
MONOCYTES RELATIVE PERCENT: 9.3 %
NEUTROPHILS ABSOLUTE: 4.3 K/UL (ref 1.7–7.7)
NEUTROPHILS RELATIVE PERCENT: 85 %
O2 CONTENT, VEN: 17 VOL %
O2 SAT, VEN: 91 %
O2 THERAPY: ABNORMAL
PCO2, VEN: 40.6 MMHG (ref 40–50)
PDW BLD-RTO: 14.3 % (ref 12.4–15.4)
PH VENOUS: 7.42 (ref 7.35–7.45)
PLATELET # BLD: 183 K/UL (ref 135–450)
PMV BLD AUTO: 7.2 FL (ref 5–10.5)
PO2, VEN: 59.5 MMHG (ref 25–40)
POTASSIUM REFLEX MAGNESIUM: 4.1 MMOL/L (ref 3.5–5.1)
PRO-BNP: 225 PG/ML (ref 0–449)
PROCALCITONIN: 0.29 NG/ML (ref 0–0.15)
RAPID INFLUENZA  B AGN: NEGATIVE
RAPID INFLUENZA A AGN: NEGATIVE
RBC # BLD: 4.62 M/UL (ref 4.2–5.9)
SARS-COV-2, NAAT: DETECTED
SODIUM BLD-SCNC: 135 MMOL/L (ref 136–145)
TCO2 CALC VENOUS: 27 MMOL/L
TOTAL PROTEIN: 8.2 G/DL (ref 6.4–8.2)
TROPONIN: 0.14 NG/ML
WBC # BLD: 5.1 K/UL (ref 4–11)

## 2020-12-07 PROCEDURE — 84484 ASSAY OF TROPONIN QUANT: CPT

## 2020-12-07 PROCEDURE — 71260 CT THORAX DX C+: CPT

## 2020-12-07 PROCEDURE — 71045 X-RAY EXAM CHEST 1 VIEW: CPT

## 2020-12-07 PROCEDURE — 93010 ELECTROCARDIOGRAM REPORT: CPT | Performed by: INTERNAL MEDICINE

## 2020-12-07 PROCEDURE — 6360000004 HC RX CONTRAST MEDICATION: Performed by: STUDENT IN AN ORGANIZED HEALTH CARE EDUCATION/TRAINING PROGRAM

## 2020-12-07 PROCEDURE — 82803 BLOOD GASES ANY COMBINATION: CPT

## 2020-12-07 PROCEDURE — U0002 COVID-19 LAB TEST NON-CDC: HCPCS

## 2020-12-07 PROCEDURE — 99284 EMERGENCY DEPT VISIT MOD MDM: CPT

## 2020-12-07 PROCEDURE — 96366 THER/PROPH/DIAG IV INF ADDON: CPT

## 2020-12-07 PROCEDURE — 87040 BLOOD CULTURE FOR BACTERIA: CPT

## 2020-12-07 PROCEDURE — 87804 INFLUENZA ASSAY W/OPTIC: CPT

## 2020-12-07 PROCEDURE — 2580000003 HC RX 258: Performed by: PHYSICIAN ASSISTANT

## 2020-12-07 PROCEDURE — 6360000002 HC RX W HCPCS: Performed by: PHYSICIAN ASSISTANT

## 2020-12-07 PROCEDURE — 96365 THER/PROPH/DIAG IV INF INIT: CPT

## 2020-12-07 PROCEDURE — 83880 ASSAY OF NATRIURETIC PEPTIDE: CPT

## 2020-12-07 PROCEDURE — 85025 COMPLETE CBC W/AUTO DIFF WBC: CPT

## 2020-12-07 PROCEDURE — 80053 COMPREHEN METABOLIC PANEL: CPT

## 2020-12-07 PROCEDURE — 84145 PROCALCITONIN (PCT): CPT

## 2020-12-07 PROCEDURE — 93005 ELECTROCARDIOGRAM TRACING: CPT | Performed by: PHYSICIAN ASSISTANT

## 2020-12-07 PROCEDURE — 83605 ASSAY OF LACTIC ACID: CPT

## 2020-12-07 PROCEDURE — 96367 TX/PROPH/DG ADDL SEQ IV INF: CPT

## 2020-12-07 RX ORDER — LEVOFLOXACIN 750 MG/1
750 TABLET ORAL DAILY
Qty: 5 TABLET | Refills: 0 | Status: SHIPPED | OUTPATIENT
Start: 2020-12-07 | End: 2020-12-12

## 2020-12-07 RX ORDER — 0.9 % SODIUM CHLORIDE 0.9 %
1000 INTRAVENOUS SOLUTION INTRAVENOUS ONCE
Status: COMPLETED | OUTPATIENT
Start: 2020-12-07 | End: 2020-12-07

## 2020-12-07 RX ADMIN — VANCOMYCIN HYDROCHLORIDE 1000 MG: 1 INJECTION, POWDER, LYOPHILIZED, FOR SOLUTION INTRAVENOUS at 13:04

## 2020-12-07 RX ADMIN — IOPAMIDOL 85 ML: 755 INJECTION, SOLUTION INTRAVENOUS at 10:49

## 2020-12-07 RX ADMIN — SODIUM CHLORIDE 1000 ML: 9 INJECTION, SOLUTION INTRAVENOUS at 10:11

## 2020-12-07 RX ADMIN — PIPERACILLIN SODIUM AND TAZOBACTAM SODIUM 4.5 G: 4; .5 INJECTION, POWDER, LYOPHILIZED, FOR SOLUTION INTRAVENOUS at 11:51

## 2020-12-07 ASSESSMENT — ENCOUNTER SYMPTOMS
COUGH: 1
GASTROINTESTINAL NEGATIVE: 1

## 2020-12-07 NOTE — ED NOTES
Spoke with patients wife about patient being discharged.  She states that she will have to pick patient up, but she is currently at the doctor getting a covid test.      Sandra Cohen RN  12/07/20 [id number]

## 2020-12-07 NOTE — ED PROVIDER NOTES
[address] ED  EMERGENCY DEPARTMENT ENCOUNTER        Pt Name: Armando Barraza  MRN: [id number]  [place] 1945  Date of evaluation: 12/7/2020  Provider: Mahsa Milan PA-C  PCP: Consuelo Castillo, DO     I have seen and evaluated this patient with my supervising physician Socorro Gillis, [facility]       Chief Complaint   Patient presents with    Fever     recent admit for pneumonia       HISTORY OF PRESENT ILLNESS   (Location, Timing/Onset, Context/Setting, Quality, Duration, Modifying Factors, Severity, Associated Signs and Symptoms)  Note limiting factors. Armando Barraza is a 76 y.o. 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 Patient reports he started running a temperature late last night. Onset of symptoms started 1 day ago. Duration symptoms have been persistent since onset. Patient reports he always has a cough and does admit to a dry cough today. Context includes fever at home x1 day. No aggravating or alleviating complaints. He took Tylenol about 40 minutes prior to arrival.  Patient has not taken anything else at this time for symptomatic relief. Nothing seems to make symptoms better or worse at this time. Denies any shortness of breath or chest pain. Denies nausea vomiting or diarrhea. Patient otherwise denies any other complaints. Patient lives with his wife at home. Denies any known sick contacts or exposure to COVID-19. Patient was also recently admitted in September for pneumonia. He does follow with pulmonology. Nursing Notes were all reviewed and agreed with or any disagreements were addressed in the HPI. REVIEW OF SYSTEMS    (2-9 systems for level 4, 10 or more for level 5)     Review of Systems   Constitutional: Positive for fever. HENT: Negative.     Respiratory: Positive for cough. Cardiovascular: Negative. Gastrointestinal: Negative. Genitourinary: Negative. Neurological: Negative. Positives and Pertinent negatives as per HPI. Except as noted above in the ROS, all other systems were reviewed and negative.        PAST MEDICAL HISTORY     Past Medical History:   Diagnosis Date    Arthritis     Benign hypertrophy of prostate     Cancer ([address]     growth on tongue    COVID-19 12/07/2020    Dry mouth     s/p radiation treatment    DVT, lower extremity ([address]     5/2011    GERD (gastroesophageal reflux disease)     acid reflux    Hip fracture ([address]     5/2011, RIGHT    Hx of blood clots     Hyperlipidemia     Pneumonia 6/2/2011    Prolonged emergence from general anesthesia     Thyroid disease          SURGICAL HISTORY     Past Surgical History:   Procedure Laterality Date    ABDOMEN SURGERY      gallbladder removed    CHOLECYSTECTOMY      COLONOSCOPY      EYE SURGERY Right     TORN RETINA, LASER    HERNIA REPAIR      HIP ARTHROPLASTY Right 08/08/2016    HIP FRACTURE SURGERY Right     JOINT REPLACEMENT      OTHER SURGICAL HISTORY  05/18/2011    right hip IM nailing    SHOULDER SURGERY Right rotater cuff    TONGUE BIOPSY  10/2010         [address]       Discharge Medication List as of 12/7/2020  4:42 PM      CONTINUE these medications which have NOT CHANGED    Details   albuterol sulfate  (90 Base) MCG/ACT inhaler INHALE 2 PUFFS INTO THE LUNGS 4 TIMES DAILY AS NEEDED (PRIOR TO ACTIVITY THAT CAUSES SHORTNESS OF BREATH), Disp-1 Inhaler,R-5Normal      diphenhydrAMINE-APAP, sleep, (TYLENOL PM EXTRA STRENGTH)  MG tablet Take 2 tablets by mouth nightly as neededHistorical Med      fluticasone (FLONASE) 50 MCG/ACT nasal spray 2 sprays by Nasal route dailyHistorical Med      Probiotic Product (PRO-BIOTIC BLEND) CAPS Take 1 tablet by mouth dailyHistorical Med      simvastatin (ZOCOR) 40 MG tablet Take 40 mg by mouth dailyHistorical Med      vitamin B-12 (CYANOCOBALAMIN) 500 MCG tablet Take 500 mcg by mouth daily      Coenzyme Q10 (COQ10 PO) Take  by mouth. omeprazole (PRILOSEC) 20 MG capsule Take 20 mg by mouth daily. levothyroxine (SYNTHROID) 88 MCG tablet Take 125 mcg by mouth daily. ALLERGIES     Tamsulosin hcl and Tetanus toxoids    FAMILYHISTORY       Family History   Problem Relation Age of Onset   Alexandra Roger Cancer Mother     Depression Mother     High Blood Pressure Mother     Heart Disease Father     Stroke Maternal Grandmother     Diabetes Other           SOCIAL HISTORY       Social History     Tobacco Use    Smoking status: Former Smoker     Years: 40.00     Last attempt to quit: 1971     Years since quittin.6    Smokeless tobacco: Never Used   Substance Use Topics    Alcohol use: Not Currently     Alcohol/week: 0.0 standard drinks     Comment: x1 half glass before bed    Drug use: No       SCREENINGS             PHYSICAL EXAM    (up to 7 for level 4, 8 or more for level 5)     ED Triage Vitals   BP Temp Temp src Pulse Resp SpO2 Height Weight   -- -- -- -- -- -- -- --       Physical Exam  Vitals signs and nursing note reviewed. Constitutional:       General: He is awake. He is not in acute distress. Appearance: Normal appearance. He is well-developed. He is not ill-appearing, toxic-appearing or diaphoretic. HENT:      Head: Normocephalic and atraumatic. Nose: Nose normal.   Eyes:      General:         Right eye: No discharge. Left eye: No discharge. Neck:      Musculoskeletal: Normal range of motion and neck supple. Cardiovascular:      Rate and Rhythm: Normal rate and regular rhythm. Pulses:           Radial pulses are 2+ on the right side and 2+ on the left side. Heart sounds: Normal heart sounds. No murmur. No gallop. Pulmonary:      Effort: Pulmonary effort is normal. No respiratory distress. Breath sounds: Normal breath sounds.  No decreased breath sounds, wheezing, rhonchi or rales. Chest:      Chest wall: No tenderness. Abdominal:      General: Abdomen is flat. Bowel sounds are normal.      Palpations: Abdomen is soft. Tenderness: There is no abdominal tenderness. There is no guarding or rebound. Musculoskeletal: Normal range of motion. General: No deformity. Right lower leg: No edema. Left lower leg: No edema. Skin:     General: Skin is warm and dry. Neurological:      General: No focal deficit present. Mental Status: He is alert and oriented to person, place, and time. GCS: GCS eye subscore is 4. GCS verbal subscore is 5. GCS motor subscore is 6. Psychiatric:         Behavior: Behavior normal. Behavior is cooperative.          DIAGNOSTIC RESULTS   LABS:    Labs Reviewed   CBC WITH AUTO DIFFERENTIAL - Abnormal; Notable for the following components:       Result Value    Hemoglobin 12.1 (*)     Hematocrit 37.4 (*)     Lymphocytes Absolute 0.2 (*)     All other components within normal limits    Narrative:     Performed at:  [place] [address],  [employer], [facility]   Phone [phone]   COMPREHENSIVE METABOLIC PANEL W/ REFLEX TO MG FOR LOW K - Abnormal; Notable for the following components:    Sodium 135 (*)     Glucose 127 (*)     Albumin/Globulin Ratio 0.9 (*)     ALT 8 (*)     AST 14 (*)     All other components within normal limits    Narrative:     Performed at:  [facility]) - [place] [address],  [place], [facility]   Phone [phone]   TROPONIN - Abnormal; Notable for the following components:    Troponin 0.14 (*)     All other components within normal limits    Narrative:     Performed at:  [facility]) - [place] [address],  [place], [facility]   Phone [phone]   BLOOD GAS, VENOUS - Abnormal; Notable for the following components:    pO2, Jan 59.5 (*)     Carboxyhemoglobin 3.1 (*)     All other components within normal limits    Narrative:     Performed at:  [facility]) [facility],  [place]   Phone [phone]   PROCALCITONIN - Abnormal; Notable for the following components:    Procalcitonin 0.29 (*)     All other components within normal limits    Narrative:     Performed at:  [facility],  [place], [place]   Phone [phone] - Abnormal; Notable for the following components:    SARS-CoV-2, NAAT DETECTED (*)     All other components within normal limits    Narrative:     ORDER WAS CANCELLED 12/07/2020 11:34, Cancelled: In-house testing. CALL  [facility]  [facility] tel. [phone],  Paola Lee rn er, 12/07/2020 12:05, by Olga Segovia  Performed at:  [place] [address],  [place], [place]   Phone [phone]   RAPID INFLUENZA A/B ANTIGENS    Narrative:     Performed at:  [facility]) [place] [address],  [place], [place]   Phone [phone]   CULTURE, BLOOD 1   CULTURE, BLOOD 2   BRAIN NATRIURETIC PEPTIDE    Narrative:     Performed at:  [facility],  [place], [place]   Phone [phone]   LACTATE, SEPSIS    Narrative:     Performed at:  [facility]) [facility],  [place], [place]   Phone [phone]   LACTATE, SEPSIS       All other labs were within normal range or not returned as of this dictation. EKG: All EKG's are interpreted by the Emergency Department Physician in the absence of a cardiologist.  Please see their note for interpretation of EKG.       RADIOLOGY:   Non-plain film images such as CT, Ultrasound and MRI are read by the radiologist. Plain radiographic images are visualized and preliminarily interpreted by the ED Provider with the below findings:        Interpretation per the Radiologist below, if available at the time of this note:    CT CHEST PULMONARY EMBOLISM W CONTRAST   Final Result   1. Negative for pulmonary embolus. 2. Multifocal bibasilar bronchiolitis with superimposed bibasilar pneumonia. Aspiration is a strong diagnostic consideration. XR CHEST PORTABLE   Final Result   Bibasilar opacity is slightly greater than prior, which can reflect acute on   chronic airspace disease. Linear opacity at the left apex, likely artifact, less likely though not   excluded small left apical pneumothorax. Continued attention on follow-up   suggested. No results found. PROCEDURES   Unless otherwise noted below, none     Procedures    CRITICAL CARE TIME   N/A    CONSULTS:  IP CONSULT TO HOSPITALIST      EMERGENCY DEPARTMENT COURSE and DIFFERENTIAL DIAGNOSIS/MDM:   Vitals:    Vitals:    12/07/20 1406 12/07/20 1436 12/07/20 1605 12/07/20 1614   BP: 137/77 119/70 (!) 141/85 135/79   Pulse: 101 100 98 103   Resp: 16  18 14   Temp:       TempSrc:       SpO2: 94% 94% 95% 95%   Weight:       Height:           Patient was given the following medications:  Medications   0.9 % sodium chloride bolus (0 mLs Intravenous Stopped 12/7/20 1303)   iopamidol (ISOVUE-370) 76 % injection 85 mL (85 mLs Intravenous Given 12/7/20 1049)   vancomycin 1000 mg IVPB in 250 mL D5W addavial (0 mg Intravenous Stopped 12/7/20 1452)   piperacillin-tazobactam (ZOSYN) 4.5 g in sodium chloride 0.9 % 100 mL IVPB (mini-bag) (0 g Intravenous Stopped 12/7/20 1303)           Patient brought in today by private vehicle for complaints of a fever and feeling SOB at home. He did take Tylenol just prior to arrival to the ED. On exam he is alert and oriented tachycardic at 108 breathing on room air at 93%. Patient appears nontoxic. No acute respiratory distress. Old labs and records reviewed at this time. Patient seen by myself as well as my attending, Aishwarya Dennis.   Patient given a liter of fluids upon arrival to ED. CBC reveals no acute leukocytosis. Hemoglobin of 12. VBG unremarkable. EKG reviewed by my attending see note for dictation. No acute electrolyte abnormalities. Kidney function unremarkable. Troponin of 0.14. However this appears to be baseline for patient. BNP unremarkable. Lactic acid of 1.2. Rapid influenza is negative. COVID-19 is Positive. Patient ambulated here without difficultly; maintained oxygen saturations. Procalcitonin mildly elevated at 0.29. CT chest PE study is negative for PE. Multifocal bibasilar bronchiolitis with superimposed bibasilar pneumonia. Patient is concerned high risk, therefore will talk to hospitalist in regards to admission for this patient. I spoke to the hospitalist the PA, Yudi Tripp who spoke with her attending and they both did not feel as though patient needed to be admitted as patient was not hypoxic and ambulated without becoming hypoxic. Patient did receive IV vancomycin and IV Zosyn giving chest x-ray finding. I did have shared decision making with this patient and patient instructed to buy a pulse ox and to check his oxygen saturation at home. He was told that if it dropped below 90% he is to return immediately to the ED for re-evaluation. Patient was discharged with Levaquin for five days. Patient told to quarantine for 10 days as he is COVID-19 positive. Patient told to return immediately to the ED with any new or worsening symptoms including but not limited to chest pain worsening shortness of breath hypoxia or any new or worsening symptoms. Patient verbalized understanding and was stable at time of discharge. I did feel comfortable sending this patient home with close follow-up instructions and strict return precautions. Patient instructed to follow-up with PCP in the next 1 day for recheck as well. FINAL IMPRESSION      1. COVID-19    2.  Bronchiolitis          DISPOSITION/PLAN   DISPOSITION        PATIENT REFERREDTO:  [facility]    Schedule an appointment as soon as possible for a visit in 1 day      [facility]  [address] [place] [address]  [phone]  Schedule an appointment as soon as possible for a visit   As needed, If symptoms worsen      DISCHARGE MEDICATIONS:  Discharge Medication List as of 12/7/2020  4:42 PM      START taking these medications    Details   levoFLOXacin (LEVAQUIN) 750 MG tablet Take 1 tablet by mouth daily for 5 days, Disp-5 tablet,R-0Print             DISCONTINUED MEDICATIONS:  Discharge Medication List as of 12/7/2020  4:42 PM                 (Please note that portions of this note were completed with a voice recognition program.  Efforts were made to edit the dictations but occasionally words are mis-transcribed.)    Yaz Oliver PA-C (electronically signed)              Yaz Oliver PA-C  12/07/20 Catarina Diamond PA-C  12/08/20 [id number]

## 2020-12-07 NOTE — ED NOTES
Patient ambulated around ED, oxygen sats remained at 96% on room air.       Bernardo Mcardle, RN  12/07/20 0204

## 2020-12-07 NOTE — ED PROVIDER NOTES
I independently examined and evaluated Tangela Bullock. All diagnostic, treatment, and disposition decisions were made by myself in conjunction with the advanced practice provider. For all further details of the patient's emergency department visit, please see the advanced practice provider's documentation. Primary Care Physician: eFlisa Diez DO    History: This is a 76 y.o. male with a past medical history of hyperlipidemia oral pharyngeal cancer, DVT, thyroid disease who presents to the Emergency Department with complaint of fever shortness of breath, weakness. On my evaluation patient reporting fever at home as high as 101.5. Patient states that he was instructed previously by his primary care doctor and cancer doctor that if his fever ever got 201 he needs to present to the emergency department. Is complaining of some shortness of breath at triage however he now states \"not really \". Also complains of generalized weakness. Denies chest pain, abdominal pain nausea vomiting diarrhea, loss of taste or smell. No known Covid exposure. Patient does not wear oxygen at home. Denies chronic lung history. Patient is tachycardic on arrival low 100s, history of DVT, no obvious clinical signs of lower extremity blood clot however it does not appear patient on anticoagulation. Grossly recent admission for pneumonia. H&H stable, no leukocytosis, patient's troponin is elevated today at 0.14, no acute findings on EKG however compared to troponins previously checked, this remains consistent and unchanged. Creatinine 0.9, no significant electrolyte abnormality. Physical:     height is 6' 2\" (1.88 m) and weight is 147 lb (66.7 kg). His oral temperature is 99.8 °F (37.7 °C). His blood pressure is 115/79 and his pulse is 108. His respiration is 18 and oxygen saturation is 93%.     76 y.o. male   Alert and oriented  Heart tachycardic but regular  Lungs clear to auscultation without wheeze rales or rhonchi  Abdomen soft nontender palpation  MSK no significant lower extremity edema or asymmetry or posterior calf pain. Impression: Fever, tachycardia    Plan: Covid labs, evaluate and rule out PE, possible admission    EKG Interpretation    The Ekg interpreted by me shows  normal sinus rhythm with a rate of 90  Axis is   Normal  QTc is  normal  Intervals and Durations are unremarkable. ST Segments: no acute change    No significant change from prior EKG dated Tober 14th 2020          CRITICAL CARE: There was a high probability of clinically significant/life threatening deterioration in this patient's condition which required my urgent intervention. Total critical care time was 0 minutes. This excludes any time for separately reportable procedures. Luis Eduardo Amaral DO  Emergency Physician        Comment: Please note this report has been produced using speech recognition software and may contain errors related to that system including errors in grammar, punctuation, and spelling, as well as words and phrases that may be inappropriate. If there are any questions or concerns please feel free to contact the dictating provider for clarification.           Luis Eduardo Amaral, DO  12/07/20 Miroslava Angulo, DO  12/07/20 1027

## 2020-12-08 ENCOUNTER — CARE COORDINATION (OUTPATIENT)
Dept: CARE COORDINATION | Age: 75
End: 2020-12-08

## 2020-12-08 NOTE — CARE COORDINATION
Patient contacted regarding recent discharge and COVID-19 risk. Discussed COVID-19 related testing which was available at this time. Test results were positive. Patient informed of results, if available? Yes     Care Transition Nurse/ Ambulatory Care Manager contacted the patient by telephone to perform post discharge assessment. Verified name and  with patient as identifiers. Patient has following risk factors of: nstemi, pneumonia. CTN/ACM reviewed discharge instructions, medical action plan and red flags related to discharge diagnosis. Reviewed and educated them on any new and changed medications related to discharge diagnosis. Advised obtaining a 90-day supply of all daily and as-needed medications. Education provided regarding infection prevention, and signs and symptoms of COVID-19 and when to seek medical attention with patient who verbalized understanding. Discussed exposure protocols and quarantine from [address] you at higher risk for severe illness  and given an opportunity for questions and concerns. The patient agrees to contact the COVID-19 hotline [phone] or PCP office for questions related to their healthcare. CTN/ACM provided contact information for future reference. From CDC: Are you at higher risk for severe illness?  Wash your hands often.  Avoid close contact (6 feet, which is about two arm lengths) with people who are sick.  Put distance between yourself and other people if COVID-19 is spreading in your community.  Clean and disinfect frequently touched surfaces.  Avoid all cruise travel and non-essential air travel.  Call your healthcare professional if you have concerns about COVID-19 and your underlying condition or if you are sick. For more information on steps you can take to protect yourself, see CDC's How to Protect Yourself    Received return call from pt, states he is feeling about the same achy, and tired.  Has pulse and states his sats this am was 94% . Reviewed pulse ox instructions and parameters, covid safety precaution, when to seek care and self care with verbal understanding. Pt reports he has already contacted Dr Avelar Chol office and is awaiting a return call, no other questions or concerns noted  Plan for follow-up call in 7-14 days based on severity of symptoms and risk factors.

## 2020-12-11 LAB
BLOOD CULTURE, ROUTINE: NORMAL
CULTURE, BLOOD 2: NORMAL

## 2021-01-06 ENCOUNTER — CARE COORDINATION (OUTPATIENT)
Dept: CARE COORDINATION | Age: 76
End: 2021-01-06

## 2021-01-06 NOTE — CARE COORDINATION
You Patient resolved from the Care Transitions episode on 1/6/21  Patient/family has been provided the following resources and education related to COVID-19:                         Signs, symptoms and red flags related to COVID-19            CDC exposure and quarantine guidelines            [facility] exposure contact - [phone]            Contact for their local Department of Health                 No further outreach scheduled with this CTN/ACM. Episode of Care resolved. Patient has this CTN/ACM contact information if future needs arise.

## 2021-06-03 ENCOUNTER — HOSPITAL ENCOUNTER (EMERGENCY)
Age: 76
Discharge: HOME | End: 2021-06-03
Payer: MEDICARE

## 2021-06-03 ENCOUNTER — APPOINTMENT (OUTPATIENT)
Dept: CT IMAGING | Age: 76
End: 2021-06-03
Payer: MEDICARE

## 2021-06-03 VITALS
RESPIRATION RATE: 15 BRPM | HEIGHT: 74 IN | TEMPERATURE: 97.7 F | OXYGEN SATURATION: 100 % | WEIGHT: 140 LBS | BODY MASS INDEX: 17.97 KG/M2 | HEART RATE: 86 BPM | SYSTOLIC BLOOD PRESSURE: 159 MMHG | DIASTOLIC BLOOD PRESSURE: 102 MMHG

## 2021-06-03 DIAGNOSIS — R10.9 ABDOMINAL CRAMPING: ICD-10-CM

## 2021-06-03 DIAGNOSIS — Z85.01 HISTORY OF ESOPHAGEAL CANCER: ICD-10-CM

## 2021-06-03 DIAGNOSIS — K59.00 CONSTIPATION, UNSPECIFIED CONSTIPATION TYPE: Primary | ICD-10-CM

## 2021-06-03 LAB
A/G RATIO: 0.9 (ref 1.1–2.2)
ALBUMIN SERPL-MCNC: 4.1 G/DL (ref 3.4–5)
ALP BLD-CCNC: 77 U/L (ref 40–129)
ALT SERPL-CCNC: 12 U/L (ref 10–40)
ANION GAP SERPL CALCULATED.3IONS-SCNC: 9 MMOL/L (ref 3–16)
AST SERPL-CCNC: 16 U/L (ref 15–37)
BASOPHILS ABSOLUTE: 0 K/UL (ref 0–0.2)
BASOPHILS RELATIVE PERCENT: 0.3 %
BILIRUB SERPL-MCNC: 0.5 MG/DL (ref 0–1)
BILIRUBIN URINE: NEGATIVE
BLOOD, URINE: ABNORMAL
BUN BLDV-MCNC: 24 MG/DL (ref 7–20)
CALCIUM SERPL-MCNC: 9.4 MG/DL (ref 8.3–10.6)
CHLORIDE BLD-SCNC: 96 MMOL/L (ref 99–110)
CLARITY: CLEAR
CO2: 30 MMOL/L (ref 21–32)
COLOR: YELLOW
CREAT SERPL-MCNC: 0.6 MG/DL (ref 0.8–1.3)
EOSINOPHILS ABSOLUTE: 0 K/UL (ref 0–0.6)
EOSINOPHILS RELATIVE PERCENT: 0.2 %
EPITHELIAL CELLS, UA: ABNORMAL /HPF (ref 0–5)
GFR AFRICAN AMERICAN: >60
GFR NON-AFRICAN AMERICAN: >60
GLOBULIN: 4.8 G/DL
GLUCOSE BLD-MCNC: 104 MG/DL (ref 70–99)
GLUCOSE URINE: NEGATIVE MG/DL
HCT VFR BLD CALC: 40.4 % (ref 40.5–52.5)
HEMOGLOBIN: 13.4 G/DL (ref 13.5–17.5)
KETONES, URINE: NEGATIVE MG/DL
LEUKOCYTE ESTERASE, URINE: NEGATIVE
LIPASE: 19 U/L (ref 13–60)
LYMPHOCYTES ABSOLUTE: 0.3 K/UL (ref 1–5.1)
LYMPHOCYTES RELATIVE PERCENT: 3.7 %
MCH RBC QN AUTO: 27.1 PG (ref 26–34)
MCHC RBC AUTO-ENTMCNC: 33.1 G/DL (ref 31–36)
MCV RBC AUTO: 81.7 FL (ref 80–100)
MICROSCOPIC EXAMINATION: YES
MONOCYTES ABSOLUTE: 0.4 K/UL (ref 0–1.3)
MONOCYTES RELATIVE PERCENT: 4.9 %
NEUTROPHILS ABSOLUTE: 7.9 K/UL (ref 1.7–7.7)
NEUTROPHILS RELATIVE PERCENT: 90.9 %
NITRITE, URINE: NEGATIVE
PDW BLD-RTO: 16.6 % (ref 12.4–15.4)
PH UA: 7.5 (ref 5–8)
PLATELET # BLD: 180 K/UL (ref 135–450)
PMV BLD AUTO: 8.4 FL (ref 5–10.5)
POTASSIUM SERPL-SCNC: 4.8 MMOL/L (ref 3.5–5.1)
PROTEIN UA: NEGATIVE MG/DL
RBC # BLD: 4.94 M/UL (ref 4.2–5.9)
RBC UA: ABNORMAL /HPF (ref 0–4)
SODIUM BLD-SCNC: 135 MMOL/L (ref 136–145)
SPECIFIC GRAVITY UA: 1.01 (ref 1–1.03)
TOTAL PROTEIN: 8.9 G/DL (ref 6.4–8.2)
URINE REFLEX TO CULTURE: ABNORMAL
URINE TYPE: ABNORMAL
UROBILINOGEN, URINE: 0.2 E.U./DL
WBC # BLD: 8.7 K/UL (ref 4–11)
WBC UA: ABNORMAL /HPF (ref 0–5)

## 2021-06-03 PROCEDURE — 6360000004 HC RX CONTRAST MEDICATION: Performed by: NURSE PRACTITIONER

## 2021-06-03 PROCEDURE — 81001 URINALYSIS AUTO W/SCOPE: CPT

## 2021-06-03 PROCEDURE — 80053 COMPREHEN METABOLIC PANEL: CPT

## 2021-06-03 PROCEDURE — 83690 ASSAY OF LIPASE: CPT

## 2021-06-03 PROCEDURE — 85025 COMPLETE CBC W/AUTO DIFF WBC: CPT

## 2021-06-03 PROCEDURE — 51798 US URINE CAPACITY MEASURE: CPT

## 2021-06-03 PROCEDURE — 74177 CT ABD & PELVIS W/CONTRAST: CPT

## 2021-06-03 PROCEDURE — 99284 EMERGENCY DEPT VISIT MOD MDM: CPT

## 2021-06-03 RX ORDER — MORPHINE SULFATE 4 MG/ML
4 INJECTION, SOLUTION INTRAMUSCULAR; INTRAVENOUS ONCE
Status: DISCONTINUED | OUTPATIENT
Start: 2021-06-03 | End: 2021-06-03 | Stop reason: HOSPADM

## 2021-06-03 RX ORDER — ONDANSETRON 2 MG/ML
4 INJECTION INTRAMUSCULAR; INTRAVENOUS ONCE
Status: DISCONTINUED | OUTPATIENT
Start: 2021-06-03 | End: 2021-06-03 | Stop reason: HOSPADM

## 2021-06-03 RX ORDER — SENNOSIDES 8.8 MG/5ML
5 LIQUID ORAL DAILY
Qty: 1 BOTTLE | Refills: 0 | Status: ON HOLD | OUTPATIENT
Start: 2021-06-03 | End: 2022-09-19 | Stop reason: HOSPADM

## 2021-06-03 RX ADMIN — IOPAMIDOL 75 ML: 755 INJECTION, SOLUTION INTRAVENOUS at 17:22

## 2021-06-03 ASSESSMENT — ENCOUNTER SYMPTOMS
SHORTNESS OF BREATH: 0
COUGH: 0
CONSTIPATION: 1
ABDOMINAL PAIN: 1
DIARRHEA: 0
VOMITING: 0
COLOR CHANGE: 0
BACK PAIN: 0
WHEEZING: 0
NAUSEA: 1

## 2021-06-03 ASSESSMENT — PAIN SCALES - GENERAL: PAINLEVEL_OUTOF10: 9

## 2021-06-03 NOTE — ED PROVIDER NOTES
dizziness. He denies taking any medicines for his symptoms. He also reports frequency in urination, states he does not feel like he can relieve his bladder fully, he does have a history of BPH. He denies any additional complaints, no additional aggravating relieving factors. He presents awake, alert and in no acute distress or toxic appearance. PastMedical/Surgical History:      Diagnosis Date    Arthritis     Benign hypertrophy of prostate     Cancer ([facility]     growth on tongue    COVID-19 12/07/2020    Dry mouth     s/p radiation treatment    DVT, lower extremity ([address]     5/2011    GERD (gastroesophageal reflux disease)     acid reflux    Hip fracture ([facility]     5/2011, RIGHT    Hx of blood clots     Hyperlipidemia     Pneumonia 6/2/2011    Prolonged emergence from general anesthesia     Thyroid disease          Procedure Laterality Date    ABDOMEN SURGERY      gallbladder removed    CHOLECYSTECTOMY      COLONOSCOPY      EYE SURGERY Right     TORN RETINA, LASER    HERNIA REPAIR      HIP ARTHROPLASTY Right 08/08/2016    HIP FRACTURE SURGERY Right     JOINT REPLACEMENT      OTHER SURGICAL HISTORY  05/18/2011    right hip IM nailing    SHOULDER SURGERY Right rotater cuff    TONGUE BIOPSY  10/2010       Medications:  Previous Medications    ALBUTEROL SULFATE  (90 BASE) MCG/ACT INHALER    INHALE 2 PUFFS INTO THE LUNGS 4 TIMES DAILY AS NEEDED (PRIOR TO ACTIVITY THAT CAUSES SHORTNESS OF BREATH)    COENZYME Q10 (COQ10 PO)    Take  by mouth. DIPHENHYDRAMINE-APAP, SLEEP, (TYLENOL PM EXTRA STRENGTH)  MG TABLET    Take 2 tablets by mouth nightly as needed    FLUTICASONE (FLONASE) 50 MCG/ACT NASAL SPRAY    2 sprays by Nasal route daily    LEVOTHYROXINE (SYNTHROID) 88 MCG TABLET    Take 125 mcg by mouth daily. OMEPRAZOLE (PRILOSEC) 20 MG CAPSULE    Take 20 mg by mouth daily.       PROBIOTIC PRODUCT (PRO-BIOTIC BLEND) CAPS    Take 1 tablet by mouth daily    SIMVASTATIN (ZOCOR) 40 MG TABLET    Take 40 mg by mouth daily    VITAMIN B-12 (CYANOCOBALAMIN) 500 MCG TABLET    Take 500 mcg by mouth daily         Review of Systems:  (2-9 systems needed)  Review of Systems   Constitutional: Negative for chills and fever. HENT: Negative for congestion. Respiratory: Negative for cough, shortness of breath and wheezing. Cardiovascular: Negative for chest pain. Gastrointestinal: Positive for abdominal pain, constipation and nausea. Negative for diarrhea and vomiting. Patient complains of generalized abdominal cramping associate with nausea, no vomiting or diarrhea, also reports having feelings of constipation, has not had a bowel movement in 2 days. States he has a lot of pressure when he tries to have a BM. Genitourinary: Positive for frequency. Negative for difficulty urinating, dysuria and flank pain. Patient reports frequency of urination last night, states he just got up about 15 times to urinate but did not feel like it relieved his bladder completely. Does report have a history of enlarged prostate. Musculoskeletal: Negative for back pain. Skin: Negative for color change. Neurological: Negative for weakness, numbness and headaches. He reports having fluctuation of the blood pressure going up and down however he denies any lightheadedness or dizziness, no one-sided weakness or neglect. No chest pain or shortness of breath   Psychiatric/Behavioral: Negative for confusion. Physical Exam:  Physical Exam  Vitals and nursing note reviewed. Constitutional:       Appearance: He is well-developed. He is not diaphoretic. HENT:      Head: Normocephalic. Right Ear: External ear normal.      Left Ear: External ear normal.      Mouth/Throat:      Comments: Patient's speech is slightly unclear, however patient has a history of esophageal cancer which is why he has the feeding tube, this makes sense why the patient articulates his words.   Eyes:      General: components:       Result Value    Blood, Urine TRACE-INTACT (*)     All other components within normal limits    Narrative:     Performed at:  [facility],  [place]   Phone [phone]   CBC WITH AUTO DIFFERENTIAL - Abnormal; Notable for the following components:    Hemoglobin 13.4 (*)     Hematocrit 40.4 (*)     RDW 16.6 (*)     Neutrophils Absolute 7.9 (*)     Lymphocytes Absolute 0.3 (*)     All other components within normal limits    Narrative:     Performed at:  [facility],  [place] [facility]   Phone [phone]   COMPREHENSIVE METABOLIC PANEL - Abnormal; Notable for the following components:    Sodium 135 (*)     Chloride 96 (*)     Glucose 104 (*)     BUN 24 (*)     CREATININE 0.6 (*)     Total Protein 8.9 (*)     Albumin/Globulin Ratio 0.9 (*)     All other components within normal limits    Narrative:     Performed at:  [facility]) - [facility],  [place], [employer]   Phone [phone]   MICROSCOPIC URINALYSIS - Abnormal; Notable for the following components:    RBC, UA 21-50 (*)     All other components within normal limits    Narrative:     Performed at:  [facility],  [place]   Phone [phone]   LIPASE    Narrative:     Performed at:  [facility]) [facility] acute hospital [facility],  [place], [employer]   Phone [phone] of labs reviewed and were negative at this time or not returned at the time of this note.     RADIOLOGY:   Non-plain film images such as CT, Ultrasound and MRI are read by the radiologist. Neisha YUEN APRN - CNP have directly visualized the radiologic plain film image(s) with the below findings:      Interpretation per the Radiologist below, if available at the time of this note:    Alan Additional Contrast? None   Final Result   Mild colonic and moderate rectal prominence of stool, consistent with      Gastrostomy tube is in normal position. There fluid-filled loops of   nondistended small bowel, nonspecific. This may be physiological.  Enteritis   is alternatively considered. Small bilateral pleural effusions. Barium aspiration pneumonitis in the lung bases, greater on the right. Additional small airways nodules may indicate chronic aspiration pneumonitis. Bronchopneumonia or follicular bronchiolitis are alternatively considered. MEDICAL DECISION MAKING / ED COURSE:    Because of high probability of sudden clinical deterioration of the patient's condition and risk of further deterioration, critical care time required my full attention to the patient's condition; which included chart data review, documentation, medication ordering, reviewing the patient's old records, reevaluation patient's cardiac, pulmonary and neurological status. Reevaluation of vital signs. Consultations with ED attending and admitting physician. Ordering, interpreting reviewing diagnostic testing. Therefore a critical care time was 18 minutes of direct attention to the patient's condition did not include time spent on procedures. PROCEDURES:   Procedures    None    Patient was given:  Medications   morphine sulfate (PF) injection 4 mg (0 mg Intravenous Held 6/3/21 1814)   ondansetron (ZOFRAN) injection 4 mg (0 mg Intravenous Held 6/3/21 1814)   iopamidol (ISOVUE-370) 76 % injection 75 mL (75 mLs Intravenous Given 6/3/21 1722)       Patient presents the ER for several complaints including abdominal cramping, constipation, urinary frequency. Does have a history of esophageal cancer, recently had a G-tube placed while living in [place] about 2 weeks ago, comes in today because he is constipated and having some cramping. Upon ED arrival he did have a bowel movement per the nursing staff.   He is also concerned about his blood pressure fluctuating however he denies any headache lightheadedness or dizziness. However, after evaluation and examination the patient IV access, blood work, urinalysis, medications, CT scan of the abdomen bladder scan were ordered. Urinalysis shows no acute infection. CBC shows no sepsis or anemia requiring transfusion. Metabolic panel shows no electrolyte disturbances or renal insufficiency. Liver functions are normal.  Lipase is normal.  CT the abdomen shows mild colonic and moderate rectal prominence of stool consistent with constipation, gastrostomy tube in place, concerning of some underlying enteritis. Upon reevaluation vital signs are stable, I ordered a soapsuds enema for the patient, educated both the patient and wife that we would start him on medicine to help with regulating his bowels. However, no concerns for acute surgical abdomen, no concerns for appendicitis, pancreatitis, diverticulitis or cholecystitis. Therefore, shared medical decision was made to the patient, his family and myself and we agreed the patient could be discharged with outpatient follow-up. Patient was discharged home referral back to PCP, given new GI referral here secondary to having the surgery done in [place]. He was started on senna liquid as prescribed. Return for any worsening or concerning symptoms. The patient tolerated their visit well. I evaluated the patient. The physician was available for consultation as needed. The patient and / or the family were informed of the results of any tests, a time was given to answer questions, a plan was proposed and they agreed with plan. Patient and family verbalized understanding of discharge instructions and was discharged from the department in stable condition. CLINICAL IMPRESSION:  1. Constipation, unspecified constipation type    2. Abdominal cramping    3.  History of esophageal cancer        DISPOSITION Discharge - Pending Orders Complete 06/03/2021 06:44:52 PM      PATIENT REFERRED TO:  Juancarlos Aldrich DO  [facility]    Schedule an appointment as soon as possible for a visit in 2 days  Follow-up with your family doctor in the next 2 days for reevaluation    Paul Watters MD  [phone] [place] [address]  [phone]    Schedule an appointment as soon as possible for a visit in 1 day  This is a GI referral, follow-up in 2 days for reevaluation    [place] [facility] [place] [facility]  [address] [facility]  Go to   If symptoms worsen      DISCHARGE MEDICATIONS:  New Prescriptions    SENNOSIDES (SENNA) 8.8 MG/5ML LIQD    Take 5 mLs by mouth daily       DISCONTINUED MEDICATIONS:  Discontinued Medications    No medications on file              (Please note the MDM and HPI sections of this note were completed with a voice recognition program.  Efforts were made to edit the dictations but occasionally words are mis-transcribed.)    Electronically signed, JOHN Su CNP,           JOHN Su CNP  06/03/21 Lalit Walker

## 2021-06-04 ENCOUNTER — TELEPHONE (OUTPATIENT)
Dept: GASTROENTEROLOGY | Age: 76
End: 2021-06-04

## 2021-06-04 NOTE — TELEPHONE ENCOUNTER
Patients wife called wanting to get an appointment with Dr Gregoria Astorga for an ER follow up. Home health is coming today and they called PCP. Dr Gregoria Astorga is in [place] next week but Dr Gregoria Astorga has 30 patients scheduled Tuesday and 27 scheduled Wednesday. Not sure what to do with this patient.  Please advise

## 2021-06-04 NOTE — ED NOTES
Pt and spouse educated on proper urinary catheter care and instructed to f/u with PCP in 2 days per D/C instructions. Pt and family verbalized understanding.       Henri Santamaria RN  06/03/21 2044

## 2021-06-09 ENCOUNTER — APPOINTMENT (OUTPATIENT)
Dept: GENERAL RADIOLOGY | Age: 76
End: 2021-06-09
Payer: MEDICARE

## 2021-06-09 ENCOUNTER — OFFICE VISIT (OUTPATIENT)
Dept: GASTROENTEROLOGY | Age: 76
End: 2021-06-09
Payer: MEDICARE

## 2021-06-09 VITALS
HEIGHT: 74 IN | SYSTOLIC BLOOD PRESSURE: 100 MMHG | DIASTOLIC BLOOD PRESSURE: 69 MMHG | WEIGHT: 139.6 LBS | BODY MASS INDEX: 17.92 KG/M2 | HEART RATE: 104 BPM

## 2021-06-09 DIAGNOSIS — K59.00 CONSTIPATION, UNSPECIFIED CONSTIPATION TYPE: ICD-10-CM

## 2021-06-09 DIAGNOSIS — R13.12 OROPHARYNGEAL DYSPHAGIA: Primary | ICD-10-CM

## 2021-06-09 LAB
A/G RATIO: 0.8 (ref 1.1–2.2)
ALBUMIN SERPL-MCNC: 3.3 G/DL (ref 3.4–5)
ALP BLD-CCNC: 76 U/L (ref 40–129)
ALT SERPL-CCNC: 10 U/L (ref 10–40)
ANION GAP SERPL CALCULATED.3IONS-SCNC: 7 MMOL/L (ref 3–16)
AST SERPL-CCNC: 12 U/L (ref 15–37)
BASOPHILS ABSOLUTE: 0 K/UL (ref 0–0.2)
BASOPHILS RELATIVE PERCENT: 0.4 %
BILIRUB SERPL-MCNC: 0.4 MG/DL (ref 0–1)
BUN BLDV-MCNC: 23 MG/DL (ref 7–20)
CALCIUM SERPL-MCNC: 9 MG/DL (ref 8.3–10.6)
CHLORIDE BLD-SCNC: 88 MMOL/L (ref 99–110)
CO2: 29 MMOL/L (ref 21–32)
CREAT SERPL-MCNC: 0.7 MG/DL (ref 0.8–1.3)
EOSINOPHILS ABSOLUTE: 0 K/UL (ref 0–0.6)
EOSINOPHILS RELATIVE PERCENT: 0.5 %
GFR AFRICAN AMERICAN: >60
GFR NON-AFRICAN AMERICAN: >60
GLOBULIN: 4.3 G/DL
GLUCOSE BLD-MCNC: 128 MG/DL (ref 70–99)
HCT VFR BLD CALC: 39.4 % (ref 40.5–52.5)
HEMOGLOBIN: 13.1 G/DL (ref 13.5–17.5)
LACTIC ACID, SEPSIS: 1.2 MMOL/L (ref 0.4–1.9)
LYMPHOCYTES ABSOLUTE: 0.6 K/UL (ref 1–5.1)
LYMPHOCYTES RELATIVE PERCENT: 7 %
MCH RBC QN AUTO: 27.3 PG (ref 26–34)
MCHC RBC AUTO-ENTMCNC: 33.3 G/DL (ref 31–36)
MCV RBC AUTO: 82 FL (ref 80–100)
MONOCYTES ABSOLUTE: 1 K/UL (ref 0–1.3)
MONOCYTES RELATIVE PERCENT: 12.4 %
NEUTROPHILS ABSOLUTE: 6.7 K/UL (ref 1.7–7.7)
NEUTROPHILS RELATIVE PERCENT: 79.7 %
PDW BLD-RTO: 15.8 % (ref 12.4–15.4)
PLATELET # BLD: 257 K/UL (ref 135–450)
PMV BLD AUTO: 7.8 FL (ref 5–10.5)
POTASSIUM SERPL-SCNC: 4.7 MMOL/L (ref 3.5–5.1)
RBC # BLD: 4.8 M/UL (ref 4.2–5.9)
SODIUM BLD-SCNC: 124 MMOL/L (ref 136–145)
TOTAL PROTEIN: 7.6 G/DL (ref 6.4–8.2)
TROPONIN: 0.08 NG/ML
WBC # BLD: 8.4 K/UL (ref 4–11)

## 2021-06-09 PROCEDURE — 84484 ASSAY OF TROPONIN QUANT: CPT

## 2021-06-09 PROCEDURE — 80053 COMPREHEN METABOLIC PANEL: CPT

## 2021-06-09 PROCEDURE — 71045 X-RAY EXAM CHEST 1 VIEW: CPT

## 2021-06-09 PROCEDURE — 85025 COMPLETE CBC W/AUTO DIFF WBC: CPT

## 2021-06-09 PROCEDURE — G8427 DOCREV CUR MEDS BY ELIG CLIN: HCPCS | Performed by: INTERNAL MEDICINE

## 2021-06-09 PROCEDURE — 99284 EMERGENCY DEPT VISIT MOD MDM: CPT

## 2021-06-09 PROCEDURE — 93005 ELECTROCARDIOGRAM TRACING: CPT | Performed by: EMERGENCY MEDICINE

## 2021-06-09 PROCEDURE — G8419 CALC BMI OUT NRM PARAM NOF/U: HCPCS | Performed by: INTERNAL MEDICINE

## 2021-06-09 PROCEDURE — 99204 OFFICE O/P NEW MOD 45 MIN: CPT | Performed by: INTERNAL MEDICINE

## 2021-06-09 PROCEDURE — 83605 ASSAY OF LACTIC ACID: CPT

## 2021-06-09 RX ORDER — LEVOTHYROXINE SODIUM 0.12 MG/1
TABLET ORAL
COMMUNITY
Start: 2021-04-23

## 2021-06-09 RX ORDER — SIMVASTATIN 20 MG
TABLET ORAL
Status: ON HOLD | COMMUNITY
Start: 2021-04-23 | End: 2022-09-19 | Stop reason: HOSPADM

## 2021-06-09 RX ORDER — OMEPRAZOLE 40 MG/1
CAPSULE, DELAYED RELEASE ORAL
COMMUNITY
Start: 2021-06-08

## 2021-06-09 RX ORDER — POLYETHYLENE GLYCOL 3350 17 G/17G
17 POWDER, FOR SOLUTION ORAL DAILY
Qty: 527 G | Refills: 1 | Status: SHIPPED | OUTPATIENT
Start: 2021-06-09 | End: 2021-07-09

## 2021-06-09 RX ORDER — POLYETHYLENE GLYCOL 3350 17 G/17G
POWDER, FOR SOLUTION ORAL
Qty: 238 G | Refills: 0 | Status: ON HOLD | OUTPATIENT
Start: 2021-06-09 | End: 2022-09-19 | Stop reason: HOSPADM

## 2021-06-09 RX ORDER — POLYETHYLENE GLYCOL 3350 17 G/17G
17 POWDER, FOR SOLUTION ORAL DAILY
Qty: 527 G | Refills: 1 | Status: SHIPPED | OUTPATIENT
Start: 2021-06-09 | End: 2021-06-09 | Stop reason: SDUPTHER

## 2021-06-09 RX ORDER — POLYETHYLENE GLYCOL 3350 17 G/17G
POWDER, FOR SOLUTION ORAL
Qty: 238 G | Refills: 0 | Status: SHIPPED | OUTPATIENT
Start: 2021-06-09 | End: 2021-06-09 | Stop reason: SDUPTHER

## 2021-06-09 ASSESSMENT — PAIN SCALES - GENERAL: PAINLEVEL_OUTOF10: 7

## 2021-06-09 ASSESSMENT — PAIN DESCRIPTION - PAIN TYPE: TYPE: CHRONIC PAIN

## 2021-06-09 NOTE — PROGRESS NOTES
[address]    [address],  [address] [facility]  [place], [facility]  Phone: [phone]   ITY:[phone]    CHIEF COMPLAINT     Chief Complaint   Patient presents with    Follow-up     ED f/u       HPI     Thank you Arslan Guevara DO for asking me to see Milton Heard in consultation. Milton Heard is a 68 y.o. male  [2] White [1] with medical history of hypothyroidism, tongue cancer 10/28/2010 status post chemoradiation, severe protein calorie malnutrition status post PEG tube placement in [place], aspiration pneumonia, DVT 2011, GERD, hyperlipidemia seen independently with referral for constipation and stool incontinence. Patient was seen in [facility] ED on 6/3/2021 for constipation and hematuria of 2 days duration. CT abdomen and pelvis with IV contrast noted mild colonic moderate rectal stool consistent with constipation. Discharged on senna with GI follow up. Last Encounter Reviewed: None  Pertinent PMH, FH, SH is reviewed below. Last EGD: 5/6/2016 Dr. Teri Valdivia; Last Colonoscopy:16/27/2012  Dr. Victoriano Gu; small sigmoid polyp in area of diverticulosis that bled on attempted polypectomy    Review of available records reveals:   Wt Readings from Last 50 Encounters:   06/09/21 139 lb 9.6 oz (63.3 kg)   06/03/21 140 lb (63.5 kg)   12/07/20 147 lb (66.7 kg)   09/16/20 154 lb (69.9 kg)   08/27/20 156 lb 3.2 oz (70.9 kg)   08/31/17 161 lb 3.2 oz (73.1 kg)   08/10/17 160 lb 6.4 oz (72.8 kg)   08/09/17 160 lb (72.6 kg)   06/21/17 163 lb (73.9 kg)   08/11/16 162 lb (73.5 kg)   08/08/16 162 lb (73.5 kg)   08/01/16 167 lb (75.8 kg)   06/01/16 166 lb (75.3 kg)   12/13/15 169 lb (76.7 kg)   12/02/15 172 lb 9.6 oz (78.3 kg)   05/20/15 175 lb 9.6 oz (79.7 kg)   11/19/14 177 lb (80.3 kg)   06/06/14 181 lb (82.1 kg)       No components found for: HGBA1C  BP Readings from Last 3 Encounters:   06/09/21 100/69   06/03/21 (!) 159/102   12/07/20 135/79     Health Maintenance   Topic Date Due    Hepatitis C screen  Never done    COVID-19 Vaccine (1) Never done    Shingles Vaccine (1 of 2) Never done    TSH testing  2018    Lipid screen  08/10/2018    Annual Wellness Visit (AWV)  Never done    Flu vaccine  Completed    Pneumococcal 65+ years Vaccine  Completed    Hepatitis A vaccine  Aged Out    Hepatitis B vaccine  Aged Out    Hib vaccine  Aged Out    Meningococcal (ACWY) vaccine  Aged Out       No components found for: Radiojar     PAST MEDICAL HISTORY     Past Medical History:   Diagnosis Date    Arthritis     Benign hypertrophy of prostate     Cancer ([address]     growth on tongue    COVID-19 2020    Dry mouth     s/p radiation treatment    DVT, lower extremity ([address]     2011    GERD (gastroesophageal reflux disease)     acid reflux    Hip fracture [address] [address]     2011, RIGHT    Hx of blood clots     Hyperlipidemia     Pneumonia 2011    Prolonged emergence from general anesthesia     Thyroid disease      FAMILY HISTORY     Family History   Problem Relation Age of Onset   [facility] Cancer Mother     Depression Mother     High Blood Pressure Mother     Heart Disease Father     Stroke Maternal Grandmother     Diabetes Other      SOCIAL HISTORY     Social History     Socioeconomic History    Marital status:      Spouse name: Not on file    Number of children: Not on file    Years of education: Not on file    Highest education level: Not on file   Occupational History    Not on file   Tobacco Use    Smoking status: Former Smoker     Years: 40.00     Quit date: 1971     Years since quittin.1    Smokeless tobacco: Never Used   Vaping Use    Vaping Use: Never used   Substance and Sexual Activity    Alcohol use: Not Currently     Alcohol/week: 0.0 standard drinks     Comment: x1 half glass before bed    Drug use: No    Sexual activity: Yes     Partners: Female   Other Topics Concern    Not on file   Social History Narrative    Not on file     Social Determinants of Health     Financial Resource Strain:     Difficulty of Paying Living Expenses:    Food Insecurity:     Worried About Running Out of Food in the Last Year:     [address] in the Last Year:    Transportation Needs:     Lack of Transportation (Medical):      Lack of Transportation (Non-Medical):    Physical Activity:     Days of Exercise per Week:     Minutes of Exercise per Session:    Stress:     Feeling of Stress :    Social Connections:     Frequency of Communication with Friends and Family:     Frequency of Social Gatherings with Friends and Family:     Attends Sabianism Services:     Active Member of Clubs or Organizations:     Attends Club or Organization Meetings:     Marital Status:    Intimate Partner Violence:     Fear of Current or Ex-Partner:     Emotionally Abused:     Physically Abused:     Sexually Abused:      SURGICAL HISTORY     Past Surgical History:   Procedure Laterality Date    ABDOMEN SURGERY      gallbladder removed    CHOLECYSTECTOMY      COLONOSCOPY      EYE SURGERY Right     TORN RETINA, LASER    HERNIA REPAIR      HIP ARTHROPLASTY Right 08/08/2016    HIP FRACTURE SURGERY Right     JOINT REPLACEMENT      OTHER SURGICAL HISTORY  05/18/2011    right hip IM nailing    SHOULDER SURGERY Right rotater cuff    TONGUE BIOPSY  10/2010     CURRENT MEDICATIONS   (This list may include medications prescribed during this encounter as epic can not insert only the list prior to this encounter.)  Current Outpatient Rx   Medication Sig Dispense Refill    levothyroxine (SYNTHROID) 125 MCG tablet       omeprazole (PRILOSEC) 40 MG delayed release capsule       simvastatin (ZOCOR) 20 MG tablet       polyethylene glycol (GLYCOLAX) 17 GM/SCOOP powder Use as directed for colonoscopy prep 238 g 0    polyethylene glycol (MIRALAX) 17 g packet Take 17 g by mouth daily 527 g 1    albuterol sulfate  (90 Base) MCG/ACT inhaler INHALE 2 PUFFS INTO THE LUNGS 4 TIMES DAILY AS NEEDED (PRIOR TO ACTIVITY THAT CAUSES SHORTNESS OF BREATH) 1 Inhaler 5    fluticasone (FLONASE) 50 MCG/ACT nasal spray 2 sprays by Nasal route daily      Sennosides (SENNA) 8.8 MG/5ML LIQD Take 5 mLs by mouth daily (Patient not taking: Reported on 6/9/2021) 1 Bottle 0     ALLERGIES     Allergies   Allergen Reactions    Tamsulosin Hcl Other (See Comments)    Tetanus Toxoids Nausea And Vomiting     IMMUNIZATIONS     Immunization History   Administered Date(s) Administered    Influenza Whole 10/06/2010    Pneumococcal Polysaccharide ([id number]) 10/06/2010     REVIEW OF SYSTEMS   See HPI for further details and pertinent postiives. Negative for the following:  Constitutional: Negative for weight change. Negative for appetite change and fatigue. HENT: Negative for nosebleeds, sore throat, mouth sores, and voice change. Respiratory: Negative for cough, choking and chest tightness. Cardiovascular: Negative for chest pain   Gastrointestinal: See HPI  Musculoskeletal: Negative for arthralgias. Skin: Negative for pallor. Neurological: Negative for weakness and light-headedness. Hematological: Negative for adenopathy. Does not bruise/bleed easily. Psychiatric/Behavioral: Negative for suicidal ideas. PHYSICAL EXAM   VITAL SIGNS: /69 (Site: Left Wrist)   Pulse 104   Ht 6' 2\" (1.88 m)   Wt 139 lb 9.6 oz (63.3 kg)   BMI 17.92 kg/m²   Wt Readings from Last 3 Encounters:   06/09/21 139 lb 9.6 oz (63.3 kg)   06/03/21 140 lb (63.5 kg)   12/07/20 147 lb (66.7 kg)     Constitutional: Cachexia,  No acute distress, Non-toxic appearance; in wheelchair. HENT: Normocephalic, Atraumatic, Bilateral external ears normal, Oropharynx moist, No oral exudates, Nose normal.   Eyes: Conjunctiva normal, No discharge. Neck: Normal range of motion, No tenderness, Supple, No stridor. Cardiovascular: Normal heart rate, Normal rhythm, No murmurs, No rubs, No gallops.    Thorax & Lungs: Normal breath sounds, No respiratory distress, No wheezing, No chest tenderness. Abdomen: PEG tube in place with bumper at 3 cm and freely rotating; no bleeding or pus noted at the PEG site. Normal bowel sounds, soft, non tender, non distended, no hernias  Rectal:  Deferred. Harrington bag in place. Skin: Warm, Dry, No erythema, No rash. No bruising. Lower Extremities: Intact distal pulses, No edema, No tenderness,   Neurologic: Alert & oriented x 3,  RADIOLOGY/PROCEDURES   CT ABDOMEN PELVIS W IV CONTRAST Additional Contrast? None    Result Date: 6/3/2021  Mild colonic and moderate rectal prominence of stool, consistent with Gastrostomy tube is in normal position. There fluid-filled loops of nondistended small bowel, nonspecific. This may be physiological.  Enteritis is alternatively considered. Small bilateral pleural effusions. Barium aspiration pneumonitis in the lung bases, greater on the right. Additional small airways nodules may indicate chronic aspiration pneumonitis. Bronchopneumonia or follicular bronchiolitis are alternatively considered. FINAL IMPRESSION   John Maher was seen today for follow-up. Diagnoses and all orders for this visit:    Oropharyngeal dysphagia  Continue PEG tube feeding;   Maintain head of bed at 30-45 degree incline to decrease risk of aspiration while feeding. Constipation, with suspected overflow diarrhea and incontinence  Polyethylene glycol (GLYCOLAX) 17 GM/SCOOP powder; Mix in 64 oz of water/gatorade and administer 10-15 ml/h via PEG tube  Polyethylene glycol (MIRALAX) 17 g packet; Take 17 g in 8oz water by PEG daily  To consider colonoscopy if non improved with above therapy. No orders of the defined types were placed in this encounter. ORDERED FUTURE/PENDING TESTS     Lab Frequency Next Occurrence       FOLLOWUP   No follow-ups on file.           Nadege Canseco MD 6/9/21 1:24 PM EDT    CC:  Elan Light DO

## 2021-06-10 ENCOUNTER — APPOINTMENT (OUTPATIENT)
Dept: GENERAL RADIOLOGY | Age: 76
DRG: 871 | End: 2021-06-10
Attending: INTERNAL MEDICINE
Payer: MEDICARE

## 2021-06-10 ENCOUNTER — HOSPITAL ENCOUNTER (INPATIENT)
Age: 76
LOS: 2 days | Discharge: HOME HEALTH | DRG: 871 | End: 2021-06-12
Attending: INTERNAL MEDICINE | Admitting: INTERNAL MEDICINE
Payer: MEDICARE

## 2021-06-10 ENCOUNTER — APPOINTMENT (OUTPATIENT)
Dept: CT IMAGING | Age: 76
End: 2021-06-10
Payer: MEDICARE

## 2021-06-10 ENCOUNTER — HOSPITAL ENCOUNTER (EMERGENCY)
Age: 76
Discharge: ANOTHER HOSPITAL | End: 2021-06-10
Attending: EMERGENCY MEDICINE
Payer: MEDICARE

## 2021-06-10 VITALS
TEMPERATURE: 98.6 F | WEIGHT: 139 LBS | OXYGEN SATURATION: 100 % | BODY MASS INDEX: 17.84 KG/M2 | HEART RATE: 82 BPM | DIASTOLIC BLOOD PRESSURE: 82 MMHG | SYSTOLIC BLOOD PRESSURE: 142 MMHG | HEIGHT: 74 IN | RESPIRATION RATE: 25 BRPM

## 2021-06-10 DIAGNOSIS — J18.9 PNEUMONIA OF LEFT LOWER LOBE DUE TO INFECTIOUS ORGANISM: ICD-10-CM

## 2021-06-10 DIAGNOSIS — R50.9 FEVER, UNSPECIFIED FEVER CAUSE: ICD-10-CM

## 2021-06-10 DIAGNOSIS — A41.9 SEPSIS WITHOUT ACUTE ORGAN DYSFUNCTION, DUE TO UNSPECIFIED ORGANISM (HCC): Primary | ICD-10-CM

## 2021-06-10 DIAGNOSIS — J93.11 PRIMARY SPONTANEOUS PNEUMOTHORAX: ICD-10-CM

## 2021-06-10 LAB
ANION GAP SERPL CALCULATED.3IONS-SCNC: 7 MMOL/L (ref 3–16)
BACTERIA: ABNORMAL /HPF
BACTERIA: ABNORMAL /HPF
BILIRUBIN URINE: NEGATIVE
BILIRUBIN URINE: NEGATIVE
BLOOD, URINE: ABNORMAL
BLOOD, URINE: ABNORMAL
BUN BLDV-MCNC: 17 MG/DL (ref 7–20)
CALCIUM SERPL-MCNC: 8.8 MG/DL (ref 8.3–10.6)
CHLORIDE BLD-SCNC: 93 MMOL/L (ref 99–110)
CLARITY: ABNORMAL
CLARITY: CLEAR
CO2: 29 MMOL/L (ref 21–32)
COLOR: YELLOW
COLOR: YELLOW
CREAT SERPL-MCNC: <0.5 MG/DL (ref 0.8–1.3)
EKG ATRIAL RATE: 106 BPM
EKG ATRIAL RATE: 106 BPM
EKG DIAGNOSIS: NORMAL
EKG DIAGNOSIS: NORMAL
EKG P AXIS: 23 DEGREES
EKG P AXIS: 35 DEGREES
EKG P-R INTERVAL: 126 MS
EKG P-R INTERVAL: 132 MS
EKG Q-T INTERVAL: 326 MS
EKG Q-T INTERVAL: 332 MS
EKG QRS DURATION: 90 MS
EKG QRS DURATION: 92 MS
EKG QTC CALCULATION (BAZETT): 433 MS
EKG QTC CALCULATION (BAZETT): 441 MS
EKG R AXIS: 65 DEGREES
EKG R AXIS: 66 DEGREES
EKG T AXIS: 69 DEGREES
EKG T AXIS: 69 DEGREES
EKG VENTRICULAR RATE: 106 BPM
EKG VENTRICULAR RATE: 106 BPM
EPITHELIAL CELLS, UA: ABNORMAL /HPF (ref 0–5)
GFR AFRICAN AMERICAN: >60
GFR NON-AFRICAN AMERICAN: >60
GLUCOSE BLD-MCNC: 102 MG/DL (ref 70–99)
GLUCOSE URINE: NEGATIVE MG/DL
GLUCOSE URINE: NEGATIVE MG/DL
KETONES, URINE: NEGATIVE MG/DL
KETONES, URINE: NEGATIVE MG/DL
LACTIC ACID, SEPSIS: 0.8 MMOL/L (ref 0.4–1.9)
LEUKOCYTE ESTERASE, URINE: ABNORMAL
LEUKOCYTE ESTERASE, URINE: ABNORMAL
MAGNESIUM: 2.1 MG/DL (ref 1.8–2.4)
MICROSCOPIC EXAMINATION: YES
MICROSCOPIC EXAMINATION: YES
MUCUS: ABNORMAL /LPF
NITRITE, URINE: NEGATIVE
NITRITE, URINE: POSITIVE
PH UA: 7 (ref 5–8)
PH UA: 7 (ref 5–8)
POTASSIUM SERPL-SCNC: 4.4 MMOL/L (ref 3.5–5.1)
PROTEIN UA: 30 MG/DL
PROTEIN UA: 30 MG/DL
RBC UA: ABNORMAL /HPF (ref 0–4)
RBC UA: ABNORMAL /HPF (ref 0–4)
SARS-COV-2, NAAT: NOT DETECTED
SODIUM BLD-SCNC: 129 MMOL/L (ref 136–145)
SPECIFIC GRAVITY UA: 1.01 (ref 1–1.03)
SPECIFIC GRAVITY UA: 1.01 (ref 1–1.03)
TROPONIN: 0.07 NG/ML
URINE REFLEX TO CULTURE: YES
URINE REFLEX TO CULTURE: YES
URINE TYPE: ABNORMAL
URINE TYPE: ABNORMAL
UROBILINOGEN, URINE: 0.2 E.U./DL
UROBILINOGEN, URINE: 0.2 E.U./DL
WBC UA: ABNORMAL /HPF (ref 0–5)
WBC UA: ABNORMAL /HPF (ref 0–5)

## 2021-06-10 PROCEDURE — 96365 THER/PROPH/DIAG IV INF INIT: CPT

## 2021-06-10 PROCEDURE — 6370000000 HC RX 637 (ALT 250 FOR IP): Performed by: EMERGENCY MEDICINE

## 2021-06-10 PROCEDURE — 6360000002 HC RX W HCPCS: Performed by: EMERGENCY MEDICINE

## 2021-06-10 PROCEDURE — 96366 THER/PROPH/DIAG IV INF ADDON: CPT

## 2021-06-10 PROCEDURE — 87077 CULTURE AEROBIC IDENTIFY: CPT

## 2021-06-10 PROCEDURE — 2580000003 HC RX 258: Performed by: EMERGENCY MEDICINE

## 2021-06-10 PROCEDURE — 6360000002 HC RX W HCPCS: Performed by: INTERNAL MEDICINE

## 2021-06-10 PROCEDURE — 87635 SARS-COV-2 COVID-19 AMP PRB: CPT

## 2021-06-10 PROCEDURE — 71250 CT THORAX DX C-: CPT

## 2021-06-10 PROCEDURE — 83735 ASSAY OF MAGNESIUM: CPT

## 2021-06-10 PROCEDURE — 94761 N-INVAS EAR/PLS OXIMETRY MLT: CPT

## 2021-06-10 PROCEDURE — 94150 VITAL CAPACITY TEST: CPT

## 2021-06-10 PROCEDURE — 6370000000 HC RX 637 (ALT 250 FOR IP): Performed by: INTERNAL MEDICINE

## 2021-06-10 PROCEDURE — 87186 SC STD MICRODIL/AGAR DIL: CPT

## 2021-06-10 PROCEDURE — 2580000003 HC RX 258: Performed by: INTERNAL MEDICINE

## 2021-06-10 PROCEDURE — 2060000000 HC ICU INTERMEDIATE R&B

## 2021-06-10 PROCEDURE — 84484 ASSAY OF TROPONIN QUANT: CPT

## 2021-06-10 PROCEDURE — 36415 COLL VENOUS BLD VENIPUNCTURE: CPT

## 2021-06-10 PROCEDURE — 93010 ELECTROCARDIOGRAM REPORT: CPT | Performed by: INTERNAL MEDICINE

## 2021-06-10 PROCEDURE — 81001 URINALYSIS AUTO W/SCOPE: CPT

## 2021-06-10 PROCEDURE — 71046 X-RAY EXAM CHEST 2 VIEWS: CPT

## 2021-06-10 PROCEDURE — 87086 URINE CULTURE/COLONY COUNT: CPT

## 2021-06-10 PROCEDURE — 96367 TX/PROPH/DG ADDL SEQ IV INF: CPT

## 2021-06-10 PROCEDURE — 83605 ASSAY OF LACTIC ACID: CPT

## 2021-06-10 PROCEDURE — 93005 ELECTROCARDIOGRAM TRACING: CPT | Performed by: INTERNAL MEDICINE

## 2021-06-10 PROCEDURE — 2700000000 HC OXYGEN THERAPY PER DAY

## 2021-06-10 PROCEDURE — 80048 BASIC METABOLIC PNL TOTAL CA: CPT

## 2021-06-10 RX ORDER — PROMETHAZINE HYDROCHLORIDE 25 MG/1
12.5 TABLET ORAL EVERY 6 HOURS PRN
Status: DISCONTINUED | OUTPATIENT
Start: 2021-06-10 | End: 2021-06-12 | Stop reason: HOSPADM

## 2021-06-10 RX ORDER — 0.9 % SODIUM CHLORIDE 0.9 %
1000 INTRAVENOUS SOLUTION INTRAVENOUS ONCE
Status: COMPLETED | OUTPATIENT
Start: 2021-06-10 | End: 2021-06-10

## 2021-06-10 RX ORDER — ALBUTEROL SULFATE 90 UG/1
2 AEROSOL, METERED RESPIRATORY (INHALATION) EVERY 6 HOURS PRN
Status: DISCONTINUED | OUTPATIENT
Start: 2021-06-10 | End: 2021-06-10

## 2021-06-10 RX ORDER — ACETAMINOPHEN 650 MG/1
650 SUPPOSITORY RECTAL EVERY 6 HOURS PRN
Status: DISCONTINUED | OUTPATIENT
Start: 2021-06-10 | End: 2021-06-12 | Stop reason: HOSPADM

## 2021-06-10 RX ORDER — SODIUM CHLORIDE 9 MG/ML
25 INJECTION, SOLUTION INTRAVENOUS PRN
Status: DISCONTINUED | OUTPATIENT
Start: 2021-06-10 | End: 2021-06-12 | Stop reason: HOSPADM

## 2021-06-10 RX ORDER — ACETAMINOPHEN 325 MG/1
650 TABLET ORAL EVERY 6 HOURS PRN
Status: DISCONTINUED | OUTPATIENT
Start: 2021-06-10 | End: 2021-06-12 | Stop reason: HOSPADM

## 2021-06-10 RX ORDER — LEVOTHYROXINE SODIUM 0.12 MG/1
125 TABLET ORAL DAILY
Status: DISCONTINUED | OUTPATIENT
Start: 2021-06-11 | End: 2021-06-12 | Stop reason: HOSPADM

## 2021-06-10 RX ORDER — POTASSIUM CHLORIDE 7.45 MG/ML
10 INJECTION INTRAVENOUS PRN
Status: DISCONTINUED | OUTPATIENT
Start: 2021-06-10 | End: 2021-06-12 | Stop reason: HOSPADM

## 2021-06-10 RX ORDER — ATORVASTATIN CALCIUM 10 MG/1
10 TABLET, FILM COATED ORAL NIGHTLY
Status: DISCONTINUED | OUTPATIENT
Start: 2021-06-10 | End: 2021-06-12 | Stop reason: HOSPADM

## 2021-06-10 RX ORDER — SODIUM CHLORIDE 0.9 % (FLUSH) 0.9 %
10 SYRINGE (ML) INJECTION EVERY 12 HOURS SCHEDULED
Status: DISCONTINUED | OUTPATIENT
Start: 2021-06-10 | End: 2021-06-12 | Stop reason: HOSPADM

## 2021-06-10 RX ORDER — SODIUM CHLORIDE 0.9 % (FLUSH) 0.9 %
10 SYRINGE (ML) INJECTION PRN
Status: DISCONTINUED | OUTPATIENT
Start: 2021-06-10 | End: 2021-06-12 | Stop reason: HOSPADM

## 2021-06-10 RX ORDER — ALBUTEROL SULFATE 90 UG/1
2 AEROSOL, METERED RESPIRATORY (INHALATION) EVERY 4 HOURS PRN
Status: DISCONTINUED | OUTPATIENT
Start: 2021-06-10 | End: 2021-06-12 | Stop reason: HOSPADM

## 2021-06-10 RX ORDER — ACETAMINOPHEN 325 MG/1
650 TABLET ORAL ONCE
Status: COMPLETED | OUTPATIENT
Start: 2021-06-10 | End: 2021-06-10

## 2021-06-10 RX ORDER — ONDANSETRON 2 MG/ML
4 INJECTION INTRAMUSCULAR; INTRAVENOUS EVERY 6 HOURS PRN
Status: DISCONTINUED | OUTPATIENT
Start: 2021-06-10 | End: 2021-06-12 | Stop reason: HOSPADM

## 2021-06-10 RX ORDER — MAGNESIUM SULFATE IN WATER 40 MG/ML
2000 INJECTION, SOLUTION INTRAVENOUS PRN
Status: DISCONTINUED | OUTPATIENT
Start: 2021-06-10 | End: 2021-06-12 | Stop reason: HOSPADM

## 2021-06-10 RX ORDER — IBUPROFEN 600 MG/1
600 TABLET ORAL ONCE
Status: COMPLETED | OUTPATIENT
Start: 2021-06-10 | End: 2021-06-10

## 2021-06-10 RX ORDER — PANTOPRAZOLE SODIUM 40 MG/1
40 TABLET, DELAYED RELEASE ORAL
Status: DISCONTINUED | OUTPATIENT
Start: 2021-06-11 | End: 2021-06-12 | Stop reason: HOSPADM

## 2021-06-10 RX ADMIN — VANCOMYCIN HYDROCHLORIDE 1250 MG: 10 INJECTION, POWDER, LYOPHILIZED, FOR SOLUTION INTRAVENOUS at 07:16

## 2021-06-10 RX ADMIN — CEFEPIME 2000 MG: 2 INJECTION, POWDER, FOR SOLUTION INTRAVENOUS at 05:43

## 2021-06-10 RX ADMIN — IBUPROFEN 600 MG: 600 TABLET, FILM COATED ORAL at 05:44

## 2021-06-10 RX ADMIN — ATORVASTATIN CALCIUM 10 MG: 10 TABLET, FILM COATED ORAL at 22:26

## 2021-06-10 RX ADMIN — SODIUM CHLORIDE 1000 ML: 9 INJECTION, SOLUTION INTRAVENOUS at 05:38

## 2021-06-10 RX ADMIN — VANCOMYCIN HYDROCHLORIDE 1000 MG: 1 INJECTION, POWDER, LYOPHILIZED, FOR SOLUTION INTRAVENOUS at 23:45

## 2021-06-10 RX ADMIN — SODIUM CHLORIDE 1000 ML: 9 INJECTION, SOLUTION INTRAVENOUS at 07:14

## 2021-06-10 RX ADMIN — ACETAMINOPHEN 650 MG: 325 TABLET ORAL at 05:44

## 2021-06-10 RX ADMIN — CEFEPIME HYDROCHLORIDE 2000 MG: 2 INJECTION, POWDER, FOR SOLUTION INTRAVENOUS at 22:25

## 2021-06-10 RX ADMIN — Medication 10 ML: at 22:26

## 2021-06-10 ASSESSMENT — PAIN SCALES - GENERAL
PAINLEVEL_OUTOF10: 0

## 2021-06-10 ASSESSMENT — ENCOUNTER SYMPTOMS
CONSTIPATION: 1
COUGH: 1
COLOR CHANGE: 0
VOMITING: 0
BLOOD IN STOOL: 0
FACIAL SWELLING: 0
NAUSEA: 0
BACK PAIN: 0
VOICE CHANGE: 0
SHORTNESS OF BREATH: 0
TROUBLE SWALLOWING: 0
PHOTOPHOBIA: 0
STRIDOR: 0
ABDOMINAL PAIN: 0
WHEEZING: 0

## 2021-06-10 NOTE — ED NOTES
Attempted to call report to [facility] [place] without success     Kerwin Fabry, RN  06/10/21 [id number]
Consult complete with Dr. Miryam Seth from [address] [facility] surgery at 8192.      Theresa Avalos  06/10/21 [id number]
Report given to John NUNES at [facility]  06/10/21 8957
Transfer center called back with room and eta for transport.      Bed number C4  444/1    Report number [phone]    Transport ETA @ [address]  06/10/21 7652
Wife called to update on patient bed and ETA for transport     [place] DES Marie  06/10/21 [id number]
no

## 2021-06-10 NOTE — PROGRESS NOTES
Patient admitted to room 439 from [facility] . Patient oriented to room, call light, bed rails, phone, lights and bathroom. Patient instructed about the schedule of the day including: vital sign frequency, lab draws, possible tests, frequency of MD and staff rounds, including RN/MD rounding together at bedside, daily weights, and I &O's. Patient instructed about prescribed diet, how to use 8MENU, and television. Telemetry box 70 in place, patient aware of placement and reason. Bed locked, in lowest position, side rails up 2/4, call light within reach. Will continue to monitor.

## 2021-06-10 NOTE — ED PROVIDER NOTES
[address] ED  eMERGENCY dEPARTMENT eNCOUnter      Pt Name: Faustino Whiting  MRN: [id number]  [place] 1945  Date of evaluation: 6/9/2021  Provider: Maranda Escalera MD    [address] [place] [address]       Chief Complaint   Patient presents with    Fever     Fevers intermittently over the past 3-4 days were being controlled by tylenol. However tonights spike was not effected by tylenol. HISTORY OF PRESENT ILLNESS   (Location/Symptom, Timing/Onset, Context/Setting, Quality, Duration, Modifying Factors, Severity)  Note limiting factors. Faustino Whiting is a 68 y.o. male with remote history of esophageal and throat cancer the patient reports that these cancers are in remission and that he is not currently on any treatment for these and reports that he is fully immunocompetent. He does have a G-tube and a chronic catheter for urinary retention. He reports that for the past 3 to 4 days he has had intermittent fevers with a T-max of 104 degrees. The patient does have a history of aspiration pneumonia. He does report a cough productive of sputum for the past week. He denies any confusion or altered mental status but does report fatigue. Reports his symptoms are moderate, constant, and worsening. Denies any known aggravating or alleviating factors. The patient does report constipation for the past 7 days. He was prescribed GoLYTELY for a bowel cleanse but has not started taking it. HPI    Nursing Notes were reviewed. REVIEW OFSYSTEMS    (2-9 systems for level 4, 10 or more for level 5)     Review of Systems   Constitutional: Positive for fatigue and fever. Negative for appetite change and unexpected weight change. HENT: Negative for facial swelling, trouble swallowing and voice change. Eyes: Negative for photophobia and visual disturbance. Respiratory: Positive for cough. Negative for shortness of breath, wheezing and stridor.     Cardiovascular: Negative for chest pain and palpitations. Gastrointestinal: Positive for constipation. Negative for abdominal pain, blood in stool, nausea and vomiting. Genitourinary: Negative for difficulty urinating and dysuria. Musculoskeletal: Negative for back pain, gait problem and neck pain. Skin: Negative for color change and wound. Neurological: Negative for seizures, syncope and speech difficulty. Psychiatric/Behavioral: Negative for self-injury and suicidal ideas. Except as noted above the remainder of the review of systems was reviewed and negative.        PAST MEDICAL HISTORY     Past Medical History:   Diagnosis Date    Arthritis     Benign hypertrophy of prostate     Cancer ([facility]     growth on tongue    COVID-19 12/07/2020    Dry mouth     s/p radiation treatment    DVT, lower extremity ([facility]     5/2011    GERD (gastroesophageal reflux disease)     acid reflux    Hip fracture ([facility]     5/2011, RIGHT    Hx of blood clots     Hyperlipidemia     Pneumonia 6/2/2011    Prolonged emergence from general anesthesia     Thyroid disease          SURGICAL HISTORY       Past Surgical History:   Procedure Laterality Date    ABDOMEN SURGERY      gallbladder removed    CHOLECYSTECTOMY      COLONOSCOPY      EYE SURGERY Right     TORN RETINA, LASER    HERNIA REPAIR      HIP ARTHROPLASTY Right 08/08/2016    HIP FRACTURE SURGERY Right     JOINT REPLACEMENT      OTHER SURGICAL HISTORY  05/18/2011    right hip IM nailing    SHOULDER SURGERY Right rotater cuff    TONGUE BIOPSY  10/2010         CURRENT MEDICATIONS       Previous Medications    ALBUTEROL SULFATE  (90 BASE) MCG/ACT INHALER    INHALE 2 PUFFS INTO THE LUNGS 4 TIMES DAILY AS NEEDED (PRIOR TO ACTIVITY THAT CAUSES SHORTNESS OF BREATH)    FLUTICASONE (FLONASE) 50 MCG/ACT NASAL SPRAY    2 sprays by Nasal route daily    LEVOTHYROXINE (SYNTHROID) 125 MCG TABLET        OMEPRAZOLE (PRILOSEC) 40 MG DELAYED RELEASE CAPSULE        POLYETHYLENE GLYCOL (GLYCOLAX) 17 GM/SCOOP POWDER    Use as directed for colonoscopy prep    POLYETHYLENE GLYCOL (MIRALAX) 17 G PACKET    Take 17 g by mouth daily    SENNOSIDES (SENNA) 8.8 MG/5ML LIQD    Take 5 mLs by mouth daily    SIMVASTATIN (ZOCOR) 20 MG TABLET           ALLERGIES     Tamsulosin hcl and Tetanus toxoids    FAMILY HISTORY       Family History   Problem Relation Age of Onset    Cancer Mother     Depression Mother     High Blood Pressure Mother     Heart Disease Father     Stroke Maternal Grandmother     Diabetes Other           SOCIAL HISTORY       Social History     Socioeconomic History    Marital status:      Spouse name: Not on file    Number of children: Not on file    Years of education: Not on file    Highest education level: Not on file   Occupational History    Not on file   Tobacco Use    Smoking status: Former Smoker     Years: 40.00     Quit date: 1971     Years since quittin.1    Smokeless tobacco: Never Used   Vaping Use    Vaping Use: Never used   Substance and Sexual Activity    Alcohol use: Not Currently     Alcohol/week: 0.0 standard drinks     Comment: x1 half glass before bed    Drug use: No    Sexual activity: Yes     Partners: Female   Other Topics Concern    Not on file   Social History Narrative    Not on file     Social Determinants of Health     Financial Resource Strain:     Difficulty of Paying Living Expenses:    Food Insecurity:     Worried About Running Out of Food in the Last Year:     Ran Out of Food in the Last Year:    Transportation Needs:     Lack of Transportation (Medical):      Lack of Transportation (Non-Medical):    Physical Activity:     Days of Exercise per Week:     Minutes of Exercise per Session:    Stress:     Feeling of Stress :    Social Connections:     Frequency of Communication with Friends and Family:     Frequency of Social Gatherings with Friends and Family:     Attends [employer] Services:     Active Member of Clubs or Organizations:     Attends Club or Organization Meetings:     Marital Status:    Intimate Partner Violence:     Fear of Current or Ex-Partner:     Emotionally Abused:     Physically Abused:     Sexually Abused:          PHYSICAL EXAM    (up to 7 for level 4, 8 or more for level 5)     ED Triage Vitals [06/09/21 2216]   BP Temp Temp Source Pulse Resp SpO2 Height Weight   (!) 92/59 102 °F (38.9 °C) Oral 112 16 93 % 6' 2\" (1.88 m) 139 lb (63 kg)       Physical Exam  Vitals and nursing note reviewed. Constitutional:       General: He is not in acute distress. Appearance: He is well-developed. Comments: Chronically ill-appearing elderly  male in no acute distress. HENT:      Head: Normocephalic and atraumatic. Right Ear: External ear normal.      Left Ear: External ear normal.   Eyes:      Extraocular Movements: Extraocular movements intact. Conjunctiva/sclera: Conjunctivae normal.      Pupils: Pupils are equal, round, and reactive to light. Neck:      Vascular: No JVD. Trachea: No tracheal deviation. Cardiovascular:      Rate and Rhythm: Normal rate. Pulmonary:      Effort: Pulmonary effort is normal. No respiratory distress. Breath sounds: Normal breath sounds. No wheezing. Abdominal:      General: There is no distension. Palpations: Abdomen is soft. Tenderness: There is no abdominal tenderness. There is no guarding or rebound. Genitourinary:     Comments: Patient has hardened stool ball in rectal vault which is able to be removed on digital rectal exam.  Harrington catheter is in place. Musculoskeletal:         General: No tenderness. Normal range of motion. Cervical back: Neck supple. Skin:     General: Skin is warm and dry. Neurological:      General: No focal deficit present. Mental Status: He is oriented to person, place, and time. Cranial Nerves: No cranial nerve deficit.       Comments: Patient is alert and oriented to person, place, time, situation. No focal neurologic deficits. DIAGNOSTIC RESULTS     EKG:All EKG's are interpreted by the Emergency Department Physician who either signs or Co-signs this chart in the absence of a cardiologist.    The Ekg interpreted by me shows  sinus tachycardia, ycjl=288   Axis is   Normal  QTc is  441ms  Intervals and Durations are unremarkable. ST Segments: Poor baseline with no other acute ST changes  Increase in rate from previous EKG on 12/7/2020      RADIOLOGY:     Interpretation per the Radiologist below, if available at the time of this note:    CT CHEST [address]   Final Result   There is a tiny right apical pneumothorax. Small left pleural effusion, with sequela of barium aspiration noted, as well   as multifocal areas of subpleural tree-in-bud opacities felt consistent with   infectious/inflammatory bronchiolitis. Bronchopneumonia also seen within the   left lower lobe with areas of consolidation, as well as potential apical   scarring versus upper lobe pneumonia as well bilaterally. Reactive l      No acute process seen in the abdomen or pelvis. These findings were called to and discussed with Dr. Og Briscoe in the emergency   department at 4:52 a.m. on 06/10/2021. XR CHEST PORTABLE   Final Result   Left basilar opacity and trace left pleural effusion could represent   pneumonia in the appropriate clinical context. Correlate with presentation. Follow-up to resolution recommended.                    LABS:  Labs Reviewed   CBC WITH AUTO DIFFERENTIAL - Abnormal; Notable for the following components:       Result Value    Hemoglobin 13.1 (*)     Hematocrit 39.4 (*)     RDW 15.8 (*)     Lymphocytes Absolute 0.6 (*)     All other components within normal limits    Narrative:     Performed at:  [facility],  [place], [facility]   Phone [phone]   COMPREHENSIVE METABOLIC PANEL - Abnormal; Notable for the following components:    Sodium 124 (*)     Chloride 88 (*)     Glucose 128 (*)     BUN 23 (*)     CREATININE 0.7 (*)     Albumin 3.3 (*)     Albumin/Globulin Ratio 0.8 (*)     AST 12 (*)     All other components within normal limits    Narrative:     Performed at:  [facility],  [place]   Phone [phone]   URINE RT REFLEX TO CULTURE - Abnormal; Notable for the following components:    Clarity, UA SL CLOUDY (*)     Blood, Urine MODERATE (*)     Protein, UA 30 (*)     Leukocyte Esterase, Urine LARGE (*)     All other components within normal limits    Narrative:     Performed at:  [place] [address],  [place]   Phone [phone]   TROPONIN - Abnormal; Notable for the following components:    Troponin 0.08 (*)     All other components within normal limits    Narrative:     Performed at:  [place] [address],  [place]   Phone [phone]   MICROSCOPIC URINALYSIS - Abnormal; Notable for the following components:    Mucus, UA 2+ (*)     WBC, UA 21-50 (*)     RBC, UA 11-20 (*)     Bacteria, UA 4+ (*)     All other components within normal limits    Narrative:     Performed at:  [facility]) - [facility],  [employer]   Phone [phone]   CULTURE, URINE   COVID-19, RAPID   LACTATE, SEPSIS    Narrative:     Performed at:  [facility]) - [facility],  [employer]   Phone [phone]   LACTATE, SEPSIS    Narrative:     Performed at:  [facility]) [facility],  [employer]   Phone [phone]   URINE RT REFLEX TO CULTURE       All otherlabs were within normal range or not returned as of this dictation.     EMERGENCY DEPARTMENT COURSE and DIFFERENTIAL DIAGNOSIS/MDM:   Vitals:    Vitals: 06/09/21 2217 06/10/21 0410 06/10/21 0501 06/10/21 0537   BP: 110/76 120/80 (!) 94/46    Pulse: 90 97 93    Resp: 16 24 24    Temp: 100.8 °F (38.2 °C)   98.6 °F (37 °C)   TempSrc:    Oral   SpO2: 92% 94% 92%    Weight:       Height:             MDM  Patient is noted to be tachycardic, febrile, and tachypneic. White blood cell count is normal however and lactic acid is within normal limits. Work-up is concerning for left lower lobe bronchopneumonia and right apical occult pneumothorax. Pneumothorax was not visible on the chest x-ray however is visible on CT. Patient had normal oxygen saturation on room air however given occult pneumothorax he is placed on 4 L nasal cannula with oxygen improving to 100%. I have contacted cardiothoracic surgery to discuss the case with him. They are in agreement with this plan and only recommend repeat imaging to ensure that it is not getting larger. Patient does have some borderline hypotension however with IV fluids his blood pressure does not normalize. Appropriate blood and urine cultures are drawn and the patient is started on Vanco and cefepime for presumed hospital-acquired pneumonia as he has had a hospital admission within the past 90 days. Patient is reevaluated multiple times in his stay in the emergency department no acute worsening clinical status. The patient does meet sepsis criteria as he has fever, tachypnea, and tachycardia with a known infection. Given the pneumothorax and potential for cardiothoracic intervention the patient is transferred to [facility] for further care. The patient and his wife expressed understanding and agreement with this plan. The patient is reevaluated multiple times noticed in the emergency part with no acute worsening clinical status and does report improvement in symptoms after IV medications and oxygen.     CONSULTS:  Caren Hickman TO HOSPITALIST    PROCEDURES:  Unless otherwise noted below, none     Critical Care  Performed by: Baldomero Dolan MD  Authorized by: Baldomero Dolan MD     Critical care provider statement:     Critical care time (minutes):  35    Critical care time was exclusive of:  Separately billable procedures and treating other patients and teaching time    Critical care was necessary to treat or prevent imminent or life-threatening deterioration of the following conditions:  Respiratory failure, sepsis and shock    Critical care was time spent personally by me on the following activities:  Ordering and performing treatments and interventions, development of treatment plan with patient or surrogate, ordering and review of laboratory studies, discussions with consultants, ordering and review of radiographic studies, pulse oximetry, evaluation of patient's response to treatment, re-evaluation of patient's condition, examination of patient and obtaining history from patient or surrogate        FINAL IMPRESSION      1. Sepsis without acute organ dysfunction, due to unspecified organism ([address]    2. Pneumonia of left lower lobe due to infectious organism    3. Fever, unspecified fever cause    4. Primary spontaneous pneumothorax          DISPOSITION/PLAN   DISPOSITION Decision To Transfer 06/10/2021 05:25:41 AM         (Please note that portions of this note were completed with a voice recognition program.  Efforts were made to edit the dictations but occasionally words aremis-transcribed. )    Baldomero Dolan MD (electronically signed)  Attending Emergency Physician       Baldomero Dolan MD  06/10/21 6314

## 2021-06-10 NOTE — CONSULTS
Pharmacy Note  Vancomycin Consult    Faustino Whiting is a 68 y.o. male started on Vancomycin for CAP; consult received from Dr. Yecenia Packer to manage therapy. Also receiving the following antibiotics: Cefepime. Allergies:  Tamsulosin hcl and Tetanus toxoids       Recent Labs     06/09/21  2229   CREATININE 0.7*       Recent Labs     06/09/21  2229   WBC 8.4       Estimated Creatinine Clearance: 80 mL/min (A) (based on SCr of 0.7 mg/dL (L)). No intake or output data in the 24 hours ending 06/10/21 1936    Wt Readings from Last 1 Encounters:   06/10/21 139 lb (63 kg)         Body mass index is 17.85 kg/m². Loading dose (critically ill or in ICU, require dialysis or renal replacement therapy): Vancomycin 25 mg/kg IVPB x 1 (maximum 3000 mg). Maintenance dose: 15 mg/kg (maximum: 2000 mg/dose and 4500 mg/day) starting at the next dosing interval determined by renal function  Pulse dose: fluctuating renal function, NEMESIO, ESRD   Goal Vancomycin trough: 10-15 mcg/mL or 15-20 mcg/mL   Goal Vancomycin AUC: 400-600     Assessment/Plan:  Will initiate Vancomycin with a one time loading dose of 1250mg x1, followed by 1000 mg IV every 12 hours. Calculated . Vancomycin trough ordered for 6/12 0730. Timing of trough level will be determined based on culture results, renal function, and clinical response. Thank you for the consult.   Jenniffer Hernandez, [facility]  6/10/2021 at 7:38 PM

## 2021-06-11 PROBLEM — E43 SEVERE MALNUTRITION (HCC): Status: ACTIVE | Noted: 2021-06-11

## 2021-06-11 LAB
A/G RATIO: 0.6 (ref 1.1–2.2)
ALBUMIN SERPL-MCNC: 2.6 G/DL (ref 3.4–5)
ALP BLD-CCNC: 76 U/L (ref 40–129)
ALT SERPL-CCNC: 12 U/L (ref 10–40)
ANION GAP SERPL CALCULATED.3IONS-SCNC: 9 MMOL/L (ref 3–16)
AST SERPL-CCNC: 17 U/L (ref 15–37)
BASOPHILS ABSOLUTE: 0 K/UL (ref 0–0.2)
BASOPHILS RELATIVE PERCENT: 0.5 %
BILIRUB SERPL-MCNC: 0.4 MG/DL (ref 0–1)
BUN BLDV-MCNC: 16 MG/DL (ref 7–20)
CALCIUM SERPL-MCNC: 9 MG/DL (ref 8.3–10.6)
CHLORIDE BLD-SCNC: 96 MMOL/L (ref 99–110)
CO2: 26 MMOL/L (ref 21–32)
CREAT SERPL-MCNC: 0.6 MG/DL (ref 0.8–1.3)
EKG ATRIAL RATE: 88 BPM
EKG DIAGNOSIS: NORMAL
EKG P AXIS: 75 DEGREES
EKG P-R INTERVAL: 140 MS
EKG Q-T INTERVAL: 360 MS
EKG QRS DURATION: 86 MS
EKG QTC CALCULATION (BAZETT): 435 MS
EKG R AXIS: 20 DEGREES
EKG T AXIS: 63 DEGREES
EKG VENTRICULAR RATE: 88 BPM
EOSINOPHILS ABSOLUTE: 0.2 K/UL (ref 0–0.6)
EOSINOPHILS RELATIVE PERCENT: 2.7 %
GFR AFRICAN AMERICAN: >60
GFR NON-AFRICAN AMERICAN: >60
GLOBULIN: 4.4 G/DL
GLUCOSE BLD-MCNC: 115 MG/DL (ref 70–99)
GLUCOSE BLD-MCNC: 166 MG/DL (ref 70–99)
GLUCOSE BLD-MCNC: 87 MG/DL (ref 70–99)
GLUCOSE BLD-MCNC: 87 MG/DL (ref 70–99)
GLUCOSE BLD-MCNC: 93 MG/DL (ref 70–99)
GLUCOSE BLD-MCNC: 95 MG/DL (ref 70–99)
GLUCOSE BLD-MCNC: 95 MG/DL (ref 70–99)
HCT VFR BLD CALC: 32.8 % (ref 40.5–52.5)
HEMOGLOBIN: 11.1 G/DL (ref 13.5–17.5)
LYMPHOCYTES ABSOLUTE: 0.5 K/UL (ref 1–5.1)
LYMPHOCYTES RELATIVE PERCENT: 7.1 %
MCH RBC QN AUTO: 26.9 PG (ref 26–34)
MCHC RBC AUTO-ENTMCNC: 33.7 G/DL (ref 31–36)
MCV RBC AUTO: 79.8 FL (ref 80–100)
MONOCYTES ABSOLUTE: 0.9 K/UL (ref 0–1.3)
MONOCYTES RELATIVE PERCENT: 13.6 %
NEUTROPHILS ABSOLUTE: 5.2 K/UL (ref 1.7–7.7)
NEUTROPHILS RELATIVE PERCENT: 76.1 %
PDW BLD-RTO: 15.8 % (ref 12.4–15.4)
PERFORMED ON: ABNORMAL
PERFORMED ON: ABNORMAL
PERFORMED ON: NORMAL
PHOSPHORUS: 4.1 MG/DL (ref 2.5–4.9)
PLATELET # BLD: 250 K/UL (ref 135–450)
PMV BLD AUTO: 7.5 FL (ref 5–10.5)
POTASSIUM REFLEX MAGNESIUM: 4.3 MMOL/L (ref 3.5–5.1)
POTASSIUM SERPL-SCNC: 4.3 MMOL/L (ref 3.5–5.1)
RBC # BLD: 4.11 M/UL (ref 4.2–5.9)
SODIUM BLD-SCNC: 131 MMOL/L (ref 136–145)
TOTAL PROTEIN: 7 G/DL (ref 6.4–8.2)
TRIGL SERPL-MCNC: 79 MG/DL (ref 0–150)
TROPONIN: 0.06 NG/ML
URINE CULTURE, ROUTINE: NORMAL
WBC # BLD: 6.8 K/UL (ref 4–11)

## 2021-06-11 PROCEDURE — 2580000003 HC RX 258: Performed by: INTERNAL MEDICINE

## 2021-06-11 PROCEDURE — 36415 COLL VENOUS BLD VENIPUNCTURE: CPT

## 2021-06-11 PROCEDURE — 6370000000 HC RX 637 (ALT 250 FOR IP): Performed by: INTERNAL MEDICINE

## 2021-06-11 PROCEDURE — 85025 COMPLETE CBC W/AUTO DIFF WBC: CPT

## 2021-06-11 PROCEDURE — 93010 ELECTROCARDIOGRAM REPORT: CPT | Performed by: INTERNAL MEDICINE

## 2021-06-11 PROCEDURE — 84484 ASSAY OF TROPONIN QUANT: CPT

## 2021-06-11 PROCEDURE — 84100 ASSAY OF PHOSPHORUS: CPT

## 2021-06-11 PROCEDURE — 84478 ASSAY OF TRIGLYCERIDES: CPT

## 2021-06-11 PROCEDURE — 6360000002 HC RX W HCPCS: Performed by: INTERNAL MEDICINE

## 2021-06-11 PROCEDURE — 80053 COMPREHEN METABOLIC PANEL: CPT

## 2021-06-11 PROCEDURE — 87449 NOS EACH ORGANISM AG IA: CPT

## 2021-06-11 PROCEDURE — 87641 MR-STAPH DNA AMP PROBE: CPT

## 2021-06-11 PROCEDURE — 2060000000 HC ICU INTERMEDIATE R&B

## 2021-06-11 RX ADMIN — VANCOMYCIN HYDROCHLORIDE 1000 MG: 1 INJECTION, POWDER, LYOPHILIZED, FOR SOLUTION INTRAVENOUS at 09:08

## 2021-06-11 RX ADMIN — CEFEPIME HYDROCHLORIDE 2000 MG: 2 INJECTION, POWDER, FOR SOLUTION INTRAVENOUS at 19:09

## 2021-06-11 RX ADMIN — CEFEPIME HYDROCHLORIDE 2000 MG: 2 INJECTION, POWDER, FOR SOLUTION INTRAVENOUS at 08:21

## 2021-06-11 RX ADMIN — ENOXAPARIN SODIUM 40 MG: 40 INJECTION SUBCUTANEOUS at 08:21

## 2021-06-11 RX ADMIN — LEVOTHYROXINE SODIUM 125 MCG: 0.12 TABLET ORAL at 06:18

## 2021-06-11 RX ADMIN — VANCOMYCIN HYDROCHLORIDE 1000 MG: 1 INJECTION, POWDER, LYOPHILIZED, FOR SOLUTION INTRAVENOUS at 20:10

## 2021-06-11 ASSESSMENT — PAIN SCALES - GENERAL
PAINLEVEL_OUTOF10: 0

## 2021-06-11 NOTE — PLAN OF CARE
Nutrition Problem #1: Severe malnutrition  Intervention: Food and/or Nutrient Delivery: Start Tube Feeding  Nutritional Goals: Tolerate enteral nutrition at goal rate without any GI intolerance.

## 2021-06-11 NOTE — PROGRESS NOTES
104/65   Pulse 76   Temp 98.1 °F (36.7 °C) (Oral)   Resp 18   Ht 6' 2\" (1.88 m)   Wt 139 lb 1.8 oz (63.1 kg)   SpO2 95%   BMI 17.86 kg/m²     General appearance: No apparent distress, frail  HEENT: Pupils equal, round, and reactive to light. Conjunctivae/corneas clear. Neck: Supple, with full range of motion. No jugular venous distention. Trachea midline. Respiratory:  Normal respiratory effort. Left sided rhonchi without Rales/Wheezes. Cardiovascular: Regular rate and rhythm with normal S1/S2 without murmurs, rubs or gallops. Abdomen: Soft, non-tender, non-distended with normal bowel sounds. Musculoskeletal: No clubbing, cyanosis or edema bilaterally. Full range of motion without deformity. Skin: Skin color, texture, turgor normal.  No rashes or lesions. Neurologic:  Neurovascularly intact without any focal sensory/motor deficits. Cranial nerves: II-XII intact, grossly non-focal.  Psychiatric: Alert and oriented, thought content appropriate, normal insight  Capillary Refill: Brisk,3 seconds, normal   Peripheral Pulses: +2 palpable, equal bilaterally       Labs:   Recent Labs     06/09/21 2229 06/11/21  0638   WBC 8.4 6.8   HGB 13.1* 11.1*   HCT 39.4* 32.8*    250     Recent Labs     06/09/21  2229 06/10/21  1940 06/11/21  0638   * 129* 131*   K 4.7 4.4 4.3  4.3   CL 88* 93* 96*   CO2 29 29 26   BUN 23* 17 16   CREATININE 0.7* <0.5* 0.6*   CALCIUM 9.0 8.8 9.0   PHOS  --   --  4.1     Recent Labs     06/09/21 2229 06/11/21  0638   AST 12* 17   ALT 10 12   BILITOT 0.4 0.4   ALKPHOS 76 76     No results for input(s): INR in the last 72 hours.   Recent Labs     06/09/21  2229 06/10/21  1940 06/11/21  0638   TROPONINI 0.08* 0.07* 0.06*       Urinalysis:      Lab Results   Component Value Date    NITRU POSITIVE 06/10/2021    WBCUA  06/10/2021    BACTERIA 2+ 06/10/2021    RBCUA 21-50 06/10/2021    BLOODU LARGE 06/10/2021    SPECGRAV 1.010 06/10/2021    GLUCOSEU Negative 06/10/2021 [address]94 NEGATIVE 06/02/2011       Radiology:  XR CHEST (2 VW)   Final Result   Overlying artifact limiting the exam.      Resolving central pulmonary congestion. COPD with slowly resolving bibasilar infiltrates and/or atelectasis. Small left pleural effusion layering posteriorly which is more prominent.                  Assessment/Plan:    Active Hospital Problems    Diagnosis     Severe malnutrition ([facility] [E43]     Sepsis ([facility] [A41.9]      Sepsis 2/2 aspiration pneumonia  - fever, tachycardia on admission  - s/p IVF bolus  - continue vanc, cefepime  - cultures NGTD  - respiratory failure ruled out    UTI  - noted on UA  - urine culture NGTD  - abx as above    Elevated troponin  - down trending  - low concern for ACS    Hyponatremia  - 2/2 poor intake  - restarting tube feeds  - improved with IVF  - continue to monitor    Dysphagia with severe protein calorie malnutrition  - restarted tube feeds  - dietitian consult    DVT Prophylaxis: lovenox  Diet: Diet NPO  ADULT TUBE FEEDING; PEG; Standard without Fiber; Continuous; 25; Yes; 25; Q 4 hours; 70; 100; Q 4 hours  Code Status: Full Code    PT/OT Eval Status: not ordered    Dispo - home in 1-3 days    Aamir Walker MD

## 2021-06-11 NOTE — DISCHARGE INSTR - COC
Continuity of Care Form    Patient Name: Faustino Whiting   :  1945  MRN:  [id number]    Admit date:  6/10/2021  Discharge date:  21    Code Status Order: Full Code   Advance Directives:   [facility] Documentation       Date/Time Healthcare Directive Type of Healthcare Directive Copy in [address] Agent's Name Healthcare Agent's Phone Number    06/10/21 1510  Yes, patient has an advance directive for healthcare treatment  --  --  --  --  --            Admitting Physician:  Maida Ortiz DO  PCP: Ron Silverman DO    Discharging Nurse: [facility] Unit/Room#: 4864/7537-39  Discharging Unit Phone Number: [phone]    Emergency Contact:   Extended Emergency Contact Information  Primary Emergency Contact: John Valdez  Address: [address] [place] [address] Phone: [phone]  Mobile Phone: [phone]  Relation: Spouse  Secondary Emergency Contact: [address] Phone: [phone]  Relation: Child    Past Surgical History:  Past Surgical History:   Procedure Laterality Date    ABDOMEN SURGERY      gallbladder removed    CHOLECYSTECTOMY      COLONOSCOPY      EYE SURGERY Right     TORN RETINA, LASER    HERNIA REPAIR      HIP ARTHROPLASTY Right 2016    HIP FRACTURE SURGERY Right     JOINT REPLACEMENT      OTHER SURGICAL HISTORY  2011    right hip IM nailing    SHOULDER SURGERY Right rotater cuff    TONGUE BIOPSY  10/2010       Immunization History:   Immunization History   Administered Date(s) Administered    Influenza Whole 10/06/2010    Pneumococcal Polysaccharide ([id number]) 10/06/2010       Active Problems:  Patient Active Problem List   Diagnosis Code    Fever and chills R50.9    SOB (shortness of breath) R06.02    BPH (benign prostatic hyperplasia) N40.0    GERD (gastroesophageal reflux disease) K21.9    Benign prostatic hyperplasia N40.0    Hyperlipidemia E78.5    DVT, lower extremity ([facility]) I82.409    Hip fracture ([facility]) S72.009A    Dry mouth R68.2    Carcinoma of base of tongue ([facility] C01    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    NSTEMI (non-ST elevated myocardial infarction) ([facility] I21.4    Septicemia (HCC) A41.9    Hypothyroidism E03.9    Moderate protein-calorie malnutrition ([facility] E44.0    Pneumonia J18.9    Fever R50.9    Hematuria R31.9    Dysphagia R13.10    Sepsis (HCC) A41.9    Severe malnutrition ([facility]) E43       Isolation/Infection:   Isolation            No Isolation          Patient Infection Status       Infection Onset Added Last Indicated Last Indicated By Review Planned Expiration Resolved Resolved By    C-diff Rule Out 06/10/21 06/10/21 06/10/21 Clostridium difficile toxin/antigen (Ordered)        Resolved    COVID-19 Rule Out 06/10/21 06/10/21 06/10/21 COVID-19, Rapid (Ordered)   06/10/21 Rule-Out Test Resulted    COVID-19 20 COVID-19   20     COVID-19 Rule Out 20 COVID-19 (Ordered)   20 Rule-Out Test Resulted    COVID-19 Rule Out 20 COVID-19 (Ordered)   09/15/20 Rule-Out Test Resulted    COVID-19 Rule Out 20 COVID-19 (Ordered)   20 Rule-Out Test Resulted            Nurse Assessment:  Last Vital Signs: /65   Pulse 76   Temp 98.1 °F (36.7 °C) (Oral)   Resp 18   Ht 6' 2\" (1.88 m)   Wt 139 lb 1.8 oz (63.1 kg)   SpO2 95%   BMI 17.86 kg/m²     Last documented pain score (0-10 scale): Pain Level: 0  Last Weight:   Wt Readings from Last 1 Encounters:   21 139 lb 1.8 oz (63.1 kg)     Mental Status:  oriented and alert    IV Access:  - None    Nursing Mobility/ADLs:  Walking   Assisted  Transfer  Assisted  Bathing  Assisted  Dressing  Assisted  Toileting  Assisted  Feeding  Assisted  Med Admin  Assisted  Med Delivery   crushed    Wound Care Documentation and Therapy: Treatments After Discharge:   Recommended Follow-up, Labs or Other Treatments After Discharge:    PT/OT, skilled nursing             Physician Certification: I certify the above information and transfer of Grant Fears  is necessary for the continuing treatment of the diagnosis listed and that he requires [address] for less 30 days.      Update Admission H&P: No change in H&P    PHYSICIAN SIGNATURE:  Electronically signed by Jewel Arevalo MD on 6/12/21 at 1:09 PM EDT

## 2021-06-11 NOTE — H&P
Hospital Medicine History & Physical      PCP: Donita Persaud DO    Date of Admission: 6/10/2021    Date of Service: Pt seen/examined on 6/10/2021  Pt seen/examined face to face on and admitted as inpatient with expected LOS greater than two midnights due to medical therapy  Chief Complaint:    Fever    History Of Present Illness:      68 y.o. male who presented to [facility] with past medical history of GERD, hyperlipidemia, throat cancer, GERD, hypothyroidism presented eczema ED for fevers pain next  Patient reported that in [place] he has history of throat cancer where he underwent resection and radiation and then due to inability to have any oral intake patient had a G-tube placed for 2 months where he has been getting nutrition via tube. Patient reported that he started having high fevers with unclear etiology denies having any cough any phlegm or shortness of breath. Patient also denies having any chest pains no urinary pains. Patient reports that his G-tube has been in place and working fine at home. Patient was at [facility] he underwent a CT chest that showed a very very small pneumothorax apically in addition to pneumonia. UA also showed infection. Blood cultures obtained.   Patient was sent here for further evaluation CODE STATUS discussed and the patient is a full code      Past Medical History:          Diagnosis Date    Arthritis     Benign hypertrophy of prostate     Cancer ([address]     growth on tongue    COVID-19 12/07/2020    Dry mouth     s/p radiation treatment    DVT, lower extremity ([address]     5/2011    GERD (gastroesophageal reflux disease)     acid reflux    Hip fracture ([address]     5/2011, RIGHT    Hx of blood clots     Hyperlipidemia     Pneumonia 6/2/2011    Prolonged emergence from general anesthesia     Thyroid disease        Past Surgical History:          Procedure Laterality Date    ABDOMEN SURGERY      gallbladder removed    CHOLECYSTECTOMY      COLONOSCOPY  EYE SURGERY Right     TORN RETINA, LASER    HERNIA REPAIR      HIP ARTHROPLASTY Right 08/08/2016    HIP FRACTURE SURGERY Right     JOINT REPLACEMENT      OTHER SURGICAL HISTORY  05/18/2011    right hip IM nailing    SHOULDER SURGERY Right rotater cuff    TONGUE BIOPSY  10/2010       Medications Prior to Admission:      Prior to Admission medications    Medication Sig Start Date End Date Taking? Authorizing Provider   levothyroxine (SYNTHROID) 125 MCG tablet  4/23/21  Yes Historical Provider, MD   omeprazole (PRILOSEC) 40 MG delayed release capsule  6/8/21  Yes Historical Provider, MD   simvastatin (ZOCOR) 20 MG tablet  4/23/21  Yes Historical Provider, MD   polyethylene glycol (GLYCOLAX) 17 GM/SCOOP powder Use as directed for colonoscopy prep 6/9/21  Yes Nadir Solis MD   polyethylene glycol (MIRALAX) 17 g packet Take 17 g by mouth daily 6/9/21 7/9/21 Yes Nadir Solis MD   Sennosides (SENNA) 8.8 MG/5ML LIQD Take 5 mLs by mouth daily 6/3/21   Neisha Barbosa, APRN - CNP   albuterol sulfate  (90 Base) MCG/ACT inhaler INHALE 2 PUFFS INTO THE LUNGS 4 TIMES DAILY AS NEEDED (PRIOR TO ACTIVITY THAT CAUSES SHORTNESS OF BREATH) 11/16/20   Maryam Snow MD   fluticasone [facility]) 50 MCG/ACT nasal spray 2 sprays by Nasal route daily 5/11/20   Historical Provider, MD       Allergies:  Tamsulosin hcl and Tetanus toxoids    Social History:          TOBACCO:   reports that he quit smoking about 50 years ago. He quit after 40.00 years of use. He has never used smokeless tobacco.  ETOH:   reports previous alcohol use.   E-Cigarettes/Vaping Use     Questions Responses    E-Cigarette/Vaping Use Never User    Start Date     Passive Exposure     Quit Date     Counseling Given     Comments             Family History:      Reviewed in detail         Problem Relation Age of Onset    Cancer Mother     Depression Mother     High Blood Pressure Mother     Heart Disease Father     Stroke Maternal Grandmother     Diabetes Other        REVIEW OF SYSTEMS:     Constitutional:  No Fever, No Chills, No Night Sweats  ENT/Mouth:  No Nasal Congestion,  No Hoarseness, No new mouth lesion  Eyes:  No Eye Pain, No Redness, No Discharge  Cardiovascular:  No Chest Pain, No Orthopnea, No Palpitations  Respiratory:  N + cough, No Sputum, No Dyspnea  Gastrointestinal: No Vomiting, No Diarrhea, No abdominal pain  Genitourinary: No Urinary Frequency, No Hematuria, No Urinary pain  Musculoskeletal:  No worsening Arthralgias, No worsening Myalgias  Skin:  No new Skin Lesions, No new skin rash  Neuro:  No new weakness, No new numbness. Psych:  No suicial ideation, No Violence ideation    PHYSICAL EXAM PERFORMED:    /78   Pulse 81   Temp 98.4 °F (36.9 °C) (Oral)   Resp 22   Ht 6' 2\" (1.88 m)   Wt 139 lb (63 kg)   SpO2 100%   BMI 17.85 kg/m²     General appearance:  mild acute distress, appears older than stated age, frail, malnourished  HEENT:   atraumatic, sclera anicteric, Conjunctivae clear. Neck: Supple,Trachea midline, no goiter  Respiratory:minimal accessory muscle usage, Normal respiratory effort. Left rhonchi   cardiovascular:  Regular rate and rhythm, capillary refill 2 seconds  Abdomen: Soft, non-tender, non-distended with normal bowel sounds. Musculoskeletal:  No clubbing, cyanosis. trace edema LE bilaterally. Skin: turgor normal.  No new rashes or lesions. Neurologic: Alert and oriented x4, no new focal sensory/motor deficits. Labs:     Recent Labs     06/09/21 2229   WBC 8.4   HGB 13.1*   HCT 39.4*        Recent Labs     06/09/21  2229 06/10/21  1940   * 129*   K 4.7 4.4   CL 88* 93*   CO2 29 29   BUN 23* 17   CREATININE 0.7* <0.5*   CALCIUM 9.0 8.8     Recent Labs     06/09/21 2229   AST 12*   ALT 10   BILITOT 0.4   ALKPHOS 76     No results for input(s): INR in the last 72 hours.   Recent Labs     06/09/21  2229 06/10/21  1940   TROPONINI 0.08* 0.07*       Urinalysis:      Lab Results Component Value Date    NITRU POSITIVE 06/10/2021    WBCUA  06/10/2021    BACTERIA 2+ 06/10/2021    RBCUA 21-50 06/10/2021    BLOODU LARGE 06/10/2021    SPECGRAV 1.010 06/10/2021    GLUCOSEU Negative 06/10/2021    Chiqui Stephanie Terrazas 994 NEGATIVE 06/02/2011       Radiology:     CXR: I have reviewed the CXR with the following interpretation:   Left opacity  EKG:  I have reviewed the EKG with the following interpretation:   Pending    XR CHEST (2 VW)    (Results Pending)       ASSESSMENT AND PLAN:    DIALLO/Kaity Cummings 1106 Problems    Diagnosis Date Noted    Sepsis ([facility] [A41.9]      Suspected Acute hypoxic respiratory failure in the ED prior to arrival: I discontinued nasal cannula while at bedside.   Patient remained at 98-99% on pulse ox, resolved      Sepsis prior to arrival: High fever and tachycardia  IVF blood cultures obtained in the ED prior to arrival  Start patient on Vanco and cefepime prior to arrival  Continued    Left lower lobe pneumonia:  Cultures pending  Continue peritoneal biotics    Cystitis:  Antibiotic as above  Urine culture pending    Acute diarrhea:  1 week onset, C. difficile pending  Continue to monitor    NSTEMI: Continues to deny any ACS symptoms  Troponin 0 0.08-0.07, aspirin taken prior to arrival  Continue to monitor and trend    Acute hyponatremia: Sodium 124 then 24 hours later currently at 129 thus could be now corrected, IVF and recheck    Severe malnutrition:  Started basic nutrition orders  Nutrition consultation for management      Diet: NPO     DVT Prophylaxis: lovenox    Dispo:   Expected LOS greater than two [place], DO

## 2021-06-11 NOTE — CARE COORDINATION
CASE MANAGEMENT INITIAL ASSESSMENT      Reviewed chart and completed assessment via telephone with: wife   Explained Case Management role/services. Health Care Decision Maker :   Primary Decision Maker: Jo Augusta - [phone]    Secondary Decision Maker: Rylie Peña - [phone]          Can this person be reached and be able to respond quickly, such as within a few minutes or hours? Yes    Admit date/status: 6/10/21 Inpatient   Diagnosis: sepsis    Is this a Readmission?:  No      Insurance: Medicare, [facility]   Precert required for SNF: No       3 night stay required: Yes    Living arrangements, Adls, care needs, prior to admission: lives at home with his wife    Transportation: wife     Durable Medical Equipment at home:  Walker_X_Cane_X_RTS__ BSC__Shower Chair__  02__ HHN__ CPAP__  BiPap__  Hospital Bed__ W/C___ Other__________    Services in the home and/or outpatient, prior to admission: active with [facility] for home care and [address] [place] [address] for tube feed    Dialysis Facility (if applicable)   · Name:  · Address:  · Dialysis Schedule:  · Phone:  · Fax:    PT/OT recs:none    Hospital Exemption Notification (HEN): not initiated     Barriers to discharge: none    Plan/comments: Patient will resume with [facility] and [address] [place] [address] for tube feeds. Wife assists with ADL's as needed due to weakness. Will continue to follow.       ECOC on chart for MD signature

## 2021-06-11 NOTE — CONSULTS
Comprehensive Nutrition Assessment    Type and Reason for Visit:  Initial, Consult    Nutrition Recommendations/Plan:   1. Continue NPO  2. Recommend order \"Diet: Adult Tube Feed\". Initiate Osmolite 1.5 (1.5 calorie without fiber formula) at 25 mL/hr and as tolerated, increase by 25 mL/hr q 4 hours until goal of 70 mL/hr. 3. Recommend 100 mL H20 q 4 hours. Increase flush if Na increases greater than 145 mEq/L.  4. Monitor for tolerance (bowel habits, N/V, cramping, abdominal exam findings: distended, firm, tense, guarded, discomfort). Nutrition Assessment:  Consult for TF order and management: 68 y.o. male who presents with PMH of GERD, hyperlipidemia, throat cancer, GERD, hypothyroidism presented eczema ED for fevers pain. Pt NPO, has had no PO intake since april, per pt. Pt had G tube placed in May d/t inability to swallow or tolerate PO. Pt tolerates bolus feeds of osmolite (7-8 cans/day) at home and didn't tolerate standard fiber formula. Pt may benefit from formula with fiber upon discharge. RD recommends initiating feeds as soon as medically possible d/t severe malnutrition, recommendations included. Malnutrition Assessment:  Malnutrition Status:  Severe malnutrition    Context:  Chronic Illness     Findings of the 6 clinical characteristics of malnutrition:  Energy Intake:  Mild decrease in energy intake (Comment)  Weight Loss:  1 - Mild weight loss (specify amount and time period)     Body Fat Loss:  7 - Severe body fat loss Triceps, Orbital   Muscle Mass Loss:  7 - Severe muscle mass loss Clavicles (pectoralis & deltoids), Temples (temporalis)    Estimated Daily Nutrient Needs:  Energy (kcal):  7869-0973 kcals/day; Weight Used for Energy Requirements:  Ideal     Protein (g):   g/day; Weight Used for Protein Requirements:  Ideal (1-1.2 g/kg)        Fluid (ml/day):  2000 ml; Method Used for Fluid Requirements:  1 ml/kcal      Nutrition Related Findings:  Na low 131.  Reports constipation, plans to start lactulose. Wounds:  Pressure Injury (Possible stage 2 on coccyx)       Current Nutrition Therapies:    Diet NPO  ADULT TUBE FEEDING; PEG; Standard without Fiber; Continuous; 25; Yes; 25; Q 4 hours; 70; 100; Q 4 hours  Current Tube Feeding (TF) Orders:  · Feeding Route: PEG  · Formula: 1.5 Calorie without Fiber  · Schedule: Continuous  · Goal TF & Flush Orders Provides: Osmolite 1.5 (1.5 calorie without fiber) x 20 hours to provide 1400 ml TV, 2100 kcals, 88 g protein and 1067 ml free water. + free water flush 100 ml q 4 hours or per MD.    Anthropometric Measures:  · Height: 6' 2\" (188 cm)  · Current Body Weight: 139 lb (63 kg)     · Ideal Body Weight: 190 lbs; % Ideal Body Weight 73.2 %   · BMI: 17.8  · BMI Categories: Underweight (BMI less than 22) age over 72       Nutrition Diagnosis:   · Severe malnutrition related to swallowing difficulty as evidenced by nutrition support - enteral nutrition, NPO or clear liquid status due to medical condition    Nutrition Interventions:   Food and/or Nutrient Delivery:  Start Tube Feeding  Nutrition Education/Counseling:  No recommendation at this time   Coordination of Nutrition Care:  Continue to monitor while inpatient    Goals: Tolerate enteral nutrition at goal rate without any GI intolerance.        Nutrition Monitoring and Evaluation:   Behavioral-Environmental Outcomes:  None Identified   Food/Nutrient Intake Outcomes:  Enteral Nutrition Intake/Tolerance  Physical Signs/Symptoms Outcomes:  Diarrhea, Biochemical Data, Nutrition Focused Physical Findings, Skin, Weight     Discharge Planning:    Enteral Nutrition     Electronically signed by Xenia Yang MS, RD, LD on 6/11/21 at 12:32 PM EDT    Contact: Office: [phone]; [address]: [phone]

## 2021-06-12 VITALS
SYSTOLIC BLOOD PRESSURE: 103 MMHG | TEMPERATURE: 98 F | BODY MASS INDEX: 17.85 KG/M2 | RESPIRATION RATE: 18 BRPM | WEIGHT: 139.11 LBS | HEIGHT: 74 IN | OXYGEN SATURATION: 96 % | DIASTOLIC BLOOD PRESSURE: 62 MMHG | HEART RATE: 74 BPM

## 2021-06-12 LAB
A/G RATIO: 0.7 (ref 1.1–2.2)
ALBUMIN SERPL-MCNC: 3.1 G/DL (ref 3.4–5)
ALP BLD-CCNC: 73 U/L (ref 40–129)
ALT SERPL-CCNC: 20 U/L (ref 10–40)
ANION GAP SERPL CALCULATED.3IONS-SCNC: 8 MMOL/L (ref 3–16)
AST SERPL-CCNC: 21 U/L (ref 15–37)
BASOPHILS ABSOLUTE: 0 K/UL (ref 0–0.2)
BASOPHILS RELATIVE PERCENT: 0.4 %
BILIRUB SERPL-MCNC: <0.2 MG/DL (ref 0–1)
BUN BLDV-MCNC: 16 MG/DL (ref 7–20)
CALCIUM SERPL-MCNC: 8.8 MG/DL (ref 8.3–10.6)
CHLORIDE BLD-SCNC: 92 MMOL/L (ref 99–110)
CO2: 31 MMOL/L (ref 21–32)
CREAT SERPL-MCNC: 0.6 MG/DL (ref 0.8–1.3)
EOSINOPHILS ABSOLUTE: 0.2 K/UL (ref 0–0.6)
EOSINOPHILS RELATIVE PERCENT: 3.1 %
GFR AFRICAN AMERICAN: >60
GFR NON-AFRICAN AMERICAN: >60
GLOBULIN: 4.2 G/DL
GLUCOSE BLD-MCNC: 114 MG/DL (ref 70–99)
HCT VFR BLD CALC: 35.7 % (ref 40.5–52.5)
HEMOGLOBIN: 12 G/DL (ref 13.5–17.5)
L. PNEUMOPHILA SEROGP 1 UR AG: NORMAL
LYMPHOCYTES ABSOLUTE: 0.5 K/UL (ref 1–5.1)
LYMPHOCYTES RELATIVE PERCENT: 7.9 %
MCH RBC QN AUTO: 26.6 PG (ref 26–34)
MCHC RBC AUTO-ENTMCNC: 33.5 G/DL (ref 31–36)
MCV RBC AUTO: 79.3 FL (ref 80–100)
MONOCYTES ABSOLUTE: 0.7 K/UL (ref 0–1.3)
MONOCYTES RELATIVE PERCENT: 11.4 %
MRSA SCREEN RT-PCR: NORMAL
NEUTROPHILS ABSOLUTE: 4.7 K/UL (ref 1.7–7.7)
NEUTROPHILS RELATIVE PERCENT: 77.2 %
ORGANISM: ABNORMAL
PDW BLD-RTO: 15.5 % (ref 12.4–15.4)
PHOSPHORUS: 3.8 MG/DL (ref 2.5–4.9)
PLATELET # BLD: 310 K/UL (ref 135–450)
PMV BLD AUTO: 7.3 FL (ref 5–10.5)
POTASSIUM REFLEX MAGNESIUM: 4.3 MMOL/L (ref 3.5–5.1)
POTASSIUM SERPL-SCNC: 4.3 MMOL/L (ref 3.5–5.1)
RBC # BLD: 4.51 M/UL (ref 4.2–5.9)
SODIUM BLD-SCNC: 131 MMOL/L (ref 136–145)
STREP PNEUMONIAE ANTIGEN, URINE: NORMAL
TOTAL PROTEIN: 7.3 G/DL (ref 6.4–8.2)
URINE CULTURE, ROUTINE: ABNORMAL
VANCOMYCIN TROUGH: 13.2 UG/ML (ref 10–20)
WBC # BLD: 6.1 K/UL (ref 4–11)

## 2021-06-12 PROCEDURE — 2580000003 HC RX 258: Performed by: INTERNAL MEDICINE

## 2021-06-12 PROCEDURE — 85025 COMPLETE CBC W/AUTO DIFF WBC: CPT

## 2021-06-12 PROCEDURE — 6360000002 HC RX W HCPCS: Performed by: INTERNAL MEDICINE

## 2021-06-12 PROCEDURE — 80053 COMPREHEN METABOLIC PANEL: CPT

## 2021-06-12 PROCEDURE — 84100 ASSAY OF PHOSPHORUS: CPT

## 2021-06-12 PROCEDURE — 6370000000 HC RX 637 (ALT 250 FOR IP): Performed by: INTERNAL MEDICINE

## 2021-06-12 PROCEDURE — 80202 ASSAY OF VANCOMYCIN: CPT

## 2021-06-12 PROCEDURE — 36415 COLL VENOUS BLD VENIPUNCTURE: CPT

## 2021-06-12 RX ORDER — LEVOFLOXACIN 500 MG/1
500 TABLET, FILM COATED ORAL DAILY
Qty: 7 TABLET | Refills: 0 | Status: SHIPPED | OUTPATIENT
Start: 2021-06-12 | End: 2021-06-19

## 2021-06-12 RX ADMIN — LEVOTHYROXINE SODIUM 125 MCG: 0.12 TABLET ORAL at 09:58

## 2021-06-12 RX ADMIN — PANTOPRAZOLE SODIUM 40 MG: 40 TABLET, DELAYED RELEASE ORAL at 09:59

## 2021-06-12 RX ADMIN — CEFEPIME HYDROCHLORIDE 2000 MG: 2 INJECTION, POWDER, FOR SOLUTION INTRAVENOUS at 09:56

## 2021-06-12 RX ADMIN — VANCOMYCIN HYDROCHLORIDE 1000 MG: 1 INJECTION, POWDER, LYOPHILIZED, FOR SOLUTION INTRAVENOUS at 11:04

## 2021-06-12 RX ADMIN — ENOXAPARIN SODIUM 40 MG: 40 INJECTION SUBCUTANEOUS at 09:58

## 2021-06-12 NOTE — CARE COORDINATION
CASE MANAGEMENT DISCHARGE SUMMARY      Discharge to: home with [facility] care and [facility] for tube feeds    New Durable Medical Equipment ordered/agency: na    Transportation:    Family/car: private    Confirmed discharge plan with:RN,[facility]     Patient: yes     Facility/Agency, name:  [facility] Noble RN, name: Italo Putnam    Note: Discharging nurse to complete CLEO, reconcile AVS, and place final copy with patient's discharge packet. RN to ensure that written prescriptions for  Level II medications are sent with patient to the facility as per protocol.

## 2021-06-12 NOTE — PROGRESS NOTES
Tube Feed continuous 50 ml/hr. Goal 70 ml/hr w/ advancement of 25 ml/hr until goal. Could not advance to goal d/t patient stating he feels \"fullness\" in his abdomen at this time. Will continue to monitor patient progress.

## 2021-06-12 NOTE — PROGRESS NOTES
#16FR with 10cc bentley cath inserted without difficulty. Instructed pt.  And wife on use of bentley at home and follow up with urology in 1-2 weeks

## 2021-06-12 NOTE — DISCHARGE SUMMARY
Hospital Medicine Discharge Summary    Patient ID: Milton Heard      Patient's PCP: Arslan Guevara DO    Admit Date: 6/10/2021     Discharge Date:   06/12/21    Admitting Physician: Isha Galvez DO     Discharge Physician: Jorge A Kang MD     Discharge Diagnoses: Active Hospital Problems    Diagnosis     Severe malnutrition ([facility] [E43]     Sepsis ([facility] [A41.9]        The patient was seen and examined on day of discharge and this discharge summary is in conjunction with any daily progress note from day of discharge. Hospital Course:   68 y. o. male who presented to [facility] with past medical history of GERD, hyperlipidemia, throat cancer, GERD, hypothyroidism presented eczema ED for fevers pain next  Patient reported that in [place] he has history of throat cancer where he underwent resection and radiation and then due to inability to have any oral intake patient had a G-tube placed for 2 months where he has been getting nutrition via tube.  Patient reported that he started having high fevers with unclear etiology denies having any cough any phlegm or shortness of breath.  Patient also denies having any chest pains no urinary pains.  Patient reports that his G-tube has been in place and working fine at home.  Patient was at [place] he underwent a CT chest that showed a very very small pneumothorax apically in addition to pneumonia. Scherry Reusing also showed infection.  Blood cultures obtained. Liu Cox was sent here for further evaluation CODE STATUS discussed and the patient is a full code    Sepsis 2/2 aspiration pneumonia  - fever, tachycardia on admission  - s/p IVF bolus  - started on vanc, cefepime.  Discharged on levaquin to complete course  - cultures NGTD  - respiratory failure ruled out     UTI  - noted on UA  - urine culture NGTD  - abx as above     Elevated troponin  - down trending  - low concern for ACS     Hyponatremia  - 2/2 poor intake  - restarted tube feeds  - improved with IVF     Dysphagia with severe protein calorie malnutrition  - restarted tube feeds  - dietitian consult    Physical Exam Performed:     /62   Pulse 74   Temp 98 °F (36.7 °C) (Oral)   Resp 18   Ht 6' 2\" (1.88 m)   Wt 139 lb 1.8 oz (63.1 kg)   SpO2 96%   BMI 17.86 kg/m²       General appearance: No apparent distress, frail  HEENT: Pupils equal, round, and reactive to light. Conjunctivae/corneas clear. Neck: Supple, with full range of motion. No jugular venous distention. Trachea midline. Respiratory:  Normal respiratory effort. Left sided rhonchi without Rales/Wheezes. Cardiovascular: Regular rate and rhythm with normal S1/S2 without murmurs, rubs or gallops. Abdomen: Soft, non-tender, non-distended with normal bowel sounds. Musculoskeletal: No clubbing, cyanosis or edema bilaterally. Full range of motion without deformity. Skin: Skin color, texture, turgor normal.  No rashes or lesions. Neurologic:  Neurovascularly intact without any focal sensory/motor deficits. Cranial nerves: II-XII intact, grossly non-focal.  Psychiatric: Alert and oriented, thought content appropriate, normal insight  Capillary Refill: Brisk,3 seconds, normal   Peripheral Pulses: +2 palpable, equal bilaterally     Labs: For convenience and continuity at follow-up the following most recent labs are provided:      CBC:    Lab Results   Component Value Date    WBC 6.1 06/12/2021    HGB 12.0 06/12/2021    HCT 35.7 06/12/2021     06/12/2021       Renal:    Lab Results   Component Value Date     06/12/2021    K 4.3 06/12/2021    K 4.3 06/12/2021    CL 92 06/12/2021    CO2 31 06/12/2021    BUN 16 06/12/2021    CREATININE 0.6 06/12/2021    CALCIUM 8.8 06/12/2021    PHOS 3.8 06/12/2021         Significant Diagnostic Studies    Radiology:   XR CHEST (2 VW)   Final Result   Overlying artifact limiting the exam.      Resolving central pulmonary congestion.       COPD with slowly resolving bibasilar infiltrates and/or atelectasis. Small left pleural effusion layering posteriorly which is more prominent. Consults:     PHARMACY TO DOSE VANCOMYCIN  IP CONSULT TO DIETITIAN  IP CONSULT TO HOME CARE NEEDS    Disposition:  [facility]     Condition at Discharge: Stable    Discharge Instructions/Follow-up:  Follow up with PCP within 1-2 week    Code Status:  Full Code     Activity: activity as tolerated    Diet: Tube feeds      Discharge Medications:     Current Discharge Medication List           Details   levoFLOXacin (LEVAQUIN) 500 MG tablet Take 1 tablet by mouth daily for 7 days  Qty: 7 tablet, Refills: 0              Details   levothyroxine (SYNTHROID) 125 MCG tablet       omeprazole (PRILOSEC) 40 MG delayed release capsule       simvastatin (ZOCOR) 20 MG tablet       polyethylene glycol (GLYCOLAX) 17 GM/SCOOP powder Use as directed for colonoscopy prep  Qty: 238 g, Refills: 0    Associated Diagnoses: Constipation, unspecified constipation type      polyethylene glycol (MIRALAX) 17 g packet Take 17 g by mouth daily  Qty: 527 g, Refills: 1    Associated Diagnoses: Constipation, unspecified constipation type      Sennosides (SENNA) 8.8 MG/5ML LIQD Take 5 mLs by mouth daily  Qty: 1 Bottle, Refills: 0      albuterol sulfate  (90 Base) MCG/ACT inhaler INHALE 2 PUFFS INTO THE LUNGS 4 TIMES DAILY AS NEEDED (PRIOR TO ACTIVITY THAT CAUSES SHORTNESS OF BREATH)  Qty: 1 Inhaler, Refills: 5      fluticasone (FLONASE) 50 MCG/ACT nasal spray 2 sprays by Nasal route daily             Time Spent on discharge is more than 30 minutes in the examination, evaluation, counseling and review of medications and discharge plan. Signed:    Nicole Gibbons MD   6/12/2021      Thank you Shane Mcdonough DO for the opportunity to be involved in this patient's care. If you have any questions or concerns please feel free to contact me at [phone].

## 2021-06-14 ENCOUNTER — HOSPITAL ENCOUNTER (EMERGENCY)
Age: 76
Discharge: HOME | End: 2021-06-14
Attending: EMERGENCY MEDICINE
Payer: MEDICARE

## 2021-06-14 VITALS
DIASTOLIC BLOOD PRESSURE: 75 MMHG | RESPIRATION RATE: 16 BRPM | OXYGEN SATURATION: 98 % | SYSTOLIC BLOOD PRESSURE: 116 MMHG | HEART RATE: 86 BPM | TEMPERATURE: 97.4 F | BODY MASS INDEX: 17.84 KG/M2 | HEIGHT: 74 IN | WEIGHT: 139 LBS

## 2021-06-14 DIAGNOSIS — K59.00 CONSTIPATION, UNSPECIFIED CONSTIPATION TYPE: ICD-10-CM

## 2021-06-14 DIAGNOSIS — K56.41 FECAL IMPACTION IN RECTUM (HCC): Primary | ICD-10-CM

## 2021-06-14 PROCEDURE — 6360000002 HC RX W HCPCS: Performed by: EMERGENCY MEDICINE

## 2021-06-14 PROCEDURE — 99284 EMERGENCY DEPT VISIT MOD MDM: CPT

## 2021-06-14 PROCEDURE — 6370000000 HC RX 637 (ALT 250 FOR IP): Performed by: EMERGENCY MEDICINE

## 2021-06-14 RX ORDER — FENTANYL CITRATE 50 UG/ML
100 INJECTION, SOLUTION INTRAMUSCULAR; INTRAVENOUS ONCE
Status: COMPLETED | OUTPATIENT
Start: 2021-06-14 | End: 2021-06-14

## 2021-06-14 RX ORDER — LIDOCAINE HYDROCHLORIDE 20 MG/ML
JELLY TOPICAL PRN
Status: DISCONTINUED | OUTPATIENT
Start: 2021-06-14 | End: 2021-06-14 | Stop reason: HOSPADM

## 2021-06-14 RX ADMIN — LIDOCAINE HYDROCHLORIDE: 20 JELLY TOPICAL at 04:16

## 2021-06-14 RX ADMIN — FENTANYL CITRATE 100 MCG: 0.05 INJECTION, SOLUTION INTRAMUSCULAR; INTRAVENOUS at 04:16

## 2021-06-14 ASSESSMENT — PAIN SCALES - GENERAL
PAINLEVEL_OUTOF10: 10
PAINLEVEL_OUTOF10: 10
PAINLEVEL_OUTOF10: 3

## 2021-06-14 ASSESSMENT — PAIN DESCRIPTION - ORIENTATION: ORIENTATION: LOWER

## 2021-06-14 ASSESSMENT — PAIN DESCRIPTION - ONSET: ONSET: GRADUAL

## 2021-06-14 ASSESSMENT — PAIN DESCRIPTION - FREQUENCY: FREQUENCY: CONTINUOUS

## 2021-06-14 ASSESSMENT — PAIN DESCRIPTION - DESCRIPTORS: DESCRIPTORS: BURNING

## 2021-06-14 ASSESSMENT — PAIN DESCRIPTION - LOCATION
LOCATION: RECTUM
LOCATION: RECTUM

## 2021-06-14 ASSESSMENT — PAIN DESCRIPTION - PROGRESSION: CLINICAL_PROGRESSION: GRADUALLY WORSENING

## 2021-06-14 ASSESSMENT — PAIN DESCRIPTION - PAIN TYPE
TYPE: ACUTE PAIN
TYPE: ACUTE PAIN

## 2021-06-14 NOTE — ED PROVIDER NOTES
Emergency Physician Note        Note Open Time: 4:37 AM EDT    Chief Complaint  Constipation (pt in via EMS for c/o lower abd/rectum pain that has been progressing. Pt states has not had a BM for 7 days. Pt took miralax with no relief. pt has g-tube for tongue cancer and repeated aspiration pneumonia.)       History of Present Illness  Sarahi Metzger is a 68 y.o. male who presents to the ED for constipation. Patient reports that his last bowel movement was 7 days ago and was rockhard at that time. In the intervening time he was admitted to the hospital and discharged with a bowel regimen of roughly 14 doses of MiraLAX which she started Sunday morning. He drank most of the container he was asked to drink but began having significant abdominal cramping and discomfort. No vomiting. He is continuing to pass gas. He denies any chest pain or shortness of breath or fever. He currently receives his food through a gastric feeding tube after multiple episodes of aspiration pneumonia. 10 systems reviewed, pertinent positives per HPI otherwise noted to be negative    I have reviewed the following from the nursing documentation:      Prior to Admission medications    Medication Sig Start Date End Date Taking?  Authorizing Provider   levoFLOXacin (LEVAQUIN) 500 MG tablet Take 1 tablet by mouth daily for 7 days 6/12/21 6/19/21  Graeme Dela Cruz MD   levothyroxine (SYNTHROID) 125 MCG tablet  4/23/21   Historical Provider, MD   omeprazole (PRILOSEC) 40 MG delayed release capsule  6/8/21   Historical Provider, MD   simvastatin (ZOCOR) 20 MG tablet  4/23/21   Historical Provider, MD   polyethylene glycol (GLYCOLAX) 17 GM/SCOOP powder Use as directed for colonoscopy prep 6/9/21   Nadir Solis MD   polyethylene glycol (MIRALAX) 17 g packet Take 17 g by mouth daily 6/9/21 7/9/21  Nadir Solis MD   Sennosides (SENNA) 8.8 MG/5ML LIQD Take 5 mLs by mouth daily 6/3/21   Neisha Barbosa, APRN - CNP   albuterol sulfate HFA 108 (90 Base) MCG/ACT inhaler INHALE 2 PUFFS INTO THE LUNGS 4 TIMES DAILY AS NEEDED (PRIOR TO ACTIVITY THAT CAUSES SHORTNESS OF BREATH) 11/16/20   Naomi Trent MD   fluticasone [facility]) 50 MCG/ACT nasal spray 2 sprays by Nasal route daily 5/11/20   Historical Provider, MD       Allergies as of 06/14/2021 - Fully Reviewed 06/14/2021   Allergen Reaction Noted    Tamsulosin hcl Other (See Comments) 01/13/2012    Tetanus toxoids Nausea And Vomiting 06/27/2012       Past Medical History:   Diagnosis Date    Arthritis     Benign hypertrophy of prostate     Cancer ([facility]     growth on tongue    COVID-19 12/07/2020    Dry mouth     s/p radiation treatment    DVT, lower extremity ([facility] [facility]     5/2011    GERD (gastroesophageal reflux disease)     acid reflux    Hip fracture ([facility] [facility]     5/2011, RIGHT    Hx of blood clots     Hyperlipidemia     Pneumonia 6/2/2011    Prolonged emergence from general anesthesia     Thyroid disease         Surgical History:   Past Surgical History:   Procedure Laterality Date    ABDOMEN SURGERY      gallbladder removed    CHOLECYSTECTOMY      COLONOSCOPY      EYE SURGERY Right     TORN RETINA, LASER    HERNIA REPAIR      HIP ARTHROPLASTY Right 08/08/2016    HIP FRACTURE SURGERY Right     JOINT REPLACEMENT      OTHER SURGICAL HISTORY  05/18/2011    right hip IM nailing    SHOULDER SURGERY Right rotater cuff    TONGUE BIOPSY  10/2010        Family History:    Family History   Problem Relation Age of Onset    Cancer Mother     Depression Mother     High Blood Pressure Mother     Heart Disease Father     Stroke Maternal Grandmother     Diabetes Other        Social History     Socioeconomic History    Marital status:      Spouse name: Not on file    Number of children: Not on file    Years of education: Not on file    Highest education level: Not on file   Occupational History    Not on file   Tobacco Use    Smoking status: Former Smoker     Years: 40.00     Quit date: 1971     Years since quittin.1    Smokeless tobacco: Never Used   Vaping Use    Vaping Use: Never used   Substance and Sexual Activity    Alcohol use: Not Currently     Alcohol/week: 0.0 standard drinks     Comment: x1 half glass before bed    Drug use: No    Sexual activity: Yes     Partners: Female   Other Topics Concern    Not on file   Social History Narrative    Not on file     Social Determinants of Health     Financial Resource Strain:     Difficulty of Paying Living Expenses:    Food Insecurity:     Worried About Running Out of Food in the Last Year:     Ran Out of Food in the Last Year:    Transportation Needs:     Lack of Transportation (Medical):  Lack of Transportation (Non-Medical):    Physical Activity:     Days of Exercise per Week:     Minutes of Exercise per Session:    Stress:     Feeling of Stress :    Social Connections:     Frequency of Communication with Friends and Family:     Frequency of Social Gatherings with Friends and Family:     Attends Jewish Services:     Active Member of Clubs or Organizations:     Attends Club or Organization Meetings:     Marital Status:    Intimate Partner Violence:     Fear of Current or Ex-Partner:     Emotionally Abused:     Physically Abused:     Sexually Abused:        Nursing notes reviewed. ED Triage Vitals [21 0350]   Enc Vitals Group      BP (!) 160/106      Pulse 73      Resp 16      Temp 97.4 °F (36.3 °C)      Temp Source Axillary      SpO2 99 %      Weight 139 lb (63 kg)      Height 6' 2\" (1.88 m)      Head Circumference       Peak Flow       Pain Score       Pain Loc       Pain Edu? Excl. in [address]? GENERAL:  Awake, alert. Well developed, well nourished with no apparent distress. HENT:  Normocephalic, Atraumatic, moist mucous membranes. EYES:  Pupils equal round and reactive to light, Conjunctiva normal, extraocular movements normal.  NECK:  No meningeal signs, Supple.   CHEST: Regular rate and rhythm, chest wall non-tender. LUNGS:  Clear to auscultation bilaterally. ABDOMEN:  Soft, non-tender, no rebound, rigidity or guarding, non-distended, hyperactive bowel sounds. Left upper quadrant G-tube in place with no signs of infection or inflammation around the tube. No costovertebral angle tenderness to palpation. BACK:  No tenderness. EXTREMITIES:  Normal range of motion, no edema, no bony tenderness, no deformity, distal pulses present. SKIN: Warm, dry and intact. NEUROLOGIC: Normal mental status. Moving all extremities to command. RECTAL: Fecal impaction. PROCEDURES  Patient gave verbal consent for manual disimpaction. He was pretreated with fentanyl for analgesia. Placed in left lateral decubitus positioning. Used a Urojet for some topical anesthesia. I performed a digital disimpaction and the patient tolerated the procedure well with minimal pain and only a slight streak of blood on some of the stool. The entire rectum was evacuated. MEDICAL DECISION MAKING        Patient reports resolution of his discomfort. I advised the patient to return to the emergency department immediately for any new or worsening symptoms, such as fever, vomiting or increased pain. The patient voiced agreement and understanding of the treatment plan. No results found for this visit on 06/14/21. I estimate there is LOW risk for ACUTE APPENDICITIS, BOWEL OBSTRUCTION, CHOLECYSTITIS, DIVERTICULITIS, INCARCERATED HERNIA, PANCREATITIS, or PERFORATED BOWEL or ULCER, thus I consider the discharge disposition reasonable. Also, there is no evidence or peritonitis, sepsis, or toxicity. [facility] and I have discussed the diagnosis and risks, and we agree with discharging home to follow-up with their primary doctor. We also discussed returning to the Emergency Department immediately if new or worsening symptoms occur.  We have discussed the symptoms which are most concerning (e.g., bloody stool, fever, changing or worsening pain, vomiting) that necessitate immediate return. FINAL Impression    1. Fecal impaction in rectum ([address]    2. Constipation, unspecified constipation type        Blood pressure (!) 140/83, pulse 73, temperature 97.4 °F (36.3 °C), temperature source Axillary, resp. rate 16, height 6' 2\" (1.88 m), weight 139 lb (63 kg), SpO2 96 %. Patient was given scripts for the following medications. I counseled patient how to take these medications. New Prescriptions    No medications on file       Disposition  Pt is in good condition upon Discharge to home. This chart was generated using the [address] [place] [address] [address]  dictation system. I created this record but it may contain dictation errors.           Natacha Reinoso MD  06/14/21 9224

## 2021-06-14 NOTE — ED TRIAGE NOTES
Chief Complaint   Patient presents with    Constipation     pt in via EMS for c/o lower abd/rectum pain that has been progressing. Pt states has not had a BM for 7 days. Pt took miralax with no relief. pt has g-tube for tongue cancer and repeated aspiration pneumonia.

## 2021-06-14 NOTE — ED NOTES
Dr. Cordell Aggarwal at bedside with staff to assist with fecal disimpaction.      Nicko Lan RN  06/14/21 0140

## 2021-06-14 NOTE — ED NOTES
Intranasal fentanyl given per [facility] order in preparation for fecal disimpaction procedure.      Nicole Espino RN  06/14/21 6025

## 2021-06-15 ENCOUNTER — TELEPHONE (OUTPATIENT)
Dept: OTHER | Facility: CLINIC | Age: 76
End: 2021-06-15

## 2021-06-15 NOTE — TELEPHONE ENCOUNTER
RN Access contacted Dr. Checo Pineda office to schedule ED f/u appt. Spoke with Beatrice, she scheduled appt for Wednesday 6-16 @2:30pm with (PA) [facility].

## 2021-06-24 ENCOUNTER — HOSPITAL ENCOUNTER (OUTPATIENT)
Age: 76
Setting detail: SPECIMEN
Discharge: HOME | End: 2021-06-24
Payer: MEDICARE

## 2021-06-24 LAB
A/G RATIO: 1.2 (ref 1.1–2.2)
ALBUMIN SERPL-MCNC: 3.9 G/DL (ref 3.4–5)
ALP BLD-CCNC: 78 U/L (ref 40–129)
ALT SERPL-CCNC: 14 U/L (ref 10–40)
ANION GAP SERPL CALCULATED.3IONS-SCNC: 9 MMOL/L (ref 3–16)
AST SERPL-CCNC: 18 U/L (ref 15–37)
BASOPHILS ABSOLUTE: 0.1 K/UL (ref 0–0.2)
BASOPHILS RELATIVE PERCENT: 0.9 %
BILIRUB SERPL-MCNC: 0.3 MG/DL (ref 0–1)
BUN BLDV-MCNC: 18 MG/DL (ref 7–20)
CALCIUM SERPL-MCNC: 9.1 MG/DL (ref 8.3–10.6)
CHLORIDE BLD-SCNC: 96 MMOL/L (ref 99–110)
CO2: 30 MMOL/L (ref 21–32)
CREAT SERPL-MCNC: 0.6 MG/DL (ref 0.8–1.3)
EOSINOPHILS ABSOLUTE: 0.1 K/UL (ref 0–0.6)
EOSINOPHILS RELATIVE PERCENT: 1.7 %
GFR AFRICAN AMERICAN: >60
GFR NON-AFRICAN AMERICAN: >60
GLOBULIN: 3.3 G/DL
GLUCOSE BLD-MCNC: 96 MG/DL (ref 70–99)
HCT VFR BLD CALC: 39 % (ref 40.5–52.5)
HEMOGLOBIN: 12.6 G/DL (ref 13.5–17.5)
LYMPHOCYTES ABSOLUTE: 0.9 K/UL (ref 1–5.1)
LYMPHOCYTES RELATIVE PERCENT: 12.5 %
MCH RBC QN AUTO: 27 PG (ref 26–34)
MCHC RBC AUTO-ENTMCNC: 32.4 G/DL (ref 31–36)
MCV RBC AUTO: 83.4 FL (ref 80–100)
MONOCYTES ABSOLUTE: 0.4 K/UL (ref 0–1.3)
MONOCYTES RELATIVE PERCENT: 6 %
NEUTROPHILS ABSOLUTE: 5.4 K/UL (ref 1.7–7.7)
NEUTROPHILS RELATIVE PERCENT: 78.9 %
PDW BLD-RTO: 15.9 % (ref 12.4–15.4)
PLATELET # BLD: 301 K/UL (ref 135–450)
PMV BLD AUTO: 7.8 FL (ref 5–10.5)
POTASSIUM SERPL-SCNC: 4.7 MMOL/L (ref 3.5–5.1)
RBC # BLD: 4.67 M/UL (ref 4.2–5.9)
SODIUM BLD-SCNC: 135 MMOL/L (ref 136–145)
TOTAL PROTEIN: 7.2 G/DL (ref 6.4–8.2)
TSH REFLEX: 1.35 UIU/ML (ref 0.27–4.2)
WBC # BLD: 6.8 K/UL (ref 4–11)

## 2021-06-24 PROCEDURE — 80053 COMPREHEN METABOLIC PANEL: CPT

## 2021-06-24 PROCEDURE — 85025 COMPLETE CBC W/AUTO DIFF WBC: CPT

## 2021-06-24 PROCEDURE — 84443 ASSAY THYROID STIM HORMONE: CPT

## 2021-06-24 PROCEDURE — 36415 COLL VENOUS BLD VENIPUNCTURE: CPT

## 2021-07-23 ENCOUNTER — TELEPHONE (OUTPATIENT)
Dept: GASTROENTEROLOGY | Age: 76
End: 2021-07-23

## 2021-07-23 NOTE — TELEPHONE ENCOUNTER
Patient canceled upcoming appt w Dr Shanna Rios. He stated that what Dr Simmons Goes recommended to do \"messed him up\". I asked him if he wanted to schedule the colonoscopy and he declined.

## 2021-10-04 ENCOUNTER — OFFICE VISIT (OUTPATIENT)
Dept: URBAN - METROPOLITAN AREA CLINIC 121 | Facility: CLINIC | Age: 76
End: 2021-10-04

## 2022-01-26 ENCOUNTER — OFFICE VISIT (OUTPATIENT)
Dept: URBAN - METROPOLITAN AREA CLINIC 57 | Facility: CLINIC | Age: 77
End: 2022-01-26

## 2022-01-28 ENCOUNTER — OFFICE VISIT (OUTPATIENT)
Dept: URBAN - METROPOLITAN AREA CLINIC 63 | Facility: CLINIC | Age: 77
End: 2022-01-28

## 2022-06-17 ENCOUNTER — HOSPITAL ENCOUNTER (EMERGENCY)
Age: 77
Discharge: HOME | End: 2022-06-17
Attending: EMERGENCY MEDICINE
Payer: MEDICARE

## 2022-06-17 ENCOUNTER — APPOINTMENT (OUTPATIENT)
Dept: GENERAL RADIOLOGY | Age: 77
End: 2022-06-17
Payer: MEDICARE

## 2022-06-17 VITALS
HEART RATE: 106 BPM | OXYGEN SATURATION: 97 % | RESPIRATION RATE: 18 BRPM | SYSTOLIC BLOOD PRESSURE: 120 MMHG | HEIGHT: 74 IN | WEIGHT: 144 LBS | BODY MASS INDEX: 18.48 KG/M2 | TEMPERATURE: 98.3 F | DIASTOLIC BLOOD PRESSURE: 72 MMHG

## 2022-06-17 DIAGNOSIS — J01.90 ACUTE SINUSITIS, RECURRENCE NOT SPECIFIED, UNSPECIFIED LOCATION: Primary | ICD-10-CM

## 2022-06-17 DIAGNOSIS — R53.83 FATIGUE, UNSPECIFIED TYPE: ICD-10-CM

## 2022-06-17 LAB
A/G RATIO: 1.1 (ref 1.1–2.2)
ALBUMIN SERPL-MCNC: 4.2 G/DL (ref 3.4–5)
ALP BLD-CCNC: 86 U/L (ref 40–129)
ALT SERPL-CCNC: 13 U/L (ref 10–40)
ANION GAP SERPL CALCULATED.3IONS-SCNC: 13 MMOL/L (ref 3–16)
AST SERPL-CCNC: 23 U/L (ref 15–37)
BASOPHILS ABSOLUTE: 0 K/UL (ref 0–0.2)
BASOPHILS RELATIVE PERCENT: 0.4 %
BILIRUB SERPL-MCNC: 0.5 MG/DL (ref 0–1)
BILIRUBIN URINE: NEGATIVE
BLOOD, URINE: ABNORMAL
BUN BLDV-MCNC: 24 MG/DL (ref 7–20)
CALCIUM SERPL-MCNC: 9 MG/DL (ref 8.3–10.6)
CHLORIDE BLD-SCNC: 93 MMOL/L (ref 99–110)
CLARITY: CLEAR
CO2: 28 MMOL/L (ref 21–32)
COLOR: YELLOW
CREAT SERPL-MCNC: 0.7 MG/DL (ref 0.8–1.3)
EOSINOPHILS ABSOLUTE: 0 K/UL (ref 0–0.6)
EOSINOPHILS RELATIVE PERCENT: 0 %
GFR AFRICAN AMERICAN: >60
GFR NON-AFRICAN AMERICAN: >60
GLUCOSE BLD-MCNC: 142 MG/DL (ref 70–99)
GLUCOSE URINE: NEGATIVE MG/DL
HCT VFR BLD CALC: 39.8 % (ref 40.5–52.5)
HEMOGLOBIN: 13.3 G/DL (ref 13.5–17.5)
INFLUENZA A: NOT DETECTED
INFLUENZA B: NOT DETECTED
KETONES, URINE: NEGATIVE MG/DL
LACTIC ACID: 1 MMOL/L (ref 0.4–2)
LACTIC ACID: 2.3 MMOL/L (ref 0.4–2)
LEUKOCYTE ESTERASE, URINE: NEGATIVE
LYMPHOCYTES ABSOLUTE: 0.3 K/UL (ref 1–5.1)
LYMPHOCYTES RELATIVE PERCENT: 4.3 %
MCH RBC QN AUTO: 27.2 PG (ref 26–34)
MCHC RBC AUTO-ENTMCNC: 33.3 G/DL (ref 31–36)
MCV RBC AUTO: 81.6 FL (ref 80–100)
MICROSCOPIC EXAMINATION: YES
MONOCYTES ABSOLUTE: 0.7 K/UL (ref 0–1.3)
MONOCYTES RELATIVE PERCENT: 9.1 %
NEUTROPHILS ABSOLUTE: 6.5 K/UL (ref 1.7–7.7)
NEUTROPHILS RELATIVE PERCENT: 86.2 %
NITRITE, URINE: NEGATIVE
PDW BLD-RTO: 14.6 % (ref 12.4–15.4)
PH UA: 6 (ref 5–8)
PLATELET # BLD: 165 K/UL (ref 135–450)
PMV BLD AUTO: 7.8 FL (ref 5–10.5)
POTASSIUM REFLEX MAGNESIUM: 4.5 MMOL/L (ref 3.5–5.1)
PROTEIN UA: 30 MG/DL
RBC # BLD: 4.88 M/UL (ref 4.2–5.9)
RBC UA: ABNORMAL /HPF (ref 0–4)
SARS-COV-2 RNA, RT PCR: NOT DETECTED
SODIUM BLD-SCNC: 134 MMOL/L (ref 136–145)
SPECIFIC GRAVITY UA: 1.01 (ref 1–1.03)
TOTAL PROTEIN: 7.9 G/DL (ref 6.4–8.2)
TROPONIN: <0.01 NG/ML
URINE REFLEX TO CULTURE: ABNORMAL
URINE TYPE: ABNORMAL
UROBILINOGEN, URINE: 2 E.U./DL
WBC # BLD: 7.5 K/UL (ref 4–11)
WBC UA: ABNORMAL /HPF (ref 0–5)

## 2022-06-17 PROCEDURE — 81001 URINALYSIS AUTO W/SCOPE: CPT

## 2022-06-17 PROCEDURE — 71045 X-RAY EXAM CHEST 1 VIEW: CPT

## 2022-06-17 PROCEDURE — 2580000003 HC RX 258: Performed by: EMERGENCY MEDICINE

## 2022-06-17 PROCEDURE — 85025 COMPLETE CBC W/AUTO DIFF WBC: CPT

## 2022-06-17 PROCEDURE — 80053 COMPREHEN METABOLIC PANEL: CPT

## 2022-06-17 PROCEDURE — 36415 COLL VENOUS BLD VENIPUNCTURE: CPT

## 2022-06-17 PROCEDURE — 87040 BLOOD CULTURE FOR BACTERIA: CPT

## 2022-06-17 PROCEDURE — 99285 EMERGENCY DEPT VISIT HI MDM: CPT

## 2022-06-17 PROCEDURE — 2500000003 HC RX 250 WO HCPCS: Performed by: EMERGENCY MEDICINE

## 2022-06-17 PROCEDURE — 96365 THER/PROPH/DIAG IV INF INIT: CPT

## 2022-06-17 PROCEDURE — 84484 ASSAY OF TROPONIN QUANT: CPT

## 2022-06-17 PROCEDURE — 96361 HYDRATE IV INFUSION ADD-ON: CPT

## 2022-06-17 PROCEDURE — 93005 ELECTROCARDIOGRAM TRACING: CPT | Performed by: EMERGENCY MEDICINE

## 2022-06-17 PROCEDURE — 87636 SARSCOV2 & INF A&B AMP PRB: CPT

## 2022-06-17 PROCEDURE — 83605 ASSAY OF LACTIC ACID: CPT

## 2022-06-17 RX ORDER — 0.9 % SODIUM CHLORIDE 0.9 %
30 INTRAVENOUS SOLUTION INTRAVENOUS ONCE
Status: COMPLETED | OUTPATIENT
Start: 2022-06-17 | End: 2022-06-17

## 2022-06-17 RX ADMIN — SODIUM CHLORIDE 1959 ML: 9 INJECTION, SOLUTION INTRAVENOUS at 19:26

## 2022-06-17 RX ADMIN — DOXYCYCLINE 100 MG: 100 INJECTION, POWDER, LYOPHILIZED, FOR SOLUTION INTRAVENOUS at 21:52

## 2022-06-17 ASSESSMENT — ENCOUNTER SYMPTOMS
PHOTOPHOBIA: 0
STRIDOR: 0
TROUBLE SWALLOWING: 0
DIARRHEA: 1
SHORTNESS OF BREATH: 0
COLOR CHANGE: 0
SINUS PAIN: 1
VOMITING: 0
ABDOMINAL PAIN: 0
COUGH: 1
BLOOD IN STOOL: 0
SINUS PRESSURE: 1
VOICE CHANGE: 0
RHINORRHEA: 1
FACIAL SWELLING: 0
WHEEZING: 0
BACK PAIN: 0

## 2022-06-17 ASSESSMENT — PAIN SCALES - GENERAL: PAINLEVEL_OUTOF10: 8

## 2022-06-17 ASSESSMENT — PAIN DESCRIPTION - LOCATION: LOCATION: HEAD

## 2022-06-17 ASSESSMENT — PAIN - FUNCTIONAL ASSESSMENT: PAIN_FUNCTIONAL_ASSESSMENT: 0-10

## 2022-06-17 NOTE — ED PROVIDER NOTES
[address] ED  eMERGENCY dEPARTMENT eNCOUnter      Pt Name: Andra Ivy  MRN: [id number]  Armstrongf[place] 1945  Date of evaluation: 6/17/2022  Provider: Bindu Garcia MD    [address] [place] [address]       Chief Complaint   Patient presents with    Fatigue     c/o \"sleeping\" for 3 days. Sinus pain/pressure and loose stool x 1 day. Pt states he took home covid test last night and it was negative       HISTORY OF PRESENT ILLNESS   (Location/Symptom, Timing/Onset, Context/Setting, Quality, Duration, Modifying Factors, Severity)  Note limiting factors. Andra  is a 68 y.o. male with complex history including not limited to hypothyroidism, recurrent aspiration pneumonia, severe protein calorie malnutrition, and G-tube dependence who presents with 3 days of fatigue, rhinorrhea, sinus pressure, nonproductive cough, and 1 day of loose stool. Patient also reports diffuse body aches. Denies any chest pain, hemoptysis, or leg swelling. Reports symptoms are severe constant and worsening. Patient's wife reports that the patient had a fever of 101 degrees today prior to arrival.  They report that they took a COVID test at home last night and that it was negative. HPI    Nursing Notes were reviewed. REVIEW OFSYSTEMS    (2-9 systems for level 4, 10 or more for level 5)     Review of Systems   Constitutional: Positive for fatigue and fever. Negative for appetite change and unexpected weight change. HENT: Positive for rhinorrhea, sinus pressure and sinus pain. Negative for facial swelling, trouble swallowing and voice change. Eyes: Negative for photophobia and visual disturbance. Respiratory: Positive for cough. Negative for shortness of breath, wheezing and stridor. Cardiovascular: Negative for chest pain and palpitations. Gastrointestinal: Positive for diarrhea. Negative for abdominal pain, blood in stool and vomiting. Genitourinary: Negative for difficulty urinating and dysuria. Musculoskeletal: Positive for arthralgias and myalgias. Negative for back pain, gait problem and neck pain. Skin: Negative for color change and wound. Neurological: Positive for headaches. Negative for seizures, syncope and speech difficulty. Psychiatric/Behavioral: Negative for self-injury and suicidal ideas. Except as noted above the remainder of the review of systems was reviewed and negative.        PAST MEDICAL HISTORY     Past Medical History:   Diagnosis Date    Arthritis     Benign hypertrophy of prostate     Cancer ([facility]     growth on tongue    COVID-19 12/07/2020    Dry mouth     s/p radiation treatment    DVT, lower extremity ([address]     5/2011    GERD (gastroesophageal reflux disease)     acid reflux    Hip fracture ([facility] [address]     5/2011, RIGHT    Hx of blood clots     Hyperlipidemia     Pneumonia 6/2/2011    Prolonged emergence from general anesthesia     Thyroid disease          SURGICAL HISTORY       Past Surgical History:   Procedure Laterality Date    ABDOMINAL SURGERY      gallbladder removed    CHOLECYSTECTOMY      COLONOSCOPY      EYE SURGERY Right     TORN RETINA, LASER    HERNIA REPAIR      HIP ARTHROPLASTY Right 08/08/2016    HIP FRACTURE SURGERY Right     JOINT REPLACEMENT      OTHER SURGICAL HISTORY  05/18/2011    right hip IM nailing    SHOULDER SURGERY Right rotater cuff    TONGUE BIOPSY  10/2010         CURRENT MEDICATIONS       Previous Medications    ALBUTEROL SULFATE  (90 BASE) MCG/ACT INHALER    INHALE 2 PUFFS INTO THE LUNGS 4 TIMES DAILY AS NEEDED (PRIOR TO ACTIVITY THAT CAUSES SHORTNESS OF BREATH)    FLUTICASONE (FLONASE) 50 MCG/ACT NASAL SPRAY    2 sprays by Nasal route daily    LEVOTHYROXINE (SYNTHROID) 125 MCG TABLET        OMEPRAZOLE (PRILOSEC) 40 MG DELAYED RELEASE CAPSULE        POLYETHYLENE GLYCOL (GLYCOLAX) 17 GM/SCOOP POWDER    Use as directed for colonoscopy prep    SENNOSIDES (SENNA) 8.8 MG/5ML LIQD    Take 5 mLs by mouth daily SIMVASTATIN (ZOCOR) 20 MG TABLET           ALLERGIES     Tamsulosin hcl and Tetanus toxoids    FAMILY HISTORY       Family History   Problem Relation Age of Onset   Krista See Cancer Mother     Depression Mother     High Blood Pressure Mother     Heart Disease Father     Stroke Maternal Grandmother     Diabetes Other           SOCIAL HISTORY       Social History     Socioeconomic History    Marital status:      Spouse name: Not on file    Number of children: Not on file    Years of education: Not on file    Highest education level: Not on file   Occupational History    Not on file   Tobacco Use    Smoking status: Former Smoker     Years: 40.00     Quit date: 1971     Years since quittin.2    Smokeless tobacco: Never Used   Vaping Use    Vaping Use: Never used   Substance and Sexual Activity    Alcohol use: Not Currently     Alcohol/week: 0.0 standard drinks     Comment: x1 half glass before bed    Drug use: No    Sexual activity: Yes     Partners: Female   Other Topics Concern    Not on file   Social History Narrative    Not on file     Social Determinants of Health     Financial Resource Strain:     Difficulty of Paying Living Expenses: Not on file   Food Insecurity:     Worried About [email or web address] in the Last Year: Not on file    Oleg[employer] of Food in the Last Year: Not on file   Transportation Needs:     Lack of Transportation (Medical): Not on file    Lack of Transportation (Non-Medical):  Not on file   Physical Activity:     Days of Exercise per Week: Not on file    Minutes of Exercise per Session: Not on file   Stress:     Feeling of Stress : Not on file   Social Connections:     Frequency of Communication with Friends and Family: Not on file    Frequency of Social Gatherings with Friends and Family: Not on file    Attends Sikh Services: Not on file    Active Member of Clubs or Organizations: Not on file    Attends Club or Organization Meetings: Not on file   Krista See Marital Status: Not on file   Intimate Partner Violence:     Fear of Current or Ex-Partner: Not on file    Emotionally Abused: Not on file    Physically Abused: Not on file    Sexually Abused: Not on file   Housing Stability:     Unable to Pay for Housing in the Last Year: Not on file    Number of [place] in the Last Year: Not on file    Unstable Housing in the Last Year: Not on file         PHYSICAL EXAM    (up to 7 for level 4, 8 or more for level 5)     ED Triage Vitals [06/17/22 1710]   BP Temp Temp Source Heart Rate Resp SpO2 Height Weight   103/67 100 °F (37.8 °C) Oral (!) 113 20 93 % 6' 2\" (1.88 m) 144 lb (65.3 kg)       Physical Exam  Vitals and nursing note reviewed. Constitutional:       General: He is not in acute distress. Appearance: He is well-developed. Comments: Chronically ill-appearing elderly  male   HENT:      Head: Normocephalic and atraumatic. Right Ear: External ear normal.      Left Ear: External ear normal.   Eyes:      Conjunctiva/sclera: Conjunctivae normal.   Neck:      Vascular: No JVD. Trachea: No tracheal deviation. Cardiovascular:      Rate and Rhythm: Regular rhythm. Tachycardia present. Pulmonary:      Effort: Pulmonary effort is normal. No respiratory distress. Breath sounds: Normal breath sounds. No wheezing. Abdominal:      General: There is no distension. Palpations: Abdomen is soft. Tenderness: There is no abdominal tenderness. There is no guarding or rebound. Comments: G-tube in abdomen with mild surrounding granulation tissue but no signs of surrounding cellulitis or acute emergent infection. No abdominal tenderness, rebound or guarding. No peritoneal signs. Musculoskeletal:         General: No tenderness. Normal range of motion. Cervical back: Neck supple. Skin:     General: Skin is warm and dry. Neurological:      Mental Status: He is alert. Cranial Nerves: No cranial nerve deficit. Comments: Patient awake and alert. Mildly slowed speech but no slurring. No facial droop. Sensation intact in 4 extremities. DIAGNOSTIC RESULTS     EKG:All EKG's are interpreted by the Emergency Department Physician who either signs or Co-signs this chart in the absence of a cardiologist.    The Ekg interpreted by me shows  sinus tachycardia, lkra=797   Axis is   Normal  QTc is  422ms  Intervals and Durations are unremarkable. ST Segments: no acute change  No significant change from prior EKG dated 6/10/21      RADIOLOGY:     Interpretation per the Radiologist below, if available at the time of this note:    XR CHEST PORTABLE   Final Result   Increased lung markings bilaterally, may be related to mild pulmonary   vascular congestion versus bronchitis or mild pneumonia.                ED BEDSIDE ULTRASOUND:   Performed by ED Physician - none    LABS:  Labs Reviewed   CBC WITH AUTO DIFFERENTIAL - Abnormal; Notable for the following components:       Result Value    Hemoglobin 13.3 (*)     Hematocrit 39.8 (*)     Lymphocytes Absolute 0.3 (*)     All other components within normal limits   COMPREHENSIVE METABOLIC PANEL W/ REFLEX TO MG FOR LOW K - Abnormal; Notable for the following components:    Sodium 134 (*)     Chloride 93 (*)     Glucose 142 (*)     BUN 24 (*)     CREATININE 0.7 (*)     All other components within normal limits   LACTIC ACID - Abnormal; Notable for the following components:    Lactic Acid 2.3 (*)     All other components within normal limits   URINALYSIS WITH REFLEX TO CULTURE - Abnormal; Notable for the following components:    Blood, Urine MODERATE (*)     Protein, UA 30 (*)     Urobilinogen, Urine 2.0 (*)     All other components within normal limits   MICROSCOPIC URINALYSIS - Abnormal; Notable for the following components:    RBC, UA  (*)     All other components within normal limits   COVID-19 & INFLUENZA COMBO   CULTURE, BLOOD 1   CULTURE, BLOOD 2   TROPONIN   LACTIC ACID All otherlabs were within normal range or not returned as of this dictation. EMERGENCY DEPARTMENT COURSE and DIFFERENTIAL DIAGNOSIS/MDM:     Vitals:    06/17/22 2000 06/17/22 2100 06/17/22 2200 06/17/22 2300   BP: 131/70 126/74 109/65 120/72   Pulse: 100 (!) 101 (!) 103 (!) 106   Resp:       Temp:       TempSrc:       SpO2: 97% 97% 97% 97%   Weight:       Height:           Is this patient to be included in the SEP-1 Core Measure due to severe sepsis or septic shock? No   Exclusion criteria - the patient is NOT to be included for SEP-1 Core Measure due to:  2+ SIRS criteria are not met      MDM  Patient is tachycardic on arrival however this resolves after IV fluids. Laboratory valuation shows very mildly elevated lactic acid of 2.3 however this completely normalizes to 1.0 after IV fluid. White blood cell count within normal limits and other vital signs within normal limits. EKG is unchanged from patient's previous and chest x-ray shows mild increased lung markings bilaterally concerning for possible bronchitis or mild pneumonia. We have discussed this possibility with the patient and his wife. Patient reports substantial improvement in symptoms after IV fluids. We have discussed the possibility of admission versus discharge home. They would like to attempt discharge home with antibiotics. I feel patient is appropriate for trial of doxycycline with close primary care follow-up and strict ER return precautions. The patient does have a pulse oximeter at home to measure his heart rate and oxygen levels. Heart rate is in the low 90s at time of discharge. Patient and wife expressed understanding and agreement with this plan the patient is discharged home with strict ER return precautions. Procedures    FINAL IMPRESSION      1. Acute sinusitis, recurrence not specified, unspecified location    2.  Fatigue, unspecified type          DISPOSITION/PLAN   DISPOSITION Decision To Discharge 06/17/2022 10:57:23 PM      (Please note that portions of this note were completed with a voice recognition program.  Efforts were made to edit the dictations but occasionally words aremis-transcribed. )    Magui Mcfarlane MD (electronically signed)  Attending Emergency Physician       Magui Mcfarlane MD  06/17/22 8205

## 2022-06-18 LAB
EKG ATRIAL RATE: 109 BPM
EKG DIAGNOSIS: NORMAL
EKG P AXIS: 79 DEGREES
EKG P-R INTERVAL: 132 MS
EKG Q-T INTERVAL: 314 MS
EKG QRS DURATION: 86 MS
EKG QTC CALCULATION (BAZETT): 422 MS
EKG R AXIS: -14 DEGREES
EKG T AXIS: 75 DEGREES
EKG VENTRICULAR RATE: 109 BPM

## 2022-06-18 PROCEDURE — 93010 ELECTROCARDIOGRAM REPORT: CPT | Performed by: INTERNAL MEDICINE

## 2022-06-22 LAB
BLOOD CULTURE, ROUTINE: NORMAL
CULTURE, BLOOD 2: NORMAL

## 2022-07-09 ENCOUNTER — TELEPHONE ENCOUNTER (OUTPATIENT)
Dept: URBAN - METROPOLITAN AREA CLINIC 121 | Facility: CLINIC | Age: 77
End: 2022-07-09

## 2022-07-10 ENCOUNTER — TELEPHONE ENCOUNTER (OUTPATIENT)
Dept: URBAN - METROPOLITAN AREA CLINIC 121 | Facility: CLINIC | Age: 77
End: 2022-07-10

## 2022-07-10 RX ORDER — LEVOTHYROXINE SODIUM 125 UG/1
TABLET ORAL ONCE A DAY
Refills: 0 | Status: ACTIVE | COMMUNITY
Start: 2022-01-28

## 2022-07-10 RX ORDER — OMEPRAZOLE 40 MG/1
CAPSULE, DELAYED RELEASE ORAL ONCE A DAY
Refills: 0 | Status: ACTIVE | COMMUNITY
Start: 2022-01-28

## 2022-09-15 ENCOUNTER — HOSPITAL ENCOUNTER (INPATIENT)
Age: 77
LOS: 3 days | Discharge: HOME | DRG: 871 | End: 2022-09-19
Attending: EMERGENCY MEDICINE | Admitting: INTERNAL MEDICINE
Payer: MEDICARE

## 2022-09-15 ENCOUNTER — APPOINTMENT (OUTPATIENT)
Dept: GENERAL RADIOLOGY | Age: 77
DRG: 871 | End: 2022-09-15
Payer: MEDICARE

## 2022-09-15 DIAGNOSIS — J96.01 ACUTE RESPIRATORY FAILURE WITH HYPOXIA (HCC): ICD-10-CM

## 2022-09-15 DIAGNOSIS — A41.9 SEPSIS WITH ACUTE HYPOXIC RESPIRATORY FAILURE WITHOUT SEPTIC SHOCK, DUE TO UNSPECIFIED ORGANISM (HCC): Primary | ICD-10-CM

## 2022-09-15 DIAGNOSIS — R65.20 SEPSIS WITH ACUTE HYPOXIC RESPIRATORY FAILURE WITHOUT SEPTIC SHOCK, DUE TO UNSPECIFIED ORGANISM (HCC): Primary | ICD-10-CM

## 2022-09-15 DIAGNOSIS — J96.01 SEPSIS WITH ACUTE HYPOXIC RESPIRATORY FAILURE WITHOUT SEPTIC SHOCK, DUE TO UNSPECIFIED ORGANISM (HCC): Primary | ICD-10-CM

## 2022-09-15 DIAGNOSIS — J69.0 ASPIRATION PNEUMONIA OF BOTH LOWER LOBES DUE TO GASTRIC SECRETIONS (HCC): ICD-10-CM

## 2022-09-15 LAB
A/G RATIO: 1.1 (ref 1.1–2.2)
ALBUMIN SERPL-MCNC: 3.7 G/DL (ref 3.4–5)
ALP BLD-CCNC: 88 U/L (ref 40–129)
ALT SERPL-CCNC: 19 U/L (ref 10–40)
AMORPHOUS: ABNORMAL /HPF
ANION GAP SERPL CALCULATED.3IONS-SCNC: 12 MMOL/L (ref 3–16)
APTT: 26.1 SEC (ref 23–34.3)
AST SERPL-CCNC: 21 U/L (ref 15–37)
BACTERIA: ABNORMAL /HPF
BASOPHILS ABSOLUTE: 0 K/UL (ref 0–0.2)
BASOPHILS RELATIVE PERCENT: 0.2 %
BILIRUB SERPL-MCNC: 0.8 MG/DL (ref 0–1)
BILIRUBIN URINE: NEGATIVE
BLOOD, URINE: NEGATIVE
BUN BLDV-MCNC: 27 MG/DL (ref 7–20)
CALCIUM SERPL-MCNC: 8.9 MG/DL (ref 8.3–10.6)
CHLORIDE BLD-SCNC: 94 MMOL/L (ref 99–110)
CLARITY: CLEAR
CO2: 26 MMOL/L (ref 21–32)
COLOR: YELLOW
CREAT SERPL-MCNC: 0.8 MG/DL (ref 0.8–1.3)
EOSINOPHILS ABSOLUTE: 0 K/UL (ref 0–0.6)
EOSINOPHILS RELATIVE PERCENT: 0.1 %
EPITHELIAL CELLS, UA: ABNORMAL /HPF (ref 0–5)
GFR AFRICAN AMERICAN: >60
GFR NON-AFRICAN AMERICAN: >60
GLUCOSE BLD-MCNC: 156 MG/DL (ref 70–99)
GLUCOSE URINE: NEGATIVE MG/DL
HCT VFR BLD CALC: 41.8 % (ref 40.5–52.5)
HEMOGLOBIN: 13.7 G/DL (ref 13.5–17.5)
INR BLD: 1.09 (ref 0.87–1.14)
KETONES, URINE: NEGATIVE MG/DL
LACTIC ACID: 3.2 MMOL/L (ref 0.4–2)
LEUKOCYTE ESTERASE, URINE: NEGATIVE
LYMPHOCYTES ABSOLUTE: 0.1 K/UL (ref 1–5.1)
LYMPHOCYTES RELATIVE PERCENT: 1.8 %
MCH RBC QN AUTO: 27 PG (ref 26–34)
MCHC RBC AUTO-ENTMCNC: 32.7 G/DL (ref 31–36)
MCV RBC AUTO: 82.7 FL (ref 80–100)
MICROSCOPIC EXAMINATION: YES
MONOCYTES ABSOLUTE: 0.1 K/UL (ref 0–1.3)
MONOCYTES RELATIVE PERCENT: 2.3 %
MUCUS: ABNORMAL /LPF
NEUTROPHILS ABSOLUTE: 3.8 K/UL (ref 1.7–7.7)
NEUTROPHILS RELATIVE PERCENT: 95.6 %
NITRITE, URINE: NEGATIVE
PDW BLD-RTO: 13.9 % (ref 12.4–15.4)
PH UA: 8 (ref 5–8)
PLATELET # BLD: 158 K/UL (ref 135–450)
PMV BLD AUTO: 8.5 FL (ref 5–10.5)
POTASSIUM REFLEX MAGNESIUM: 4.2 MMOL/L (ref 3.5–5.1)
PROTEIN UA: ABNORMAL MG/DL
PROTHROMBIN TIME: 13.9 SEC (ref 11.7–14.5)
RBC # BLD: 5.05 M/UL (ref 4.2–5.9)
RBC UA: ABNORMAL /HPF (ref 0–4)
REASON FOR REJECTION: NORMAL
REJECTED TEST: NORMAL
SARS-COV-2, NAAT: NOT DETECTED
SODIUM BLD-SCNC: 132 MMOL/L (ref 136–145)
SPECIFIC GRAVITY UA: 1.01 (ref 1–1.03)
TOTAL PROTEIN: 7.2 G/DL (ref 6.4–8.2)
TROPONIN: <0.01 NG/ML
URINE REFLEX TO CULTURE: ABNORMAL
URINE TYPE: ABNORMAL
UROBILINOGEN, URINE: 2 E.U./DL
WBC # BLD: 4 K/UL (ref 4–11)
WBC UA: ABNORMAL /HPF (ref 0–5)

## 2022-09-15 PROCEDURE — 96361 HYDRATE IV INFUSION ADD-ON: CPT

## 2022-09-15 PROCEDURE — 6360000002 HC RX W HCPCS: Performed by: EMERGENCY MEDICINE

## 2022-09-15 PROCEDURE — 2580000003 HC RX 258: Performed by: EMERGENCY MEDICINE

## 2022-09-15 PROCEDURE — 02HV33Z INSERTION OF INFUSION DEVICE INTO SUPERIOR VENA CAVA, PERCUTANEOUS APPROACH: ICD-10-PCS | Performed by: INTERNAL MEDICINE

## 2022-09-15 PROCEDURE — 81001 URINALYSIS AUTO W/SCOPE: CPT

## 2022-09-15 PROCEDURE — 85025 COMPLETE CBC W/AUTO DIFF WBC: CPT

## 2022-09-15 PROCEDURE — 84145 PROCALCITONIN (PCT): CPT

## 2022-09-15 PROCEDURE — 93005 ELECTROCARDIOGRAM TRACING: CPT | Performed by: EMERGENCY MEDICINE

## 2022-09-15 PROCEDURE — 84484 ASSAY OF TROPONIN QUANT: CPT

## 2022-09-15 PROCEDURE — 85730 THROMBOPLASTIN TIME PARTIAL: CPT

## 2022-09-15 PROCEDURE — 36415 COLL VENOUS BLD VENIPUNCTURE: CPT

## 2022-09-15 PROCEDURE — 6370000000 HC RX 637 (ALT 250 FOR IP): Performed by: EMERGENCY MEDICINE

## 2022-09-15 PROCEDURE — 87635 SARS-COV-2 COVID-19 AMP PRB: CPT

## 2022-09-15 PROCEDURE — 84443 ASSAY THYROID STIM HORMONE: CPT

## 2022-09-15 PROCEDURE — 71045 X-RAY EXAM CHEST 1 VIEW: CPT

## 2022-09-15 PROCEDURE — 87040 BLOOD CULTURE FOR BACTERIA: CPT

## 2022-09-15 PROCEDURE — 83605 ASSAY OF LACTIC ACID: CPT

## 2022-09-15 PROCEDURE — 85610 PROTHROMBIN TIME: CPT

## 2022-09-15 PROCEDURE — 96365 THER/PROPH/DIAG IV INF INIT: CPT

## 2022-09-15 PROCEDURE — 99285 EMERGENCY DEPT VISIT HI MDM: CPT

## 2022-09-15 PROCEDURE — 80053 COMPREHEN METABOLIC PANEL: CPT

## 2022-09-15 RX ORDER — 0.9 % SODIUM CHLORIDE 0.9 %
2000 INTRAVENOUS SOLUTION INTRAVENOUS ONCE
Status: COMPLETED | OUTPATIENT
Start: 2022-09-15 | End: 2022-09-16

## 2022-09-15 RX ORDER — METRONIDAZOLE 500 MG/100ML
500 INJECTION, SOLUTION INTRAVENOUS ONCE
Status: COMPLETED | OUTPATIENT
Start: 2022-09-16 | End: 2022-09-16

## 2022-09-15 RX ORDER — 0.9 % SODIUM CHLORIDE 0.9 %
250 INTRAVENOUS SOLUTION INTRAVENOUS ONCE
Status: COMPLETED | OUTPATIENT
Start: 2022-09-15 | End: 2022-09-16

## 2022-09-15 RX ORDER — ACETAMINOPHEN 160 MG/5ML
650 SOLUTION ORAL ONCE
Status: COMPLETED | OUTPATIENT
Start: 2022-09-15 | End: 2022-09-15

## 2022-09-15 RX ADMIN — ACETAMINOPHEN 650 MG: 160 SOLUTION ORAL at 23:15

## 2022-09-15 RX ADMIN — CEFTRIAXONE SODIUM 1000 MG: 1 INJECTION, POWDER, FOR SOLUTION INTRAMUSCULAR; INTRAVENOUS at 23:59

## 2022-09-15 RX ADMIN — SODIUM CHLORIDE 2000 ML: 9 INJECTION, SOLUTION INTRAVENOUS at 23:18

## 2022-09-15 ASSESSMENT — LIFESTYLE VARIABLES
HOW MANY STANDARD DRINKS CONTAINING ALCOHOL DO YOU HAVE ON A TYPICAL DAY: PATIENT DOES NOT DRINK
HOW OFTEN DO YOU HAVE A DRINK CONTAINING ALCOHOL: NEVER

## 2022-09-15 ASSESSMENT — PAIN - FUNCTIONAL ASSESSMENT: PAIN_FUNCTIONAL_ASSESSMENT: NONE - DENIES PAIN

## 2022-09-16 ENCOUNTER — APPOINTMENT (OUTPATIENT)
Dept: GENERAL RADIOLOGY | Age: 77
DRG: 871 | End: 2022-09-16
Payer: MEDICARE

## 2022-09-16 PROBLEM — R65.21 SEPTIC SHOCK (HCC): Status: ACTIVE | Noted: 2022-09-16

## 2022-09-16 PROBLEM — A41.9 SEPTIC SHOCK (HCC): Status: ACTIVE | Noted: 2022-09-16

## 2022-09-16 LAB
A/G RATIO: 1.1 (ref 1.1–2.2)
ALBUMIN SERPL-MCNC: 3.1 G/DL (ref 3.4–5)
ALP BLD-CCNC: 72 U/L (ref 40–129)
ALT SERPL-CCNC: 58 U/L (ref 10–40)
ANION GAP SERPL CALCULATED.3IONS-SCNC: 8 MMOL/L (ref 3–16)
AST SERPL-CCNC: 64 U/L (ref 15–37)
BANDED NEUTROPHILS RELATIVE PERCENT: 15 % (ref 0–7)
BASOPHILS ABSOLUTE: 0 K/UL (ref 0–0.2)
BASOPHILS RELATIVE PERCENT: 0 %
BILIRUB SERPL-MCNC: 0.5 MG/DL (ref 0–1)
BUN BLDV-MCNC: 22 MG/DL (ref 7–20)
CALCIUM SERPL-MCNC: 7.8 MG/DL (ref 8.3–10.6)
CHLORIDE BLD-SCNC: 101 MMOL/L (ref 99–110)
CO2: 27 MMOL/L (ref 21–32)
CREAT SERPL-MCNC: 0.7 MG/DL (ref 0.8–1.3)
EKG ATRIAL RATE: 111 BPM
EKG DIAGNOSIS: NORMAL
EKG P AXIS: 59 DEGREES
EKG P-R INTERVAL: 136 MS
EKG Q-T INTERVAL: 330 MS
EKG QRS DURATION: 84 MS
EKG QTC CALCULATION (BAZETT): 448 MS
EKG R AXIS: -15 DEGREES
EKG T AXIS: 51 DEGREES
EKG VENTRICULAR RATE: 111 BPM
EOSINOPHILS ABSOLUTE: 0.1 K/UL (ref 0–0.6)
EOSINOPHILS RELATIVE PERCENT: 1 %
GFR AFRICAN AMERICAN: >60
GFR NON-AFRICAN AMERICAN: >60
GLUCOSE BLD-MCNC: 112 MG/DL (ref 70–99)
GLUCOSE BLD-MCNC: 129 MG/DL (ref 70–99)
HCT VFR BLD CALC: 34.5 % (ref 40.5–52.5)
HEMOGLOBIN: 11.8 G/DL (ref 13.5–17.5)
LACTIC ACID: 1.9 MMOL/L (ref 0.4–2)
LYMPHOCYTES ABSOLUTE: 0.5 K/UL (ref 1–5.1)
LYMPHOCYTES RELATIVE PERCENT: 9 %
MCH RBC QN AUTO: 28.4 PG (ref 26–34)
MCHC RBC AUTO-ENTMCNC: 34.1 G/DL (ref 31–36)
MCV RBC AUTO: 83.3 FL (ref 80–100)
MONOCYTES ABSOLUTE: 0.4 K/UL (ref 0–1.3)
MONOCYTES RELATIVE PERCENT: 7 %
NEUTROPHILS ABSOLUTE: 4.9 K/UL (ref 1.7–7.7)
NEUTROPHILS RELATIVE PERCENT: 68 %
PDW BLD-RTO: 13.6 % (ref 12.4–15.4)
PERFORMED ON: ABNORMAL
PLATELET # BLD: 154 K/UL (ref 135–450)
PLATELET SLIDE REVIEW: ADEQUATE
PMV BLD AUTO: 7.9 FL (ref 5–10.5)
POTASSIUM REFLEX MAGNESIUM: 4.2 MMOL/L (ref 3.5–5.1)
PROCALCITONIN: 1.54 NG/ML (ref 0–0.15)
RBC # BLD: 4.15 M/UL (ref 4.2–5.9)
SLIDE REVIEW: ABNORMAL
SODIUM BLD-SCNC: 136 MMOL/L (ref 136–145)
TOTAL PROTEIN: 5.9 G/DL (ref 6.4–8.2)
TSH REFLEX: 2.05 UIU/ML (ref 0.27–4.2)
WBC # BLD: 5.9 K/UL (ref 4–11)

## 2022-09-16 PROCEDURE — 6370000000 HC RX 637 (ALT 250 FOR IP): Performed by: INTERNAL MEDICINE

## 2022-09-16 PROCEDURE — 87186 SC STD MICRODIL/AGAR DIL: CPT

## 2022-09-16 PROCEDURE — 80053 COMPREHEN METABOLIC PANEL: CPT

## 2022-09-16 PROCEDURE — 87077 CULTURE AEROBIC IDENTIFY: CPT

## 2022-09-16 PROCEDURE — 36415 COLL VENOUS BLD VENIPUNCTURE: CPT

## 2022-09-16 PROCEDURE — 87181 SC STD AGAR DILUTION PER AGT: CPT

## 2022-09-16 PROCEDURE — 6360000002 HC RX W HCPCS: Performed by: EMERGENCY MEDICINE

## 2022-09-16 PROCEDURE — 2580000003 HC RX 258: Performed by: INTERNAL MEDICINE

## 2022-09-16 PROCEDURE — 87205 SMEAR GRAM STAIN: CPT

## 2022-09-16 PROCEDURE — 2500000003 HC RX 250 WO HCPCS: Performed by: EMERGENCY MEDICINE

## 2022-09-16 PROCEDURE — 87070 CULTURE OTHR SPECIMN AEROBIC: CPT

## 2022-09-16 PROCEDURE — 83605 ASSAY OF LACTIC ACID: CPT

## 2022-09-16 PROCEDURE — 6360000002 HC RX W HCPCS: Performed by: INTERNAL MEDICINE

## 2022-09-16 PROCEDURE — 96367 TX/PROPH/DG ADDL SEQ IV INF: CPT

## 2022-09-16 PROCEDURE — 94640 AIRWAY INHALATION TREATMENT: CPT

## 2022-09-16 PROCEDURE — 85025 COMPLETE CBC W/AUTO DIFF WBC: CPT

## 2022-09-16 PROCEDURE — 2000000000 HC ICU R&B

## 2022-09-16 PROCEDURE — 99291 CRITICAL CARE FIRST HOUR: CPT | Performed by: INTERNAL MEDICINE

## 2022-09-16 PROCEDURE — 71045 X-RAY EXAM CHEST 1 VIEW: CPT

## 2022-09-16 PROCEDURE — 36556 INSERT NON-TUNNEL CV CATH: CPT

## 2022-09-16 PROCEDURE — 2580000003 HC RX 258: Performed by: EMERGENCY MEDICINE

## 2022-09-16 RX ORDER — METRONIDAZOLE 500 MG/100ML
500 INJECTION, SOLUTION INTRAVENOUS EVERY 8 HOURS
Status: DISCONTINUED | OUTPATIENT
Start: 2022-09-16 | End: 2022-09-16

## 2022-09-16 RX ORDER — SODIUM CHLORIDE 9 MG/ML
INJECTION, SOLUTION INTRAVENOUS CONTINUOUS
Status: DISCONTINUED | OUTPATIENT
Start: 2022-09-16 | End: 2022-09-17

## 2022-09-16 RX ORDER — SODIUM CHLORIDE 0.9 % (FLUSH) 0.9 %
5-40 SYRINGE (ML) INJECTION PRN
Status: DISCONTINUED | OUTPATIENT
Start: 2022-09-16 | End: 2022-09-19 | Stop reason: HOSPADM

## 2022-09-16 RX ORDER — IPRATROPIUM BROMIDE AND ALBUTEROL SULFATE 2.5; .5 MG/3ML; MG/3ML
1 SOLUTION RESPIRATORY (INHALATION)
Status: DISCONTINUED | OUTPATIENT
Start: 2022-09-16 | End: 2022-09-16

## 2022-09-16 RX ORDER — ALBUTEROL SULFATE 2.5 MG/3ML
2.5 SOLUTION RESPIRATORY (INHALATION) EVERY 4 HOURS PRN
Status: DISCONTINUED | OUTPATIENT
Start: 2022-09-16 | End: 2022-09-19 | Stop reason: HOSPADM

## 2022-09-16 RX ORDER — PROCHLORPERAZINE EDISYLATE 5 MG/ML
10 INJECTION INTRAMUSCULAR; INTRAVENOUS EVERY 6 HOURS PRN
Status: DISCONTINUED | OUTPATIENT
Start: 2022-09-16 | End: 2022-09-19 | Stop reason: HOSPADM

## 2022-09-16 RX ORDER — ENOXAPARIN SODIUM 100 MG/ML
40 INJECTION SUBCUTANEOUS DAILY
Status: DISCONTINUED | OUTPATIENT
Start: 2022-09-16 | End: 2022-09-19 | Stop reason: HOSPADM

## 2022-09-16 RX ORDER — ALBUTEROL SULFATE 2.5 MG/3ML
2.5 SOLUTION RESPIRATORY (INHALATION) EVERY 6 HOURS PRN
Status: DISCONTINUED | OUTPATIENT
Start: 2022-09-16 | End: 2022-09-16

## 2022-09-16 RX ORDER — ACETAMINOPHEN 160 MG/5ML
650 SOLUTION ORAL EVERY 4 HOURS PRN
Status: DISCONTINUED | OUTPATIENT
Start: 2022-09-16 | End: 2022-09-19 | Stop reason: HOSPADM

## 2022-09-16 RX ORDER — IPRATROPIUM BROMIDE AND ALBUTEROL SULFATE 2.5; .5 MG/3ML; MG/3ML
1 SOLUTION RESPIRATORY (INHALATION) 2 TIMES DAILY
Status: DISCONTINUED | OUTPATIENT
Start: 2022-09-16 | End: 2022-09-17

## 2022-09-16 RX ORDER — SODIUM CHLORIDE 9 MG/ML
INJECTION, SOLUTION INTRAVENOUS PRN
Status: DISCONTINUED | OUTPATIENT
Start: 2022-09-16 | End: 2022-09-19 | Stop reason: HOSPADM

## 2022-09-16 RX ORDER — MORPHINE SULFATE 2 MG/ML
2 INJECTION, SOLUTION INTRAMUSCULAR; INTRAVENOUS EVERY 4 HOURS PRN
Status: DISCONTINUED | OUTPATIENT
Start: 2022-09-16 | End: 2022-09-19 | Stop reason: HOSPADM

## 2022-09-16 RX ORDER — SODIUM CHLORIDE 0.9 % (FLUSH) 0.9 %
5-40 SYRINGE (ML) INJECTION EVERY 12 HOURS SCHEDULED
Status: DISCONTINUED | OUTPATIENT
Start: 2022-09-16 | End: 2022-09-19 | Stop reason: HOSPADM

## 2022-09-16 RX ORDER — PANTOPRAZOLE SODIUM 40 MG/10ML
40 INJECTION, POWDER, LYOPHILIZED, FOR SOLUTION INTRAVENOUS DAILY
Status: DISCONTINUED | OUTPATIENT
Start: 2022-09-16 | End: 2022-09-16

## 2022-09-16 RX ORDER — PANTOPRAZOLE SODIUM 40 MG/1
40 TABLET, DELAYED RELEASE ORAL
Status: DISCONTINUED | OUTPATIENT
Start: 2022-09-16 | End: 2022-09-19 | Stop reason: CLARIF

## 2022-09-16 RX ORDER — ACETAMINOPHEN 650 MG/1
650 SUPPOSITORY RECTAL EVERY 4 HOURS PRN
Status: DISCONTINUED | OUTPATIENT
Start: 2022-09-16 | End: 2022-09-19 | Stop reason: HOSPADM

## 2022-09-16 RX ORDER — LEVOTHYROXINE SODIUM 0.12 MG/1
125 TABLET ORAL DAILY
Status: DISCONTINUED | OUTPATIENT
Start: 2022-09-16 | End: 2022-09-19 | Stop reason: HOSPADM

## 2022-09-16 RX ADMIN — AMPICILLIN SODIUM AND SULBACTAM SODIUM 3000 MG: 2; 1 INJECTION, POWDER, FOR SOLUTION INTRAMUSCULAR; INTRAVENOUS at 21:07

## 2022-09-16 RX ADMIN — AZITHROMYCIN DIHYDRATE 500 MG: 500 INJECTION, POWDER, LYOPHILIZED, FOR SOLUTION INTRAVENOUS at 00:21

## 2022-09-16 RX ADMIN — ENOXAPARIN SODIUM 40 MG: 100 INJECTION SUBCUTANEOUS at 10:33

## 2022-09-16 RX ADMIN — SODIUM CHLORIDE 250 ML: 9 INJECTION, SOLUTION INTRAVENOUS at 00:21

## 2022-09-16 RX ADMIN — ACETAMINOPHEN 650 MG: 160 SOLUTION ORAL at 10:32

## 2022-09-16 RX ADMIN — NOREPINEPHRINE BITARTRATE 5 MCG/MIN: 1 INJECTION INTRAVENOUS at 01:24

## 2022-09-16 RX ADMIN — Medication 10 ML: at 20:09

## 2022-09-16 RX ADMIN — SODIUM CHLORIDE: 9 INJECTION, SOLUTION INTRAVENOUS at 04:10

## 2022-09-16 RX ADMIN — VANCOMYCIN HYDROCHLORIDE 1500 MG: 10 INJECTION, POWDER, LYOPHILIZED, FOR SOLUTION INTRAVENOUS at 04:10

## 2022-09-16 RX ADMIN — MUPIROCIN: 20 OINTMENT TOPICAL at 20:09

## 2022-09-16 RX ADMIN — AMPICILLIN SODIUM AND SULBACTAM SODIUM 3000 MG: 2; 1 INJECTION, POWDER, FOR SOLUTION INTRAMUSCULAR; INTRAVENOUS at 10:32

## 2022-09-16 RX ADMIN — IPRATROPIUM BROMIDE AND ALBUTEROL SULFATE 1 AMPULE: 2.5; .5 SOLUTION RESPIRATORY (INHALATION) at 19:20

## 2022-09-16 RX ADMIN — PANTOPRAZOLE SODIUM 40 MG: 40 TABLET, DELAYED RELEASE ORAL at 16:14

## 2022-09-16 RX ADMIN — AMPICILLIN SODIUM AND SULBACTAM SODIUM 3000 MG: 2; 1 INJECTION, POWDER, FOR SOLUTION INTRAMUSCULAR; INTRAVENOUS at 15:57

## 2022-09-16 RX ADMIN — ACETAMINOPHEN 650 MG: 160 SOLUTION ORAL at 20:09

## 2022-09-16 RX ADMIN — LEVOTHYROXINE SODIUM 125 MCG: 125 TABLET ORAL at 10:33

## 2022-09-16 RX ADMIN — IPRATROPIUM BROMIDE AND ALBUTEROL SULFATE 1 AMPULE: 2.5; .5 SOLUTION RESPIRATORY (INHALATION) at 08:42

## 2022-09-16 RX ADMIN — METRONIDAZOLE 500 MG: 500 INJECTION, SOLUTION INTRAVENOUS at 01:23

## 2022-09-16 ASSESSMENT — PAIN DESCRIPTION - DESCRIPTORS: DESCRIPTORS: ACHING

## 2022-09-16 ASSESSMENT — PAIN DESCRIPTION - LOCATION: LOCATION: OTHER (COMMENT)

## 2022-09-16 ASSESSMENT — PAIN SCALES - GENERAL
PAINLEVEL_OUTOF10: 0
PAINLEVEL_OUTOF10: 3

## 2022-09-16 NOTE — DISCHARGE INSTR - DIET

## 2022-09-16 NOTE — H&P
Hospital Medicine History & Physical      PCP: Gianna Stewart DO    Date of Service: Pt seen/examined on 9/16/22 and admitted on 9/16/22 to Inpatient    Chief Complaint   Patient presents with    Aspiration     Patient has long history of GI issues has PEG tube and wife says he has been vomiting his tube out his mouth and nose. History Of Present Illness: The patient is a 68 y.o. male with PMH below, presents with SOB, aspiration, N/V, cough, fevers, generalized weakness. 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 He had resulting dysphagia. He had a PEG placed about 2 years ago. He takes ALL intake via his PEG. He says he has been throwing up some of his tube feeds the last few days. He has had aspiration PNA in the past 2/2 this. He is concerned that he may have aspirated some of his TF. He has been having intermittent fevers at home. He has had increasing SOB and cough. He was found to have sats in the 80's in the ED. He is requiring 2L O2. He is not normally on O2. CXR showed bibasilar PNA. He was also noted to be hypotensive. His BP did improve w/ IVF. R IJ placed in the ED.       Past Medical History:        Diagnosis Date    Arthritis     Benign hypertrophy of prostate     Cancer ([address]     growth on tongue    COVID-19 12/07/2020    Dry mouth     s/p radiation treatment    DVT, lower extremity ([address]     5/2011    GERD (gastroesophageal reflux disease)     acid reflux    Hip fracture ([address]     5/2011, RIGHT    Hx of blood clots     Hyperlipidemia     Pneumonia 6/2/2011    Prolonged emergence from general anesthesia     Thyroid disease        Past Surgical History:        Procedure Laterality Date    ABDOMEN SURGERY      gallbladder removed    CHOLECYSTECTOMY      COLONOSCOPY      EYE SURGERY Right     TORN RETINA, LASER    HERNIA REPAIR      HIP ARTHROPLASTY Right 08/08/2016    HIP FRACTURE SURGERY Right     JOINT REPLACEMENT      OTHER SURGICAL HISTORY  05/18/2011 right hip IM nailing    SHOULDER SURGERY Right rotater cuff    TONGUE BIOPSY  10/2010       Medications Prior to Admission:    Prior to Admission medications    Medication Sig Start Date End Date Taking? Authorizing Provider   levothyroxine (SYNTHROID) 125 MCG tablet  4/23/21   Historical Provider, MD   omeprazole (PRILOSEC) 40 MG delayed release capsule  6/8/21   Historical Provider, MD   simvastatin (ZOCOR) 20 MG tablet  4/23/21   Historical Provider, MD   polyethylene glycol (GLYCOLAX) 17 GM/SCOOP powder Use as directed for colonoscopy prep 6/9/21   Sabina Brown MD   Sennosides (SENNA) 8.8 MG/5ML LIQD Take 5 mLs by mouth daily 6/3/21   Neisha Barbosa, APRN - CNP   albuterol sulfate  (90 Base) MCG/ACT inhaler INHALE 2 PUFFS INTO THE LUNGS 4 TIMES DAILY AS NEEDED (PRIOR TO ACTIVITY THAT CAUSES SHORTNESS OF BREATH) 11/16/20   Dhruv Pino MD   fluticasone [facility]) 50 MCG/ACT nasal spray 2 sprays by Nasal route daily 5/11/20   Historical Provider, MD       Allergies:  Tamsulosin hcl and Tetanus toxoids    Social History:    TOBACCO:   reports that he quit smoking about 51 years ago. He has never used smokeless tobacco.  ETOH:   reports that he does not currently use alcohol. Family History:  Reviewed in detail and negative for DM, Early CAD, Cancer (except as below). Positive as follows:        Problem Relation Age of Onset    Cancer Mother     Depression Mother     High Blood Pressure Mother     Heart Disease Father     Stroke Maternal Grandmother     Diabetes Other        REVIEW OF SYSTEMS:   Pertinent positives/negatives as follows: SOB, aspiration, N/V, cough, fevers, generalized weakness, and as discussed in HPI, otherwise a complete ROS performed and all other systems are negative. PHYSICAL EXAM PERFORMED:  BP (!) 78/55   Pulse 86   Temp (!) 100.7 °F (38.2 °C) (Oral)   Resp 20   Ht 6' 2\" (1.88 m)   Wt 148 lb (67.1 kg)   SpO2 99%   BMI 19.00 kg/m²   GEN:  A&Ox3, NAD. R IJ in place. HEENT:  NC/AT, MMM, no erythema/exudates or visible masses. CVS:  Normal S1,S2. RRR. Without M/G/R.   LUNG:   Frequent cough w/ insp. Bibasilar rales. No wheezes or rhonchi. Mild tachypnea. ABD:  Soft, ND/NT, BS+ x4. Without G/R.  PEG in place. EXT: 2+ pulses, no c/c/e. Brisk cap refill. PSY:  Thought process intact, affect appropriate. KINJAL:  CN III-XII grossly intact. Moves all 4 spontaneously. Sensory grossly intact. SKIN: No rash or lesions on visible skin. Chart review shows recent radiographs:  XR CHEST PORTABLE    Result Date: 9/15/2022  EXAMINATION: ONE XRAY VIEW OF THE CHEST 9/15/2022 10:42 pm COMPARISON: 06/17/2022 HISTORY: ORDERING SYSTEM PROVIDED HISTORY: aspiration, sob, cough TECHNOLOGIST PROVIDED HISTORY: Reason for exam:->aspiration, sob, cough Reason for Exam: aspiration, sob, cough FINDINGS: Heart size is within normal limits for technique. No pleural effusion or pneumothorax is seen. There are airspace opacities in the lower lungs, right greater than left. There is scarring at the lung apices. New airspace opacities in the lower lungs, right greater than left, compatible with infection. RECOMMENDATION: Follow-up radiographs are recommended to ensure resolution after treatment.        EKG:    EKG 12 Lead [[id number]]    Collected: 09/15/22 2259    Updated: 09/15/22 2259     Ventricular Rate 111 BPM    Atrial Rate 111 BPM    P-R Interval 136 ms    QRS Duration 84 ms    Q-T Interval 330 ms    QTc Calculation (Bazett) 448 ms    P Axis 59 degrees    R Axis -15 degrees    T Axis 51 degrees    Diagnosis Sinus tachycardiaOtherwise normal ECGWhen compared with ECG of 17-JUN-2022 17:13,No significant change was found       CBC:  Recent Labs     09/15/22  2238   WBC 4.0   HGB 13.7   HCT 41.8         RENAL  Recent Labs     09/15/22  2238   *   K 4.2   CL 94*   CO2 26   BUN 27*   CREATININE 0.8   GLUCOSE 156*     LFT'S:  Recent Labs     09/15/22  2238   AST 21   ALT 19 BILITOT 0.8   ALKPHOS 88     COAG:  Recent Labs     09/15/22  2238   INR 1.09     CARDIAC ENZYMES:   Recent Labs     09/15/22  2238   TROPONINI <0.01     Lab Results   Component Value Date    PROBNP 225 12/07/2020    PROBNP 148 09/14/2020     LACTIC ACID:  Recent Labs     09/15/22  2238   LACTA 3.2*     U/A:  Recent Labs     09/15/22  2240   LEUKOCYTESUR Negative   BACTERIA Rare*   WBCUA 3-5   COLORU Yellow   RBCUA 3-4   MUCUS 1+*   CLARITYU Clear   SPECGRAV 1.015   BLOODU Negative   GLUCOSEU Negative   AMORPHOUS 1+     PHYSICIAN CERTIFICATION  I certify that Basilia Vale is expected to be hospitalized for 2 midnights based on the following assessment and plan:    ASSESSMENT/PLAN:  Septic shock (BP, HR, RR, temp; Lact 3.2). Likey 2/2 asp PNA. IV levophed. ED placing central line. IV vanco, cefepime and metronidazole. F/u cx. Intensivist c/s. Acute respiratory failure with hypoxia, likely related to asp PNA, supplemental O2 to maintain SPO2 ? 92%, continuous pulse ox. Satting 80's on RA in the ER. Currently requiring 2 L O2. Patient normally not on O2 at home. Wean as tolerated. Aspiration PNA, bibasilar, R>L. Hx of same. Hx same. Takes all Rx and nourishment via PEG. IBD, Chk resp Cx. ABX as above. Lactic acidosis, 3.2. IVF and repeats ordered. Hx GERD, PPI converted to IV. Hx carcinoma to base of tongue w/ resulting dysphagia, PEG placed ~2 yrs ago. Rapid COVID neg. DVT Prophylaxis: Lx  Diet: NPO, strict  Code Status: Full Code   PT/OT Eval Status: Will order if needed and as patient condition allows  Dispo - Admit to inpatient ICU    Total critical care time caring for this patient with life threatening, unstable organ failure, including direct patient contact, management of life support systems, review of data including imaging and labs, discussions with other team members and physicians is 34 minutes so far today, excluding procedures.       Magdalena Cheney MD    Thank you Kathy Waggoner, DO for the opportunity to be involved in this patient's care. If you have any questions or concerns please feel free to contact me via the [employer] at [phone]. This chart was generated using the [address] [place] [address] [address] dictation system. I created this record but it may contain dictation errors given the limitations of this technology.

## 2022-09-16 NOTE — PROGRESS NOTES
Comprehensive Nutrition Assessment    Type and Reason for Visit:  Initial, Positive Nutrition Screen (+ screen for home EN regimen)    Nutrition Recommendations/Plan:   Start TF today - TF recommendations, per patient's home regimen - ADULT TUBE FEEDING; PEG tube; Other formula - Osmolite 1.5 x 4 bolus feeds of 237 ml each via Kangaroo pump (10 am, noon, 2 pm, and 4 pm) + Standard with Fiber formula - Jevity 1.5 x 2 bolus feeds of 237 ml each via syringe push (7 am and ~ 5:30 pm). Water flushes, 50 ml before and after each bolus feeding. Monitor TF intake and tolerance + water flushes. Monitor GI status and plan of care. Please re-check patient's weight since CBW is higher than previous weights in hx. Monitor nutrition-related labs, bowel function, and weight trends. Malnutrition Assessment:  Malnutrition Status:   At risk for malnutrition (09/16/22 1119)    Context:  Acute Illness     Findings of the 6 clinical characteristics of malnutrition:  Energy Intake:  Mild decrease in energy intake   Weight Loss:  Unable to assess     Body Fat Loss:  Unable to assess (patient was visiting with his )     Muscle Mass Loss:  Unable to assess (patient was visiting with his )    Fluid Accumulation:  No significant fluid accumulation     Strength:  Not Performed    Nutrition Assessment:    patient is nutritionally compromised AEB N/V PTA and need for EN as sole source of nutrition and he is at risk for further compromise d/t altered nutrition-related labs and need for EN as sole source of nutrition r/t hx of dysphagia r/t hx of carcinoma at the base of his tongue s/p chemoradiation and PEG tube placement ~ 2 years ago; will start patient's home EN regimen today    Nutrition Related Findings:    patient is A & O x 4; patient was sitting up in bed this am and his  from [facility] had just arrived to visit with patient when this RD entered his room for initial assessment; patient had PEG tube placed ~ 2 years ago in [facility], per past notes; patient has a hx of carcinoma at the base of the tongue and dysphagia as a result; he had chemoradiation completed for carcinoma; patient is indepedent at home; he is from home where he lives with his spouse; patient has an EN regimen that he follows closely at home; patient presented with c/o vomiting + TF formula in his tube and vomit from his nose and mouth; last BM was on 9/16/22; + NS at 150 ml/hr currently infusing Wound Type: None       Current Nutrition Intake & Therapies:    Average Meal Intake: NPO  Average Supplements Intake: NPO  Current Tube Feeding (TF) Orders:  Feeding Route: PEG  Formula: Other Tube Feeding (Comment) (Osmolite 1.5 and Jevity 1.5)  Schedule: Continuous  Feeding Regimen: Osmolite 1.5 x 4 bolus feeds of 237 ml each (10 am, noon, 2 pm, and 4 pm) + Jevity 1.5 x 2 bolus feeds of 237 ml each (7 am and ~ 5:30 am)  Additives/Modulars: None  Water Flushes: 50 ml water flushes before and after each bolus feeding  Current TF & Flush Orders Provides: TF started today  Goal TF & Flush Orders Provides: Osmolite 1.5 x 4 bolus feeds of 237 ml each = 948 ml TV, 1422 kcals, 59 g protein, and 722 ml free water + Jevity 1.5 x 2 bolus feeds of 237 ml each = 474 ml TV, 711 kcals, 30 g protein, and 360 ml free water (1422 ml TV, 2133 kcals, 89 g protein, and 1082 ml free water total)    Anthropometric Measures:  Height: 6' 2\" (188 cm)  Ideal Body Weight (IBW): 190 lbs (86 kg)    Admission Body Weight: 148 lb (67.1 kg) (obtained on 9/15/22; weight method not specified)  Current Body Weight: 171 lb 1.2 oz (77.6 kg) (obtained on 9/16/22; actual weight, per chart, but question the accuracy of this weight since CBW is higher than weights in patient's hx), 90 % IBW.  Weight Source: Bed Scale  Current BMI (kg/m2): 22  Usual Body Weight: 139 lb 2 oz (63.1 kg) (obtained on 6/11/21; actual weight)  % Weight Change (Calculated): 23  Weight Adjustment For: No Adjustment BMI Categories: Underweight (BMI less than 22) age over 72    Estimated Daily Nutrient Needs:  Energy Requirements Based On: Kcal/kg  Weight Used for Energy Requirements: Current  Energy (kcal/day): 2184 - 7578 kcals based on 28-30 kcals/kg/CBW  Weight Used for Protein Requirements: Current  Protein (g/day): 94 - 117 g protein based on 1.2-1.5 g/kg/CBW  Method Used for Fluid Requirements: 1 ml/kcal  Fluid (ml/day): 2184 - 2340 ml    Nutrition Diagnosis:   Inadequate oral intake related to inadequate protein-energy intake, swallowing difficulty, altered GI function as evidenced by NPO or clear liquid status due to medical condition, nutrition support - enteral nutrition, GI abnormality, vomiting, nausea, lab values, swallow study results    Nutrition Interventions:   Food and/or Nutrient Delivery: Continue NPO, Start Tube Feeding  Nutrition Education/Counseling: No recommendation at this time  Coordination of Nutrition Care: Continue to monitor while inpatient, Interdisciplinary Rounds  Plan of Care discussed with: ICU Team    Goals:     Goals: Initiate nutrition support       Nutrition Monitoring and Evaluation:   Behavioral-Environmental Outcomes: None Identified  Food/Nutrient Intake Outcomes: Enteral Nutrition Intake/Tolerance, IVF Intake  Physical Signs/Symptoms Outcomes: Biochemical Data, GI Status, Nausea or Vomiting, Nutrition Focused Physical Findings, Skin, Weight    Discharge Planning:    Enteral Nutrition     Gopal Estrada, [address] [place] [address],   Contact: [phone]

## 2022-09-16 NOTE — PROGRESS NOTES
End of shift note. Levophed continues, telemetry continues NSR. Right IJ central line dry/intact. PEG intact, tube feeding initiated and patient is tolerating. Patient is voiding per urinal at bedside.

## 2022-09-16 NOTE — ED NOTES
Dr. Sarah lizama served at [facility]  09/16/22 0006

## 2022-09-16 NOTE — CONSULTS
Pharmacy Note  Vancomycin Consult    Marcela Carlos is a 68 y.o. male with a PEG tube, started on Vancomycin for gram positive coverage for aspiration pneumonia; consult received from Dr. Jigna Perrin to manage therapy. Also receiving the following antibiotics: metronidazole & cefepime. Allergies:  Tamsulosin hcl and Tetanus toxoids     Tmax: 100.7    Recent Labs     09/15/22  2238   CREATININE 0.8       Recent Labs     09/15/22  2238   WBC 4.0       Estimated Creatinine Clearance: 85 mL/min (based on SCr of 0.8 mg/dL). Intake/Output Summary (Last 24 hours) at 9/16/2022 0420  Last data filed at 9/16/2022 0346  Gross per 24 hour   Intake 387.28 ml   Output 0 ml   Net 387.28 ml       Wt Readings from Last 1 Encounters:   09/16/22 171 lb 1.2 oz (77.6 kg)         Body mass index is 21.96 kg/m². Culture Date      Source                       Results      Loading dose (critically ill or in ICU, require dialysis or renal replacement therapy): Vancomycin 25 mg/kg IVPB x 1 (maximum 2500 mg). Maintenance dose: 15 mg/kg (maximum: 2000 mg/dose and 4500 mg/day) starting at the next dosing interval determined by renal function  Pulse dose: fluctuating renal function, NEMESIO, ESRD   Goal Vancomycin trough: 10-15 mcg/mL or 15-20 mcg/mL   Goal Vancomycin AUC: 400-600     Assessment/Plan:  Will initiate Vancomycin with a one time loading dose of 1500 mg x1, followed by 750 mg IV every 12 hours. Calculated . Vancomycin level ordered for 9/17 @ 0300. Insight Summary:    Loading dose: 1500 mg at 04:10 09/16/2022. Regimen: 750 mg IV every 12 hours. Start time: 04:37 on 09/16/2022  Exposure target: AUC24 (range)400-600 mg/L.hr   AUC24,ss: 435 mg/L.hr  Probability of AUC24 > 400: 59 %  Ctrough,ss: 14.2 mg/L  Probability of Ctrough,ss > 20: 21 %  Probability of nephrotoxicity (Lodise SILVANO 2009): 9 %      Thank you for the consult.

## 2022-09-16 NOTE — ED NOTES
4045  Two (2) sets of blood cultures were collected from this patient, from two (2) different sites in an aseptic manner. Each site was cleansed utilizing a Chloroprep for 30 seconds, allowing 30 seconds of drying time. The top of each aerobic and anaerobic bottle was cleaned for one minute with an new alcohol prep pads, and allowed to dry for one minute.      Willow Carrero  09/15/22 3583

## 2022-09-16 NOTE — PROGRESS NOTES
RT Inhaler-Nebulizer Bronchodilator Protocol Note    There is a bronchodilator order in the chart from a provider indicating to follow the RT Bronchodilator Protocol and there is an Initiate RT Inhaler-Nebulizer Bronchodilator Protocol order as well (see protocol at bottom of note). CXR Findings:  XR CHEST PORTABLE    Result Date: 9/16/2022  Worsening bibasilar infiltrates. Interval placement of a right jugular central venous catheter with the tip overlying the cavoatrial junction. No pneumothorax. XR CHEST PORTABLE    Result Date: 9/15/2022  New airspace opacities in the lower lungs, right greater than left, compatible with infection. RECOMMENDATION: Follow-up radiographs are recommended to ensure resolution after treatment. The findings from the last RT Protocol Assessment were as follows:   History Pulmonary Disease: Smoker 15 pack years or more  Respiratory Pattern: Dyspnea on exertion or RR 21-25 bpm  Breath Sounds: Slightly diminished and/or crackles  Cough: Weak, non-productive  Indication for Bronchodilator Therapy: Decreased or absent breath sounds  Bronchodilator Assessment Score: 5    Aerosolized bronchodilator medication orders have been revised according to the RT Inhaler-Nebulizer Bronchodilator Protocol below. Respiratory Therapist to perform RT Therapy Protocol Assessment initially then follow the protocol. Repeat RT Therapy Protocol Assessment PRN for score 0-3 or on second treatment, BID, and PRN for scores above 3. No Indications - adjust the frequency to every 6 hours PRN wheezing or bronchospasm, if no treatments needed after 48 hours then discontinue using Per Protocol order mode. If indication present, adjust the RT bronchodilator orders based on the Bronchodilator Assessment Score as indicated below.   Use Inhaler orders unless patient has one or more of the following: on home nebulizer, not able to hold breath for 10 seconds, is not alert and oriented, cannot activate and use MDI correctly, or respiratory rate 25 breaths per minute or more, then use the equivalent nebulizer order(s) with same Frequency and PRN reasons based on the score. If a patient is on this medication at home then do not decrease Frequency below that used at home. 0-3 - enter or revise RT bronchodilator order(s) to equivalent RT Bronchodilator order with Frequency of every 4 hours PRN for wheezing or increased work of breathing using Per Protocol order mode. 4-6 - enter or revise RT Bronchodilator order(s) to two equivalent RT bronchodilator orders with one order with BID Frequency and one order with Frequency of every 4 hours PRN wheezing or increased work of breathing using Per Protocol order mode. 7-10 - enter or revise RT Bronchodilator order(s) to two equivalent RT bronchodilator orders with one order with TID Frequency and one order with Frequency of every 4 hours PRN wheezing or increased work of breathing using Per Protocol order mode. 11-13 - enter or revise RT Bronchodilator order(s) to one equivalent RT bronchodilator order with QID Frequency and an Albuterol order with Frequency of every 4 hours PRN wheezing or increased work of breathing using Per Protocol order mode. Greater than 13 - enter or revise RT Bronchodilator order(s) to one equivalent RT bronchodilator order with every 4 hours Frequency and an Albuterol order with Frequency of every 2 hours PRN wheezing or increased work of breathing using Per Protocol order mode.        Electronically signed by Marianne Ocampo RCP on 9/16/2022 at 5:47 AM

## 2022-09-16 NOTE — CONSULTS
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 He has had similar episodes with aspiration pneumonia. PAST MEDICAL HISTORY:  Past Medical History:   Diagnosis Date    Arthritis     Benign hypertrophy of prostate     Cancer ([address]     growth on tongue    COVID-19 12/07/2020    Dry mouth     s/p radiation treatment    DVT, lower extremity ([address]     5/2011    GERD (gastroesophageal reflux disease)     acid reflux    Hip fracture ([facility] [address]     5/2011, RIGHT    Hx of blood clots     Hyperlipidemia     Pneumonia 6/2/2011    Prolonged emergence from general anesthesia     Thyroid disease      PAST SURGICAL HISTORY:  Past Surgical History:   Procedure Laterality Date    ABDOMEN SURGERY      gallbladder removed    CHOLECYSTECTOMY      COLONOSCOPY      EYE SURGERY Right     TORN RETINA, LASER    HERNIA REPAIR      HIP ARTHROPLASTY Right 08/08/2016    HIP FRACTURE SURGERY Right     JOINT REPLACEMENT      OTHER SURGICAL HISTORY  05/18/2011    right hip IM nailing    SHOULDER SURGERY Right rotater cuff    TONGUE BIOPSY  10/2010       FAMILY HISTORY:  family history includes Cancer in his mother; Depression in his mother; Diabetes in an other family member; Heart Disease in his father; High Blood Pressure in his mother; Stroke in his maternal grandmother. SOCIAL HISTORY:   reports that he quit smoking about 51 years ago.  He has never used smokeless tobacco.    Scheduled Meds:   cefepime  2,000 mg IntraVENous Q12H    metroNIDAZOLE  500 mg IntraVENous Q8H    sodium chloride flush  5-40 mL IntraVENous 2 times per day    enoxaparin  40 mg SubCUTAneous Daily    pantoprazole 40 mg IntraVENous Daily    vancomycin  750 mg IntraVENous Q12H    ipratropium-albuterol  1 ampule Inhalation BID     Continuous Infusions:   norepinephrine 6 mcg/min (09/16/22 0612)    sodium chloride      sodium chloride Stopped (09/16/22 0410)     PRN Meds:  sodium chloride flush, sodium chloride, acetaminophen, morphine, prochlorperazine, albuterol    ALLERGIES:  Patient is allergic to tamsulosin hcl, amoxicillin-pot clavulanate, and tetanus toxoids. REVIEW OF SYSTEMS:  Constitutional: Negative for fever  HENT: Negative for sore throat  Eyes: Negative for redness   Respiratory: + for dyspnea, cough  Cardiovascular: Negative for chest pain  Gastrointestinal: Emesis of tube feeds  Genitourinary: Negative for hematuria   Musculoskeletal: Negative for arthralgias   Skin: Negative for rash  Neurological: Negative for syncope  Hematological: Negative for adenopathy  Psychiatric/Behavorial: Negative for anxiety    PHYSICAL EXAM:  Blood pressure (!) 86/56, pulse 76, temperature 98.1 °F (36.7 °C), temperature source Oral, resp. rate 21, height 6' 2\" (1.88 m), weight 171 lb 1.2 oz (77.6 kg), SpO2 94 %.' on 1 L  General: ill appearing. Eyes: PERRL. No sclera icterus. No conjunctival injection. ENT: No discharge. Pharynx clear. Neck: Trachea midline. Normal thyroid. Resp: No accessory muscle use. + crackles. No wheezing. No rhonchi. No dullness on percussion. CV: Regular rate. Regular rhythm. No mumur or rub. No edema. Peripheral pulses are 2+. Capillary refill is less than 3 seconds. GI: Non-tender. Non-distended. No masses. No organomegaly. Normal bowel sounds. No hernia. Skin: Warm and dry. No nodule on exposed extremities. No rash on exposed extremities. Lymph: No cervical LAD. No supraclavicular LAD. M/S: No cyanosis. No joint deformity. No clubbing. Neuro: A&OX3. Patellar reflexes are symmetric. Psych: No agitation, no anxiety, affect is full.      LABS:  CBC:   Recent Labs     09/15/22  6768 09/16/22 0417   WBC 4.0 5.9   HGB 13.7 11.8*   HCT 41.8 34.5*   MCV 82.7 83.3    154     BMP:   Recent Labs     09/15/22  2238 09/16/22  0417   * 136   K 4.2 4.2   CL 94* 101   CO2 26 27   BUN 27* 22*   CREATININE 0.8 0.7*     LIVER PROFILE:   Recent Labs     09/15/22  2238 09/16/22  0417   AST 21 64*   ALT 19 58*   BILITOT 0.8 0.5   ALKPHOS 88 72     PT/INR:   Recent Labs     09/15/22  2238   PROTIME 13.9   INR 1.09     APTT:   Recent Labs     09/15/22  2238   APTT 26.1     UA:  Recent Labs     09/15/22  2240   COLORU Yellow   PHUR 8.0   WBCUA 3-5   RBCUA 3-4   MUCUS 1+*   BACTERIA Rare*   CLARITYU Clear   SPECGRAV 1.015   LEUKOCYTESUR Negative   UROBILINOGEN 2.0*   BILIRUBINUR Negative   BLOODU Negative   GLUCOSEU Negative   AMORPHOUS 1+     No results for input(s): PHART, [id number], PO2ART in the last 72 hours. Cultures:      9/15/2022 SARS-CoV-2 negative  9/15/2022 blood sent    Chest imaging was reviewed by me and showed:   CXR 9/16/2022 patchy bilateral airspace disease, increased in the right lower lobe    ASSESSMENT:  Septic shock  Aspiration pneumonia  Chronic dysphagia s/p PEG - 2/2 tongue carcinoma   Emesis of tube feeds      PLAN:  IV fluid resuscitation with crystalloid    Cefepime, Flagyl and vancomycin day #1, change to unasyn and vanc  IV levophed to maintain MAP of 65  Follow up on cultures  Follow electrolytes  Blood sugar control, with goal 140-180   DNR-limited      Total critical care time caring for this patient with life threatening, unstable organ failure, including direct patient contact, management of life support systems, review of data including imaging and labs, discussions with other team members and physicians is 31 minutes so far today, excluding procedures.

## 2022-09-16 NOTE — PROGRESS NOTES
RT Inhaler-Nebulizer Bronchodilator Protocol Note    There is a bronchodilator order in the chart from a provider indicating to follow the RT Bronchodilator Protocol and there is an Initiate RT Inhaler-Nebulizer Bronchodilator Protocol order as well (see protocol at bottom of note). CXR Findings:  XR CHEST PORTABLE    Result Date: 9/16/2022  New airspace opacities in the lower lungs, right greater than left, compatible with infection. RECOMMENDATION: Follow-up radiographs are recommended to ensure resolution after treatment. XR CHEST PORTABLE    Result Date: 9/16/2022  Worsening bibasilar infiltrates. Interval placement of a right jugular central venous catheter with the tip overlying the cavoatrial junction. No pneumothorax. The findings from the last RT Protocol Assessment were as follows:   History Pulmonary Disease: Smoker 15 pack years or more  Respiratory Pattern: Dyspnea on exertion or RR 21-25 bpm  Breath Sounds: Slightly diminished and/or crackles  Cough: Strong, productive  Indication for Bronchodilator Therapy: Decreased or absent breath sounds  Bronchodilator Assessment Score: 6    Aerosolized bronchodilator medication orders have been revised according to the RT Inhaler-Nebulizer Bronchodilator Protocol below. Respiratory Therapist to perform RT Therapy Protocol Assessment initially then follow the protocol. Repeat RT Therapy Protocol Assessment PRN for score 0-3 or on second treatment, BID, and PRN for scores above 3. No Indications - adjust the frequency to every 6 hours PRN wheezing or bronchospasm, if no treatments needed after 48 hours then discontinue using Per Protocol order mode. If indication present, adjust the RT bronchodilator orders based on the Bronchodilator Assessment Score as indicated below.   Use Inhaler orders unless patient has one or more of the following: on home nebulizer, not able to hold breath for 10 seconds, is not alert and oriented, cannot activate and use MDI correctly, or respiratory rate 25 breaths per minute or more, then use the equivalent nebulizer order(s) with same Frequency and PRN reasons based on the score. If a patient is on this medication at home then do not decrease Frequency below that used at home. 0-3 - enter or revise RT bronchodilator order(s) to equivalent RT Bronchodilator order with Frequency of every 4 hours PRN for wheezing or increased work of breathing using Per Protocol order mode. 4-6 - enter or revise RT Bronchodilator order(s) to two equivalent RT bronchodilator orders with one order with BID Frequency and one order with Frequency of every 4 hours PRN wheezing or increased work of breathing using Per Protocol order mode. 7-10 - enter or revise RT Bronchodilator order(s) to two equivalent RT bronchodilator orders with one order with TID Frequency and one order with Frequency of every 4 hours PRN wheezing or increased work of breathing using Per Protocol order mode. 11-13 - enter or revise RT Bronchodilator order(s) to one equivalent RT bronchodilator order with QID Frequency and an Albuterol order with Frequency of every 4 hours PRN wheezing or increased work of breathing using Per Protocol order mode. Greater than 13 - enter or revise RT Bronchodilator order(s) to one equivalent RT bronchodilator order with every 4 hours Frequency and an Albuterol order with Frequency of every 2 hours PRN wheezing or increased work of breathing using Per Protocol order mode.        Electronically signed by Sherin Swan RCP on 9/16/2022 at 7:24 PM

## 2022-09-16 NOTE — ED NOTES
2300  12 lead ECG completed, given to Dr. Jennifer Meng for review     [facility]  09/15/22 1871

## 2022-09-16 NOTE — PROGRESS NOTES
Navin Angel called to speak with Ying Castaneda regarding insurance approval for upcoming procedure and update regarding pt . Please advise [phone]. RT Nebulizer Bronchodilator Protocol Note    There is a bronchodilator order in the chart from a provider indicating to follow the RT Bronchodilator Protocol and there is an Initiate RT Bronchodilator Protocol order as well (see protocol at bottom of note). CXR Findings:  XR CHEST PORTABLE    Result Date: 9/16/2022  Worsening bibasilar infiltrates. Interval placement of a right jugular central venous catheter with the tip overlying the cavoatrial junction. No pneumothorax. XR CHEST PORTABLE    Result Date: 9/15/2022  New airspace opacities in the lower lungs, right greater than left, compatible with infection. RECOMMENDATION: Follow-up radiographs are recommended to ensure resolution after treatment. The findings from the last RT Protocol Assessment were as follows:  Smoking: (P) Smoker 15 pack years or more  Respiratory Pattern: (P) Regular pattern and RR 12-20 bpm  Breath Sounds: (P) Slightly diminished and/or crackles  Cough: (P) Strong, productive  Indication for Bronchodilator Therapy: (P) Decreased or absent breath sounds  Bronchodilator Assessment Score: (P) 4    Aerosolized bronchodilator medication orders have been revised according to the RT Nebulizer Bronchodilator Protocol below. Respiratory Therapist to perform RT Therapy Protocol Assessment initially then follow the protocol. Repeat RT Therapy Protocol Assessment PRN for score 0-3 or on second treatment, BID, and PRN for scores above 3. No Indications - adjust the frequency to every 6 hours PRN wheezing or bronchospasm, if no treatments needed after 48 hours then discontinue using Per Protocol order mode. If indication present, adjust the RT bronchodilator orders based on the Bronchodilator Assessment Score as indicated below. If a patient is on this medication at home then do not decrease Frequency below that used at home.     0-3 - enter or revise RT bronchodilator order(s) to equivalent RT Bronchodilator order with Frequency of every 4 hours PRN for wheezing or increased work of breathing using Per Protocol order mode. 4-6 - enter or revise RT Bronchodilator order(s) to two equivalent RT bronchodilator orders with one order with BID Frequency and one order with Frequency of every 4 hours PRN wheezing or increased work of breathing using Per Protocol order mode. 7-10 - enter or revise RT Bronchodilator order(s) to two equivalent RT bronchodilator orders with one order with TID Frequency and one order with Frequency of every 4 hours PRN wheezing or increased work of breathing using Per Protocol order mode. 11-13 - enter or revise RT Bronchodilator order(s) to one equivalent RT bronchodilator order with QID Frequency and an Albuterol order with Frequency of every 4 hours PRN wheezing or increased work of breathing using Per Protocol order mode. Greater than 13 - enter or revise RT Bronchodilator order(s) to one equivalent RT bronchodilator order with every 4 hours Frequency and an Albuterol order with Frequency of every 2 hours PRN wheezing or increased work of breathing using Per Protocol order mode. RT to enter RT Home Evaluation for COPD & MDI Assessment order using Per Protocol order mode.     Electronically signed by Isa Montana RCP on 9/16/2022 at 12:01 PM

## 2022-09-16 NOTE — ED NOTES
8158  Saline lock established via 18 gauge angiocath in right forearm. Good blood return noted, with labs drawn. Site flushed easily, with no swelling of adjacent tissue noted.      Piero Moctezuma  09/15/22 1633

## 2022-09-16 NOTE — PROGRESS NOTES
4 Eyes Skin Assessment     The patient is being assess for   Admission    I agree that 2 RN's have performed a thorough Head to Toe Skin Assessment on the patient. ALL assessment sites listed below have been assessed. Areas assessed by both nurses:   [x]   Head, Face, and Ears   [x]   Shoulders, Back, and Chest, Abdomen  []   Arms, Elbows, and Hands   [x]   Coccyx, Sacrum, and Ischium  [x]   Legs, Feet, and Heels        Blanchable redness on coccyx, foam dressing placed. Co-signer eSignature: Electronically signed by Geraldo Villatoro RN on 9/16/22 at 5:37 AM EDT    Does the Patient have Skin Breakdown?   No          Eugene Prevention initiated:  Yes   Wound Care Orders initiated:  N/A      WOC nurse consulted for Pressure Injury (Stage 3,4, Unstageable, DTI, NWPT, Complex wounds)and New or Established Ostomies:  N/A      Primary Nurse eSignature: Electronically signed by Tasha Acosta RN on 9/16/22 at 5:30 AM EDT

## 2022-09-16 NOTE — PROGRESS NOTES
4 Eyes Skin Assessment     The patient is being assess for   Shift Handoff    I agree that 2 RN's have performed a thorough Head to Toe Skin Assessment on the patient. ALL assessment sites listed below have been assessed. Areas assessed for pressure by both nurses:   [x]   Head, Face, and Ears   [x]   Shoulders, Back, and Chest, Abdomen  [x]   Arms, Elbows, and Hands   [x]   Coccyx, Sacrum, and Ischium  [x]   Legs, Feet, and Heels        Skin Assessed Under all Medical Devices by both nurses:  Gastrostomy tube    (pink/red around it) Pt states he puts peroxide around it daily (advised that this wasn't a good idea, just keep it dry)     All Mepilex Borders were peeled back and area peeked at by both nurses:  Yes  Please list where Mepilex Borders are located:  NA             **SHARE this note so that the co-signing nurse is able to place an eSignature**    Co-signer eSignature: Electronically signed by Bob Santos RN on 9/16/22 at 4:30 PM EDT    Does the Patient have Skin Breakdown related to pressure?   No     Eugene Prevention initiated:  No   Wound Care Orders initiated:  No      C nurse consulted for Pressure Injury (Stage 3,4, Unstageable, DTI, NWPT, Complex wounds)and New or Established Ostomies:  No      Primary Nurse eSignature: Electronically signed by Wilbur Escalera RN on 9/16/22 at 4:30 PM EDT

## 2022-09-16 NOTE — CARE COORDINATION
Case Management Assessment  Initial Evaluation      Patient Name: Scott Sidhu  YOB: 1945  Diagnosis: Acute respiratory failure with hypoxia ([facility] [J96.01]  Septic shock ([facility] [facility] [A41.9, R65.21]  Aspiration pneumonia of both lower lobes due to gastric secretions ([facility] [facility] [J69.0]  Sepsis with acute hypoxic respiratory failure without septic shock, due to unspecified organism ([facility] [facility] [A41.9, R65.20, J96.01]  Date / Time: 9/15/2022 10:05 PM    Admission status/Date:09/15/2022 Inpatient   Chart Reviewed: Yes      Patient Interviewed: Yes   Family Interviewed:  No      Hospitalization in the last 30 days:  No    Health Care Decision Maker :   Primary Decision Maker: Wendy Valverde - Spouse - [phone]    Secondary Decision Maker: Ronna Solano - Child - [phone]    (CM - must 1st enter selection under Navigator - emergency contact- [place] Relationship and pick relationship)   Who do you trust or have selected to make healthcare decisions for you    Met with:  pt   Interview conducted  (bedside/phone): bedside    Current PCP: Tamara Noel,     Financial  Medicare and [place] [facility]  Precert required for SNF :  N          3 night stay required -  [employer]  Support Systems/Care Needs: Spouse/Significant Other  Transportation:  family     Meal Preparation: family    Housing  Living Arrangements: pt lives at home with his wife  Steps: 2  Intent for return to present living arrangements: Yes  Identified Issues: Inderjit Nvaarro  Active with [address] : No Agency:(Services)  Type of Home Care Services: None  Passport/Waiver : No  :                      Phone Number:    Passport/Waiver Services: n/a          Durable Medical Equiptment   DME Provider: n/a  Equipment:   Walker___Cane_x (for support)__RTS___ BSC___Shower Chair___Hospital Bed___W/C____Other________  02 at ____Liter(s)---wears(frequency)_______ [facility] ___ CPAP___ BiPap___   N/A____    Home O2 Use :  No    If No for home O2---if presently on O2 during hospitalization:  Yes  if yes CM to follow for potential DC O2 need    Community Service Affiliation  Dialysis:  No    Agency:  Location:  Dialysis Schedule:  Phone:   Fax: Other Community Services: n/a    DISCHARGE PLAN: Explained Case Management role/services. Chart review completed. Met with pt at bedside. Pt stated he is independent at home and will return when discharged. He stated that he will have a ride home and does not anticipate needing skilled [facility] for RN/PT/OT. CM will follow. Please notify CM if needs or concerns arise.      Aman Moore MSW, BRYANTW-S

## 2022-09-16 NOTE — PROGRESS NOTES
Patient admitted to ICU for hypotension/sepsis. Telemetry monitor hooked up to patient, SR on monitor. Breathing pattern appears unlabored, lung sounds diminished. Admitted to ICU on 2 LPM NC, weaned down to RA. Mental status appears A&O x4. BUE strength is weak, BLE strength is weak. PERRLA. Abdomen appears soft with PEG in place, bowel sounds active. IV access is PIV and R IJ CVC. Voids per urinal.  No presence of edema. Patient is not able to demonstrate the ability to move from a reclining position to an upright position within the recliner due to current medical condition. CHG bath provided for pt. Levo infusing at 5 mcg/min. NS started at 150 ml/hr, abx infusing. AM labs collected via IJ.

## 2022-09-16 NOTE — ED PROVIDER NOTES
[address] ED  EMERGENCY DEPARTMENT ENCOUNTER      Pt Name: Vasiliy Simeon  MRN: [id number]  [place] 1945  Date of evaluation: 9/15/2022  Provider: Chantal Garcia MD    CHIEF COMPLAINT       Chief Complaint   Patient presents with    Aspiration     Patient has long history of GI issues has PEG tube and wife says he has been vomiting his tube out his mouth and nose. HISTORY OF PRESENT ILLNESS   (Location/Symptom, Timing/Onset, Context/Setting, Quality, Duration, Modifying Factors, Severity)  Note limiting factors. Vasiliy Simeon is a 68 y.o. 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 States he gets all of his nutrition through his PEG tube and notes that he has been vomiting up some tube feeds on occasion. Denies any abdominal pain. He is concerned he may have aspirated some of this. Notes he has had a cough but no chest pain. Does note some shortness of breath. Has been having intermittent fevers. No diarrhea. Mild nausea. Denies dysuria or hematuria. HPI    Nursing Notes were reviewed. REVIEW OF SYSTEMS    (2-9 systems for level 4, 10 or more for level 5)     Review of Systems    Please see HPI for pertinent positive and negative review of system findings. A full 10 system ROS was performed and otherwise negative.         PAST MEDICAL HISTORY     Past Medical History:   Diagnosis Date    Arthritis     Benign hypertrophy of prostate     Cancer ([facility] [facility]     growth on tongue    COVID-19 12/07/2020    Dry mouth     s/p radiation treatment    DVT, lower extremity ([address]     5/2011    GERD (gastroesophageal reflux disease)     acid reflux    Hip fracture ([facility] [address]     5/2011, RIGHT    Hx of blood clots     Hyperlipidemia     Pneumonia 2011    Prolonged emergence from general anesthesia     Thyroid disease          SURGICAL HISTORY       Past Surgical History:   Procedure Laterality Date    ABDOMEN SURGERY      gallbladder removed    CHOLECYSTECTOMY      COLONOSCOPY      EYE SURGERY Right     TORN RETINA, LASER    HERNIA REPAIR      HIP ARTHROPLASTY Right 2016    HIP FRACTURE SURGERY Right     JOINT REPLACEMENT      OTHER SURGICAL HISTORY  2011    right hip IM nailing    SHOULDER SURGERY Right rotater cuff    TONGUE BIOPSY  10/2010         CURRENT MEDICATIONS       Previous Medications    ALBUTEROL SULFATE  (90 BASE) MCG/ACT INHALER    INHALE 2 PUFFS INTO THE LUNGS 4 TIMES DAILY AS NEEDED (PRIOR TO ACTIVITY THAT CAUSES SHORTNESS OF BREATH)    FLUTICASONE (FLONASE) 50 MCG/ACT NASAL SPRAY    2 sprays by Nasal route daily    LEVOTHYROXINE (SYNTHROID) 125 MCG TABLET        OMEPRAZOLE (PRILOSEC) 40 MG DELAYED RELEASE CAPSULE        POLYETHYLENE GLYCOL (GLYCOLAX) 17 GM/SCOOP POWDER    Use as directed for colonoscopy prep    SENNOSIDES (SENNA) 8.8 MG/5ML LIQD    Take 5 mLs by mouth daily    SIMVASTATIN (ZOCOR) 20 MG TABLET           ALLERGIES     Tamsulosin hcl and Tetanus toxoids    FAMILY HISTORY       Family History   Problem Relation Age of Onset    Cancer Mother     Depression Mother     High Blood Pressure Mother     Heart Disease Father     Stroke Maternal Grandmother     Diabetes Other           SOCIAL HISTORY       Social History     Socioeconomic History    Marital status:      Spouse name: None    Number of children: None    Years of education: None    Highest education level: None   Tobacco Use    Smoking status: Former     Years: 40.00     Types: Cigarettes     Quit date: 1971     Years since quittin.4    Smokeless tobacco: Never   Vaping Use    Vaping Use: Never used   Substance and Sexual Activity    Alcohol use: Not Currently     Alcohol/week: 0.0 standard drinks     Comment: x1 half glass before bed    Drug use: No    Sexual activity: Yes     Partners: Female       SCREENINGS    [place] Coma Scale  Eye Opening: Spontaneous  Best Verbal Response: Oriented  Best Motor Response: Obeys commands  [place] Coma Scale Score: 15          PHYSICAL EXAM    (up to 7 for level 4, 8 or more for level 5)     ED Triage Vitals [09/15/22 2235]   BP Temp Temp Source Heart Rate Resp SpO2 Height Weight   (!) 73/47 (!) 100.7 °F (38.2 °C) Oral (!) 113 28 91 % 6' 2\" (1.88 m) 148 lb (67.1 kg)       Physical Exam    General appearance:  Cooperative. Elderly appearing somewhat ill male in no overt distress skin:  Warm. Dry. Eye:  Extraocular movements intact. Ears, nose, mouth and throat:  Oral mucosa moist,  Neck:  Trachea midline. Heart: Tachycardic but regular  Perfusion:  intact  Respiratory: Quick cough. Mild increased work of breathing with some crackles in the bilateral lung bases  Abdominal:   Non distended. Nontender. PEG tube in left upper abdomen  Neurological:  Alert and oriented x 3.   Moves all extremities spontaneously  Musculoskeletal:   Normal ROM, no deformities          Psychiatric:  Normal mood      DIAGNOSTIC RESULTS       Labs Reviewed   CBC WITH AUTO DIFFERENTIAL - Abnormal; Notable for the following components:       Result Value    Lymphocytes Absolute 0.1 (*)     All other components within normal limits   COMPREHENSIVE METABOLIC PANEL W/ REFLEX TO MG FOR LOW K - Abnormal; Notable for the following components:    Sodium 132 (*)     Chloride 94 (*)     Glucose 156 (*)     BUN 27 (*)     All other components within normal limits   LACTIC ACID - Abnormal; Notable for the following components:    Lactic Acid 3.2 (*)     All other components within normal limits   URINALYSIS WITH REFLEX TO CULTURE - Abnormal; Notable for the following components:    Protein, UA TRACE (*)     Urobilinogen, Urine 2.0 (*)     All other components within normal limits   MICROSCOPIC URINALYSIS - Abnormal; Notable for the following components:    Mucus, UA 1+ (*)     Bacteria, UA Rare (*)     All other components within normal limits   COVID-19, RAPID    Narrative:     let Rn Zackery Peabody know slg   CULTURE, BLOOD 1   CULTURE, BLOOD 2   PROTIME-INR   APTT   TROPONIN   SPECIMEN REJECTION       Interpretation per the Radiologist below, if obtained/available at the time of this note:    XR CHEST PORTABLE   Preliminary Result   New airspace opacities in the lower lungs, right greater than left,   compatible with infection. RECOMMENDATION:   Follow-up radiographs are recommended to ensure resolution after treatment. XR CHEST PORTABLE    (Results Pending)       All other labs/imaging were within normal range or not returned as of this dictation. EMERGENCY DEPARTMENT COURSE and DIFFERENTIAL DIAGNOSIS/MDM:   Vitals:    Vitals:    09/16/22 0015 09/16/22 0030 09/16/22 0045 09/16/22 0100   BP: (!) 81/58 108/62 (!) 73/54 (!) 78/55   Pulse: 91 (!) 112 87 86   Resp: 22 19 19 20   Temp:       TempSrc:       SpO2: 98% 99% 99% 99%   Weight:       Height:           EKG: Sinus tachycardia rate of 111 bpm.  No ST elevation. Similar to prior. Patient presented to the emergency department today with cough shortness of breath and concern for aspiration. Chronically uses tube feeds due to dysphagia from prior oropharyngeal cancer. Suspect aspiration. No risk factors otherwise for healthcare associated pneumonia. Here, the patient was hypotensive, tachycardic and had oxygen saturations that fell to the upper 80s. Was placed on 1 to 2 L nasal cannula oxygen. Transfuse large volume IV fluids and antibiotics to cover community-acquired pneumonia and possible aspiration pneumonia. Blood pressure is improving with IV fluid administration. Plan to admit to the hospital for further treatment. Addendum: While the patient's initial blood pressure was low after IV fluid administration it did raise 614 systolic.   Unfortunately, it then rebounded and did fall again to the 70s where it remained despite IV fluid resuscitation. Given concern for underlying sepsis with septic shock right IJ central venous catheter was placed and he was started on Levophed without further complications. Patient is tolerated this well. Akil Lester M.D., am the primary clinician of record. MDM      CONSULTS     IP CONSULT TO HOSPITALIST  PHARMACY TO DOSE VANCOMYCIN  IP CONSULT TO CRITICAL CARE    Critical Care:     CRITICAL CARE TIME    I personally saw the patient and independently provided 30 minutes of non-concurrent critical care out of the total shared critical care time provided, excluding separately reportable procedures. There was a high probability of clinically significant/life threatening deterioration in the patient's condition which required my urgent intervention.   This time was spent reviewing the patient chart, interpreting diagnostic/laboratory data, transfusion of large-volume IV fluids and broad-spectrum antibiotics to treat sepsis, administration of supplemental oxygen for hypoxic respiratory failure      REASSESSMENT          PROCEDURE     Unless otherwise noted below, none     Central Line    Date/Time: 9/16/2022 2:14 AM  Performed by: Shiloh Calzada MD  Authorized by: Jaxon Irwin MD     Consent:     Consent obtained:  Verbal    Consent given by:  Patient    Risks, benefits, and alternatives were discussed: yes      Risks discussed:  Arterial puncture, incorrect placement, pneumothorax, infection and bleeding    Alternatives discussed:  No treatment  Pre-procedure details:     Skin preparation:  Chlorhexidine    Skin preparation agent: Skin preparation agent completely dried prior to procedure    Sedation:     Sedation type:  None  Anesthesia:     Anesthesia method:  Local infiltration    Local anesthetic:  Lidocaine 1% w/o epi  Procedure details:     Location:  R internal jugular    Patient position: Trendelenburg    Procedural supplies:  Triple lumen    Catheter size:  8 Fr    Landmarks identified: yes      Ultrasound guidance: yes      Ultrasound guidance timing: real time      Sterile ultrasound techniques: Sterile gel and sterile probe covers were used      Number of attempts:  1    Successful placement: yes    Post-procedure details:     Post-procedure:  Dressing applied and line sutured    Assessment:  Blood return through all ports, no pneumothorax on x-ray, free fluid flow and placement verified by x-ray    Procedure completion:  Tolerated      FINAL IMPRESSION      1. Sepsis with acute hypoxic respiratory failure without septic shock, due to unspecified organism ([address]    2. Aspiration pneumonia of both lower lobes due to gastric secretions ([address]    3. Acute respiratory failure with hypoxia ([address]            DISPOSITION/PLAN   DISPOSITION Admitted 09/16/2022 01:31:31 AM        PATIENT REFERRED TO:  No follow-up provider specified. DISCHARGE MEDICATIONS:  New Prescriptions    No medications on file     Controlled Substances Monitoring:     No flowsheet data found.     (Please note that portions of this note were completed with a voice recognition program.  Efforts were made to edit the dictations but occasionally words are mis-transcribed.)    Daysi Araya MD (electronically signed)  Attending Emergency Physician            Soledad Molina MD  09/15/22 400 Stan Johnson MD  09/16/22 9232

## 2022-09-17 LAB
ANION GAP SERPL CALCULATED.3IONS-SCNC: 7 MMOL/L (ref 3–16)
BASOPHILS ABSOLUTE: 0 K/UL (ref 0–0.2)
BASOPHILS RELATIVE PERCENT: 0.5 %
BUN BLDV-MCNC: 14 MG/DL (ref 7–20)
CALCIUM SERPL-MCNC: 7.7 MG/DL (ref 8.3–10.6)
CHLORIDE BLD-SCNC: 102 MMOL/L (ref 99–110)
CO2: 27 MMOL/L (ref 21–32)
CREAT SERPL-MCNC: 0.6 MG/DL (ref 0.8–1.3)
EOSINOPHILS ABSOLUTE: 0.1 K/UL (ref 0–0.6)
EOSINOPHILS RELATIVE PERCENT: 1.4 %
GFR AFRICAN AMERICAN: >60
GFR NON-AFRICAN AMERICAN: >60
GLUCOSE BLD-MCNC: 120 MG/DL (ref 70–99)
HCT VFR BLD CALC: 33.5 % (ref 40.5–52.5)
HEMOGLOBIN: 10.9 G/DL (ref 13.5–17.5)
LYMPHOCYTES ABSOLUTE: 0.3 K/UL (ref 1–5.1)
LYMPHOCYTES RELATIVE PERCENT: 5.3 %
MCH RBC QN AUTO: 27.1 PG (ref 26–34)
MCHC RBC AUTO-ENTMCNC: 32.6 G/DL (ref 31–36)
MCV RBC AUTO: 83.2 FL (ref 80–100)
MONOCYTES ABSOLUTE: 0.5 K/UL (ref 0–1.3)
MONOCYTES RELATIVE PERCENT: 7.9 %
NEUTROPHILS ABSOLUTE: 5.6 K/UL (ref 1.7–7.7)
NEUTROPHILS RELATIVE PERCENT: 84.9 %
PDW BLD-RTO: 13.9 % (ref 12.4–15.4)
PLATELET # BLD: 141 K/UL (ref 135–450)
PMV BLD AUTO: 8.5 FL (ref 5–10.5)
POTASSIUM REFLEX MAGNESIUM: 4.2 MMOL/L (ref 3.5–5.1)
RBC # BLD: 4.02 M/UL (ref 4.2–5.9)
SODIUM BLD-SCNC: 136 MMOL/L (ref 136–145)
WBC # BLD: 6.6 K/UL (ref 4–11)

## 2022-09-17 PROCEDURE — 97166 OT EVAL MOD COMPLEX 45 MIN: CPT

## 2022-09-17 PROCEDURE — 94640 AIRWAY INHALATION TREATMENT: CPT

## 2022-09-17 PROCEDURE — 6360000002 HC RX W HCPCS: Performed by: INTERNAL MEDICINE

## 2022-09-17 PROCEDURE — 97535 SELF CARE MNGMENT TRAINING: CPT

## 2022-09-17 PROCEDURE — 99291 CRITICAL CARE FIRST HOUR: CPT | Performed by: INTERNAL MEDICINE

## 2022-09-17 PROCEDURE — 87324 CLOSTRIDIUM AG IA: CPT

## 2022-09-17 PROCEDURE — 2580000003 HC RX 258: Performed by: INTERNAL MEDICINE

## 2022-09-17 PROCEDURE — 99232 SBSQ HOSP IP/OBS MODERATE 35: CPT | Performed by: INTERNAL MEDICINE

## 2022-09-17 PROCEDURE — 80048 BASIC METABOLIC PNL TOTAL CA: CPT

## 2022-09-17 PROCEDURE — 97530 THERAPEUTIC ACTIVITIES: CPT

## 2022-09-17 PROCEDURE — 97162 PT EVAL MOD COMPLEX 30 MIN: CPT

## 2022-09-17 PROCEDURE — 2000000000 HC ICU R&B

## 2022-09-17 PROCEDURE — 6370000000 HC RX 637 (ALT 250 FOR IP): Performed by: INTERNAL MEDICINE

## 2022-09-17 PROCEDURE — 87449 NOS EACH ORGANISM AG IA: CPT

## 2022-09-17 PROCEDURE — 85025 COMPLETE CBC W/AUTO DIFF WBC: CPT

## 2022-09-17 RX ORDER — IPRATROPIUM BROMIDE AND ALBUTEROL SULFATE 2.5; .5 MG/3ML; MG/3ML
1 SOLUTION RESPIRATORY (INHALATION) EVERY 4 HOURS PRN
Status: DISCONTINUED | OUTPATIENT
Start: 2022-09-17 | End: 2022-09-19 | Stop reason: HOSPADM

## 2022-09-17 RX ADMIN — Medication 10 ML: at 21:19

## 2022-09-17 RX ADMIN — PANTOPRAZOLE SODIUM 40 MG: 40 TABLET, DELAYED RELEASE ORAL at 06:34

## 2022-09-17 RX ADMIN — AMPICILLIN SODIUM AND SULBACTAM SODIUM 3000 MG: 2; 1 INJECTION, POWDER, FOR SOLUTION INTRAMUSCULAR; INTRAVENOUS at 04:25

## 2022-09-17 RX ADMIN — ACETAMINOPHEN 650 MG: 160 SOLUTION ORAL at 21:10

## 2022-09-17 RX ADMIN — AMPICILLIN SODIUM AND SULBACTAM SODIUM 3000 MG: 2; 1 INJECTION, POWDER, FOR SOLUTION INTRAMUSCULAR; INTRAVENOUS at 08:51

## 2022-09-17 RX ADMIN — MUPIROCIN: 20 OINTMENT TOPICAL at 08:51

## 2022-09-17 RX ADMIN — LEVOTHYROXINE SODIUM 125 MCG: 125 TABLET ORAL at 06:34

## 2022-09-17 RX ADMIN — PANTOPRAZOLE SODIUM 40 MG: 40 TABLET, DELAYED RELEASE ORAL at 17:35

## 2022-09-17 RX ADMIN — AMPICILLIN SODIUM AND SULBACTAM SODIUM 3000 MG: 2; 1 INJECTION, POWDER, FOR SOLUTION INTRAMUSCULAR; INTRAVENOUS at 17:36

## 2022-09-17 RX ADMIN — AMPICILLIN SODIUM AND SULBACTAM SODIUM 3000 MG: 2; 1 INJECTION, POWDER, FOR SOLUTION INTRAMUSCULAR; INTRAVENOUS at 21:10

## 2022-09-17 RX ADMIN — ENOXAPARIN SODIUM 40 MG: 100 INJECTION SUBCUTANEOUS at 08:51

## 2022-09-17 RX ADMIN — IPRATROPIUM BROMIDE AND ALBUTEROL SULFATE 1 AMPULE: 2.5; .5 SOLUTION RESPIRATORY (INHALATION) at 07:19

## 2022-09-17 RX ADMIN — MUPIROCIN: 20 OINTMENT TOPICAL at 21:11

## 2022-09-17 ASSESSMENT — PAIN SCALES - GENERAL: PAINLEVEL_OUTOF10: 0

## 2022-09-17 NOTE — PROGRESS NOTES
Inpatient Physical Therapy Evaluation and Treatment    Unit: ICU  Date:  9/17/2022  Patient Name:    Kenya Azevedo  Admitting diagnosis:  Acute respiratory failure with hypoxia ([facility] [J96.01]  Septic shock ([facility] [A41.9, R65.21]  Aspiration pneumonia of both lower lobes due to gastric secretions ([facility] [J69.0]  Sepsis with acute hypoxic respiratory failure without septic shock, due to unspecified organism ([facility] [A41.9, R65.20, J96.01]  Admit Date:  9/15/2022  Precautions/Restrictions/WB Status/ Lines/ Wounds/ Oxygen: Fall risk, Lines -IV and RIJ, Telemetry, and Continuous pulse oximetry,PEG tube    Patient cleared for OOB activity by RN  Treatment Time:  10:55-11:50  Treatment Number:  1   Timed Code Treatment Minutes: 45 minutes  Total Treatment Minutes:  55  minutes    Patient Goals for Therapy: \" to go home \"          Discharge Recommendations: Home 24 hr assist and with home PT   DME needs for discharge: RW assessment is ongoing       Therapy recommendation for EMS Transport: NA    Therapy recommendations for staff:   Assist of 1 with use of rolling walker (RW) and gait belt for all transfers to/from [facility]  to/from [facility]      History of Present Illness: Per H&P \"The patient is a 68 y.o. male with PMH below, presents with SOB, aspiration, N/V, cough, fevers, generalized weakness. 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 He had resulting dysphagia. He had a PEG placed about 2 years ago. He takes ALL intake via his PEG. He says he has been throwing up some of his tube feeds the last few days. He has had aspiration PNA in the past 2/2 this. He is concerned that he may have aspirated some of his TF. He has been having intermittent fevers at home. He has had increasing SOB and cough. He was found to have sats in the 80's in the ED. He is requiring 2L O2. He is not normally on O2. CXR showed bibasilar PNA. He was also noted to be hypotensive. His BP did improve w/ IVF.   R IJ placed in the ED.\"    Home Health S4 Level Recommendation:  Level 1 Standard  AM-PAC Mobility Score    AM-PAC Inpatient Mobility Raw Score : 360 Elkin scored a 19/24 on the AM-PAC short mobility form. Current research shows that an AM-PAC score of 18 or greater is typically associated with a discharge to the patient's home setting. If patient discharges prior to next session this note will serve as a discharge summary. Please see below for the latest assessment towards goals. Preadmission Environment    Pt. Lives with spouse, son/dtr lives 10 minutes  Home environment:    two story home  Steps to enter first floor:    2 steps to enter and hand rail unilateral R side     Steps to second floor: Full flight of 12-13  Laundry:          Main floor  Bathroom:       Tub/Shower unit  Equipment owned:      [employer] and Standard Walker (SW)      Preadmission Status / PLOF:  History of falls             No  Pt. Able to drive          No  Pt Fully independent with ADL's         Yes  Pt. Required assistance from family for: Independent PTA    Pt. Fully independent for transfers and gait and walked with: Cane    Pain   Yes  Location: neck and legs-chronic  Ratin /10  Pain Medicine Status: No request made    Cognition    A&O x4   Able to follow 2 step commands    Subjective  Patient lying supine in bed with no family present. Pt agreeable to this PT eval & tx. Upper Extremity ROM/Strength  Please see OT evaluation. Lower Extremity ROM / Strength   AROM WFL: Yes  ROM limitations:     Strength Assessment (measured on a 0-5 scale): and BLE strength WFL, but not formally assessed with MMT.      Lower Extremity Sensation    NT        Balance  Sitting:  Good ; Supervision  Comments: no LOB    Standing: Fair ; CGA  Comments: using SPC' Good using RW to stand for pericare    Bed Mobility   Supine to Sit:    SBA (sleeps in recliner at home)  Sit to Supine:   Not Tested  Rolling:   Not Tested  Scooting in sitting: SBA  Scooting in supine:  Not Tested    Transfer Training     Sit to stand:   CGA  Stand to sit:   CGA  Bed to Chair:   CGA with use of gait belt and Single point cane . Gait gait completed as indicated below  Distance:      5 ft  Deviations (firm surface/linoleum):  decreased alvaro, forward flexed posture, step to pattern, and unsteady using SPC  Assistive Device Used:    Single point cane   Level of Assist:    Min A   Comment: assist to manage lines; unsteady using SPC    Stair Training deferred, pt unsafe/ not appropriate to complete stairs at this time      Activity Tolerance   Pt completed therapy session with No adverse symptoms noted w/activity        BP (mmHg)     HR (bpm) SpO2 (%)   Semifowlers before activity  116/63 90 95%   Seated EOB             111/64 101 93   Seated after activity 107/70 99 98               Positioning Needs   Pt up in chair, no chair alarm needed per RN, positioned in proper neutral alignment and pressure relief provided. Call light provided and all needs within reach      Other      Patient/Family Education   Pt educated on role of inpatient PT, POC, importance of continued activity, DC recommendations, safety awareness, transfer techniques, and calling for assist with mobility. Assessment  Pt seen for Physical Therapy evaluation in acute care setting. Pt demonstrated decreased Activity tolerance and Balance as well as decreased independence with Ambulation and Transfers. Patient would benefit from skilled PT to maximize functional mobility. PAtient may benefit from RW at discharge. Recommending Home 24 hr assist and with home PT upon discharge as patient functioning would benefit from continued therapy services    Goals : To be met in 3 visits:  1). Independent with LE Ex x 10 reps    To be met in 6 visits:  1). Supine to/from sit: N/A,sleeps in recliner  2). Sit to/from stand: Independent  3). Bed to chair: Independent  4).   Gait: Ambulate 150 ft. with  SBA and use of LRAD (least restrictive assistive device)  5). Tolerate B LE exercises 3 sets of 10-15 reps  6). Ascend/descend 2 steps with SBA with use of hand rail unilateral and LRAD (least restrictive assistive device)    Rehabilitation Potential: Good  Strengths for achieving goals include:   Pt motivated, PLOF, Family Support, and Pt cooperative   Barriers to achieving goals include:    No Barriers    Plan    To be seen 3-5 x / week  while in acute care setting for therapeutic exercises, bed mobility, transfers, progressive gait training, balance training, and family/patient education. Signature: Fern Mendoza, PT #[id number]     If patient discharges from this facility prior to next visit, this note will serve as the Discharge Summary.

## 2022-09-17 NOTE — PROGRESS NOTES
Score: AM-PAC Inpatient Daily Activity Raw Score: 21    Preadmission Environment    Pt. Lives with spouse, son/dtr lives 10 minutes  Home environment:  two story home  Steps to enter first floor:    2 steps to enter and hand rail unilateral R side   Steps to second floor: Full flight of 12-13  Laundry: Main floor  Bathroom:  Tub/Shower unit  Equipment owned:  [facility] and Standard Walker (SW)     Preadmission Status / PLOF:  History of falls   No  Pt. Able to drive   No  Pt Fully independent with ADL's  Yes  Pt. Required assistance from family for: Independent PTA    Pt. Fully independent for transfers and gait and walked with: Jenners       Subjective  Patient lying supine in bed with no family present   Pt agreeable to this OT eval & tx. Cognition    A&O x4   Able to follow 2 step commands    Pain  Yes  Ratin  Location:Chronic pain in neck and legs. Pain Medicine Status: No request made        Upper Extremity ROM:    WFL,  pt able to perform all bed mobility, transfers, and gait without ROM limitation. Upper Extremity Strength:    BUE strength WFL, but not formally assessed w/ MMT      Upper Extremity Sensation    WNL    Upper Extremity Proprioception:  WNL    Coordination and Tone  WNL    Balance  Static Sitting:  Good   Dynamic Sitting: Not tested  Static Standing:  Good   Dynamic Standing: Not tested    Bed mobility:    Supine to sit:   SBA  Sit to supine:   Not Tested  Rolling:    Not Tested  Scooting in sitting:  SBA  Scooting to head of bed:   Not Tested    Bridging:   Not Tested    Transfers:    Sit to stand:  CGA  Stand to sit:  CGA  Bed to chair:   CGA  Standard toilet: Not Tested  Bed to [facility]:  Not Tested    Dressing:      UE:   Max A- Therapist don gown, pt able to thread arms. LE:    SBA- increased time. Bathing:    UE:  Not Tested  LE:  Not Tested    Eating:   Not Tested    Toileting: Max A- Pt found to be bowl incontinent Max A for hosea care.      Grooming/hygiene: Not Tested    Activity Tolerance   No adverse symptoms noted w/activity         BP (mmHg)  HR (bpm) SpO2 (%)   Semifowlers before activity  116/63 90 95%   Seated EOB  111/64 101 93   Seated after activity 107/70 99 98             Positioning Needs:   Up in chair, call light and needs in reach. Per RN pt does not need chair alarm. Exercise / Activities Initiated:   N/A    Patient/Family Education:   Role of OT  Use of AE  Safe RW use/hand placement    Assessment of Deficits: Pt seen for Occupational therapy evaluation in acute care setting. Pt demonstrated decreased Activity tolerance, ADLs, and Safety Awareness. Pt functioning below baseline and will likely benefit from skilled occupational therapy services to maximize safety and independence. Goal(s) : To be met in 3 Visits:  1). Bed to toilet/BSC: Supervision    To be met in 5 Visits:  1). Supine to/from Sit:  Independent  2). Upper Body Bathing:   Independent  3). Lower Body Bathing:   Independent  4). Upper Body Dressing:  Independent  5). Lower Body Dressing:  Independent  6). Pt to demonstrate UE exs x 15 reps with minimal cues    Rehabilitation Potential:  Good for goals listed above. Strengths for achieving goals include: PLOF, Family Support, and Pt cooperative  Barriers to achieving goals include:  Complexity of condition     Plan: To be seen 3-5 x/wk while in acute care setting for therapeutic exercises, bed mobility, transfers, dressing, bathing, family/patient education, ADL/IADL retraining, energy conservation training. Isac Beltre OTR/L #[id number]      If patient discharges from this facility prior to next visit, this note will serve as the Discharge Summary

## 2022-09-17 NOTE — PROGRESS NOTES
End of shift note. VSS, telemetry continues NSR. Levophed is now off with stable blood pressures. Central line dry/intact and now at [facility] with intermittent IV antibiotics. PT/OT worked with patient and he sat up to the chair this morning.

## 2022-09-17 NOTE — PROGRESS NOTES
Admit: 9/15/2022    Name:  Corin Brooks  Room:  [address] [place] [address]  MRN:    [id number]    Critical Care Daily Progress Note for 2022   Admitted with sepsis and pna    Interval History:   Levophed has been weaned off this morning    Scheduled Meds:   sodium chloride flush  5-40 mL IntraVENous 2 times per day    enoxaparin  40 mg SubCUTAneous Daily    mupirocin   Nasal BID    levothyroxine  125 mcg Oral Daily    pantoprazole  40 mg Oral BID AC    ampicillin-sulbactam  3,000 mg IntraVENous Q6H       Continuous Infusions:   norepinephrine Stopped (22 0630)    sodium chloride         PRN Meds:  ipratropium-albuterol, sodium chloride flush, sodium chloride, acetaminophen, morphine, prochlorperazine, albuterol, acetaminophen                  Objective:     Temp  Av °F (37.2 °C)  Min: 98.3 °F (36.8 °C)  Max: 99.5 °F (37.5 °C)  Pulse  Av.3  Min: 77  Max: 112  BP  Min: 84/53  Max: 127/77  SpO2  Av.9 %  Min: 92 %  Max: 97 %  Patient Vitals for the past 4 hrs:   BP Pulse Resp SpO2   22 0900 105/64 90 28 94 %   22 0800 105/63 (!) 108 28 94 %   22 0700 (!) 115/59 98 25 93 %         Intake/Output Summary (Last 24 hours) at 2022 1023  Last data filed at 2022 0700  Gross per 24 hour   Intake 6167.03 ml   Output 1550 ml   Net 4617.03 ml       Physical Exam:  General:  Awake, alert and oriented. Appears to be not in any distress  Mucous Membranes:  Pink , anicteric  Neck: No JVD, no carotid bruit, no thyromegaly  Chest:  Clear to auscultation bilaterally, no added sounds  Cardiovascular:  RRR S1S2 heard, no murmurs or gallops  Abdomen:  Soft, undistended, non tender, no organomegaly, BS present  Extremities: No edema or cyanosis.  Distal pulses well felt  Neurological : no focal deficits    Lab Data:  CBC:   Recent Labs     09/15/22  2238 22  0417 22  0420   WBC 4.0 5.9 6.6   RBC 5.05 4.15* 4.02*   HGB 13.7 11.8* 10.9*   HCT 41.8 34.5* 33.5*   MCV 82.7 83.3 83.2   RDW 13.9 13.6 13.9    154 141     BMP:   Recent Labs     09/15/22  2238 09/16/22  0417 09/17/22  0420   * 136 136   K 4.2 4.2 4.2   CL 94* 101 102   CO2 26 27 27   BUN 27* 22* 14   CREATININE 0.8 0.7* 0.6*     BNP: No results for input(s): BNP in the last 72 hours. PT/INR:   Recent Labs     09/15/22  2238   PROTIME 13.9   INR 1.09     APTT:  Recent Labs     09/15/22  2238   APTT 26.1     CARDIAC ENZYMES:   Recent Labs     09/15/22  2238   TROPONINI <0.01     FASTING LIPID PANEL:  Lab Results   Component Value Date/Time    CHOL 98 08/10/2017 05:51 AM    HDL 45 08/10/2017 05:51 AM    TRIG 79 06/11/2021 06:38 AM     LIVER PROFILE:   Recent Labs     09/15/22  2238 09/16/22  0417   AST 21 64*   ALT 19 58*   BILITOT 0.8 0.5   ALKPHOS 88 72         XR CHEST PORTABLE   Final Result   Worsening bibasilar infiltrates. Interval placement of a right jugular central venous catheter with the tip   overlying the cavoatrial junction. No pneumothorax. XR CHEST PORTABLE   Final Result   New airspace opacities in the lower lungs, right greater than left,   compatible with infection. RECOMMENDATION:   Follow-up radiographs are recommended to ensure resolution after treatment.                Assessment & Plan:     Patient Active Problem List    Diagnosis Date Noted    Carcinoma of base of tongue ([facility] 10/19/2010    Septic shock ([facility] 09/16/2022    Severe malnutrition ([facility] 06/11/2021    Sepsis ([facility]     Pneumonia 09/14/2020    Fever     Hematuria     Dysphagia     Septicemia ([address] 08/27/2020    Moderate protein-calorie malnutrition ([address] 08/27/2020    Hypothyroidism     NSTEMI (non-ST elevated myocardial infarction) ([address]     Status post total replacement of right hip 08/08/2016    Fever and chills 06/02/2011    SOB (shortness of breath) 06/02/2011    BPH (benign prostatic hyperplasia) 06/02/2011    GERD (gastroesophageal reflux disease) 06/02/2011    Benign prostatic hyperplasia     Hyperlipidemia     DVT, lower extremity ([address]     Hip fracture ([address]     Dry mouth      Septic shock  2 to pna  Admitted to ICU  IV fluids  Blood and sputum cultures. On levophed, which has been weaned off. Patient claims that his blood pressure is always in the low side. Aspiration pneumonia. Chronic dysphagia status post PEG secondary to tongue carcinoma  Continue Unasyn and vancomycin.     Hypothyroidism  Continue Synthroid    Lovenox for DVT prophylaxis  Full code  General diet    Kenan Mackey MD

## 2022-09-17 NOTE — PROGRESS NOTES
Pulmonary & Critical Care Medicine ICU Progress Note    CC: Aspiration pneumonia and septic shock    Events of Last 24 hours: Levophed has been titrated    Vascular lines: IV: 2022 RIJ CVC      MV: None     / / /   No results for input(s): PHART, TNN7TPO, PO2ART in the last 72 hours. IV:   norepinephrine 2 mcg/min (22 0430)    sodium chloride      sodium chloride 150 mL/hr at 22 1707       Vitals:  Blood pressure 113/62, pulse (!) 111, temperature 98.8 °F (37.1 °C), resp. rate 27, height 6' 2\" (1.88 m), weight 171 lb 1.2 oz (77.6 kg), SpO2 93 %. on room air  Temp  Av.9 °F (37.2 °C)  Min: 98.3 °F (36.8 °C)  Max: 99.4 °F (37.4 °C)    Intake/Output Summary (Last 24 hours) at 2022 0637  Last data filed at 2022 0500  Gross per 24 hour   Intake 6167.03 ml   Output 1150 ml   Net 5017.03 ml     PE:  General:  ill appearing but better  Resp: No crackles. No wheezing. CV: S1, S2. Trace edema  GI: NT, ND, +BS  Skin: Warm and dry. Neuro: PERRL.  Alert and oriented to person, events      Scheduled Meds:   sodium chloride flush  5-40 mL IntraVENous 2 times per day    enoxaparin  40 mg SubCUTAneous Daily    ipratropium-albuterol  1 ampule Inhalation BID    mupirocin   Nasal BID    levothyroxine  125 mcg Oral Daily    pantoprazole  40 mg Oral BID AC    ampicillin-sulbactam  3,000 mg IntraVENous Q6H       Data:  CBC:   Recent Labs     09/15/22  2238 09/16/22  0417 22  0420   WBC 4.0 5.9 6.6   HGB 13.7 11.8* 10.9*   HCT 41.8 34.5* 33.5*   MCV 82.7 83.3 83.2    154 141     BMP:   Recent Labs     09/15/22  2238 09/16/22  0417 22  0420   * 136 136   K 4.2 4.2 4.2   CL 94* 101 102   CO2 26 27 27   BUN 27* 22* 14   CREATININE 0.8 0.7* 0.6*     LIVER PROFILE:   Recent Labs     09/15/22  2238 22  0417   AST 21 64*   ALT 19 58*   BILITOT 0.8 0.5   ALKPHOS 88 72     Cultures:      9/15/2022 SARS-CoV-2 negative  9/15/2022 blood NGTD  2022 respiratory culture requested    Chest imaging was reviewed by me and showed:   CXR 9/16/2022 patchy bilateral airspace disease, increased in the right lower lobe    ASSESSMENT:  Septic shock  Aspiration pneumonia  Chronic dysphagia s/p PEG - 2/2 tongue carcinoma   Emesis of tube feeds      PLAN:  D/C IVF, continue TF  Unasyn and vancomycin day #2  IV levophed to maintain MAP of 65  Follow up on cultures  Follow electrolytes  Blood sugar control, with goal 140-180   DNR-limited   Referral to dietician as outpatient      Total critical care time caring for this patient with life threatening, unstable organ failure, including direct patient contact, management of life support systems, review of data including imaging and labs, discussions with other team members and physicians is 30 minutes so far today, excluding procedures.

## 2022-09-17 NOTE — PROGRESS NOTES
RT Inhaler-Nebulizer Bronchodilator Protocol Note    There is a bronchodilator order in the chart from a provider indicating to follow the RT Bronchodilator Protocol and there is an Initiate RT Inhaler-Nebulizer Bronchodilator Protocol order as well (see protocol at bottom of note). CXR Findings:  XR CHEST PORTABLE    Result Date: 9/16/2022  New airspace opacities in the lower lungs, right greater than left, compatible with infection. RECOMMENDATION: Follow-up radiographs are recommended to ensure resolution after treatment. XR CHEST PORTABLE    Result Date: 9/16/2022  Worsening bibasilar infiltrates. Interval placement of a right jugular central venous catheter with the tip overlying the cavoatrial junction. No pneumothorax. The findings from the last RT Protocol Assessment were as follows:   History Pulmonary Disease: (P) Smoker 15 pack years or more  Respiratory Pattern: (P) Regular pattern and RR 12-20 bpm  Breath Sounds: (P) Slightly diminished and/or crackles  Cough: (P) Strong, spontaneous, non-productive  Indication for Bronchodilator Therapy: (P) Decreased or absent breath sounds  Bronchodilator Assessment Score: (P) 3    Aerosolized bronchodilator medication orders have been revised according to the RT Inhaler-Nebulizer Bronchodilator Protocol below. Respiratory Therapist to perform RT Therapy Protocol Assessment initially then follow the protocol. Repeat RT Therapy Protocol Assessment PRN for score 0-3 or on second treatment, BID, and PRN for scores above 3. No Indications - adjust the frequency to every 6 hours PRN wheezing or bronchospasm, if no treatments needed after 48 hours then discontinue using Per Protocol order mode. If indication present, adjust the RT bronchodilator orders based on the Bronchodilator Assessment Score as indicated below.   Use Inhaler orders unless patient has one or more of the following: on home nebulizer, not able to hold breath for 10 seconds, is not alert and oriented, cannot activate and use MDI correctly, or respiratory rate 25 breaths per minute or more, then use the equivalent nebulizer order(s) with same Frequency and PRN reasons based on the score. If a patient is on this medication at home then do not decrease Frequency below that used at home. 0-3 - enter or revise RT bronchodilator order(s) to equivalent RT Bronchodilator order with Frequency of every 4 hours PRN for wheezing or increased work of breathing using Per Protocol order mode. 4-6 - enter or revise RT Bronchodilator order(s) to two equivalent RT bronchodilator orders with one order with BID Frequency and one order with Frequency of every 4 hours PRN wheezing or increased work of breathing using Per Protocol order mode. 7-10 - enter or revise RT Bronchodilator order(s) to two equivalent RT bronchodilator orders with one order with TID Frequency and one order with Frequency of every 4 hours PRN wheezing or increased work of breathing using Per Protocol order mode. 11-13 - enter or revise RT Bronchodilator order(s) to one equivalent RT bronchodilator order with QID Frequency and an Albuterol order with Frequency of every 4 hours PRN wheezing or increased work of breathing using Per Protocol order mode. Greater than 13 - enter or revise RT Bronchodilator order(s) to one equivalent RT bronchodilator order with every 4 hours Frequency and an Albuterol order with Frequency of every 2 hours PRN wheezing or increased work of breathing using Per Protocol order mode.        Electronically signed by Cal Claros RCP on 9/17/2022 at 7:24 AM

## 2022-09-17 NOTE — PROGRESS NOTES
4 Eyes Skin Assessment     The patient is being assess for   Shift Handoff    I agree that 2 RN's have performed a thorough Head to Toe Skin Assessment on the patient. ALL assessment sites listed below have been assessed. Areas assessed for pressure by both nurses:   []   Head, Face, and Ears   []   Shoulders, Back, and Chest, Abdomen  []   Arms, Elbows, and Hands   []   Coccyx, Sacrum, and Ischium  []   Legs, Feet, and Heels        Skin Assessed Under all Medical Devices by both nurses:gastostomy tubes   Gastrostomy tube               All Mepilex Borders were peeled back and area peeked at by both nurses:  No: NA  Please list where Mepilex Borders are located:  NA             **SHARE this note so that the co-signing nurse is able to place an eSignature**    Co-signer eSignature: Electronically signed by Tom Anaya RN on 9/17/22 at 6:14 PM EDT    Does the Patient have Skin Breakdown related to pressure?   No              Eugene Prevention initiated:  No   Wound Care Orders initiated:  No      [facility] nurse consulted for Pressure Injury (Stage 3,4, Unstageable, DTI, NWPT, Complex wounds)and New or Established Ostomies:  No      Primary Nurse eSignature: Electronically signed by Andreas Bagley RN on 9/17/22 at 6:09 PM EDT

## 2022-09-18 LAB
ANION GAP SERPL CALCULATED.3IONS-SCNC: 7 MMOL/L (ref 3–16)
BASOPHILS ABSOLUTE: 0 K/UL (ref 0–0.2)
BASOPHILS RELATIVE PERCENT: 0.3 %
BUN BLDV-MCNC: 12 MG/DL (ref 7–20)
C DIFF TOXIN/ANTIGEN: NORMAL
CALCIUM SERPL-MCNC: 8.1 MG/DL (ref 8.3–10.6)
CHLORIDE BLD-SCNC: 102 MMOL/L (ref 99–110)
CO2: 28 MMOL/L (ref 21–32)
CREAT SERPL-MCNC: 0.6 MG/DL (ref 0.8–1.3)
EOSINOPHILS ABSOLUTE: 0.1 K/UL (ref 0–0.6)
EOSINOPHILS RELATIVE PERCENT: 2.3 %
GFR AFRICAN AMERICAN: >60
GFR NON-AFRICAN AMERICAN: >60
GLUCOSE BLD-MCNC: 111 MG/DL (ref 70–99)
HCT VFR BLD CALC: 31.3 % (ref 40.5–52.5)
HEMOGLOBIN: 10.7 G/DL (ref 13.5–17.5)
LYMPHOCYTES ABSOLUTE: 0.3 K/UL (ref 1–5.1)
LYMPHOCYTES RELATIVE PERCENT: 5.8 %
MCH RBC QN AUTO: 28.2 PG (ref 26–34)
MCHC RBC AUTO-ENTMCNC: 34.1 G/DL (ref 31–36)
MCV RBC AUTO: 82.8 FL (ref 80–100)
MONOCYTES ABSOLUTE: 0.5 K/UL (ref 0–1.3)
MONOCYTES RELATIVE PERCENT: 8 %
NEUTROPHILS ABSOLUTE: 4.9 K/UL (ref 1.7–7.7)
NEUTROPHILS RELATIVE PERCENT: 83.6 %
PDW BLD-RTO: 13.5 % (ref 12.4–15.4)
PLATELET # BLD: 137 K/UL (ref 135–450)
PMV BLD AUTO: 7.7 FL (ref 5–10.5)
POTASSIUM REFLEX MAGNESIUM: 4.1 MMOL/L (ref 3.5–5.1)
RBC # BLD: 3.78 M/UL (ref 4.2–5.9)
SODIUM BLD-SCNC: 137 MMOL/L (ref 136–145)
WBC # BLD: 5.9 K/UL (ref 4–11)

## 2022-09-18 PROCEDURE — 85025 COMPLETE CBC W/AUTO DIFF WBC: CPT

## 2022-09-18 PROCEDURE — 2580000003 HC RX 258: Performed by: INTERNAL MEDICINE

## 2022-09-18 PROCEDURE — 6370000000 HC RX 637 (ALT 250 FOR IP)

## 2022-09-18 PROCEDURE — 1200000000 HC SEMI PRIVATE

## 2022-09-18 PROCEDURE — 6360000002 HC RX W HCPCS: Performed by: INTERNAL MEDICINE

## 2022-09-18 PROCEDURE — 94761 N-INVAS EAR/PLS OXIMETRY MLT: CPT

## 2022-09-18 PROCEDURE — 99232 SBSQ HOSP IP/OBS MODERATE 35: CPT | Performed by: INTERNAL MEDICINE

## 2022-09-18 PROCEDURE — 6370000000 HC RX 637 (ALT 250 FOR IP): Performed by: INTERNAL MEDICINE

## 2022-09-18 PROCEDURE — 80048 BASIC METABOLIC PNL TOTAL CA: CPT

## 2022-09-18 RX ORDER — METRONIDAZOLE 250 MG/1
500 TABLET ORAL EVERY 8 HOURS SCHEDULED
Status: DISCONTINUED | OUTPATIENT
Start: 2022-09-18 | End: 2022-09-18

## 2022-09-18 RX ORDER — CEFDINIR 250 MG/5ML
300 POWDER, FOR SUSPENSION ORAL EVERY 12 HOURS SCHEDULED
Status: DISCONTINUED | OUTPATIENT
Start: 2022-09-18 | End: 2022-09-18

## 2022-09-18 RX ORDER — LEVOFLOXACIN 500 MG/1
500 TABLET, FILM COATED ORAL DAILY
Status: DISCONTINUED | OUTPATIENT
Start: 2022-09-18 | End: 2022-09-19 | Stop reason: HOSPADM

## 2022-09-18 RX ADMIN — Medication 10 ML: at 08:21

## 2022-09-18 RX ADMIN — Medication 10 ML: at 20:58

## 2022-09-18 RX ADMIN — ENOXAPARIN SODIUM 40 MG: 100 INJECTION SUBCUTANEOUS at 08:20

## 2022-09-18 RX ADMIN — AMPICILLIN SODIUM AND SULBACTAM SODIUM 3000 MG: 2; 1 INJECTION, POWDER, FOR SOLUTION INTRAMUSCULAR; INTRAVENOUS at 08:23

## 2022-09-18 RX ADMIN — LEVOFLOXACIN 500 MG: 500 TABLET, FILM COATED ORAL at 15:49

## 2022-09-18 RX ADMIN — LEVOTHYROXINE SODIUM 125 MCG: 125 TABLET ORAL at 06:59

## 2022-09-18 RX ADMIN — MUPIROCIN: 20 OINTMENT TOPICAL at 20:57

## 2022-09-18 RX ADMIN — CEFDINIR 300 MG: 250 POWDER, FOR SUSPENSION ORAL at 10:47

## 2022-09-18 RX ADMIN — AMPICILLIN SODIUM AND SULBACTAM SODIUM 3000 MG: 2; 1 INJECTION, POWDER, FOR SOLUTION INTRAMUSCULAR; INTRAVENOUS at 03:56

## 2022-09-18 NOTE — PROGRESS NOTES
Patient uses urinal voids 225 ml, states he still feels like he needs to void but unable to void anymore, abdomen distended, bladder scanned, over 600 ml retained, assisted patient to stand up to void, 450 ml urine voided, will continue to monitor, denies needs, bed pad changed, hosea care complete. Will continue to monitor.

## 2022-09-18 NOTE — PROGRESS NOTES
AM labs drawn from CVC without difficulty, after labs drawn patient has brief run of tachycardia with rates in the 150's, returns to SR after approximately 14 seconds.

## 2022-09-18 NOTE — PROGRESS NOTES
Plan of care reviewed. Pt verbalized understanding and has no questions at this time. Pt awake in bed. Pt states no needs at this time. Pt call light in reach. Pt knows to notify RN if there are any needs. All lines and monitoring devices in place. Pt states he hopes to go home today.

## 2022-09-18 NOTE — FLOWSHEET NOTE
09/18/22 0000   Vital Signs   Temp 97.8 °F (36.6 °C)   Temp Source Oral   Heart Rate 76   Resp 22   BP (!) 92/59   MAP (Calculated) 70   Level of Consciousness 0   MEWS Score 3   Oxygen Therapy   SpO2 94 %     Patient no longer has fever, lung sounds clear and diminished, on room air, denies needs, call light in reach.

## 2022-09-18 NOTE — PROGRESS NOTES
Admit: 9/15/2022    Name:  Loretta Potts  Room:  [address] [place] [address]  MRN:    [id number]    Critical Care Daily Progress Note for 2022   Admitted with sepsis and pna    Interval History:   Levophed has been weaned off yesterday    Scheduled Meds:   levoFLOXacin  500 mg Per G Tube Daily    sodium chloride flush  5-40 mL IntraVENous 2 times per day    enoxaparin  40 mg SubCUTAneous Daily    mupirocin   Nasal BID    levothyroxine  125 mcg Oral Daily    pantoprazole  40 mg Oral BID AC       Continuous Infusions:   sodium chloride         PRN Meds:  ipratropium-albuterol, sodium chloride flush, sodium chloride, acetaminophen, morphine, prochlorperazine, albuterol, acetaminophen                  Objective:     Temp  Av.8 °F (37.1 °C)  Min: 97.8 °F (36.6 °C)  Max: 100.7 °F (38.2 °C)  Pulse  Av.1  Min: 76  Max: 121  BP  Min: 89/60  Max: 169/94  SpO2  Av.7 %  Min: 93 %  Max: 99 %  Patient Vitals for the past 4 hrs:   BP Pulse Resp SpO2   22 1000 107/68 86 27 95 %   22 0900 113/68 90 27 98 %           Intake/Output Summary (Last 24 hours) at 2022 1237  Last data filed at 2022 0600  Gross per 24 hour   Intake 4343.52 ml   Output 1975 ml   Net 2368. 52 ml         Physical Exam:  General:  Awake, alert and oriented. Appears to be not in any distress  Mucous Membranes:  Pink , anicteric  Neck: No JVD, no carotid bruit, no thyromegaly  Chest:  Clear to auscultation bilaterally, no added sounds  Cardiovascular:  RRR S1S2 heard, no murmurs or gallops  Abdomen:  Soft, undistended, non tender, no organomegaly, BS present  Extremities: No edema or cyanosis.  Distal pulses well felt  Neurological : no focal deficits    Lab Data:  CBC:   Recent Labs     22  0417 22  0420 22  0414   WBC 5.9 6.6 5.9   RBC 4.15* 4.02* 3.78*   HGB 11.8* 10.9* 10.7*   HCT 34.5* 33.5* 31.3*   MCV 83.3 83.2 82.8   RDW 13.6 13.9 13.5    141 137       BMP:   Recent Labs     22  0417 22  0428 09/18/22 0414    136 137   K 4.2 4.2 4.1    102 102   CO2 27 27 28   BUN 22* 14 12   CREATININE 0.7* 0.6* 0.6*       BNP: No results for input(s): BNP in the last 72 hours. PT/INR:   Recent Labs     09/15/22  2238   PROTIME 13.9   INR 1.09       APTT:  Recent Labs     09/15/22  2238   APTT 26.1       CARDIAC ENZYMES:   Recent Labs     09/15/22  2238   TROPONINI <0.01       FASTING LIPID PANEL:  Lab Results   Component Value Date/Time    CHOL 98 08/10/2017 05:51 AM    HDL 45 08/10/2017 05:51 AM    TRIG 79 06/11/2021 06:38 AM     LIVER PROFILE:   Recent Labs     09/15/22  2238 09/16/22  0417   AST 21 64*   ALT 19 58*   BILITOT 0.8 0.5   ALKPHOS 88 72           XR CHEST PORTABLE   Final Result   Worsening bibasilar infiltrates. Interval placement of a right jugular central venous catheter with the tip   overlying the cavoatrial junction. No pneumothorax. XR CHEST PORTABLE   Final Result   New airspace opacities in the lower lungs, right greater than left,   compatible with infection. RECOMMENDATION:   Follow-up radiographs are recommended to ensure resolution after treatment.                Assessment & Plan:     Patient Active Problem List    Diagnosis Date Noted    Carcinoma of base of tongue ([facility] 10/19/2010    Septic shock ([facility] 09/16/2022    Severe malnutrition ([address] 06/11/2021    Sepsis ([address]     Pneumonia 09/14/2020    Fever     Hematuria     Dysphagia     Septicemia ([address] 08/27/2020    Moderate protein-calorie malnutrition ([address] 08/27/2020    Hypothyroidism     NSTEMI (non-ST elevated myocardial infarction) ([address]     Status post total replacement of right hip 08/08/2016    Fever and chills 06/02/2011    SOB (shortness of breath) 06/02/2011    BPH (benign prostatic hyperplasia) 06/02/2011    GERD (gastroesophageal reflux disease) 06/02/2011    Benign prostatic hyperplasia     Hyperlipidemia     DVT, lower extremity (HCC)     Hip fracture (HCC)     Dry mouth      Septic shock  2 to pna  Admitted to ICU  IV fluids  Blood and sputum cultures. On levophed, which has been weaned off. Aspiration pneumonia. Chronic dysphagia status post PEG secondary to tongue carcinoma  Continue Unasyn and vancomycin. Light growth of Pseudomonas respiratory culture. Sensitivities pending  Antibiotic changed to Levaquin    Hypothyroidism  Continue Synthroid    Lovenox for DVT prophylaxis  Full code  General diet    Transfer to  ROC Crenshaw MD

## 2022-09-18 NOTE — PROGRESS NOTES
Cdiff sample on hold until infection control gives ok to run sample. Lab states that should be around noon before they get phone call regarding ok or not to run sample.

## 2022-09-18 NOTE — PROGRESS NOTES
Pulmonary & Critical Care Medicine ICU Progress Note    CC: Aspiration pneumonia and septic shock    Events of Last 24 hours:   Levophed remains off    Vascular lines: IV: 2022 RIJ CVC      MV: None     / / /   No results for input(s): PHART, DJF2PBH, PO2ART in the last 72 hours. IV:   norepinephrine Stopped (22 0630)    sodium chloride         Vitals:  Blood pressure (!) 148/92, pulse 90, temperature 98.8 °F (37.1 °C), temperature source Oral, resp. rate 28, height 6' 2\" (1.88 m), weight 164 lb 0.4 oz (74.4 kg), SpO2 97 %. on room air  Temp  Av.8 °F (37.1 °C)  Min: 97.8 °F (36.6 °C)  Max: 100.7 °F (38.2 °C)    Intake/Output Summary (Last 24 hours) at 2022 0719  Last data filed at 2022 0600  Gross per 24 hour   Intake 4343.52 ml   Output 1975 ml   Net 2368. 52 ml     PE:  General:  ill appearing but better  Resp: No crackles. No wheezing. CV: S1, S2. Trace edema  GI: NT, ND, +BS  Skin: Warm and dry. Neuro: PERRL.  Alert and oriented to person, events      Scheduled Meds:   sodium chloride flush  5-40 mL IntraVENous 2 times per day    enoxaparin  40 mg SubCUTAneous Daily    mupirocin   Nasal BID    levothyroxine  125 mcg Oral Daily    pantoprazole  40 mg Oral BID AC    ampicillin-sulbactam  3,000 mg IntraVENous Q6H       Data:  CBC:   Recent Labs     22  0420 22  0414   WBC 5.9 6.6 5.9   HGB 11.8* 10.9* 10.7*   HCT 34.5* 33.5* 31.3*   MCV 83.3 83.2 82.8    141 137     BMP:   Recent Labs     22  0417 22  0420 22  0414    136 137   K 4.2 4.2 4.1    102 102   CO2 27 27 28   BUN 22* 14 12   CREATININE 0.7* 0.6* 0.6*     LIVER PROFILE:   Recent Labs     09/15/22  2238 22  0417   AST 21 64*   ALT 19 58*   BILITOT 0.8 0.5   ALKPHOS 88 72     Cultures:      9/15/2022 SARS-CoV-2 negative  9/15/2022 blood NGTD  2022 respiratory culture Pseudomonas     Chest imaging was reviewed by me and showed:   CXR 2022 patchy bilateral airspace disease, increased in the right lower lobe    ASSESSMENT:  Septic shock has resolved  Aspiration pneumonia  Chronic dysphagia s/p PEG - 2/2 tongue carcinoma   Emesis of tube feeds      PLAN:  Unasyn D#3, change to levaquin and f/u on sensitivities  Referral to dietician as outpatient   Probably home tomorrow; PA lateral chest x-ray with PCP to document resolution of infiltrate  DNR-limited

## 2022-09-18 NOTE — PROGRESS NOTES
Patient assessment complete, patient feels like he is getting fever, last reading 99.8, will reassess. Lung sounds clear and diminished, on room air, BS active, tube feeding done for the night, PEG tube flushed, gauze changed. Incontinent of loose brown stool, no mucous or blood noted in stool. Changed, mipilex changed to coccyx. Patient denies needs, call light in reach, will continue to monitor.

## 2022-09-19 VITALS
DIASTOLIC BLOOD PRESSURE: 55 MMHG | SYSTOLIC BLOOD PRESSURE: 89 MMHG | RESPIRATION RATE: 21 BRPM | HEART RATE: 79 BPM | BODY MASS INDEX: 21.05 KG/M2 | TEMPERATURE: 97.8 F | OXYGEN SATURATION: 96 % | HEIGHT: 74 IN | WEIGHT: 164.02 LBS

## 2022-09-19 PROBLEM — J96.01 ACUTE RESPIRATORY FAILURE WITH HYPOXIA (HCC): Status: ACTIVE | Noted: 2022-09-19

## 2022-09-19 LAB
ANION GAP SERPL CALCULATED.3IONS-SCNC: 7 MMOL/L (ref 3–16)
BASOPHILS ABSOLUTE: 0 K/UL (ref 0–0.2)
BASOPHILS RELATIVE PERCENT: 0.5 %
BUN BLDV-MCNC: 13 MG/DL (ref 7–20)
CALCIUM SERPL-MCNC: 8.5 MG/DL (ref 8.3–10.6)
CHLORIDE BLD-SCNC: 103 MMOL/L (ref 99–110)
CO2: 28 MMOL/L (ref 21–32)
CREAT SERPL-MCNC: 0.6 MG/DL (ref 0.8–1.3)
CULTURE, RESPIRATORY: ABNORMAL
CULTURE, RESPIRATORY: ABNORMAL
EOSINOPHILS ABSOLUTE: 0.1 K/UL (ref 0–0.6)
EOSINOPHILS RELATIVE PERCENT: 3.4 %
GFR AFRICAN AMERICAN: >60
GFR NON-AFRICAN AMERICAN: >60
GLUCOSE BLD-MCNC: 106 MG/DL (ref 70–99)
GRAM STAIN RESULT: ABNORMAL
HCT VFR BLD CALC: 33.4 % (ref 40.5–52.5)
HEMOGLOBIN: 11.2 G/DL (ref 13.5–17.5)
LYMPHOCYTES ABSOLUTE: 0.4 K/UL (ref 1–5.1)
LYMPHOCYTES RELATIVE PERCENT: 10 %
MCH RBC QN AUTO: 27.7 PG (ref 26–34)
MCHC RBC AUTO-ENTMCNC: 33.4 G/DL (ref 31–36)
MCV RBC AUTO: 82.8 FL (ref 80–100)
MONOCYTES ABSOLUTE: 0.5 K/UL (ref 0–1.3)
MONOCYTES RELATIVE PERCENT: 12 %
NEUTROPHILS ABSOLUTE: 3.1 K/UL (ref 1.7–7.7)
NEUTROPHILS RELATIVE PERCENT: 74.1 %
ORGANISM: ABNORMAL
PDW BLD-RTO: 13.6 % (ref 12.4–15.4)
PLATELET # BLD: 157 K/UL (ref 135–450)
PMV BLD AUTO: 7.5 FL (ref 5–10.5)
POTASSIUM REFLEX MAGNESIUM: 4.3 MMOL/L (ref 3.5–5.1)
RBC # BLD: 4.04 M/UL (ref 4.2–5.9)
SODIUM BLD-SCNC: 138 MMOL/L (ref 136–145)
WBC # BLD: 4.2 K/UL (ref 4–11)

## 2022-09-19 PROCEDURE — 6370000000 HC RX 637 (ALT 250 FOR IP): Performed by: INTERNAL MEDICINE

## 2022-09-19 PROCEDURE — 99238 HOSP IP/OBS DSCHRG MGMT 30/<: CPT | Performed by: INTERNAL MEDICINE

## 2022-09-19 PROCEDURE — 99232 SBSQ HOSP IP/OBS MODERATE 35: CPT | Performed by: INTERNAL MEDICINE

## 2022-09-19 PROCEDURE — 85025 COMPLETE CBC W/AUTO DIFF WBC: CPT

## 2022-09-19 PROCEDURE — 80048 BASIC METABOLIC PNL TOTAL CA: CPT

## 2022-09-19 RX ORDER — LEVOFLOXACIN 25 MG/ML
500 SOLUTION ORAL DAILY
Qty: 140 ML | Refills: 0 | Status: SHIPPED | OUTPATIENT
Start: 2022-09-19 | End: 2022-09-26

## 2022-09-19 RX ORDER — LANSOPRAZOLE
30 KIT
Status: DISCONTINUED | OUTPATIENT
Start: 2022-09-19 | End: 2022-09-19 | Stop reason: HOSPADM

## 2022-09-19 RX ADMIN — LEVOFLOXACIN 500 MG: 500 TABLET, FILM COATED ORAL at 13:17

## 2022-09-19 RX ADMIN — LEVOTHYROXINE SODIUM 125 MCG: 125 TABLET ORAL at 06:16

## 2022-09-19 RX ADMIN — LANSOPRAZOLE 30 MG: KIT at 09:11

## 2022-09-19 RX ADMIN — ACETAMINOPHEN 650 MG: 160 SOLUTION ORAL at 02:30

## 2022-09-19 ASSESSMENT — PAIN SCALES - GENERAL: PAINLEVEL_OUTOF10: 5

## 2022-09-19 ASSESSMENT — PAIN - FUNCTIONAL ASSESSMENT: PAIN_FUNCTIONAL_ASSESSMENT: ACTIVITIES ARE NOT PREVENTED

## 2022-09-19 ASSESSMENT — PAIN DESCRIPTION - ORIENTATION: ORIENTATION: RIGHT

## 2022-09-19 ASSESSMENT — PAIN DESCRIPTION - DESCRIPTORS: DESCRIPTORS: ACHING

## 2022-09-19 ASSESSMENT — PAIN DESCRIPTION - LOCATION: LOCATION: HIP

## 2022-09-19 NOTE — PROGRESS NOTES
All IV access lines removed x2. Pt ambulatory and dressing self. Wife given chux for car ride home. Discharge reviewed with pt and patient wife Jesse Hodges phone number [phone]. Outpatient pharmacy states liquid version is $150. Asking MD for pill version per wife request so she can crush medication at home. Ok per Dr Daniel Rivera to call antibiotic to [facility] in pill form. Called in to pharmacist Tori Mercado at [facility]: Levaquin 500mg tab by mouth daily for 7 days quantity 7.

## 2022-09-19 NOTE — DISCHARGE INSTR - ACTIVITY
Please take it easy at home. Increase activity as tolerated. It is good practice to ambulate often to keep your lungs healthy. Be careful not to overwork yourself and allow your body to heal. Eat a healthy, balanced diet and drink a lot of water to stay hydrated. Please use caution with ambulation while you are having diarrhea as it may cause you to be weak and tired. If diarrhea continues or gets worse, please call your primary doctor to discuss with them including the antibiotics you are currently on and have taken recently including recent hospital admission.

## 2022-09-19 NOTE — PROGRESS NOTES
Tylenol given for right hip discomfort, effective, patient resting, no s/s distress. Call light in reach.

## 2022-09-19 NOTE — DISCHARGE SUMMARY
Name:  Jody Taylor  Room:  3001/3001-01  MRN:    [id number]    Discharge Summary      This discharge summary is in conjunction with a complete physical exam done on the day of discharge. Discharging Physician: Gama Sullivan MD      Admit: 9/15/2022  Discharge:  9/19/2022     Diagnoses this Admission    Principal Problem:    Septic shock ([address]  Resolved Problems:    * No resolved hospital problems. *      Procedures (Please Review Full Report for Details)  none    Consults    IP CONSULT TO HOSPITALIST  PHARMACY TO DOSE VANCOMYCIN  IP CONSULT TO CRITICAL CARE  IP CONSULT TO DIETITIAN  IP CONSULT TO PHARMACY      HPI:    The patient is a 68 y.o. male with PMH below, presents with SOB, aspiration, N/V, cough, fevers, generalized weakness. 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 He had resulting dysphagia. He had a PEG placed about 2 years ago. He takes ALL intake via his PEG. He says he has been throwing up some of his tube feeds the last few days. He has had aspiration PNA in the past 2/2 this. He is concerned that he may have aspirated some of his TF. He has been having intermittent fevers at home. He has had increasing SOB and cough. He was found to have sats in the 80's in the ED. He is requiring 2L O2. He is not normally on O2. CXR showed bibasilar PNA. He was also noted to be hypotensive. His BP did improve w/ IVF. R IJ placed in the ED. Physical Exam at Discharge:  BP (!) 89/55   Pulse 79   Temp 97.8 °F (36.6 °C) (Oral)   Resp 21   Ht 6' 2\" (1.88 m)   Wt 164 lb 0.4 oz (74.4 kg)   SpO2 96%   BMI 21.06 kg/m²     General:  Awake, alert and oriented.  Appears to be not in any distress  Mucous Membranes:  Pink , anicteric  Neck: No JVD, no carotid bruit, no thyromegaly  Chest:  Clear to auscultation bilaterally, no added sounds  Cardiovascular:  RRR S1S2 heard, no murmurs or gallops  Abdomen:  Soft, undistended, non tender, no organomegaly, BS present  Extremities: No edema or cyanosis. Distal pulses well felt  Neurological : no focal deficits      Hospital Course  Septic shock  2 to pna  Admitted to ICU  IV fluids  Blood and sputum cultures. On levophed, which has been weaned off     Aspiration pneumonia. Chronic dysphagia status post PEG secondary to tongue carcinoma  Continue Unasyn and vancomycin. Light growth of Pseudomonas respiratory culture.   Sensitivities pending  Antibiotic changed to Levaquin for 7 days at discharge     Hypothyroidism  Continue Synthroid    CBC:   Recent Labs     09/17/22 0420 09/18/22 0414 09/19/22  0510   WBC 6.6 5.9 4.2   HGB 10.9* 10.7* 11.2*   HCT 33.5* 31.3* 33.4*   MCV 83.2 82.8 82.8    137 157     BMP:   Recent Labs     09/17/22 0420 09/18/22 0414 09/19/22  0510    137 138   K 4.2 4.1 4.3    102 103   CO2 27 28 28   BUN 14 12 13   CREATININE 0.6* 0.6* 0.6*       CULTURE, RESPIRATORY Moderate growth normal respiratory itz with Abnormal   [facility]   Gram Stain Result 4+ WBC's (Polymorphonuclear)   2+ Epithelial Cells   4+ Gram positive cocci  [facility]   Organism Pseudomonas aeruginosa Abnormal   [facility]   CULTURE, RESPIRATORY Light growth  [address] [facility]        Susceptibility    Pseudomonas aeruginosa (1)    Antibiotic Interpretation Microscan  Method Status    cefepime Sensitive <=2 mcg/mL BACTERIAL SUSCEPTIBILITY PANEL BY JOANIE     ciprofloxacin Sensitive <=1 mcg/mL BACTERIAL SUSCEPTIBILITY PANEL BY JOANIE     gentamicin Sensitive <=4 mcg/mL BACTERIAL SUSCEPTIBILITY PANEL BY JOANIE     meropenem Sensitive <=1 mcg/mL BACTERIAL SUSCEPTIBILITY PANEL BY JOANIE     piperacillin-tazobactam Sensitive <=16 mcg/mL BACTERIAL SUSCEPTIBILITY PANEL BY JOANIE     tobramycin Sensitive <=4 mcg/mL BACTERIAL SUSCEPTIBILITY PANEL BY JOANIE     levofloxacin Sensitive 0.5 ug/ml BACTERIAL SUSCEPTIBILITY PANEL BY E-TEST                     XR CHEST PORTABLE   Final Result Worsening bibasilar infiltrates. Interval placement of a right jugular central venous catheter with the tip   overlying the cavoatrial junction. No pneumothorax. XR CHEST PORTABLE   Final Result   New airspace opacities in the lower lungs, right greater than left,   compatible with infection. RECOMMENDATION:   Follow-up radiographs are recommended to ensure resolution after treatment. Discharge Medications     Medication List        START taking these medications      levoFLOXacin 25 MG/ML solution  Commonly known as: LEVAQUIN  20 mLs by Per G Tube route daily for 7 days            CONTINUE taking these medications      levothyroxine 125 MCG tablet  Commonly known as: SYNTHROID     omeprazole 40 MG delayed release capsule  Commonly known as: PRILOSEC            STOP taking these medications      albuterol sulfate  (90 Base) MCG/ACT inhaler  Commonly known as: PROVENTIL;VENTOLIN;PROAIR     fluticasone 50 MCG/ACT nasal spray  Commonly known as: FLONASE     polyethylene glycol 17 GM/SCOOP powder  Commonly known as: GLYCOLAX     Senna 8.8 MG/5ML Liqd     simvastatin 20 MG tablet  Commonly known as: ZOCOR               Where to Get Your Medications        These medications were sent to [id number] [place] [address], [address] [place] [address]      Phone: [phone]   levoFLOXacin 25 MG/ML solution     Pharmacy Instructions:    DO NOT TAKE ANY DIARRHEA INDUCING MEDICATIONS! Discharge Condition/Location: Stable    Follow Up: Follow up with PCP.         Betty Diggs MD 9/19/2022 1:00 PM

## 2022-09-19 NOTE — CARE COORDINATION
RN stated pt will discharge today and awaiting MD orders. DISCHARGE ORDER  Date/Time 2022 8:47 AM  Completed by: Lesa MACDONALD, ODILON  Case Management    Patient Name: Sherlyn Cox      : 1945  Admitting Diagnosis: Acute respiratory failure with hypoxia ([address] [J96.01]  Septic shock ([address] [A41.9, R65.21]  Aspiration pneumonia of both lower lobes due to gastric secretions ([address] [J69.0]  Sepsis with acute hypoxic respiratory failure without septic shock, due to unspecified organism ([address] [A41.9, R65.20, J96.01]      Admit order Date and Status:2022 Inpatient   (verify MD's last order for status of admission)      Noted discharge order. If applicable PT/OT recommendation at Discharge: home with 24/7 assist and home therapy   DME recommendation by PT/OT:RW-continue to assess    Confirmed discharge plan: Yes  with whom pt  If pt confirmed DC plan does family need to be contacted by CM No if yes who n/a    Discharge Plan:     Date of Last IMM Given: today by writer; pt agreeable to discharge    Reviewed chart. Role of discharge planner explained and patient verbalized understanding. Discharge order is noted. Met with pt at bedside. Pt stated he will return home on discharge and his wife will bring him home. Pt stated he has home tube feeds through [facility]. Discussed PT/OT recommendations with pt and he stated that his wife is with him 24/7 assist. Pt stated he doesn't need skilled [address] for RN/PT/OT and declined a rolling walker. He stated that he has a standard walker and doesn't need one with wheels. Pt aware to call his PCP if he decides he wants skilled [address] once home and he stated understanding. He denied further needs for CM. Spoke with Marsha Hall at [facility] who confirmed pt has home tube feeds and doesn't need anything from [facility]. Pt is being d/c'd to home today. Pt's O2 sats are 96% on Room Air per vitals in epic. Discharge timeout done with [facility].  All discharge needs and concerns addressed.

## 2022-09-19 NOTE — CONSULTS
Consult received: \"Patient needs protonix sprinkles, has PEG tube, NPO, suggestion for equivalent alternate. \"    [address] called floor RN. Suggested alternative was Protonix IV. RN said they wanted sprinkles. Explained that we have lansoprazole suspension. RN said that is what they would rather have. Converted Protonix 40 mg BID to lansoprazole suspension 30 mg BID.      Tay Douglass, PharmD, [facility], 9/19/2022 7:48 AM

## 2022-09-19 NOTE — PROGRESS NOTES
Patient awakened diaphoretic after tylenol, re-bathed, linens changed, incontinent of stool, brief changed, PEG tube dressing changed, slight redness around tubing, slight redness to anterior left hip area, mipilex placed on blanchable red coccyx, encouraged repositioning.

## 2022-09-19 NOTE — PROGRESS NOTES
Pulmonary & Critical Care Medicine ICU Progress Note    CC: Aspiration pneumonia and septic shock    Events of Last 24 hours:       Vascular lines: IV: 9/16/2022 RIJ CVC    Vitals:  Blood pressure (!) 89/55, pulse 79, temperature 97.8 °F (36.6 °C), temperature source Oral, resp. rate 21, height 6' 2\" (1.88 m), weight 164 lb 0.4 oz (74.4 kg), SpO2 96 %. on room airPE:  General:  ill appearing but better  Resp: No crackles. No wheezing. CV: S1, S2. Trace edema  GI: NT, ND, +BS  Skin: Warm and dry. Neuro: PERRL. Alert and oriented to person, events      Scheduled Meds:   levoFLOXacin  500 mg Per G Tube Daily    sodium chloride flush  5-40 mL IntraVENous 2 times per day    enoxaparin  40 mg SubCUTAneous Daily    mupirocin   Nasal BID    levothyroxine  125 mcg Oral Daily       Data:  CBC:   Recent Labs     09/17/22  0420 09/18/22  0414 09/19/22  0510   WBC 6.6 5.9 4.2   HGB 10.9* 10.7* 11.2*   HCT 33.5* 31.3* 33.4*   MCV 83.2 82.8 82.8    137 157       BMP:   Recent Labs     09/17/22 0420 09/18/22  0414 09/19/22  0510    137 138   K 4.2 4.1 4.3    102 103   CO2 27 28 28   BUN 14 12 13   CREATININE 0.6* 0.6* 0.6*       LIVER PROFILE:   No results for input(s): AST, ALT, LIPASE, BILIDIR, BILITOT, ALKPHOS in the last 72 hours. Invalid input(s): AMYLASE,  ALB    Cultures:      9/15/2022 SARS-CoV-2 negative  9/15/2022 blood NGTD  9/16/2022 respiratory culture Pseudomonas     Chest imaging was reviewed by me and showed:   CXR 9/16/2022 patchy bilateral airspace disease, increased in the right lower lobe    ASSESSMENT:  Septic shock has resolved  Aspiration pneumonia  Chronic dysphagia s/p PEG - 2/2 tongue carcinoma   Emesis of tube feeds      PLAN:  Changed to levaquin D#2 and unasyn prior  D/c planning.  PA lateral chest x-ray with PCP to document resolution of infiltrate

## 2022-09-19 NOTE — PROGRESS NOTES
Patient resting in bed, assessment complete, on room air, rhonchi noted right upper lobe, wheezing right middle lobe, otherwise diminished, productive cough. Patient declines last dose of tube feeding, states that he feels some reflux and does not want to start vomiting, states he has been getting too much at one time. Voiding well per urinal when able to stand to void. Denies needs, call light in reach, will continue to monitor.

## 2022-09-19 NOTE — PROGRESS NOTES
Pt awake in bed. Pt states no needs at this time. Pt call light in reach. Pt knows to notify RN if there are any needs. All lines and monitoring devices in place. Plan of care reviewed. Pt verbalized understanding and has no questions at this time.

## 2022-09-20 LAB
BLOOD CULTURE, ROUTINE: NORMAL
CULTURE, BLOOD 2: NORMAL

## 2022-09-22 NOTE — PROGRESS NOTES
Please document evidence. -- Acute Respiratory Failure ruled out after study  -- Other - I will add my own diagnosis  -- Disagree - Not applicable / Not valid  -- Disagree - Clinically unable to determine / Unknown  -- Refer to Clinical Documentation Reviewer    PROVIDER RESPONSE TEXT:    Acute Respiratory Failure has been ruled out after study.     Query created by: Nalini Blue on 9/20/2022 1:26 PM      Electronically signed by:  Cristi Mello MD 9/22/2022 9:36 AM

## 2022-11-16 NOTE — ED NOTES
1229-Perfect  Sent to Dr. Matthias Magallanes  12/07/20 123    1256-Varsha Houser returned call     [address] [facility]  12/07/20 5834 negative...

## 2022-12-28 ENCOUNTER — OFFICE VISIT (OUTPATIENT)
Dept: URBAN - METROPOLITAN AREA CLINIC 63 | Facility: CLINIC | Age: 77
End: 2022-12-28

## 2023-01-31 ENCOUNTER — WEB ENCOUNTER (OUTPATIENT)
Dept: URBAN - METROPOLITAN AREA CLINIC 63 | Facility: CLINIC | Age: 78
End: 2023-01-31

## 2023-01-31 ENCOUNTER — OFFICE VISIT (OUTPATIENT)
Dept: URBAN - METROPOLITAN AREA CLINIC 63 | Facility: CLINIC | Age: 78
End: 2023-01-31
Payer: MEDICARE

## 2023-01-31 VITALS
OXYGEN SATURATION: 95 % | SYSTOLIC BLOOD PRESSURE: 122 MMHG | TEMPERATURE: 97.8 F | HEIGHT: 74 IN | DIASTOLIC BLOOD PRESSURE: 80 MMHG | BODY MASS INDEX: 18.87 KG/M2 | HEART RATE: 81 BPM | WEIGHT: 147 LBS

## 2023-01-31 DIAGNOSIS — J69.0 ASPIRATION PNEUMONIA OF BOTH LUNGS DUE TO GASTRIC SECRETIONS, UNSPECIFIED PART OF LUNG: ICD-10-CM

## 2023-01-31 DIAGNOSIS — K94.22 GASTROSTOMY SITE ERYTHEMA: ICD-10-CM

## 2023-01-31 DIAGNOSIS — K21.00 GASTROESOPHAGEAL REFLUX DISEASE WITH ESOPHAGITIS WITHOUT HEMORRHAGE: ICD-10-CM

## 2023-01-31 PROBLEM — 266433003: Status: ACTIVE | Noted: 2023-01-31

## 2023-01-31 PROCEDURE — 99214 OFFICE O/P EST MOD 30 MIN: CPT | Performed by: NURSE PRACTITIONER

## 2023-01-31 RX ORDER — LEVOTHYROXINE SODIUM 125 UG/1
TABLET ORAL ONCE A DAY
Refills: 0 | Status: ACTIVE | COMMUNITY
Start: 2022-01-28

## 2023-01-31 RX ORDER — OMEPRAZOLE 20 MG/1
1 CAPSULE 30 MINUTES BEFORE MORNING MEAL CAPSULE, DELAYED RELEASE ORAL ONCE A DAY
Qty: 90 | Refills: 1 | OUTPATIENT
Start: 2023-01-31

## 2023-01-31 RX ORDER — OMEPRAZOLE 40 MG/1
CAPSULE, DELAYED RELEASE ORAL ONCE A DAY
Refills: 0 | Status: ACTIVE | COMMUNITY
Start: 2022-01-28

## 2023-01-31 NOTE — HPI-TODAY'S VISIT:
Mr. Jacinto is a pleasant 78-year-old male evaluated with complaint of severe acid reflux. History of oropharyngeal cancer, with chemotherapy and radiation. He was previously evaluated 01/28/2022 by Dr. Ling, with complaint of dysphagia and evaluation of gastrostomy site irritation. Site appeared healthy, he was advised to use half-strength hydrogen peroxide to clean area.  Has not followed up since.  Today, he complains of continued mucus production, thick and long strands coming from his mouth.  He complains the material is sometimes chalky like his feed product.   Denies vomit tasting sputum.  Taking omeprazole 40 mg once daily.  Has failed numberous swallowing studies including with SLP, having aspiration.   Is taking crushed pills through G port several three times per day.    Curently J tube feedings are running at 95 mL/hr.  20 hrs on and 4 hours off.  Sleeps sitting up.    He was hospitalized December 2022 for 10 days with diagnosis of aspiration pneumonia.  G-tube was changed to J-tube. He was advised to use MiraLAX on a regular basis.  Was discharged to SNF. Diagnosed with UTI 01/05/2023, Espinosa catheter in place.  Underwent voiding 01/23/2023 trial, for consideration of removal of catheter.

## 2023-01-31 NOTE — HPI-PREVIOUS LABS
Laboratory results 01/11/2023 CBC Hgb 11.6 HCT 37.5 MCHC 30.9 CMP Glucose 107 Creatinine 0.53 BUN/creatinine ratio 35.8 Albumin 3.3 A/G ratio 0.9

## 2023-02-01 ENCOUNTER — TELEPHONE ENCOUNTER (OUTPATIENT)
Dept: URBAN - METROPOLITAN AREA SURGERY CENTER 4 | Facility: SURGERY CENTER | Age: 78
End: 2023-02-01

## 2023-02-01 RX ORDER — OMEPRAZOLE 20 MG/1
1 CAPSULE 30 MINUTES BEFORE MORNING MEAL CAPSULE, DELAYED RELEASE ORAL ONCE A DAY
Qty: 90 | Refills: 1
Start: 2023-01-31

## 2023-02-13 ENCOUNTER — TELEPHONE ENCOUNTER (OUTPATIENT)
Dept: URBAN - METROPOLITAN AREA CLINIC 63 | Facility: CLINIC | Age: 78
End: 2023-02-13

## 2023-02-14 ENCOUNTER — TELEPHONE ENCOUNTER (OUTPATIENT)
Dept: URBAN - METROPOLITAN AREA CLINIC 60 | Facility: CLINIC | Age: 78
End: 2023-02-14

## 2023-02-14 ENCOUNTER — TELEPHONE ENCOUNTER (OUTPATIENT)
Dept: URBAN - METROPOLITAN AREA CLINIC 63 | Facility: CLINIC | Age: 78
End: 2023-02-14

## 2023-02-14 ENCOUNTER — LAB OUTSIDE AN ENCOUNTER (OUTPATIENT)
Dept: URBAN - METROPOLITAN AREA CLINIC 63 | Facility: CLINIC | Age: 78
End: 2023-02-14

## 2023-02-14 PROBLEM — 302110001: Status: ACTIVE | Noted: 2023-02-14

## 2023-02-16 ENCOUNTER — TELEPHONE ENCOUNTER (OUTPATIENT)
Dept: URBAN - METROPOLITAN AREA CLINIC 63 | Facility: CLINIC | Age: 78
End: 2023-02-16

## 2023-02-17 ENCOUNTER — TELEPHONE ENCOUNTER (OUTPATIENT)
Dept: URBAN - METROPOLITAN AREA CLINIC 63 | Facility: CLINIC | Age: 78
End: 2023-02-17

## 2023-03-01 ENCOUNTER — TELEPHONE ENCOUNTER (OUTPATIENT)
Dept: URBAN - METROPOLITAN AREA CLINIC 60 | Facility: CLINIC | Age: 78
End: 2023-03-01

## 2023-03-15 ENCOUNTER — OFFICE VISIT (OUTPATIENT)
Dept: URBAN - METROPOLITAN AREA CLINIC 57 | Facility: CLINIC | Age: 78
End: 2023-03-15

## 2023-04-19 ENCOUNTER — OFFICE VISIT (OUTPATIENT)
Dept: URBAN - METROPOLITAN AREA CLINIC 57 | Facility: CLINIC | Age: 78
End: 2023-04-19
Payer: MEDICARE

## 2023-04-19 ENCOUNTER — WEB ENCOUNTER (OUTPATIENT)
Dept: URBAN - METROPOLITAN AREA CLINIC 57 | Facility: CLINIC | Age: 78
End: 2023-04-19

## 2023-04-19 VITALS
OXYGEN SATURATION: 96 % | BODY MASS INDEX: 18.99 KG/M2 | HEIGHT: 74 IN | DIASTOLIC BLOOD PRESSURE: 80 MMHG | SYSTOLIC BLOOD PRESSURE: 100 MMHG | HEART RATE: 56 BPM | WEIGHT: 148 LBS

## 2023-04-19 DIAGNOSIS — Z93.4 JEJUNOSTOMY TUBE IN SITU: ICD-10-CM

## 2023-04-19 DIAGNOSIS — K21.00 GASTROESOPHAGEAL REFLUX DISEASE WITH ESOPHAGITIS WITHOUT HEMORRHAGE: ICD-10-CM

## 2023-04-19 PROCEDURE — 99213 OFFICE O/P EST LOW 20 MIN: CPT | Performed by: INTERNAL MEDICINE

## 2023-04-19 RX ORDER — LEVOTHYROXINE SODIUM 125 UG/1
TABLET ORAL ONCE A DAY
Refills: 0 | Status: ACTIVE | COMMUNITY
Start: 2022-01-28

## 2023-04-19 RX ORDER — OMEPRAZOLE 40 MG/1
CAPSULE, DELAYED RELEASE ORAL ONCE A DAY
Refills: 0 | Status: ACTIVE | COMMUNITY
Start: 2022-01-28

## 2023-04-19 RX ORDER — APIXABAN 5 MG/1
1 TABLET TABLET, FILM COATED ORAL TWICE A DAY
Status: ACTIVE | COMMUNITY

## 2023-04-19 RX ORDER — OMEPRAZOLE 20 MG/1
1 CAPSULE 30 MINUTES BEFORE MORNING MEAL CAPSULE, DELAYED RELEASE ORAL ONCE A DAY
Qty: 90 | Refills: 1 | Status: ACTIVE | COMMUNITY
Start: 2023-01-31

## 2023-04-19 RX ORDER — PREGABALIN 50 MG/1
1 CAPSULE CAPSULE ORAL TWICE A DAY
Status: ACTIVE | COMMUNITY

## 2023-04-19 RX ORDER — MIDODRINE HYDROCHLORIDE 2.5 MG/1
1 TABLET TABLET ORAL TWICE A DAY
Status: ACTIVE | COMMUNITY

## 2023-04-19 NOTE — HPI-TODAY'S VISIT:
Seen in follow-up. He is doing well with his jejunostomy feeds, no episodes of aspiration reported since his last visit. The tube seems to be functioning well. Was able to unclog it hence no change of jejunostomy tube was required hence he canceled appointment. Denies any nausea or vomiting. Occasional sense of fullness. DON G-tube site looks clean PEG tube looks normal.

## 2023-06-27 ENCOUNTER — APPOINTMENT (OUTPATIENT)
Dept: CT IMAGING | Age: 78
DRG: 391 | End: 2023-06-27
Payer: MEDICARE

## 2023-06-27 ENCOUNTER — HOSPITAL ENCOUNTER (INPATIENT)
Age: 78
LOS: 2 days | Discharge: HOME | DRG: 391 | End: 2023-06-29
Attending: EMERGENCY MEDICINE | Admitting: INTERNAL MEDICINE
Payer: MEDICARE

## 2023-06-27 DIAGNOSIS — K66.8 INTRA-ABDOMINAL FREE AIR OF UNKNOWN ETIOLOGY: Primary | ICD-10-CM

## 2023-06-27 DIAGNOSIS — R11.2 NAUSEA AND VOMITING, UNSPECIFIED VOMITING TYPE: ICD-10-CM

## 2023-06-27 DIAGNOSIS — R10.84 GENERALIZED ABDOMINAL PAIN: ICD-10-CM

## 2023-06-27 DIAGNOSIS — N30.01 ACUTE CYSTITIS WITH HEMATURIA: ICD-10-CM

## 2023-06-27 PROBLEM — R10.9 ABDOMINAL PAIN: Status: ACTIVE | Noted: 2023-06-27

## 2023-06-27 PROBLEM — I95.9 HYPOTENSION: Status: ACTIVE | Noted: 2023-06-27

## 2023-06-27 LAB
ALBUMIN SERPL-MCNC: 3.7 G/DL (ref 3.4–5)
ALBUMIN/GLOB SERPL: 1 {RATIO} (ref 1.1–2.2)
ALP SERPL-CCNC: 106 U/L (ref 40–129)
ALT SERPL-CCNC: 7 U/L (ref 10–40)
ANION GAP SERPL CALCULATED.3IONS-SCNC: 8 MMOL/L (ref 3–16)
AST SERPL-CCNC: 16 U/L (ref 15–37)
BASOPHILS # BLD: 0 K/UL (ref 0–0.2)
BASOPHILS NFR BLD: 0.4 %
BILIRUB SERPL-MCNC: 0.4 MG/DL (ref 0–1)
BILIRUB UR QL STRIP.AUTO: NEGATIVE
BUN SERPL-MCNC: 20 MG/DL (ref 7–20)
CALCIUM SERPL-MCNC: 9.3 MG/DL (ref 8.3–10.6)
CHLORIDE SERPL-SCNC: 91 MMOL/L (ref 99–110)
CLARITY UR: CLEAR
CO2 SERPL-SCNC: 32 MMOL/L (ref 21–32)
COLOR UR: YELLOW
CREAT SERPL-MCNC: 0.7 MG/DL (ref 0.8–1.3)
DEPRECATED RDW RBC AUTO: 15.2 % (ref 12.4–15.4)
EOSINOPHIL # BLD: 0.2 K/UL (ref 0–0.6)
EOSINOPHIL NFR BLD: 2.7 %
EPI CELLS #/AREA URNS HPF: ABNORMAL /HPF (ref 0–5)
GFR SERPLBLD CREATININE-BSD FMLA CKD-EPI: >60 ML/MIN/{1.73_M2}
GLUCOSE SERPL-MCNC: 121 MG/DL (ref 70–99)
GLUCOSE UR STRIP.AUTO-MCNC: NEGATIVE MG/DL
HCT VFR BLD AUTO: 42.7 % (ref 40.5–52.5)
HGB BLD-MCNC: 14 G/DL (ref 13.5–17.5)
HGB UR QL STRIP.AUTO: ABNORMAL
KETONES UR STRIP.AUTO-MCNC: NEGATIVE MG/DL
LEUKOCYTE ESTERASE UR QL STRIP.AUTO: ABNORMAL
LIPASE SERPL-CCNC: 17 U/L (ref 13–60)
LYMPHOCYTES # BLD: 0.6 K/UL (ref 1–5.1)
LYMPHOCYTES NFR BLD: 10.3 %
MCH RBC QN AUTO: 27.8 PG (ref 26–34)
MCHC RBC AUTO-ENTMCNC: 32.7 G/DL (ref 31–36)
MCV RBC AUTO: 85 FL (ref 80–100)
MONOCYTES # BLD: 0.4 K/UL (ref 0–1.3)
MONOCYTES NFR BLD: 7 %
NEUTROPHILS # BLD: 4.8 K/UL (ref 1.7–7.7)
NEUTROPHILS NFR BLD: 79.6 %
NITRITE UR QL STRIP.AUTO: POSITIVE
PH UR STRIP.AUTO: 8.5 [PH] (ref 5–8)
PLATELET # BLD AUTO: 246 K/UL (ref 135–450)
PMV BLD AUTO: 7.3 FL (ref 5–10.5)
POTASSIUM SERPL-SCNC: 4.6 MMOL/L (ref 3.5–5.1)
PROT SERPL-MCNC: 7.5 G/DL (ref 6.4–8.2)
PROT UR STRIP.AUTO-MCNC: NEGATIVE MG/DL
RBC # BLD AUTO: 5.02 M/UL (ref 4.2–5.9)
RBC #/AREA URNS HPF: ABNORMAL /HPF (ref 0–4)
SODIUM SERPL-SCNC: 131 MMOL/L (ref 136–145)
SP GR UR STRIP.AUTO: 1.01 (ref 1–1.03)
UA COMPLETE W REFLEX CULTURE PNL UR: ABNORMAL
UA DIPSTICK W REFLEX MICRO PNL UR: YES
URN SPEC COLLECT METH UR: ABNORMAL
UROBILINOGEN UR STRIP-ACNC: 1 E.U./DL
WBC # BLD AUTO: 6.1 K/UL (ref 4–11)
WBC #/AREA URNS HPF: ABNORMAL /HPF (ref 0–5)

## 2023-06-27 PROCEDURE — 2580000003 HC RX 258

## 2023-06-27 PROCEDURE — 6360000004 HC RX CONTRAST MEDICATION: Performed by: NURSE PRACTITIONER

## 2023-06-27 PROCEDURE — 2500000003 HC RX 250 WO HCPCS: Performed by: NURSE PRACTITIONER

## 2023-06-27 PROCEDURE — 87086 URINE CULTURE/COLONY COUNT: CPT

## 2023-06-27 PROCEDURE — 74176 CT ABD & PELVIS W/O CONTRAST: CPT

## 2023-06-27 PROCEDURE — 6360000002 HC RX W HCPCS: Performed by: NURSE PRACTITIONER

## 2023-06-27 PROCEDURE — 2000000000 HC ICU R&B

## 2023-06-27 PROCEDURE — 81001 URINALYSIS AUTO W/SCOPE: CPT

## 2023-06-27 PROCEDURE — 96361 HYDRATE IV INFUSION ADD-ON: CPT

## 2023-06-27 PROCEDURE — 36415 COLL VENOUS BLD VENIPUNCTURE: CPT

## 2023-06-27 PROCEDURE — 96375 TX/PRO/DX INJ NEW DRUG ADDON: CPT

## 2023-06-27 PROCEDURE — 96367 TX/PROPH/DG ADDL SEQ IV INF: CPT

## 2023-06-27 PROCEDURE — 99223 1ST HOSP IP/OBS HIGH 75: CPT

## 2023-06-27 PROCEDURE — 2580000003 HC RX 258: Performed by: NURSE PRACTITIONER

## 2023-06-27 PROCEDURE — 93005 ELECTROCARDIOGRAM TRACING: CPT

## 2023-06-27 PROCEDURE — 6360000004 HC RX CONTRAST MEDICATION: Performed by: SURGERY

## 2023-06-27 PROCEDURE — 99223 1ST HOSP IP/OBS HIGH 75: CPT | Performed by: SURGERY

## 2023-06-27 PROCEDURE — 6370000000 HC RX 637 (ALT 250 FOR IP)

## 2023-06-27 PROCEDURE — 87077 CULTURE AEROBIC IDENTIFY: CPT

## 2023-06-27 PROCEDURE — 85025 COMPLETE CBC W/AUTO DIFF WBC: CPT

## 2023-06-27 PROCEDURE — 96365 THER/PROPH/DIAG IV INF INIT: CPT

## 2023-06-27 PROCEDURE — 2580000003 HC RX 258: Performed by: SURGERY

## 2023-06-27 PROCEDURE — 87181 SC STD AGAR DILUTION PER AGT: CPT

## 2023-06-27 PROCEDURE — 70450 CT HEAD/BRAIN W/O DYE: CPT

## 2023-06-27 PROCEDURE — 2500000003 HC RX 250 WO HCPCS

## 2023-06-27 PROCEDURE — 2700000000 HC OXYGEN THERAPY PER DAY

## 2023-06-27 PROCEDURE — 74177 CT ABD & PELVIS W/CONTRAST: CPT

## 2023-06-27 PROCEDURE — 87186 SC STD MICRODIL/AGAR DIL: CPT

## 2023-06-27 PROCEDURE — 94761 N-INVAS EAR/PLS OXIMETRY MLT: CPT

## 2023-06-27 PROCEDURE — 80053 COMPREHEN METABOLIC PANEL: CPT

## 2023-06-27 PROCEDURE — 83690 ASSAY OF LIPASE: CPT

## 2023-06-27 PROCEDURE — 99285 EMERGENCY DEPT VISIT HI MDM: CPT

## 2023-06-27 RX ORDER — SODIUM CHLORIDE 0.9 % (FLUSH) 0.9 %
10 SYRINGE (ML) INJECTION PRN
Status: DISCONTINUED | OUTPATIENT
Start: 2023-06-27 | End: 2023-06-29 | Stop reason: HOSPADM

## 2023-06-27 RX ORDER — SODIUM CHLORIDE 9 MG/ML
INJECTION, SOLUTION INTRAVENOUS CONTINUOUS
Status: DISCONTINUED | OUTPATIENT
Start: 2023-06-27 | End: 2023-06-29

## 2023-06-27 RX ORDER — ONDANSETRON 2 MG/ML
4 INJECTION INTRAMUSCULAR; INTRAVENOUS EVERY 6 HOURS PRN
Status: DISCONTINUED | OUTPATIENT
Start: 2023-06-27 | End: 2023-06-29 | Stop reason: HOSPADM

## 2023-06-27 RX ORDER — CIPROFLOXACIN 2 MG/ML
400 INJECTION, SOLUTION INTRAVENOUS ONCE
Status: COMPLETED | OUTPATIENT
Start: 2023-06-27 | End: 2023-06-27

## 2023-06-27 RX ORDER — MAGNESIUM SULFATE 1 G/100ML
1000 INJECTION INTRAVENOUS PRN
Status: DISCONTINUED | OUTPATIENT
Start: 2023-06-27 | End: 2023-06-29 | Stop reason: HOSPADM

## 2023-06-27 RX ORDER — CIPROFLOXACIN 2 MG/ML
400 INJECTION, SOLUTION INTRAVENOUS EVERY 12 HOURS
Status: DISCONTINUED | OUTPATIENT
Start: 2023-06-28 | End: 2023-06-29

## 2023-06-27 RX ORDER — POTASSIUM CHLORIDE 750 MG/1
40 TABLET, EXTENDED RELEASE ORAL PRN
Status: DISCONTINUED | OUTPATIENT
Start: 2023-06-27 | End: 2023-06-29 | Stop reason: HOSPADM

## 2023-06-27 RX ORDER — ONDANSETRON 2 MG/ML
4 INJECTION INTRAMUSCULAR; INTRAVENOUS ONCE
Status: COMPLETED | OUTPATIENT
Start: 2023-06-27 | End: 2023-06-27

## 2023-06-27 RX ORDER — HYDROCODONE BITARTRATE AND ACETAMINOPHEN 5; 325 MG/1; MG/1
1 TABLET ORAL ONCE
Status: DISCONTINUED | OUTPATIENT
Start: 2023-06-27 | End: 2023-06-27

## 2023-06-27 RX ORDER — SODIUM CHLORIDE 0.9 % (FLUSH) 0.9 %
5-40 SYRINGE (ML) INJECTION EVERY 12 HOURS SCHEDULED
Status: DISCONTINUED | OUTPATIENT
Start: 2023-06-27 | End: 2023-06-29 | Stop reason: HOSPADM

## 2023-06-27 RX ORDER — ACETAMINOPHEN 325 MG/1
650 TABLET ORAL EVERY 6 HOURS PRN
Status: DISCONTINUED | OUTPATIENT
Start: 2023-06-27 | End: 2023-06-29 | Stop reason: HOSPADM

## 2023-06-27 RX ORDER — LEVOTHYROXINE SODIUM 0.12 MG/1
125 TABLET ORAL DAILY
Status: DISCONTINUED | OUTPATIENT
Start: 2023-06-28 | End: 2023-06-27 | Stop reason: ALTCHOICE

## 2023-06-27 RX ORDER — METRONIDAZOLE 500 MG/100ML
500 INJECTION, SOLUTION INTRAVENOUS EVERY 8 HOURS
Status: DISCONTINUED | OUTPATIENT
Start: 2023-06-27 | End: 2023-06-29

## 2023-06-27 RX ORDER — 0.9 % SODIUM CHLORIDE 0.9 %
1000 INTRAVENOUS SOLUTION INTRAVENOUS ONCE
Status: COMPLETED | OUTPATIENT
Start: 2023-06-27 | End: 2023-06-28

## 2023-06-27 RX ORDER — ONDANSETRON 4 MG/1
4 TABLET, ORALLY DISINTEGRATING ORAL EVERY 8 HOURS PRN
Status: DISCONTINUED | OUTPATIENT
Start: 2023-06-27 | End: 2023-06-29 | Stop reason: HOSPADM

## 2023-06-27 RX ORDER — PANTOPRAZOLE SODIUM 20 MG/1
20 TABLET, DELAYED RELEASE ORAL
Status: DISCONTINUED | OUTPATIENT
Start: 2023-06-28 | End: 2023-06-28

## 2023-06-27 RX ORDER — POTASSIUM CHLORIDE 7.45 MG/ML
10 INJECTION INTRAVENOUS PRN
Status: DISCONTINUED | OUTPATIENT
Start: 2023-06-27 | End: 2023-06-29 | Stop reason: HOSPADM

## 2023-06-27 RX ORDER — SODIUM CHLORIDE 9 MG/ML
INJECTION, SOLUTION INTRAVENOUS PRN
Status: DISCONTINUED | OUTPATIENT
Start: 2023-06-27 | End: 2023-06-29 | Stop reason: HOSPADM

## 2023-06-27 RX ORDER — KETOROLAC TROMETHAMINE 30 MG/ML
15 INJECTION, SOLUTION INTRAMUSCULAR; INTRAVENOUS ONCE
Status: COMPLETED | OUTPATIENT
Start: 2023-06-27 | End: 2023-06-27

## 2023-06-27 RX ORDER — ACETAMINOPHEN 650 MG/1
650 SUPPOSITORY RECTAL EVERY 6 HOURS PRN
Status: DISCONTINUED | OUTPATIENT
Start: 2023-06-27 | End: 2023-06-29 | Stop reason: HOSPADM

## 2023-06-27 RX ORDER — 0.9 % SODIUM CHLORIDE 0.9 %
1000 INTRAVENOUS SOLUTION INTRAVENOUS ONCE
Status: COMPLETED | OUTPATIENT
Start: 2023-06-27 | End: 2023-06-27

## 2023-06-27 RX ORDER — POLYETHYLENE GLYCOL 3350 17 G/17G
17 POWDER, FOR SOLUTION ORAL DAILY PRN
Status: DISCONTINUED | OUTPATIENT
Start: 2023-06-27 | End: 2023-06-29 | Stop reason: HOSPADM

## 2023-06-27 RX ADMIN — SODIUM CHLORIDE: 9 INJECTION, SOLUTION INTRAVENOUS at 22:37

## 2023-06-27 RX ADMIN — METRONIDAZOLE 500 MG: 500 INJECTION, SOLUTION INTRAVENOUS at 13:09

## 2023-06-27 RX ADMIN — APIXABAN 2.5 MG: 5 TABLET, FILM COATED ORAL at 22:37

## 2023-06-27 RX ADMIN — CIPROFLOXACIN 400 MG: 2 INJECTION, SOLUTION INTRAVENOUS at 14:30

## 2023-06-27 RX ADMIN — SODIUM CHLORIDE 1000 ML: 9 INJECTION, SOLUTION INTRAVENOUS at 22:38

## 2023-06-27 RX ADMIN — DIATRIZOATE MEGLUMINE AND DIATRIZOATE SODIUM 20 ML: 660; 100 LIQUID ORAL; RECTAL at 19:22

## 2023-06-27 RX ADMIN — METRONIDAZOLE 500 MG: 500 INJECTION, SOLUTION INTRAVENOUS at 20:49

## 2023-06-27 RX ADMIN — ONDANSETRON 4 MG: 2 INJECTION INTRAMUSCULAR; INTRAVENOUS at 10:09

## 2023-06-27 RX ADMIN — IOPAMIDOL 75 ML: 755 INJECTION, SOLUTION INTRAVENOUS at 11:04

## 2023-06-27 RX ADMIN — Medication 10 ML: at 20:53

## 2023-06-27 RX ADMIN — SODIUM CHLORIDE: 9 INJECTION, SOLUTION INTRAVENOUS at 17:23

## 2023-06-27 RX ADMIN — KETOROLAC TROMETHAMINE 15 MG: 30 INJECTION, SOLUTION INTRAMUSCULAR; INTRAVENOUS at 10:09

## 2023-06-27 RX ADMIN — HYDROMORPHONE HYDROCHLORIDE 1 MG: 1 INJECTION, SOLUTION INTRAMUSCULAR; INTRAVENOUS; SUBCUTANEOUS at 14:23

## 2023-06-27 RX ADMIN — SODIUM CHLORIDE 1000 ML: 9 INJECTION, SOLUTION INTRAVENOUS at 10:09

## 2023-06-27 RX ADMIN — CEFTRIAXONE SODIUM 1000 MG: 1 INJECTION, POWDER, FOR SOLUTION INTRAMUSCULAR; INTRAVENOUS at 12:36

## 2023-06-27 ASSESSMENT — PAIN - FUNCTIONAL ASSESSMENT
PAIN_FUNCTIONAL_ASSESSMENT: 0-10
PAIN_FUNCTIONAL_ASSESSMENT: ACTIVITIES ARE NOT PREVENTED
PAIN_FUNCTIONAL_ASSESSMENT: ACTIVITIES ARE NOT PREVENTED

## 2023-06-27 ASSESSMENT — ENCOUNTER SYMPTOMS
EYE REDNESS: 0
CHOKING: 0
DIARRHEA: 0
SHORTNESS OF BREATH: 0
ABDOMINAL PAIN: 1
NAUSEA: 1
APNEA: 0
COUGH: 0
BACK PAIN: 0
VOMITING: 1
FACIAL SWELLING: 0
EYE DISCHARGE: 0
COLOR CHANGE: 0

## 2023-06-27 ASSESSMENT — PAIN DESCRIPTION - ONSET
ONSET: ON-GOING
ONSET: ON-GOING

## 2023-06-27 ASSESSMENT — PAIN DESCRIPTION - FREQUENCY
FREQUENCY: CONTINUOUS
FREQUENCY: CONTINUOUS

## 2023-06-27 ASSESSMENT — PAIN SCALES - GENERAL
PAINLEVEL_OUTOF10: 0
PAINLEVEL_OUTOF10: 8
PAINLEVEL_OUTOF10: 5
PAINLEVEL_OUTOF10: 5
PAINLEVEL_OUTOF10: 8

## 2023-06-27 ASSESSMENT — PAIN DESCRIPTION - LOCATION
LOCATION: BACK;HEAD
LOCATION: HEAD;BACK
LOCATION: ABDOMEN
LOCATION: ABDOMEN

## 2023-06-27 ASSESSMENT — PAIN SCALES - WONG BAKER: WONGBAKER_NUMERICALRESPONSE: 0

## 2023-06-27 ASSESSMENT — PAIN DESCRIPTION - PAIN TYPE
TYPE: ACUTE PAIN
TYPE: ACUTE PAIN

## 2023-06-27 ASSESSMENT — LIFESTYLE VARIABLES: HOW OFTEN DO YOU HAVE A DRINK CONTAINING ALCOHOL: NEVER

## 2023-06-27 ASSESSMENT — PAIN DESCRIPTION - ORIENTATION
ORIENTATION: MID
ORIENTATION: MID

## 2023-06-27 ASSESSMENT — PAIN DESCRIPTION - DESCRIPTORS
DESCRIPTORS: ACHING
DESCRIPTORS: ACHING

## 2023-06-27 NOTE — ED PROVIDER NOTES
intracranial abnormality. CT ABDOMEN PELVIS W IV CONTRAST Additional Contrast? None    Result Date: 6/27/2023  EXAMINATION: CT OF THE ABDOMEN AND PELVIS WITH CONTRAST 6/27/2023 11:05 am TECHNIQUE: CT of the abdomen and pelvis was performed with the administration of intravenous contrast. Multiplanar reformatted images are provided for review. Automated exposure control, iterative reconstruction, and/or weight based adjustment of the mA/kV was utilized to reduce the radiation dose to as low as reasonably achievable. COMPARISON: June 2021 HISTORY: ORDERING SYSTEM PROVIDED HISTORY: abd pain, n/v, has G/J tube TECHNOLOGIST PROVIDED HISTORY: Additional Contrast?->None Reason for exam:->abd pain, n/v, has G/J tube Reason for Exam: abd pain, n/v FINDINGS: Lower Chest: Lung bases are degraded by motion. Scattered tree-in-bud nodularity is seen in the lower lobes, likely postinflammatory-infectious. Bronchiectatic changes are seen. Trace pleural fluid is seen. Organs: No splenomegaly. Adrenal glands are unremarkable. No hydronephrosis on right. No hydronephrosis on left. No pancreatic calcification noted. No peripancreatic fluid. Clips are seen from prior cholecystectomy. Gallbladder is surgically absent. GI/Bowel: GJ tube is seen. Tip projects in region of jejunum. No significant small bowel distention noted. Moderate stool seen in the colon. Scattered colonic diverticula are seen. Scattered tiny bubbles of free air seen in the abdomen, anterior to the liver, and beneath the left hemidiaphragm. Pelvis: Bladder is distended. Streak artifact from right hip arthroplasty limits evaluation the pelvis. Prostate measures 5.5 cm x 4.6 cm. Peritoneum/Retroperitoneum: Atherosclerotic change seen in aorta. Small retroperitoneal nodes are seen. Bones/Soft Tissues: Spurring is seen in the spine. Spurring is seen in the left hip. There is anterolisthesis of L5 on S1.   Right hip arthroplasty is seen     Scattered tiny
admit overnight with general surgery consult. Admit to hospitalist    Discussed with family and patient. He has no needs at this time. No active vomiting. Stable. Disposition Considerations (tests considered but not done, Admit vs D/C, Shared Decision Making, Pt Expectation of Test or Tx.): admit       I am the Primary Clinician of Record. FINAL IMPRESSION      1. Intra-abdominal free air of unknown etiology    2. Generalized abdominal pain    3. Nausea and vomiting, unspecified vomiting type    4. Acute cystitis with hematuria          DISPOSITION/PLAN     DISPOSITION        PATIENT REFERRED TO:  No follow-up provider specified.     DISCHARGE MEDICATIONS:  New Prescriptions    No medications on file       DISCONTINUED MEDICATIONS:  Discontinued Medications    No medications on file              (Please note that portions of this note were completed with a voice recognition program.  Efforts were made to edit the dictations but occasionally words are mis-transcribed.)    JOHN Dominguez CNP (electronically signed)     JOHN Dominguez CNP  06/27/23 7660

## 2023-06-27 NOTE — ED NOTES
Patient's oxygen saturation fell to 75% after medicated with Dilaudid. Patient placed on 2 liters of oxygen and saturations now in the 90's.      Barney Lundberg RN  06/27/23 2419

## 2023-06-27 NOTE — ED NOTES
1231-Call placed to General Surgery for consult placed by Marco Antonio Sanchez NP. Vinnie Age  06/27/23 1231  1248-Call back received from Dr. Hilary Dickey spoke with Marco Antonio Sanchez NP.       Vinnie Age  06/27/23 1259

## 2023-06-27 NOTE — ED NOTES
1714- Call placed to Hospitalist spoke to Lili her aware of the current bp. Camila Ramos stated to start fluids and if bp doesn't stabilize to contact the ICU doctor to upgrade to unit.        Sandi Beyer  06/27/23 1717

## 2023-06-27 NOTE — ED NOTES
Patient had small amount of urine on clothing, shorts removed and placed in gown. External catheter in place to collect urine.      Silverio Frias RN  06/27/23 1156

## 2023-06-27 NOTE — H&P
Hospital Medicine History & Physical      PCP: Davina Santana DO    Date of Admission: 6/27/2023    Date of Service: Pt seen/examined on 06/27/23       Chief Complaint:    Chief Complaint   Patient presents with    Abdominal Pain     Pt reports headache, stomach ache for 3 weeks, sees pcp on Thursday for problems         History Of Present Illness: The patient is a 66 y.o. male with PMHx of tongue cancer, recurrent aspiration pneumonia, hx DVT on Eliquis, GERD, and BPH who presented to [facility] ED with complaint of severe, diffuse intermittent abdominal pain. Patient with history of tongue cancer s/p chemo and radiation and subsequent recurrent aspiration. Had G tube placement followed by transition to [facility] tube in December 2022 at an out of [facility] hospital. Has been strictly NPO for 3 years and receives medications and nutrition via tube. He reports 3 weeks of worsening abdominal pain. Last night he began experiencing nausea and bilious, non-bloody vomiting. Denies any diarrhea or constipation. No urinary symptoms. He also reports intermittent headache for the last 3 weeks, at times is frontal and other times in the back of his head. Denies head injury. No associated photophobia, vision changes, stiff neck, fever or chills.     Past Medical History:        Diagnosis Date    Arthritis     Benign hypertrophy of prostate     Cancer ([address])     growth on tongue    COVID-19 12/07/2020    Dry mouth     s/p radiation treatment    DVT, lower extremity ([address])     5/2011    GERD (gastroesophageal reflux disease)     acid reflux    Hip fracture ([address])     5/2011, RIGHT    Hx of blood clots     Hyperlipidemia     Pneumonia 6/2/2011    Prolonged emergence from general anesthesia     Thyroid disease        Past Surgical History:        Procedure Laterality Date    ABDOMEN SURGERY      gallbladder removed    CHOLECYSTECTOMY      COLONOSCOPY      EYE SURGERY Right     TORN RETINA, LASER    HERNIA REPAIR      HIP ARTHROPLASTY Right

## 2023-06-28 PROBLEM — C02.9 TONGUE CANCER (HCC): Status: ACTIVE | Noted: 2023-06-28

## 2023-06-28 LAB
ANION GAP SERPL CALCULATED.3IONS-SCNC: 9 MMOL/L (ref 3–16)
APTT BLD: 131.2 SEC (ref 22.7–35.9)
APTT BLD: 29.4 SEC (ref 22.7–35.9)
BASOPHILS # BLD: 0 K/UL (ref 0–0.2)
BASOPHILS NFR BLD: 0.4 %
BUN SERPL-MCNC: 15 MG/DL (ref 7–20)
CALCIUM SERPL-MCNC: 8 MG/DL (ref 8.3–10.6)
CHLORIDE SERPL-SCNC: 107 MMOL/L (ref 99–110)
CO2 SERPL-SCNC: 23 MMOL/L (ref 21–32)
CREAT SERPL-MCNC: 0.6 MG/DL (ref 0.8–1.3)
DEPRECATED RDW RBC AUTO: 14.9 % (ref 12.4–15.4)
EKG ATRIAL RATE: 106 BPM
EKG DIAGNOSIS: NORMAL
EKG P AXIS: 54 DEGREES
EKG P-R INTERVAL: 172 MS
EKG Q-T INTERVAL: 366 MS
EKG QRS DURATION: 90 MS
EKG QTC CALCULATION (BAZETT): 486 MS
EKG R AXIS: 11 DEGREES
EKG T AXIS: 41 DEGREES
EKG VENTRICULAR RATE: 106 BPM
EOSINOPHIL # BLD: 0.1 K/UL (ref 0–0.6)
EOSINOPHIL NFR BLD: 0.9 %
GFR SERPLBLD CREATININE-BSD FMLA CKD-EPI: >60 ML/MIN/{1.73_M2}
GLUCOSE BLD-MCNC: 115 MG/DL (ref 70–99)
GLUCOSE BLD-MCNC: 91 MG/DL (ref 70–99)
GLUCOSE SERPL-MCNC: 99 MG/DL (ref 70–99)
HCT VFR BLD AUTO: 36.7 % (ref 40.5–52.5)
HGB BLD-MCNC: 12 G/DL (ref 13.5–17.5)
LACTATE BLDV-SCNC: 1.5 MMOL/L (ref 0.4–2)
LYMPHOCYTES # BLD: 0.4 K/UL (ref 1–5.1)
LYMPHOCYTES NFR BLD: 4.7 %
MCH RBC QN AUTO: 27.8 PG (ref 26–34)
MCHC RBC AUTO-ENTMCNC: 32.6 G/DL (ref 31–36)
MCV RBC AUTO: 85.5 FL (ref 80–100)
MONOCYTES # BLD: 0.7 K/UL (ref 0–1.3)
MONOCYTES NFR BLD: 7.5 %
NEUTROPHILS # BLD: 7.9 K/UL (ref 1.7–7.7)
NEUTROPHILS NFR BLD: 86.5 %
PERFORMED ON: ABNORMAL
PERFORMED ON: NORMAL
PLATELET # BLD AUTO: 167 K/UL (ref 135–450)
PMV BLD AUTO: 7.3 FL (ref 5–10.5)
POTASSIUM SERPL-SCNC: 4.3 MMOL/L (ref 3.5–5.1)
RBC # BLD AUTO: 4.29 M/UL (ref 4.2–5.9)
SODIUM SERPL-SCNC: 139 MMOL/L (ref 136–145)
WBC # BLD AUTO: 9.1 K/UL (ref 4–11)

## 2023-06-28 PROCEDURE — 99222 1ST HOSP IP/OBS MODERATE 55: CPT | Performed by: INTERNAL MEDICINE

## 2023-06-28 PROCEDURE — 94761 N-INVAS EAR/PLS OXIMETRY MLT: CPT

## 2023-06-28 PROCEDURE — 99233 SBSQ HOSP IP/OBS HIGH 50: CPT | Performed by: SURGERY

## 2023-06-28 PROCEDURE — 6360000002 HC RX W HCPCS: Performed by: INTERNAL MEDICINE

## 2023-06-28 PROCEDURE — 2500000003 HC RX 250 WO HCPCS

## 2023-06-28 PROCEDURE — 6370000000 HC RX 637 (ALT 250 FOR IP): Performed by: INTERNAL MEDICINE

## 2023-06-28 PROCEDURE — 6370000000 HC RX 637 (ALT 250 FOR IP): Performed by: SURGERY

## 2023-06-28 PROCEDURE — 83605 ASSAY OF LACTIC ACID: CPT

## 2023-06-28 PROCEDURE — 85730 THROMBOPLASTIN TIME PARTIAL: CPT

## 2023-06-28 PROCEDURE — 2580000003 HC RX 258

## 2023-06-28 PROCEDURE — 6360000002 HC RX W HCPCS

## 2023-06-28 PROCEDURE — 6370000000 HC RX 637 (ALT 250 FOR IP)

## 2023-06-28 PROCEDURE — 2700000000 HC OXYGEN THERAPY PER DAY

## 2023-06-28 PROCEDURE — 2000000000 HC ICU R&B

## 2023-06-28 PROCEDURE — 36415 COLL VENOUS BLD VENIPUNCTURE: CPT

## 2023-06-28 PROCEDURE — 85025 COMPLETE CBC W/AUTO DIFF WBC: CPT

## 2023-06-28 PROCEDURE — 80048 BASIC METABOLIC PNL TOTAL CA: CPT

## 2023-06-28 PROCEDURE — 93010 ELECTROCARDIOGRAM REPORT: CPT | Performed by: INTERNAL MEDICINE

## 2023-06-28 PROCEDURE — 99233 SBSQ HOSP IP/OBS HIGH 50: CPT | Performed by: INTERNAL MEDICINE

## 2023-06-28 RX ORDER — PANTOPRAZOLE SODIUM 40 MG/10ML
40 INJECTION, POWDER, LYOPHILIZED, FOR SOLUTION INTRAVENOUS DAILY
Status: DISCONTINUED | OUTPATIENT
Start: 2023-06-28 | End: 2023-06-29 | Stop reason: HOSPADM

## 2023-06-28 RX ORDER — HEPARIN SODIUM 1000 [USP'U]/ML
80 INJECTION, SOLUTION INTRAVENOUS; SUBCUTANEOUS PRN
Status: DISCONTINUED | OUTPATIENT
Start: 2023-06-28 | End: 2023-06-29

## 2023-06-28 RX ORDER — HYDROCODONE BITARTRATE AND ACETAMINOPHEN 5; 325 MG/1; MG/1
1 TABLET ORAL EVERY 6 HOURS PRN
Status: DISCONTINUED | OUTPATIENT
Start: 2023-06-28 | End: 2023-06-29 | Stop reason: HOSPADM

## 2023-06-28 RX ORDER — MIDODRINE HYDROCHLORIDE 5 MG/1
5 TABLET ORAL
Status: DISCONTINUED | OUTPATIENT
Start: 2023-06-28 | End: 2023-06-29 | Stop reason: HOSPADM

## 2023-06-28 RX ORDER — HEPARIN SODIUM 1000 [USP'U]/ML
40 INJECTION, SOLUTION INTRAVENOUS; SUBCUTANEOUS PRN
Status: DISCONTINUED | OUTPATIENT
Start: 2023-06-28 | End: 2023-06-29

## 2023-06-28 RX ORDER — HEPARIN SODIUM 1000 [USP'U]/ML
80 INJECTION, SOLUTION INTRAVENOUS; SUBCUTANEOUS ONCE
Status: COMPLETED | OUTPATIENT
Start: 2023-06-28 | End: 2023-06-28

## 2023-06-28 RX ORDER — KETOROLAC TROMETHAMINE 30 MG/ML
15 INJECTION, SOLUTION INTRAMUSCULAR; INTRAVENOUS ONCE
Status: COMPLETED | OUTPATIENT
Start: 2023-06-28 | End: 2023-06-28

## 2023-06-28 RX ORDER — HEPARIN SODIUM 10000 [USP'U]/100ML
17 INJECTION, SOLUTION INTRAVENOUS CONTINUOUS
Status: DISCONTINUED | OUTPATIENT
Start: 2023-06-28 | End: 2023-06-29

## 2023-06-28 RX ORDER — FLUCONAZOLE 2 MG/ML
200 INJECTION, SOLUTION INTRAVENOUS ONCE
Status: COMPLETED | OUTPATIENT
Start: 2023-06-28 | End: 2023-06-28

## 2023-06-28 RX ADMIN — METRONIDAZOLE 500 MG: 500 INJECTION, SOLUTION INTRAVENOUS at 20:35

## 2023-06-28 RX ADMIN — SODIUM CHLORIDE: 9 INJECTION, SOLUTION INTRAVENOUS at 18:03

## 2023-06-28 RX ADMIN — Medication 10 ML: at 08:35

## 2023-06-28 RX ADMIN — PANTOPRAZOLE SODIUM 20 MG: 20 TABLET, DELAYED RELEASE ORAL at 05:59

## 2023-06-28 RX ADMIN — CIPROFLOXACIN 400 MG: 400 INJECTION, SOLUTION INTRAVENOUS at 02:25

## 2023-06-28 RX ADMIN — FLUCONAZOLE 200 MG: 200 INJECTION, SOLUTION INTRAVENOUS at 11:15

## 2023-06-28 RX ADMIN — MUPIROCIN: 20 OINTMENT TOPICAL at 09:00

## 2023-06-28 RX ADMIN — ACETAMINOPHEN 650 MG: 325 TABLET ORAL at 08:35

## 2023-06-28 RX ADMIN — CIPROFLOXACIN 400 MG: 400 INJECTION, SOLUTION INTRAVENOUS at 14:48

## 2023-06-28 RX ADMIN — HEPARIN SODIUM 18 UNITS/KG/HR: 10000 INJECTION, SOLUTION INTRAVENOUS at 11:51

## 2023-06-28 RX ADMIN — MIDODRINE HYDROCHLORIDE 5 MG: 5 TABLET ORAL at 18:02

## 2023-06-28 RX ADMIN — MIDODRINE HYDROCHLORIDE 5 MG: 5 TABLET ORAL at 11:53

## 2023-06-28 RX ADMIN — METRONIDAZOLE 500 MG: 500 INJECTION, SOLUTION INTRAVENOUS at 06:01

## 2023-06-28 RX ADMIN — HEPARIN SODIUM 5680 UNITS: 1000 INJECTION INTRAVENOUS; SUBCUTANEOUS at 11:52

## 2023-06-28 RX ADMIN — LEVOTHYROXINE SODIUM 137 MCG: 25 TABLET ORAL at 05:59

## 2023-06-28 RX ADMIN — Medication 10 ML: at 20:25

## 2023-06-28 RX ADMIN — ACETAMINOPHEN 650 MG: 325 TABLET ORAL at 20:35

## 2023-06-28 RX ADMIN — MIDODRINE HYDROCHLORIDE 5 MG: 5 TABLET ORAL at 06:33

## 2023-06-28 RX ADMIN — METRONIDAZOLE 500 MG: 500 INJECTION, SOLUTION INTRAVENOUS at 13:15

## 2023-06-28 RX ADMIN — KETOROLAC TROMETHAMINE 15 MG: 30 INJECTION, SOLUTION INTRAMUSCULAR; INTRAVENOUS at 14:55

## 2023-06-28 RX ADMIN — MUPIROCIN: 20 OINTMENT TOPICAL at 20:25

## 2023-06-28 RX ADMIN — MIDODRINE HYDROCHLORIDE 5 MG: 5 TABLET ORAL at 08:34

## 2023-06-28 ASSESSMENT — PAIN DESCRIPTION - PAIN TYPE: TYPE: ACUTE PAIN

## 2023-06-28 ASSESSMENT — PAIN DESCRIPTION - LOCATION
LOCATION: HEAD

## 2023-06-28 ASSESSMENT — PAIN SCALES - WONG BAKER
WONGBAKER_NUMERICALRESPONSE: 6
WONGBAKER_NUMERICALRESPONSE: 6
WONGBAKER_NUMERICALRESPONSE: 0

## 2023-06-28 ASSESSMENT — PAIN DESCRIPTION - ONSET: ONSET: ON-GOING

## 2023-06-28 ASSESSMENT — PAIN SCALES - GENERAL
PAINLEVEL_OUTOF10: 0
PAINLEVEL_OUTOF10: 5
PAINLEVEL_OUTOF10: 7
PAINLEVEL_OUTOF10: 7
PAINLEVEL_OUTOF10: 5
PAINLEVEL_OUTOF10: 5

## 2023-06-28 ASSESSMENT — PAIN DESCRIPTION - DESCRIPTORS
DESCRIPTORS: ACHING

## 2023-06-28 ASSESSMENT — PAIN DESCRIPTION - ORIENTATION
ORIENTATION: MID
ORIENTATION: MID
ORIENTATION: LEFT;RIGHT
ORIENTATION: RIGHT;LEFT

## 2023-06-28 ASSESSMENT — PAIN - FUNCTIONAL ASSESSMENT: PAIN_FUNCTIONAL_ASSESSMENT: ACTIVITIES ARE NOT PREVENTED

## 2023-06-28 ASSESSMENT — PAIN DESCRIPTION - FREQUENCY: FREQUENCY: CONTINUOUS

## 2023-06-28 NOTE — ACP (ADVANCE CARE PLANNING)
Advance Care Planning     General Advance Care Planning (ACP) Conversation    Date of Conversation: 6/27/2023  Conducted with: Patient with Decision Making Capacity    Healthcare Decision Maker:    Primary Decision Maker (Active): Bruce Monday - [phone]    Secondary Decision Maker: Dana Pringle - Child - [phone]  Click here to complete Healthcare Decision Makers including selection of the Healthcare Decision Maker Relationship (ie \"Primary\"). Today we documented Decision Maker(s) consistent with Legal Next of Kin hierarchy. Content/Action Overview:   Has ACP document(s) on file - reflects the patient's care preferences  Reviewed DNR/DNI and patient confirms current DNR status - completed forms on file (place new order if needed)        Length of Voluntary ACP Conversation in minutes:  <16 minutes (Non-Billable)    Danette Nicole RN

## 2023-06-28 NOTE — CARE COORDINATION
and/or patient representative Tracie  and his family were provided with a choice of provider and agrees with the discharge plan. Freedom of choice list with basic dialogue that supports the patient's individualized plan of care/goals and shares the quality data associated with the providers was provided to:     Patient Representative Name:       The Patient and/or Patient Representative Agree with the Discharge Plan?       Benito Bajwa RN  Case Management Department  Ph: [phone] Fax: [phone]

## 2023-06-29 VITALS
DIASTOLIC BLOOD PRESSURE: 73 MMHG | WEIGHT: 150.35 LBS | SYSTOLIC BLOOD PRESSURE: 115 MMHG | RESPIRATION RATE: 20 BRPM | TEMPERATURE: 97.5 F | OXYGEN SATURATION: 97 % | BODY MASS INDEX: 19.3 KG/M2 | HEIGHT: 74 IN | HEART RATE: 82 BPM

## 2023-06-29 LAB
ANION GAP SERPL CALCULATED.3IONS-SCNC: 7 MMOL/L (ref 3–16)
APTT BLD: 64.5 SEC (ref 22.7–35.9)
APTT BLD: 66.2 SEC (ref 22.7–35.9)
BASOPHILS # BLD: 0 K/UL (ref 0–0.2)
BASOPHILS NFR BLD: 0.6 %
BUN SERPL-MCNC: 17 MG/DL (ref 7–20)
CALCIUM SERPL-MCNC: 7.9 MG/DL (ref 8.3–10.6)
CHLORIDE SERPL-SCNC: 107 MMOL/L (ref 99–110)
CO2 SERPL-SCNC: 23 MMOL/L (ref 21–32)
CREAT SERPL-MCNC: 0.6 MG/DL (ref 0.8–1.3)
DEPRECATED RDW RBC AUTO: 15.7 % (ref 12.4–15.4)
EOSINOPHIL # BLD: 0.2 K/UL (ref 0–0.6)
EOSINOPHIL NFR BLD: 5 %
GFR SERPLBLD CREATININE-BSD FMLA CKD-EPI: >60 ML/MIN/{1.73_M2}
GLUCOSE BLD-MCNC: 141 MG/DL (ref 70–99)
GLUCOSE SERPL-MCNC: 133 MG/DL (ref 70–99)
HCT VFR BLD AUTO: 33.2 % (ref 40.5–52.5)
HGB BLD-MCNC: 11 G/DL (ref 13.5–17.5)
LYMPHOCYTES # BLD: 0.4 K/UL (ref 1–5.1)
LYMPHOCYTES NFR BLD: 7.9 %
MCH RBC QN AUTO: 28 PG (ref 26–34)
MCHC RBC AUTO-ENTMCNC: 33 G/DL (ref 31–36)
MCV RBC AUTO: 84.9 FL (ref 80–100)
MONOCYTES # BLD: 0.5 K/UL (ref 0–1.3)
MONOCYTES NFR BLD: 9.4 %
NEUTROPHILS # BLD: 3.8 K/UL (ref 1.7–7.7)
NEUTROPHILS NFR BLD: 77.1 %
PERFORMED ON: ABNORMAL
PLATELET # BLD AUTO: 163 K/UL (ref 135–450)
PMV BLD AUTO: 7.1 FL (ref 5–10.5)
POTASSIUM SERPL-SCNC: 4.3 MMOL/L (ref 3.5–5.1)
RBC # BLD AUTO: 3.91 M/UL (ref 4.2–5.9)
SODIUM SERPL-SCNC: 137 MMOL/L (ref 136–145)
WBC # BLD AUTO: 4.9 K/UL (ref 4–11)

## 2023-06-29 PROCEDURE — 99239 HOSP IP/OBS DSCHRG MGMT >30: CPT | Performed by: INTERNAL MEDICINE

## 2023-06-29 PROCEDURE — 80048 BASIC METABOLIC PNL TOTAL CA: CPT

## 2023-06-29 PROCEDURE — 6370000000 HC RX 637 (ALT 250 FOR IP): Performed by: INTERNAL MEDICINE

## 2023-06-29 PROCEDURE — 6370000000 HC RX 637 (ALT 250 FOR IP)

## 2023-06-29 PROCEDURE — 94761 N-INVAS EAR/PLS OXIMETRY MLT: CPT

## 2023-06-29 PROCEDURE — 6360000002 HC RX W HCPCS

## 2023-06-29 PROCEDURE — 99232 SBSQ HOSP IP/OBS MODERATE 35: CPT | Performed by: SURGERY

## 2023-06-29 PROCEDURE — 6360000002 HC RX W HCPCS: Performed by: INTERNAL MEDICINE

## 2023-06-29 PROCEDURE — 36415 COLL VENOUS BLD VENIPUNCTURE: CPT

## 2023-06-29 PROCEDURE — 85730 THROMBOPLASTIN TIME PARTIAL: CPT

## 2023-06-29 PROCEDURE — C9113 INJ PANTOPRAZOLE SODIUM, VIA: HCPCS | Performed by: INTERNAL MEDICINE

## 2023-06-29 PROCEDURE — 2500000003 HC RX 250 WO HCPCS

## 2023-06-29 PROCEDURE — 2700000000 HC OXYGEN THERAPY PER DAY

## 2023-06-29 PROCEDURE — 2580000003 HC RX 258

## 2023-06-29 PROCEDURE — 99232 SBSQ HOSP IP/OBS MODERATE 35: CPT | Performed by: INTERNAL MEDICINE

## 2023-06-29 PROCEDURE — 85025 COMPLETE CBC W/AUTO DIFF WBC: CPT

## 2023-06-29 RX ORDER — METHOCARBAMOL 500 MG/1
500 TABLET, FILM COATED ORAL 3 TIMES DAILY PRN
Status: DISCONTINUED | OUTPATIENT
Start: 2023-06-29 | End: 2023-06-29 | Stop reason: HOSPADM

## 2023-06-29 RX ORDER — LIDOCAINE 4 G/G
1 PATCH TOPICAL DAILY
Status: DISCONTINUED | OUTPATIENT
Start: 2023-06-29 | End: 2023-06-29 | Stop reason: HOSPADM

## 2023-06-29 RX ORDER — LIDOCAINE 4 G/G
1 PATCH TOPICAL DAILY
Qty: 10 PATCH | Refills: 0 | Status: SHIPPED | OUTPATIENT
Start: 2023-06-30

## 2023-06-29 RX ORDER — CIPROFLOXACIN 500 MG/1
500 TABLET, FILM COATED ORAL 2 TIMES DAILY
Qty: 14 TABLET | Refills: 0 | Status: SHIPPED | OUTPATIENT
Start: 2023-06-29 | End: 2023-07-06

## 2023-06-29 RX ORDER — METRONIDAZOLE 250 MG/1
500 TABLET ORAL EVERY 8 HOURS SCHEDULED
Status: DISCONTINUED | OUTPATIENT
Start: 2023-06-29 | End: 2023-06-29 | Stop reason: HOSPADM

## 2023-06-29 RX ORDER — CIPROFLOXACIN 500 MG/1
500 TABLET, FILM COATED ORAL EVERY 12 HOURS SCHEDULED
Status: DISCONTINUED | OUTPATIENT
Start: 2023-06-29 | End: 2023-06-29 | Stop reason: HOSPADM

## 2023-06-29 RX ORDER — FLUCONAZOLE 100 MG/1
200 TABLET ORAL DAILY
Qty: 10 TABLET | Refills: 0 | Status: SHIPPED | OUTPATIENT
Start: 2023-06-29 | End: 2023-07-04

## 2023-06-29 RX ORDER — METRONIDAZOLE 500 MG/1
500 TABLET ORAL 3 TIMES DAILY
Qty: 21 TABLET | Refills: 0 | Status: SHIPPED | OUTPATIENT
Start: 2023-06-29 | End: 2023-07-06

## 2023-06-29 RX ORDER — METHOCARBAMOL 500 MG/1
250 TABLET, FILM COATED ORAL 3 TIMES DAILY PRN
Qty: 15 TABLET | Refills: 0 | Status: SHIPPED | OUTPATIENT
Start: 2023-06-29 | End: 2023-07-09

## 2023-06-29 RX ORDER — FLUCONAZOLE 100 MG/1
100 TABLET ORAL DAILY
Status: DISCONTINUED | OUTPATIENT
Start: 2023-06-29 | End: 2023-06-29 | Stop reason: HOSPADM

## 2023-06-29 RX ADMIN — PANTOPRAZOLE SODIUM 40 MG: 40 INJECTION, POWDER, FOR SOLUTION INTRAVENOUS at 08:46

## 2023-06-29 RX ADMIN — Medication 10 ML: at 08:45

## 2023-06-29 RX ADMIN — METRONIDAZOLE 500 MG: 250 TABLET ORAL at 14:25

## 2023-06-29 RX ADMIN — METHOCARBAMOL TABLETS 500 MG: 500 TABLET, COATED ORAL at 08:58

## 2023-06-29 RX ADMIN — METRONIDAZOLE 500 MG: 500 INJECTION, SOLUTION INTRAVENOUS at 05:44

## 2023-06-29 RX ADMIN — APIXABAN 2.5 MG: 5 TABLET, FILM COATED ORAL at 11:00

## 2023-06-29 RX ADMIN — ACETAMINOPHEN 650 MG: 325 TABLET ORAL at 08:46

## 2023-06-29 RX ADMIN — CIPROFLOXACIN 500 MG: 500 TABLET, FILM COATED ORAL at 11:00

## 2023-06-29 RX ADMIN — CIPROFLOXACIN 400 MG: 400 INJECTION, SOLUTION INTRAVENOUS at 03:36

## 2023-06-29 RX ADMIN — MUPIROCIN: 20 OINTMENT TOPICAL at 08:45

## 2023-06-29 RX ADMIN — FLUCONAZOLE 100 MG: 100 TABLET ORAL at 11:00

## 2023-06-29 RX ADMIN — LEVOTHYROXINE SODIUM 137 MCG: 25 TABLET ORAL at 05:41

## 2023-06-29 ASSESSMENT — PAIN DESCRIPTION - ORIENTATION
ORIENTATION: ANTERIOR;POSTERIOR
ORIENTATION: ANTERIOR;POSTERIOR

## 2023-06-29 ASSESSMENT — PAIN DESCRIPTION - DESCRIPTORS
DESCRIPTORS: ACHING
DESCRIPTORS: ACHING

## 2023-06-29 ASSESSMENT — PAIN SCALES - WONG BAKER
WONGBAKER_NUMERICALRESPONSE: 6

## 2023-06-29 ASSESSMENT — PAIN SCALES - GENERAL
PAINLEVEL_OUTOF10: 4
PAINLEVEL_OUTOF10: 8
PAINLEVEL_OUTOF10: 0
PAINLEVEL_OUTOF10: 7

## 2023-06-29 ASSESSMENT — PAIN DESCRIPTION - LOCATION
LOCATION: HEAD
LOCATION: HEAD

## 2023-06-29 NOTE — DISCHARGE INSTR - DIET
Nutrition Recommendations  Recommend Cyrilla Mu 1.4 formula for patient to reduce the volume needed at home. Recommend 5 cartons per 24 hour period to be provided. Patient can follow his normal regimen at home of 3 cartons at night and 2 during the day or whatever works best for patient, as long as he gets 5 cartons per day. This will provide patient with: 1625 ml TV, 2275 kcals, 100 g protein, and 1170 ml free water. Recommend that tube is flushed with 30 ml water before and after each feeding + 100 ml water flushes x 5 per 24 hour period for hydration. If patient uses a pump for TF delivery, rate would be 58 ml/hr x 20 hours.    Thank you, [address], [address]

## 2023-06-29 NOTE — DISCHARGE INSTR - COC
Nutrition Recommendations  Recommend Othelia Jamarcus 1.4 formula for patient to reduce the volume needed at home. Recommend 5 cartons per 24 hour period to be provided. Patient can follow his normal regimen at home of 3 cartons at night and 2 during the day or whatever works best for patient, as long as he gets 5 cartons per day. This will provide patient with: 1625 ml TV, 2275 kcals, 100 g protein, and 1170 ml free water. Recommend that tube is flushed with 30 ml water before and after each feeding + 100 ml water flushes x 5 per 24 hour period for hydration. If patient uses a pump for TF delivery, rate would be 58 ml/hr x 20 hours.    Thank you, [address], [address]

## 2023-06-29 NOTE — PLAN OF CARE
Problem: Discharge Planning  Goal: Discharge to home or other facility with appropriate resources  6/29/2023 1521 by Christopher Gann RN  Outcome: Completed     Problem: Safety - Adult  Goal: Free from fall injury  6/29/2023 1521 by Christopher Gann RN  Outcome: Completed     Problem: ABCDS Injury Assessment  Goal: Absence of physical injury  6/29/2023 1521 by Christopher Gann RN  Outcome: Completed     Problem: Pain  Goal: Verbalizes/displays adequate comfort level or baseline comfort level  6/29/2023 1521 by Christopher Gann RN  Outcome: Completed     Problem: Nutrition Deficit:  Goal: Optimize nutritional status  6/29/2023 1521 by Christopher Gann RN  Outcome: Completed

## 2023-06-29 NOTE — DISCHARGE SUMMARY
Name:  Anastasiya Orellana  Room:  3010/3010-01  MRN:    [id number]    Discharge Summary      This discharge summary is in conjunction with a complete physical exam done on the day of discharge. Discharging Provider: Dr. Ag Riley MD      Admit: 6/27/2023  Discharge: 06/29/23      HPI taken from admission H&P:      The patient is a 66 y.o. male with PMHx of tongue cancer, recurrent aspiration pneumonia, hx DVT on Eliquis, GERD, and BPH who presented to [facility] ED with complaint of severe, diffuse intermittent abdominal pain. Patient with history of tongue cancer s/p chemo and radiation and subsequent recurrent aspiration. Had G tube placement followed by transition to [facility] tube in December 2022 at an out of state hospital. Has been strictly NPO for 3 years and receives medications and nutrition via tube. He reports 3 weeks of worsening abdominal pain. Last night he began experiencing nausea and bilious, non-bloody vomiting. Denies any diarrhea or constipation. No urinary symptoms. He also reports intermittent headache for the last 3 weeks, at times is frontal and other times in the back of his head. Denies head injury. No associated photophobia, vision changes, stiff neck, fever or chills.      Diagnoses this Admission and Hospital Course     #Abdominal pain  #Pneumoperitoneum -etiology unclear   -CT significant for scattered free air within abdomen, moderate colonic stool burden and diverticulosis  -gastrograffin study with no contrast leak   -general surgery c/s and recommending conservative management and observation as no viscus perforation noted and exam mostly benign  -started cipro and flagyl D#3    -given IVF with improved BP    -resumed J tube feeds and monitored, did well      #Hypotension , possible sepsis vs dehydration   -transferred to ICU for closer observation  -given aggressive  IVF and improved   -no blood cx done unfortunately on adm     #Acute hypoxia  -occurred after receiving dilaudid  -now

## 2023-06-29 NOTE — DISCHARGE INSTRUCTIONS
Nutrition Recommendations  Recommend Hassel Melena 1.4 formula for patient to reduce the volume needed at home. Recommend 5 cartons per 24 hour period to be provided. Patient can follow his normal regimen at home of 3 cartons at night and 2 during the day or whatever works best for patient, as long as he gets 5 cartons per day. This will provide patient with: 1625 ml TV, 2275 kcals, 100 g protein, and 1170 ml free water. Recommend that tube is flushed with 30 ml water before and after each feeding + 100 ml water flushes x 5 per 24 hour period for hydration. If patient uses a pump for TF delivery, rate would be 58 ml/hr x 20 hours.    Thank you, [address], [address]

## 2023-06-30 LAB
BACTERIA UR CULT: ABNORMAL
ORGANISM: ABNORMAL

## 2023-10-27 ENCOUNTER — OFFICE VISIT (OUTPATIENT)
Dept: URBAN - METROPOLITAN AREA CLINIC 57 | Facility: CLINIC | Age: 78
End: 2023-10-27
Payer: MEDICARE

## 2023-10-27 ENCOUNTER — LAB OUTSIDE AN ENCOUNTER (OUTPATIENT)
Dept: URBAN - METROPOLITAN AREA CLINIC 57 | Facility: CLINIC | Age: 78
End: 2023-10-27

## 2023-10-27 VITALS
WEIGHT: 56 LBS | BODY MASS INDEX: 7.19 KG/M2 | DIASTOLIC BLOOD PRESSURE: 80 MMHG | OXYGEN SATURATION: 97 % | SYSTOLIC BLOOD PRESSURE: 120 MMHG | TEMPERATURE: 102 F | HEIGHT: 74 IN

## 2023-10-27 DIAGNOSIS — T85.598A CLOGGED FEEDING TUBE: ICD-10-CM

## 2023-10-27 PROCEDURE — 99213 OFFICE O/P EST LOW 20 MIN: CPT | Performed by: INTERNAL MEDICINE

## 2023-10-27 RX ORDER — FAMOTIDINE 20 MG/1
1 TABLET AT BEDTIME AS NEEDED TABLET, FILM COATED ORAL ONCE A DAY
Status: ACTIVE | COMMUNITY

## 2023-10-27 RX ORDER — LEVOTHYROXINE SODIUM 137 UG/1
1 ML IN THE MORNING BEFORE BREAKFAST CAPSULE ORAL ONCE A DAY
Refills: 0 | Status: ACTIVE | COMMUNITY
Start: 2022-01-28

## 2023-10-27 NOTE — HPI-TODAY'S VISIT:
Here for evaluation of his GJ tube, is having trouble with having the tube, one of the ports is leaking. He has also noticed some degradation of the tube. He does have some mucoid discharge from the PEG site, and erythema. He has been using hydrogen peroxide to keep the area clean. Also having significant reflux ever since he was changed to famotidine. He is using some Gaviscon on a as needed basis he continues to be NPO.

## 2023-12-15 ENCOUNTER — OFFICE VISIT (OUTPATIENT)
Dept: URBAN - METROPOLITAN AREA CLINIC 57 | Facility: CLINIC | Age: 78
End: 2023-12-15
Payer: MEDICARE

## 2023-12-15 VITALS
WEIGHT: 155 LBS | HEIGHT: 74 IN | SYSTOLIC BLOOD PRESSURE: 110 MMHG | BODY MASS INDEX: 19.89 KG/M2 | OXYGEN SATURATION: 98 % | DIASTOLIC BLOOD PRESSURE: 76 MMHG | HEART RATE: 100 BPM

## 2023-12-15 DIAGNOSIS — Z93.4 JEJUNOSTOMY TUBE IN SITU: ICD-10-CM

## 2023-12-15 DIAGNOSIS — K11.7 SALIVARY SECRETION: ICD-10-CM

## 2023-12-15 DIAGNOSIS — K21.00 GASTROESOPHAGEAL REFLUX DISEASE WITH ESOPHAGITIS WITHOUT HEMORRHAGE: ICD-10-CM

## 2023-12-15 DIAGNOSIS — J69.0 ASPIRATION PNEUMONIA, UNSPECIFIED ASPIRATION PNEUMONIA TYPE, UNSPECIFIED LATERALITY, UNSPECIFIED PART OF LUNG: ICD-10-CM

## 2023-12-15 PROCEDURE — 99214 OFFICE O/P EST MOD 30 MIN: CPT

## 2023-12-15 RX ORDER — LEVOTHYROXINE SODIUM 137 UG/1
1 ML IN THE MORNING BEFORE BREAKFAST CAPSULE ORAL ONCE A DAY
Refills: 0 | Status: ACTIVE | COMMUNITY
Start: 2022-01-28

## 2023-12-15 RX ORDER — OMEPRAZOLE 40 MG/1
1 CAPSULE 30 MINUTES BEFORE MORNING MEAL CAPSULE, DELAYED RELEASE ORAL ONCE A DAY
Status: ACTIVE | COMMUNITY

## 2023-12-15 NOTE — PHYSICAL EXAM GASTROINTESTINAL
Abdomen , soft, nontender, nondistended , no guarding or rigidity , no masses palpable , normal bowel sounds , Liver and Spleen , no hepatomegaly present , no hepatosplenomegaly , liver nontender , spleen not palpable  . . J tube in place - mild erythema without induration or discharge

## 2023-12-15 NOTE — HPI-TODAY'S VISIT:
Tylor is a 78-year-old male presenting to the office today for evaluation after hospital stay. He was seen on 12/8/2023 in the emergency room for dehydration. He was also in the hospital from 11/1/2023 to 11/3/2023 for pneumonia of both lungs due to aspiration. He has been doing well since then but has had problems with reflux. In the past omeprazole has worked well. Recently he switched from omeprazole to famotidine and this was causing a lot of reflux. However, he recently switched back to omeprazole and reflux has been under good control for the past week and a half or so. He is taking omeprazole 20 mg 1 in the morning and 2 in the evening via G-tube. This has worked well in over the past week and a half he has not had reflux symptoms. He states that he was also prescribed Nexium in powder form last month and that worked very well for him and he was not having any reflux at all but it was too expensive. He continues to have complaints of hypersecretion and has yet to follow-up with ENT. He states that he did not follow-up with ENT because he was not sure which one to go to. He has no other complaints, questions, concerns. He states that his J-tube has been working well as of recent and he has not had any problems with it getting clogged. He denies melena, hematochezia, blood when wiping, abdominal pain, constipation/diarrhea, change in stool caliber, unintentional weight loss, fatigue, pallor, PEG site erythema, discharge, induration. . . X-ray EGD/colon tube check 11/2/2023; - Impression; the gastrojejunostomy tube is in satisfactory position in the jejunum . CT abdomen/pelvis 11/1/2023; - Impression; - The patient's GJ tube appears to be adequately positioned. No acute inflammatory process in the abdomen/pelvis. There are extensive interstitial and nodular opacities at the lung bases which is suspicious for a chronic inflammatory/infectious process . Chest x-ray 11/28/2023; - Impression; chronic lung disease. No focal consolidation . Chest x-ray 12/8/2023; - Impression; no acute abnormality . 12/8/2023 CMP; glucose 123, BUN 26, BUN/creatinine ratio 32.9, chloride 97, CO2 32, total protein 8.6, H/G ratio 1.0 . 12/8/2023 CBC; MCHC 30.3, neutrophils 86.8, lymphocyte percent 5.1, absolute neutrophils 8.9, absolute lymphocyte count 0.5 . Hospital stay 11/1/2023 to 11/3/2023 for feeding tube dysfunction, pneumonia of both lungs due to infectious organism, aspiration pneumonia due to inhalation of vomitus

## 2023-12-26 ENCOUNTER — TELEPHONE ENCOUNTER (OUTPATIENT)
Dept: URBAN - METROPOLITAN AREA CLINIC 57 | Facility: CLINIC | Age: 78
End: 2023-12-26

## 2024-01-25 ENCOUNTER — TELEPHONE ENCOUNTER (OUTPATIENT)
Dept: URBAN - METROPOLITAN AREA CLINIC 57 | Facility: CLINIC | Age: 79
End: 2024-01-25

## 2024-01-26 ENCOUNTER — OFFICE VISIT (OUTPATIENT)
Dept: URBAN - METROPOLITAN AREA CLINIC 57 | Facility: CLINIC | Age: 79
End: 2024-01-26
Payer: MEDICARE

## 2024-01-26 VITALS
BODY MASS INDEX: 18.74 KG/M2 | DIASTOLIC BLOOD PRESSURE: 60 MMHG | WEIGHT: 146 LBS | HEIGHT: 74 IN | OXYGEN SATURATION: 96 % | HEART RATE: 103 BPM | SYSTOLIC BLOOD PRESSURE: 110 MMHG

## 2024-01-26 DIAGNOSIS — R63.4 UNINTENTIONAL WEIGHT LOSS: ICD-10-CM

## 2024-01-26 DIAGNOSIS — K21.9 CHRONIC GERD: ICD-10-CM

## 2024-01-26 PROBLEM — 235595009: Status: ACTIVE | Noted: 2024-01-26

## 2024-01-26 PROCEDURE — 99214 OFFICE O/P EST MOD 30 MIN: CPT

## 2024-01-26 RX ORDER — LEVOTHYROXINE SODIUM 137 UG/1
1 ML IN THE MORNING BEFORE BREAKFAST CAPSULE ORAL ONCE A DAY
Refills: 0 | Status: ACTIVE | COMMUNITY
Start: 2022-01-28

## 2024-01-26 RX ORDER — ESOMEPRAZOLE MAGNESIUM 20 MG/1
TAKE 20 MG BY MOUTH DAILY FOR SUSPENSION ORAL
Qty: 30 EACH | Refills: 0 | Status: ACTIVE | COMMUNITY

## 2024-01-26 RX ORDER — METOCLOPRAMIDE 5 MG/1
TABLET ORAL
Qty: 120 TABLET | Status: ACTIVE | COMMUNITY

## 2024-01-26 RX ORDER — IPRATROPIUM BROMIDE 21 UG/1
SPRAY, METERED NASAL
Qty: 90 | Status: ACTIVE | COMMUNITY

## 2024-01-26 RX ORDER — FLUCONAZOLE 100 MG/1
TABLET ORAL
Qty: 10 TABLET | Status: ACTIVE | COMMUNITY

## 2024-01-26 RX ORDER — OMEPRAZOLE 40 MG/1
1 CAPSULE 30 MINUTES BEFORE MORNING MEAL CAPSULE, DELAYED RELEASE ORAL ONCE A DAY
Status: ACTIVE | COMMUNITY

## 2024-01-26 RX ORDER — ESOMEPRAZOLE MAGNESIUM 40 MG/1
1 PACKET MIXED WITH 15 MILLILITERSOF WATER GRANULE, DELAYED RELEASE ORAL TWICE DAILY
Refills: 1 | OUTPATIENT
Start: 2024-01-26

## 2024-01-26 NOTE — HPI-TODAY'S VISIT:
Tylor is a 79-year-old male presenting to the office today for increased GERD symptoms. Omeprazole was working well to control symptoms but for the past few weeks has not been working well at all. He is having reflux daily and feeding has been more difficult for him causing increased reflux as well. He also notes that reflux is worse at night. He does have a lot of mouth secretions and did see ENT who started him on a nasal spray but he cannot remember exactly which one. Nexium has worked better in the past then omeprazole has for reflux symptoms. He states that he has been eating less because of the reflux and has also lost some weight due to eating less. He has no other GI complaints, questions, concerns. He denies melena, hematochezia, bright red blood per rectum, nausea/vomiting, hematemesis, abdominal pain, change in bowel habits, change in stool caliber. . Discussed CT abdomen/pelvis 11/1/2023 . Discussed labs 12/8/2023

## 2024-01-31 NOTE — ED NOTES
Bacteremia Cardiac Clearance Report given to [facility], patient's VS were elevated prior to move to room 17, nurse aware will send a Perfect serve to provider.      Marysol Morales RN  06/27/23 4746 Right kidney stone

## 2024-03-08 ENCOUNTER — OV EP (OUTPATIENT)
Dept: URBAN - METROPOLITAN AREA CLINIC 57 | Facility: CLINIC | Age: 79
End: 2024-03-08

## 2024-03-08 RX ORDER — ESOMEPRAZOLE MAGNESIUM 20 MG/1
TAKE 20 MG BY MOUTH DAILY FOR SUSPENSION ORAL
Qty: 30 EACH | Refills: 0 | Status: ACTIVE | COMMUNITY

## 2024-03-08 RX ORDER — IPRATROPIUM BROMIDE 21 UG/1
SPRAY, METERED NASAL
Qty: 90 | Status: ACTIVE | COMMUNITY

## 2024-03-08 RX ORDER — OMEPRAZOLE 40 MG/1
1 CAPSULE 30 MINUTES BEFORE MORNING MEAL CAPSULE, DELAYED RELEASE ORAL ONCE A DAY
Status: ACTIVE | COMMUNITY

## 2024-03-08 RX ORDER — LEVOTHYROXINE SODIUM 137 UG/1
1 ML IN THE MORNING BEFORE BREAKFAST CAPSULE ORAL ONCE A DAY
Refills: 0 | Status: ACTIVE | COMMUNITY
Start: 2022-01-28

## 2024-03-08 RX ORDER — ESOMEPRAZOLE MAGNESIUM 40 MG/1
1 PACKET MIXED WITH 15 MILLILITERSOF WATER GRANULE, DELAYED RELEASE ORAL TWICE DAILY
Refills: 1 | Status: ACTIVE | COMMUNITY
Start: 2024-01-26

## 2024-03-08 RX ORDER — METOCLOPRAMIDE 5 MG/1
TABLET ORAL
Qty: 120 TABLET | Status: ACTIVE | COMMUNITY

## 2024-03-08 RX ORDER — FLUCONAZOLE 100 MG/1
TABLET ORAL
Qty: 10 TABLET | Status: ACTIVE | COMMUNITY

## 2024-03-08 NOTE — HPI-TODAY'S VISIT:
1. Chronic GERD - K21.9 (Primary)2. Unintentional weight loss - R63.4GERD: - Omeprazole has not been working as well as Nexium has in the past for patient - Stop omeprazole and start Nexium packets as this has worked well in the past - If problem persists at next visit we will consider checking G-tube after eating to see if food is refluxing back into the stomach/esophagus causing symptoms . Unintentional weight loss: - Patient has lost about 9 pounds in the past month and a half or so and states that he thinks it is due to eating much less and not getting enough calories - Discussed proper calorie/protein intake and patient displayed good understanding. He does follow with a nutritionist and will consult them about this - If problem persist at next visit we will proceed with imaging such as CT to rule out malignancy . .      Tylor is a 79-year-old male presenting to the office today for increased GERD symptoms. Omeprazole was working well to control symptoms but for the past few weeks has not been working well at all. He is having reflux daily and feeding has been more difficult for him causing increased reflux as well. He also notes that reflux is worse at night. He does have a lot of mouth secretions and did see ENT who started him on a nasal spray but he cannot remember exactly which one. Nexium has worked better in the past then omeprazole has for reflux symptoms. He states that he has been eating less because of the reflux and has also lost some weight due to eating less. He has no other GI complaints, questions, concerns. He denies melena, hematochezia, bright red blood per rectum, nausea/vomiting, hematemesis, abdominal pain, change in bowel habits, change in stool caliber.

## 2024-03-08 NOTE — HPI-ZZZTODAY'S VISIT
Admitted to the hospital from 2/23/2024 until 2/25/2024 for COVID-19 . EGD 5/1/2021; - Impression; - Normal esophagus, some subtle narrowing in proximal esophagus noted after G-tube bumper advanced through this area - Mild gastritis in the stomach.  This was biopsied with cold forceps.  Scattered stomach polyps seen with a benign appearance - Unremarkable duodenum . CT chest 1/23/2024; - Impression; - Chronic peripheral interstitial scarring.  Left lower lobe peribronchial cuffing tree-in-bud formation pneumonia.  Early pneumonia within the chronic syncytial scarring diffusely cannot be excluded . CT abdomen/pelvis 11/1/2023; - Impression; - The patient's GJ tube appears to be adequately positioned.  No acute inflammatory process in the abdomen or pelvis - There are extensive interstitial nodular opacities at the lung bases which is suspicious for chronic inflammatory/infectious process . 2/25/2024 labs; - CBC; RBC 3.89, hemoglobin 9.9, hematocrit 32.7, MCH 25.4, MCHC 30.3, neutrophil percent 84.3, lymphocyte percent 8.3, absolute lymphocytes 0.4 - BMP; glucose 159, anion gap 2, BUN 25, creatinine 0.49, BUN/creatinine ratio 51.0, calcium 7.7 . 2/24/2024 labs; - CMP; anion gap 4, glucose 128, BUN 24, creatinine 0.57, BUN/creatinine ratio 42.1, calcium 8.0, total protein 5.2, albumin 2.4, H/G ratio 0.9

## 2024-03-22 ENCOUNTER — OV EP (OUTPATIENT)
Dept: URBAN - METROPOLITAN AREA CLINIC 57 | Facility: CLINIC | Age: 79
End: 2024-03-22

## 2024-05-08 ENCOUNTER — HOSPITAL ENCOUNTER (EMERGENCY)
Age: 79
Discharge: HOME | End: 2024-05-08
Payer: MEDICARE

## 2024-05-08 ENCOUNTER — APPOINTMENT (OUTPATIENT)
Dept: GENERAL RADIOLOGY | Age: 79
End: 2024-05-08
Payer: MEDICARE

## 2024-05-08 VITALS
OXYGEN SATURATION: 97 % | HEIGHT: 74 IN | BODY MASS INDEX: 18.25 KG/M2 | RESPIRATION RATE: 18 BRPM | HEART RATE: 55 BPM | SYSTOLIC BLOOD PRESSURE: 129 MMHG | TEMPERATURE: 98.1 F | DIASTOLIC BLOOD PRESSURE: 84 MMHG | WEIGHT: 142.2 LBS

## 2024-05-08 DIAGNOSIS — J18.9 PNEUMONIA OF RIGHT LUNG DUE TO INFECTIOUS ORGANISM, UNSPECIFIED PART OF LUNG: Primary | ICD-10-CM

## 2024-05-08 LAB
ALBUMIN SERPL-MCNC: 3.5 G/DL (ref 3.4–5)
ALBUMIN/GLOB SERPL: 1.1 {RATIO} (ref 1.1–2.2)
ALP SERPL-CCNC: 93 U/L (ref 40–129)
ALT SERPL-CCNC: 16 U/L (ref 10–40)
ANION GAP SERPL CALCULATED.3IONS-SCNC: 10 MMOL/L (ref 3–16)
AST SERPL-CCNC: 18 U/L (ref 15–37)
BASOPHILS # BLD: 0 K/UL (ref 0–0.2)
BASOPHILS NFR BLD: 0.5 %
BILIRUB SERPL-MCNC: 0.3 MG/DL (ref 0–1)
BUN SERPL-MCNC: 19 MG/DL (ref 7–20)
CALCIUM SERPL-MCNC: 9 MG/DL (ref 8.3–10.6)
CHLORIDE SERPL-SCNC: 98 MMOL/L (ref 99–110)
CO2 SERPL-SCNC: 27 MMOL/L (ref 21–32)
CREAT SERPL-MCNC: <0.5 MG/DL (ref 0.8–1.3)
DEPRECATED RDW RBC AUTO: 15.7 % (ref 12.4–15.4)
EOSINOPHIL # BLD: 0.1 K/UL (ref 0–0.6)
EOSINOPHIL NFR BLD: 1.5 %
FLUAV RNA RESP QL NAA+PROBE: NOT DETECTED
FLUBV RNA RESP QL NAA+PROBE: NOT DETECTED
GFR SERPLBLD CREATININE-BSD FMLA CKD-EPI: >90 ML/MIN/{1.73_M2}
GLUCOSE SERPL-MCNC: 104 MG/DL (ref 70–99)
HCT VFR BLD AUTO: 38.2 % (ref 40.5–52.5)
HGB BLD-MCNC: 12.4 G/DL (ref 13.5–17.5)
LACTATE BLDV-SCNC: 1.2 MMOL/L (ref 0.4–2)
LYMPHOCYTES # BLD: 0.5 K/UL (ref 1–5.1)
LYMPHOCYTES NFR BLD: 10.6 %
MCH RBC QN AUTO: 25.3 PG (ref 26–34)
MCHC RBC AUTO-ENTMCNC: 32.5 G/DL (ref 31–36)
MCV RBC AUTO: 77.8 FL (ref 80–100)
MONOCYTES # BLD: 0.4 K/UL (ref 0–1.3)
MONOCYTES NFR BLD: 9.1 %
NEUTROPHILS # BLD: 3.6 K/UL (ref 1.7–7.7)
NEUTROPHILS NFR BLD: 78.3 %
PLATELET # BLD AUTO: 166 K/UL (ref 135–450)
PMV BLD AUTO: 8.2 FL (ref 5–10.5)
POTASSIUM SERPL-SCNC: 4.4 MMOL/L (ref 3.5–5.1)
PROT SERPL-MCNC: 6.7 G/DL (ref 6.4–8.2)
RBC # BLD AUTO: 4.91 M/UL (ref 4.2–5.9)
SARS-COV-2 RNA RESP QL NAA+PROBE: NOT DETECTED
SODIUM SERPL-SCNC: 135 MMOL/L (ref 136–145)
WBC # BLD AUTO: 4.6 K/UL (ref 4–11)

## 2024-05-08 PROCEDURE — 80053 COMPREHEN METABOLIC PANEL: CPT

## 2024-05-08 PROCEDURE — 36415 COLL VENOUS BLD VENIPUNCTURE: CPT

## 2024-05-08 PROCEDURE — 71046 X-RAY EXAM CHEST 2 VIEWS: CPT

## 2024-05-08 PROCEDURE — 85025 COMPLETE CBC W/AUTO DIFF WBC: CPT

## 2024-05-08 PROCEDURE — 99284 EMERGENCY DEPT VISIT MOD MDM: CPT

## 2024-05-08 PROCEDURE — 83605 ASSAY OF LACTIC ACID: CPT

## 2024-05-08 PROCEDURE — 87636 SARSCOV2 & INF A&B AMP PRB: CPT

## 2024-05-08 RX ORDER — DOXYCYCLINE HYCLATE 100 MG
100 TABLET ORAL 2 TIMES DAILY
Qty: 20 TABLET | Refills: 0 | Status: SHIPPED | OUTPATIENT
Start: 2024-05-08 | End: 2024-05-18

## 2024-05-08 RX ORDER — CEFPODOXIME PROXETIL 200 MG/1
200 TABLET, FILM COATED ORAL 2 TIMES DAILY
Qty: 20 TABLET | Refills: 0 | Status: SHIPPED | OUTPATIENT
Start: 2024-05-08 | End: 2024-05-18

## 2024-05-08 NOTE — ED PROVIDER NOTES
[facility] Emergency Department    CHIEF COMPLAINT  Cough (Patient sent by PCP, was diagnosed with pneumonia, wants to make sure \"he can be treated at home\")      SHARED SERVICE VISIT  Evaluated by TALIB.  My supervising physician was available for consultation.    HISTORY OF PRESENT ILLNESS  Héctor Valverde is a 79 y.o. male who presents to the ED complaining of nonproductive cough for several days.  Was evaluated by his PCP earlier today and was diagnosed with a pneumonia.  They sent him the emergency department to ensure that he can be treated outpatient for this condition.  Besides feeling little shortness of breath he has no other complaints at this time.  No other complaints, modifying factors or associated symptoms.     Nursing notes reviewed.   Past Medical History:   Diagnosis Date    Arthritis     Benign hypertrophy of prostate     Cancer (HCC)     growth on tongue    COVID-19 12/07/2020    Dry mouth     s/p radiation treatment    DVT, lower extremity (HCC)     5/2011    GERD (gastroesophageal reflux disease)     acid reflux    Hip fracture (HCC)     5/2011, RIGHT    Hx of blood clots     Hyperlipidemia     Pneumonia 6/2/2011    Prolonged emergence from general anesthesia     Thyroid disease      Past Surgical History:   Procedure Laterality Date    ABDOMEN SURGERY      gallbladder removed    CHOLECYSTECTOMY      COLONOSCOPY      EYE SURGERY Right     TORN RETINA, LASER    HERNIA REPAIR      HIP ARTHROPLASTY Right 08/08/2016    HIP FRACTURE SURGERY Right     JOINT REPLACEMENT      OTHER SURGICAL HISTORY  05/18/2011    right hip IM nailing    SHOULDER SURGERY Right rotater cuff    TONGUE BIOPSY  10/2010     Family History   Problem Relation Age of Onset    Cancer Mother     Depression Mother     High Blood Pressure Mother     Heart Disease Father     Stroke Maternal Grandmother     Diabetes Other      Social History     Socioeconomic History    Marital status:

## 2024-06-26 ENCOUNTER — TELEPHONE (OUTPATIENT)
Dept: INTERVENTIONAL RADIOLOGY/VASCULAR | Age: 79
End: 2024-06-26

## 2024-06-26 NOTE — TELEPHONE ENCOUNTER
Attempted to call patient to schedule procedure. No answer left message for patient to call back. Number used [phone]    Procedure:  GJ tube replacement  Approving Radiologist: Curry

## 2024-06-26 NOTE — TELEPHONE ENCOUNTER
Called and spoke to Wendy about upcoming procedure. Phone number used: [phone]  Procedure:  GJ tube exchange  Approving Radiologist: Curry Valerio informed of the following:  Date of procedure: 6/27/24  Arrival time of procedure: 0900  Time of procedure: 0930    Need SDS: No

## 2024-06-27 ENCOUNTER — HOSPITAL ENCOUNTER (OUTPATIENT)
Dept: INTERVENTIONAL RADIOLOGY/VASCULAR | Age: 79
Discharge: HOME | End: 2024-06-27
Attending: INTERNAL MEDICINE
Payer: MEDICARE

## 2024-06-27 VITALS
HEART RATE: 105 BPM | OXYGEN SATURATION: 95 % | DIASTOLIC BLOOD PRESSURE: 91 MMHG | SYSTOLIC BLOOD PRESSURE: 145 MMHG | RESPIRATION RATE: 16 BRPM

## 2024-06-27 DIAGNOSIS — R13.11 ORAL PHASE DYSPHAGIA: ICD-10-CM

## 2024-06-27 PROCEDURE — C1769 GUIDE WIRE: HCPCS

## 2024-06-27 PROCEDURE — 49452 REPLACE G-J TUBE PERC: CPT

## 2024-06-27 PROCEDURE — 6360000004 HC RX CONTRAST MEDICATION: Performed by: INTERNAL MEDICINE

## 2024-06-27 RX ADMIN — IOVERSOL 20 ML: 741 INJECTION INTRA-ARTERIAL; INTRAVENOUS at 10:07

## 2024-06-27 NOTE — OR NURSING
Pt arrived for image guided GJ tube. Procedure explained including the risk and benefits of the procedure. All questions answered. Pt verbalizes understanding of the procedure and states no more questions. Consent confirmed. Vital signs stable. Labs, allergies, medications, and code status reviewed. No contraindications noted.     Vital Signs  Vitals:    06/27/24 0926   BP: (!) 156/105   Pulse: (!) 116   Resp: 16   SpO2: 98%    (vital signs in table format)    Allergies  Tamsulosin hcl, Amoxicillin-pot clavulanate, and Tetanus toxoids (allergies)    Labs  Lab Results   Component Value Date    INR 1.09 09/15/2022    PROTIME 13.9 09/15/2022     Lab Results   Component Value Date    CREATININE <0.5 (L) 05/08/2024    BUN 19 05/08/2024     (L) 05/08/2024    K 4.4 05/08/2024    CL 98 (L) 05/08/2024    CO2 27 05/08/2024     Lab Results   Component Value Date    WBC 4.6 05/08/2024    HGB 12.4 (L) 05/08/2024    HCT 38.2 (L) 05/08/2024    MCV 77.8 (L) 05/08/2024     05/08/2024

## 2024-06-27 NOTE — OR NURSING
Image guided GJ tube insertion left abdomen completed. Dr. Zapien placed 22 Icelandic.     Vital Signs  Vitals:    06/27/24 0944   BP: (!) 145/91   Pulse: (!) 105   Resp: 16   SpO2: 95%    (vital signs in table format)

## 2024-06-29 ENCOUNTER — APPOINTMENT (OUTPATIENT)
Dept: CT IMAGING | Age: 79
DRG: 871 | End: 2024-06-29
Payer: MEDICARE

## 2024-06-29 ENCOUNTER — APPOINTMENT (OUTPATIENT)
Dept: GENERAL RADIOLOGY | Age: 79
DRG: 871 | End: 2024-06-29
Payer: MEDICARE

## 2024-06-29 ENCOUNTER — HOSPITAL ENCOUNTER (INPATIENT)
Age: 79
LOS: 8 days | Discharge: HOME | DRG: 871 | End: 2024-07-07
Attending: EMERGENCY MEDICINE | Admitting: INTERNAL MEDICINE
Payer: MEDICARE

## 2024-06-29 DIAGNOSIS — J93.9 PNEUMOTHORAX, UNSPECIFIED TYPE: ICD-10-CM

## 2024-06-29 DIAGNOSIS — K21.9 CHRONIC GERD: ICD-10-CM

## 2024-06-29 DIAGNOSIS — R65.21 SEPTIC SHOCK (HCC): ICD-10-CM

## 2024-06-29 DIAGNOSIS — J96.01 ACUTE RESPIRATORY FAILURE WITH HYPOXIA (HCC): Primary | ICD-10-CM

## 2024-06-29 DIAGNOSIS — J69.0 ASPIRATION PNEUMONIA OF LEFT LOWER LOBE, UNSPECIFIED ASPIRATION PNEUMONIA TYPE (HCC): ICD-10-CM

## 2024-06-29 DIAGNOSIS — A41.9 SEPTIC SHOCK (HCC): ICD-10-CM

## 2024-06-29 LAB
ALBUMIN SERPL-MCNC: 3.6 G/DL (ref 3.4–5)
ALBUMIN/GLOB SERPL: 1 {RATIO} (ref 1.1–2.2)
ALP SERPL-CCNC: 97 U/L (ref 40–129)
ALT SERPL-CCNC: 12 U/L (ref 10–40)
AMORPH SED URNS QL MICRO: ABNORMAL /HPF
ANION GAP SERPL CALCULATED.3IONS-SCNC: 13 MMOL/L (ref 3–16)
AST SERPL-CCNC: 19 U/L (ref 15–37)
BACTERIA URNS QL MICRO: ABNORMAL /HPF
BASE EXCESS BLDV CALC-SCNC: 1 MMOL/L (ref -3–3)
BASOPHILS # BLD: 0 K/UL (ref 0–0.2)
BASOPHILS NFR BLD: 0.2 %
BILIRUB SERPL-MCNC: 0.5 MG/DL (ref 0–1)
BILIRUB UR QL STRIP.AUTO: NEGATIVE
BUN SERPL-MCNC: 21 MG/DL (ref 7–20)
CALCIUM SERPL-MCNC: 8.9 MG/DL (ref 8.3–10.6)
CHLORIDE SERPL-SCNC: 95 MMOL/L (ref 99–110)
CLARITY UR: CLEAR
CO2 BLDV-SCNC: 28 MMOL/L
CO2 SERPL-SCNC: 28 MMOL/L (ref 21–32)
COHGB MFR BLDV: 1.8 % (ref 0–1.5)
COLOR UR: YELLOW
CREAT SERPL-MCNC: 0.7 MG/DL (ref 0.8–1.3)
DEPRECATED RDW RBC AUTO: 15.1 % (ref 12.4–15.4)
EKG ATRIAL RATE: 150 BPM
EKG DIAGNOSIS: NORMAL
EKG P AXIS: 62 DEGREES
EKG P-R INTERVAL: 118 MS
EKG Q-T INTERVAL: 278 MS
EKG QRS DURATION: 72 MS
EKG QTC CALCULATION (BAZETT): 439 MS
EKG R AXIS: -89 DEGREES
EKG T AXIS: 55 DEGREES
EKG VENTRICULAR RATE: 150 BPM
EOSINOPHIL # BLD: 0 K/UL (ref 0–0.6)
EOSINOPHIL NFR BLD: 0.1 %
FLUAV RNA RESP QL NAA+PROBE: NOT DETECTED
FLUBV RNA RESP QL NAA+PROBE: NOT DETECTED
GFR SERPLBLD CREATININE-BSD FMLA CKD-EPI: >90 ML/MIN/{1.73_M2}
GLUCOSE SERPL-MCNC: 164 MG/DL (ref 70–99)
GLUCOSE UR STRIP.AUTO-MCNC: NEGATIVE MG/DL
HCO3 BLDV-SCNC: 26.2 MMOL/L (ref 23–29)
HCT VFR BLD AUTO: 39.1 % (ref 40.5–52.5)
HGB BLD-MCNC: 13 G/DL (ref 13.5–17.5)
HGB UR QL STRIP.AUTO: ABNORMAL
KETONES UR STRIP.AUTO-MCNC: NEGATIVE MG/DL
LACTATE BLDV-SCNC: 1.3 MMOL/L (ref 0.4–1.9)
LACTATE BLDV-SCNC: 2.8 MMOL/L (ref 0.4–1.9)
LEUKOCYTE ESTERASE UR QL STRIP.AUTO: NEGATIVE
LIPASE SERPL-CCNC: 14 U/L (ref 13–60)
LYMPHOCYTES # BLD: 0.2 K/UL (ref 1–5.1)
LYMPHOCYTES NFR BLD: 2.3 %
MCH RBC QN AUTO: 25.4 PG (ref 26–34)
MCHC RBC AUTO-ENTMCNC: 33.1 G/DL (ref 31–36)
MCV RBC AUTO: 76.8 FL (ref 80–100)
METHGB MFR BLDV: 0.3 %
MONOCYTES # BLD: 0.2 K/UL (ref 0–1.3)
MONOCYTES NFR BLD: 1.8 %
NEUTROPHILS # BLD: 8.6 K/UL (ref 1.7–7.7)
NEUTROPHILS NFR BLD: 95.6 %
NITRITE UR QL STRIP.AUTO: NEGATIVE
O2 CT VFR BLDV CALC: 16 VOL %
O2 THERAPY: ABNORMAL
PCO2 BLDV: 43.9 MMHG (ref 40–50)
PH BLDV: 7.39 [PH] (ref 7.35–7.45)
PH UR STRIP.AUTO: 8 [PH] (ref 5–8)
PLATELET # BLD AUTO: 175 K/UL (ref 135–450)
PMV BLD AUTO: 8.4 FL (ref 5–10.5)
PO2 BLDV: 41.5 MMHG (ref 25–40)
POTASSIUM SERPL-SCNC: 4.7 MMOL/L (ref 3.5–5.1)
PROCALCITONIN SERPL IA-MCNC: 0.23 NG/ML (ref 0–0.15)
PROT SERPL-MCNC: 7.2 G/DL (ref 6.4–8.2)
PROT UR STRIP.AUTO-MCNC: ABNORMAL MG/DL
RBC # BLD AUTO: 5.1 M/UL (ref 4.2–5.9)
RBC #/AREA URNS HPF: ABNORMAL /HPF (ref 0–4)
SAO2 % BLDV: 77 %
SARS-COV-2 RNA RESP QL NAA+PROBE: NOT DETECTED
SODIUM SERPL-SCNC: 136 MMOL/L (ref 136–145)
SP GR UR STRIP.AUTO: 1.01 (ref 1–1.03)
TROPONIN, HIGH SENSITIVITY: 15 NG/L (ref 0–22)
UA COMPLETE W REFLEX CULTURE PNL UR: ABNORMAL
UA DIPSTICK W REFLEX MICRO PNL UR: YES
URN SPEC COLLECT METH UR: ABNORMAL
UROBILINOGEN UR STRIP-ACNC: 2 E.U./DL
WBC # BLD AUTO: 9 K/UL (ref 4–11)
WBC #/AREA URNS HPF: ABNORMAL /HPF (ref 0–5)

## 2024-06-29 PROCEDURE — 81001 URINALYSIS AUTO W/SCOPE: CPT

## 2024-06-29 PROCEDURE — 36556 INSERT NON-TUNNEL CV CATH: CPT

## 2024-06-29 PROCEDURE — 36415 COLL VENOUS BLD VENIPUNCTURE: CPT

## 2024-06-29 PROCEDURE — 2700000000 HC OXYGEN THERAPY PER DAY

## 2024-06-29 PROCEDURE — 84484 ASSAY OF TROPONIN QUANT: CPT

## 2024-06-29 PROCEDURE — 93005 ELECTROCARDIOGRAM TRACING: CPT | Performed by: EMERGENCY MEDICINE

## 2024-06-29 PROCEDURE — 2500000003 HC RX 250 WO HCPCS: Performed by: EMERGENCY MEDICINE

## 2024-06-29 PROCEDURE — 87636 SARSCOV2 & INF A&B AMP PRB: CPT

## 2024-06-29 PROCEDURE — 87633 RESP VIRUS 12-25 TARGETS: CPT

## 2024-06-29 PROCEDURE — 87205 SMEAR GRAM STAIN: CPT

## 2024-06-29 PROCEDURE — 87070 CULTURE OTHR SPECIMN AEROBIC: CPT

## 2024-06-29 PROCEDURE — 71045 X-RAY EXAM CHEST 1 VIEW: CPT

## 2024-06-29 PROCEDURE — 6360000002 HC RX W HCPCS: Performed by: INTERNAL MEDICINE

## 2024-06-29 PROCEDURE — 87186 SC STD MICRODIL/AGAR DIL: CPT

## 2024-06-29 PROCEDURE — 94761 N-INVAS EAR/PLS OXIMETRY MLT: CPT

## 2024-06-29 PROCEDURE — 93010 ELECTROCARDIOGRAM REPORT: CPT | Performed by: INTERNAL MEDICINE

## 2024-06-29 PROCEDURE — 82803 BLOOD GASES ANY COMBINATION: CPT

## 2024-06-29 PROCEDURE — 6360000004 HC RX CONTRAST MEDICATION: Performed by: EMERGENCY MEDICINE

## 2024-06-29 PROCEDURE — 51798 US URINE CAPACITY MEASURE: CPT

## 2024-06-29 PROCEDURE — 87040 BLOOD CULTURE FOR BACTERIA: CPT

## 2024-06-29 PROCEDURE — 06HY33Z INSERTION OF INFUSION DEVICE INTO LOWER VEIN, PERCUTANEOUS APPROACH: ICD-10-PCS | Performed by: INTERNAL MEDICINE

## 2024-06-29 PROCEDURE — 83605 ASSAY OF LACTIC ACID: CPT

## 2024-06-29 PROCEDURE — 2580000003 HC RX 258: Performed by: EMERGENCY MEDICINE

## 2024-06-29 PROCEDURE — 83690 ASSAY OF LIPASE: CPT

## 2024-06-29 PROCEDURE — 2580000003 HC RX 258: Performed by: INTERNAL MEDICINE

## 2024-06-29 PROCEDURE — 87184 SC STD DISK METHOD PER PLATE: CPT

## 2024-06-29 PROCEDURE — 87449 NOS EACH ORGANISM AG IA: CPT

## 2024-06-29 PROCEDURE — 6360000002 HC RX W HCPCS: Performed by: EMERGENCY MEDICINE

## 2024-06-29 PROCEDURE — 6370000000 HC RX 637 (ALT 250 FOR IP): Performed by: EMERGENCY MEDICINE

## 2024-06-29 PROCEDURE — 2000000000 HC ICU R&B

## 2024-06-29 PROCEDURE — 71260 CT THORAX DX C+: CPT

## 2024-06-29 PROCEDURE — 99285 EMERGENCY DEPT VISIT HI MDM: CPT

## 2024-06-29 PROCEDURE — 87641 MR-STAPH DNA AMP PROBE: CPT

## 2024-06-29 PROCEDURE — 87077 CULTURE AEROBIC IDENTIFY: CPT

## 2024-06-29 PROCEDURE — 6370000000 HC RX 637 (ALT 250 FOR IP): Performed by: INTERNAL MEDICINE

## 2024-06-29 PROCEDURE — 96367 TX/PROPH/DG ADDL SEQ IV INF: CPT

## 2024-06-29 PROCEDURE — 3E033XZ INTRODUCTION OF VASOPRESSOR INTO PERIPHERAL VEIN, PERCUTANEOUS APPROACH: ICD-10-PCS | Performed by: INTERNAL MEDICINE

## 2024-06-29 PROCEDURE — 80053 COMPREHEN METABOLIC PANEL: CPT

## 2024-06-29 PROCEDURE — 96360 HYDRATION IV INFUSION INIT: CPT

## 2024-06-29 PROCEDURE — 85025 COMPLETE CBC W/AUTO DIFF WBC: CPT

## 2024-06-29 PROCEDURE — 84145 PROCALCITONIN (PCT): CPT

## 2024-06-29 PROCEDURE — 96365 THER/PROPH/DIAG IV INF INIT: CPT

## 2024-06-29 RX ORDER — ACETAMINOPHEN 650 MG/1
650 SUPPOSITORY RECTAL ONCE
Status: COMPLETED | OUTPATIENT
Start: 2024-06-29 | End: 2024-06-29

## 2024-06-29 RX ORDER — ONDANSETRON 2 MG/ML
4 INJECTION INTRAMUSCULAR; INTRAVENOUS EVERY 6 HOURS PRN
Status: DISCONTINUED | OUTPATIENT
Start: 2024-06-29 | End: 2024-07-07 | Stop reason: HOSPADM

## 2024-06-29 RX ORDER — POLYETHYLENE GLYCOL 3350 17 G/17G
17 POWDER, FOR SOLUTION ORAL DAILY PRN
Status: DISCONTINUED | OUTPATIENT
Start: 2024-06-29 | End: 2024-07-07 | Stop reason: HOSPADM

## 2024-06-29 RX ORDER — SODIUM CHLORIDE 0.9 % (FLUSH) 0.9 %
5-40 SYRINGE (ML) INJECTION EVERY 12 HOURS SCHEDULED
Status: DISCONTINUED | OUTPATIENT
Start: 2024-06-29 | End: 2024-07-07 | Stop reason: HOSPADM

## 2024-06-29 RX ORDER — ENOXAPARIN SODIUM 100 MG/ML
40 INJECTION SUBCUTANEOUS DAILY
Status: DISCONTINUED | OUTPATIENT
Start: 2024-06-30 | End: 2024-07-07 | Stop reason: HOSPADM

## 2024-06-29 RX ORDER — SODIUM CHLORIDE 9 MG/ML
INJECTION, SOLUTION INTRAVENOUS CONTINUOUS
Status: DISCONTINUED | OUTPATIENT
Start: 2024-06-29 | End: 2024-07-04

## 2024-06-29 RX ORDER — SIMETHICONE 80 MG
80 TABLET,CHEWABLE ORAL EVERY 6 HOURS PRN
COMMUNITY

## 2024-06-29 RX ORDER — 0.9 % SODIUM CHLORIDE 0.9 %
1000 INTRAVENOUS SOLUTION INTRAVENOUS ONCE
Status: COMPLETED | OUTPATIENT
Start: 2024-06-29 | End: 2024-06-29

## 2024-06-29 RX ORDER — POTASSIUM CHLORIDE 7.45 MG/ML
10 INJECTION INTRAVENOUS PRN
Status: DISCONTINUED | OUTPATIENT
Start: 2024-06-29 | End: 2024-07-07 | Stop reason: HOSPADM

## 2024-06-29 RX ORDER — MAGNESIUM SULFATE IN WATER 40 MG/ML
2000 INJECTION, SOLUTION INTRAVENOUS PRN
Status: DISCONTINUED | OUTPATIENT
Start: 2024-06-29 | End: 2024-07-07 | Stop reason: HOSPADM

## 2024-06-29 RX ORDER — POTASSIUM CHLORIDE 750 MG/1
40 TABLET, EXTENDED RELEASE ORAL PRN
Status: DISCONTINUED | OUTPATIENT
Start: 2024-06-29 | End: 2024-07-07 | Stop reason: HOSPADM

## 2024-06-29 RX ORDER — SODIUM CHLORIDE 0.9 % (FLUSH) 0.9 %
5-40 SYRINGE (ML) INJECTION PRN
Status: DISCONTINUED | OUTPATIENT
Start: 2024-06-29 | End: 2024-07-07 | Stop reason: HOSPADM

## 2024-06-29 RX ORDER — ACETAMINOPHEN 325 MG/1
650 TABLET ORAL EVERY 6 HOURS PRN
Status: DISCONTINUED | OUTPATIENT
Start: 2024-06-29 | End: 2024-07-07 | Stop reason: HOSPADM

## 2024-06-29 RX ORDER — 0.9 % SODIUM CHLORIDE 0.9 %
30 INTRAVENOUS SOLUTION INTRAVENOUS ONCE
Status: COMPLETED | OUTPATIENT
Start: 2024-06-29 | End: 2024-06-29

## 2024-06-29 RX ORDER — SODIUM CHLORIDE 9 MG/ML
INJECTION, SOLUTION INTRAVENOUS PRN
Status: DISCONTINUED | OUTPATIENT
Start: 2024-06-29 | End: 2024-07-07 | Stop reason: HOSPADM

## 2024-06-29 RX ORDER — SODIUM CHLORIDE 9 MG/ML
INJECTION, SOLUTION INTRAVENOUS CONTINUOUS
Status: DISCONTINUED | OUTPATIENT
Start: 2024-06-29 | End: 2024-06-29

## 2024-06-29 RX ORDER — ACETAMINOPHEN 650 MG/1
650 SUPPOSITORY RECTAL EVERY 6 HOURS PRN
Status: DISCONTINUED | OUTPATIENT
Start: 2024-06-29 | End: 2024-07-07 | Stop reason: HOSPADM

## 2024-06-29 RX ORDER — ONDANSETRON 4 MG/1
4 TABLET, ORALLY DISINTEGRATING ORAL EVERY 8 HOURS PRN
Status: DISCONTINUED | OUTPATIENT
Start: 2024-06-29 | End: 2024-07-07 | Stop reason: HOSPADM

## 2024-06-29 RX ADMIN — Medication 10 ML: at 23:00

## 2024-06-29 RX ADMIN — SODIUM CHLORIDE: 9 INJECTION, SOLUTION INTRAVENOUS at 22:09

## 2024-06-29 RX ADMIN — SODIUM CHLORIDE 1959 ML: 9 INJECTION, SOLUTION INTRAVENOUS at 12:44

## 2024-06-29 RX ADMIN — SODIUM CHLORIDE 1000 ML: 9 INJECTION, SOLUTION INTRAVENOUS at 15:25

## 2024-06-29 RX ADMIN — CEFEPIME 2000 MG: 2 INJECTION, POWDER, FOR SOLUTION INTRAVENOUS at 22:34

## 2024-06-29 RX ADMIN — ACETAMINOPHEN 650 MG: 325 TABLET ORAL at 22:10

## 2024-06-29 RX ADMIN — NOREPINEPHRINE BITARTRATE 5 MCG/MIN: 1 INJECTION, SOLUTION, CONCENTRATE INTRAVENOUS at 18:05

## 2024-06-29 RX ADMIN — CEFEPIME 2000 MG: 2 INJECTION, POWDER, FOR SOLUTION INTRAVENOUS at 12:48

## 2024-06-29 RX ADMIN — ACETAMINOPHEN 650 MG: 650 SUPPOSITORY RECTAL at 13:55

## 2024-06-29 RX ADMIN — IOPAMIDOL 75 ML: 755 INJECTION, SOLUTION INTRAVENOUS at 15:46

## 2024-06-29 RX ADMIN — SODIUM CHLORIDE 1000 ML: 9 INJECTION, SOLUTION INTRAVENOUS at 14:13

## 2024-06-29 RX ADMIN — VANCOMYCIN HYDROCHLORIDE 1000 MG: 1 INJECTION, POWDER, LYOPHILIZED, FOR SOLUTION INTRAVENOUS at 13:55

## 2024-06-29 ASSESSMENT — PAIN - FUNCTIONAL ASSESSMENT
PAIN_FUNCTIONAL_ASSESSMENT: ACTIVITIES ARE NOT PREVENTED
PAIN_FUNCTIONAL_ASSESSMENT: NONE - DENIES PAIN

## 2024-06-29 ASSESSMENT — PAIN DESCRIPTION - LOCATION: LOCATION: HEAD

## 2024-06-29 ASSESSMENT — PAIN DESCRIPTION - ORIENTATION: ORIENTATION: MID

## 2024-06-29 ASSESSMENT — PAIN SCALES - GENERAL: PAINLEVEL_OUTOF10: 8

## 2024-06-29 ASSESSMENT — PAIN DESCRIPTION - DESCRIPTORS: DESCRIPTORS: THROBBING

## 2024-06-29 NOTE — ED NOTES
MD aware of patients BP trending down again following completion of fluids. Heart rate improving. Temp improving. MD to order fluids at a set rate to see if that improves BP.

## 2024-06-29 NOTE — ED PROVIDER NOTES
[facility] Emergency Department      CHIEF COMPLAINT  Emesis (Ems called for emesis, fever 101. Hx throat cancer. )      HISTORY OF PRESENT ILLNESS  Héctor Valverde is a 79 y.o. male with a history of remote throat cancer who has chronic severe acid reflux and recurrent aspiration pneumonia who has been slowly GJ tube fed for the past 4 years presents after an episode of coughing, choking with difficulty breathing and high fever.  His wife states he just had his GJ tube exchanged 2 days ago.  She states even with the GJ tube he continues to have severe acid reflux and aspiration.  She states nobody has been able to figure out why this is happening especially with being a J-tube.  He is confused with difficulty breathing here.  His wife states this is not his baseline.  He normally sees Dr. Mera with GI.   No other complaints, modifying factors or associated symptoms.     History obtained from the patient's wife.    I have reviewed the following from the nursing documentation.    Past Medical History:   Diagnosis Date    Arthritis     Benign hypertrophy of prostate     Cancer (HCC)     growth on tongue    COVID-19 12/07/2020    Dry mouth     s/p radiation treatment    DVT, lower extremity (HCC)     5/2011    GERD (gastroesophageal reflux disease)     acid reflux    Hip fracture (HCC)     5/2011, RIGHT    Hx of blood clots     Hyperlipidemia     Pneumonia 6/2/2011    Prolonged emergence from general anesthesia     Thyroid disease      Past Surgical History:   Procedure Laterality Date    ABDOMEN SURGERY      gallbladder removed    CHOLECYSTECTOMY      COLONOSCOPY      EYE SURGERY Right     TORN RETINA, LASER    HERNIA REPAIR      HIP ARTHROPLASTY Right 08/08/2016    HIP FRACTURE SURGERY Right     JOINT REPLACEMENT      OTHER SURGICAL HISTORY  05/18/2011    right hip IM nailing    SHOULDER SURGERY Right rotater cuff    TONGUE BIOPSY  10/2010     Family History   Problem Relation Age of Onset    Cancer

## 2024-06-29 NOTE — ED NOTES
Pure wick placed on patient for elimination purposes. No other needs at this time. Will continue to monitor.

## 2024-06-30 PROBLEM — R13.19 DYSPHAGIA CAUSING PULMONARY ASPIRATION WITH SWALLOWING: Status: ACTIVE | Noted: 2024-06-30

## 2024-06-30 PROBLEM — J18.9 MULTIFOCAL PNEUMONIA: Status: ACTIVE | Noted: 2024-06-30

## 2024-06-30 LAB
ANION GAP SERPL CALCULATED.3IONS-SCNC: 8 MMOL/L (ref 3–16)
BASOPHILS # BLD: 0 K/UL (ref 0–0.2)
BASOPHILS NFR BLD: 0.5 %
BUN SERPL-MCNC: 14 MG/DL (ref 7–20)
CALCIUM SERPL-MCNC: 8.2 MG/DL (ref 8.3–10.6)
CHLORIDE SERPL-SCNC: 104 MMOL/L (ref 99–110)
CO2 SERPL-SCNC: 25 MMOL/L (ref 21–32)
CREAT SERPL-MCNC: <0.5 MG/DL (ref 0.8–1.3)
DEPRECATED RDW RBC AUTO: 15 % (ref 12.4–15.4)
EOSINOPHIL # BLD: 0 K/UL (ref 0–0.6)
EOSINOPHIL NFR BLD: 0.1 %
GFR SERPLBLD CREATININE-BSD FMLA CKD-EPI: >90 ML/MIN/{1.73_M2}
GLUCOSE BLD-MCNC: 108 MG/DL (ref 70–99)
GLUCOSE BLD-MCNC: 122 MG/DL (ref 70–99)
GLUCOSE BLD-MCNC: 90 MG/DL (ref 70–99)
GLUCOSE BLD-MCNC: 92 MG/DL (ref 70–99)
GLUCOSE BLD-MCNC: 94 MG/DL (ref 70–99)
GLUCOSE SERPL-MCNC: 110 MG/DL (ref 70–99)
HCT VFR BLD AUTO: 34 % (ref 40.5–52.5)
HGB BLD-MCNC: 11.1 G/DL (ref 13.5–17.5)
LACTATE BLDV-SCNC: 1 MMOL/L (ref 0.4–2)
LACTATE BLDV-SCNC: 1.1 MMOL/L (ref 0.4–2)
LYMPHOCYTES # BLD: 0.5 K/UL (ref 1–5.1)
LYMPHOCYTES NFR BLD: 5 %
MCH RBC QN AUTO: 25.2 PG (ref 26–34)
MCHC RBC AUTO-ENTMCNC: 32.8 G/DL (ref 31–36)
MCV RBC AUTO: 76.9 FL (ref 80–100)
MONOCYTES # BLD: 0.8 K/UL (ref 0–1.3)
MONOCYTES NFR BLD: 7.9 %
NEUTROPHILS # BLD: 8.6 K/UL (ref 1.7–7.7)
NEUTROPHILS NFR BLD: 86.5 %
PERFORMED ON: ABNORMAL
PERFORMED ON: ABNORMAL
PERFORMED ON: NORMAL
PLATELET # BLD AUTO: 154 K/UL (ref 135–450)
PMV BLD AUTO: 8 FL (ref 5–10.5)
POTASSIUM SERPL-SCNC: 3.7 MMOL/L (ref 3.5–5.1)
RBC # BLD AUTO: 4.42 M/UL (ref 4.2–5.9)
SODIUM SERPL-SCNC: 137 MMOL/L (ref 136–145)
VANCOMYCIN TROUGH SERPL-MCNC: 7.4 UG/ML (ref 10–20)
WBC # BLD AUTO: 10 K/UL (ref 4–11)

## 2024-06-30 PROCEDURE — 6370000000 HC RX 637 (ALT 250 FOR IP): Performed by: INTERNAL MEDICINE

## 2024-06-30 PROCEDURE — 2700000000 HC OXYGEN THERAPY PER DAY

## 2024-06-30 PROCEDURE — 6360000002 HC RX W HCPCS: Performed by: INTERNAL MEDICINE

## 2024-06-30 PROCEDURE — 2580000003 HC RX 258: Performed by: INTERNAL MEDICINE

## 2024-06-30 PROCEDURE — 51702 INSERT TEMP BLADDER CATH: CPT

## 2024-06-30 PROCEDURE — 6370000000 HC RX 637 (ALT 250 FOR IP)

## 2024-06-30 PROCEDURE — 94761 N-INVAS EAR/PLS OXIMETRY MLT: CPT

## 2024-06-30 PROCEDURE — 80048 BASIC METABOLIC PNL TOTAL CA: CPT

## 2024-06-30 PROCEDURE — 85025 COMPLETE CBC W/AUTO DIFF WBC: CPT

## 2024-06-30 PROCEDURE — C9113 INJ PANTOPRAZOLE SODIUM, VIA: HCPCS | Performed by: INTERNAL MEDICINE

## 2024-06-30 PROCEDURE — 36415 COLL VENOUS BLD VENIPUNCTURE: CPT

## 2024-06-30 PROCEDURE — 80202 ASSAY OF VANCOMYCIN: CPT

## 2024-06-30 PROCEDURE — 99291 CRITICAL CARE FIRST HOUR: CPT | Performed by: INTERNAL MEDICINE

## 2024-06-30 PROCEDURE — 99223 1ST HOSP IP/OBS HIGH 75: CPT | Performed by: INTERNAL MEDICINE

## 2024-06-30 PROCEDURE — 83605 ASSAY OF LACTIC ACID: CPT

## 2024-06-30 PROCEDURE — 2000000000 HC ICU R&B

## 2024-06-30 RX ORDER — DIMETHICONE, OXYBENZONE, AND PADIMATE O 2; 2.5; 6.6 G/100G; G/100G; G/100G
STICK TOPICAL
Status: COMPLETED
Start: 2024-06-30 | End: 2024-06-30

## 2024-06-30 RX ORDER — 0.9 % SODIUM CHLORIDE 0.9 %
500 INTRAVENOUS SOLUTION INTRAVENOUS
Status: DISCONTINUED | OUTPATIENT
Start: 2024-06-30 | End: 2024-07-07 | Stop reason: HOSPADM

## 2024-06-30 RX ADMIN — SODIUM CHLORIDE: 9 INJECTION, SOLUTION INTRAVENOUS at 16:14

## 2024-06-30 RX ADMIN — CEFEPIME 2000 MG: 2 INJECTION, POWDER, FOR SOLUTION INTRAVENOUS at 14:05

## 2024-06-30 RX ADMIN — SODIUM CHLORIDE 500 ML: 9 INJECTION, SOLUTION INTRAVENOUS at 15:12

## 2024-06-30 RX ADMIN — ENOXAPARIN SODIUM 40 MG: 100 INJECTION SUBCUTANEOUS at 09:38

## 2024-06-30 RX ADMIN — PANTOPRAZOLE SODIUM 40 MG: 40 INJECTION, POWDER, FOR SOLUTION INTRAVENOUS at 12:28

## 2024-06-30 RX ADMIN — CEFEPIME 2000 MG: 2 INJECTION, POWDER, FOR SOLUTION INTRAVENOUS at 06:03

## 2024-06-30 RX ADMIN — VANCOMYCIN HYDROCHLORIDE 1000 MG: 1 INJECTION, POWDER, LYOPHILIZED, FOR SOLUTION INTRAVENOUS at 14:06

## 2024-06-30 RX ADMIN — Medication: at 14:08

## 2024-06-30 RX ADMIN — PANTOPRAZOLE SODIUM 40 MG: 40 INJECTION, POWDER, FOR SOLUTION INTRAVENOUS at 23:59

## 2024-06-30 RX ADMIN — VANCOMYCIN HYDROCHLORIDE 1000 MG: 1 INJECTION, POWDER, LYOPHILIZED, FOR SOLUTION INTRAVENOUS at 02:45

## 2024-06-30 RX ADMIN — MUPIROCIN: 20 OINTMENT TOPICAL at 20:47

## 2024-06-30 RX ADMIN — ACETAMINOPHEN 650 MG: 325 TABLET ORAL at 12:17

## 2024-06-30 RX ADMIN — SODIUM CHLORIDE: 9 INJECTION, SOLUTION INTRAVENOUS at 22:19

## 2024-06-30 RX ADMIN — CEFEPIME 2000 MG: 2 INJECTION, POWDER, FOR SOLUTION INTRAVENOUS at 21:32

## 2024-06-30 RX ADMIN — SODIUM CHLORIDE: 9 INJECTION, SOLUTION INTRAVENOUS at 11:38

## 2024-06-30 RX ADMIN — Medication 5 ML: at 20:48

## 2024-06-30 ASSESSMENT — PAIN SCALES - WONG BAKER
WONGBAKER_NUMERICALRESPONSE: NO HURT

## 2024-06-30 ASSESSMENT — PAIN SCALES - GENERAL
PAINLEVEL_OUTOF10: 0
PAINLEVEL_OUTOF10: 7
PAINLEVEL_OUTOF10: 3

## 2024-06-30 ASSESSMENT — PAIN - FUNCTIONAL ASSESSMENT
PAIN_FUNCTIONAL_ASSESSMENT: ACTIVITIES ARE NOT PREVENTED
PAIN_FUNCTIONAL_ASSESSMENT: ACTIVITIES ARE NOT PREVENTED

## 2024-06-30 ASSESSMENT — PAIN DESCRIPTION - LOCATION
LOCATION: HEAD
LOCATION: HEAD

## 2024-06-30 ASSESSMENT — PAIN DESCRIPTION - DESCRIPTORS
DESCRIPTORS: ACHING
DESCRIPTORS: ACHING

## 2024-06-30 ASSESSMENT — PAIN DESCRIPTION - ORIENTATION: ORIENTATION: MID

## 2024-06-30 NOTE — H&P
History and Physical        HISTORY OF PRESENT ILLNESS: A 79-year-old male with a history of throat cancer, recurrent aspiration pneumonia presented to the emergency room with severe coughing.  Associated with choking.  Has a cough with yellow-green sputum.  No hemoptysis.  Had a temp up to 103 in the ER.  Was hypotensive with a blood pressure in the 60s    2 days ago he had a percutaneous exchange of the GJ tube at [facility].  Has been mild leakage of the gastric fluid.    Admitted for septic shock to the ICU    Patient is allergic to tamsulosin hcl, amoxicillin-pot clavulanate, and tetanus toxoids.    Past Medical History:   Diagnosis Date    Arthritis     Benign hypertrophy of prostate     Cancer (HCC)     growth on tongue    COVID-19 12/07/2020    Dry mouth     s/p radiation treatment    DVT, lower extremity (HCC)     5/2011    GERD (gastroesophageal reflux disease)     acid reflux    Hip fracture (HCC)     5/2011, RIGHT    Hx of blood clots     Hyperlipidemia     Pneumonia 6/2/2011    Prolonged emergence from general anesthesia     Thyroid disease        Past Surgical History:   Procedure Laterality Date    ABDOMEN SURGERY      gallbladder removed    CHOLECYSTECTOMY      COLONOSCOPY      EYE SURGERY Right     TORN RETINA, LASER    HERNIA REPAIR      HIP ARTHROPLASTY Right 08/08/2016    HIP FRACTURE SURGERY Right     JOINT REPLACEMENT      OTHER SURGICAL HISTORY  05/18/2011    right hip IM nailing    SHOULDER SURGERY Right rotater cuff    TONGUE BIOPSY  10/2010       Scheduled Meds:   mupirocin   Topical BID    pantoprazole (PROTONIX) 40 mg in sodium chloride (PF) 0.9 % 10 mL injection  40 mg IntraVENous Q12H    sodium chloride flush  5-40 mL IntraVENous 2 times per day    enoxaparin  40 mg SubCUTAneous Daily    cefepime  2,000 mg IntraVENous Q8H    vancomycin  1,000 mg IntraVENous Q12H       Continuous Infusions:   norepinephrine Stopped (06/30/24 1042)    sodium chloride 75 mL/hr at 06/30/24 0623    sodium

## 2024-06-30 NOTE — PLAN OF CARE
Problem: Discharge Planning  Goal: Discharge to home or other facility with appropriate resources  6/30/2024 0900 by Aidan Yeung, RN  Outcome: Progressing  6/30/2024 0407 by Margareth Yuan RN  Outcome: Progressing     Problem: Skin/Tissue Integrity  Goal: Absence of new skin breakdown  Description: 1.  Monitor for areas of redness and/or skin breakdown  2.  Assess vascular access sites hourly  3.  Every 4-6 hours minimum:  Change oxygen saturation probe site  4.  Every 4-6 hours:  If on nasal continuous positive airway pressure, respiratory therapy assess nares and determine need for appliance change or resting period.  6/30/2024 0900 by Aidan Yeung, RN  Outcome: Progressing  6/30/2024 0407 by Margareth Yuan RN  Outcome: Progressing     Problem: Safety - Adult  Goal: Free from fall injury  6/30/2024 0900 by Aidan Yeung, RN  Outcome: Progressing  6/30/2024 0407 by Margareth Yuan, RN  Outcome: Progressing

## 2024-07-01 ENCOUNTER — ANESTHESIA EVENT (OUTPATIENT)
Dept: ENDOSCOPY | Age: 79
End: 2024-07-01
Payer: MEDICARE

## 2024-07-01 LAB
ANION GAP SERPL CALCULATED.3IONS-SCNC: 12 MMOL/L (ref 3–16)
BASOPHILS # BLD: 0 K/UL (ref 0–0.2)
BASOPHILS NFR BLD: 0.2 %
BUN SERPL-MCNC: 14 MG/DL (ref 7–20)
CALCIUM SERPL-MCNC: 8.2 MG/DL (ref 8.3–10.6)
CHLORIDE SERPL-SCNC: 102 MMOL/L (ref 99–110)
CO2 SERPL-SCNC: 21 MMOL/L (ref 21–32)
CREAT SERPL-MCNC: <0.5 MG/DL (ref 0.8–1.3)
DEPRECATED RDW RBC AUTO: 15 % (ref 12.4–15.4)
EOSINOPHIL # BLD: 0 K/UL (ref 0–0.6)
EOSINOPHIL NFR BLD: 0.7 %
GFR SERPLBLD CREATININE-BSD FMLA CKD-EPI: >90 ML/MIN/{1.73_M2}
GLUCOSE BLD-MCNC: 70 MG/DL (ref 70–99)
GLUCOSE BLD-MCNC: 73 MG/DL (ref 70–99)
GLUCOSE BLD-MCNC: 77 MG/DL (ref 70–99)
GLUCOSE SERPL-MCNC: 76 MG/DL (ref 70–99)
HCT VFR BLD AUTO: 29.3 % (ref 40.5–52.5)
HGB BLD-MCNC: 9.3 G/DL (ref 13.5–17.5)
INR PPP: 1.2 (ref 0.85–1.15)
LEGIONELLA AG UR QL: NORMAL
LYMPHOCYTES # BLD: 0.4 K/UL (ref 1–5.1)
LYMPHOCYTES NFR BLD: 6.7 %
MAGNESIUM SERPL-MCNC: 1.6 MG/DL (ref 1.8–2.4)
MCH RBC QN AUTO: 25 PG (ref 26–34)
MCHC RBC AUTO-ENTMCNC: 31.9 G/DL (ref 31–36)
MCV RBC AUTO: 78.5 FL (ref 80–100)
MONOCYTES # BLD: 0.5 K/UL (ref 0–1.3)
MONOCYTES NFR BLD: 8.4 %
MRSA DNA SPEC QL NAA+PROBE: NORMAL
NEUTROPHILS # BLD: 5.2 K/UL (ref 1.7–7.7)
NEUTROPHILS NFR BLD: 84 %
PERFORMED ON: NORMAL
PHOSPHATE SERPL-MCNC: 2.5 MG/DL (ref 2.5–4.9)
PLATELET # BLD AUTO: 137 K/UL (ref 135–450)
PMV BLD AUTO: 8.1 FL (ref 5–10.5)
POTASSIUM SERPL-SCNC: 3.3 MMOL/L (ref 3.5–5.1)
POTASSIUM SERPL-SCNC: 3.3 MMOL/L (ref 3.5–5.1)
PROTHROMBIN TIME: 15.4 SEC (ref 11.9–14.9)
RBC # BLD AUTO: 3.73 M/UL (ref 4.2–5.9)
S PNEUM AG UR QL: NORMAL
SODIUM SERPL-SCNC: 135 MMOL/L (ref 136–145)
WBC # BLD AUTO: 6.2 K/UL (ref 4–11)

## 2024-07-01 PROCEDURE — 36415 COLL VENOUS BLD VENIPUNCTURE: CPT

## 2024-07-01 PROCEDURE — 1200000000 HC SEMI PRIVATE

## 2024-07-01 PROCEDURE — 99233 SBSQ HOSP IP/OBS HIGH 50: CPT | Performed by: INTERNAL MEDICINE

## 2024-07-01 PROCEDURE — 97161 PT EVAL LOW COMPLEX 20 MIN: CPT

## 2024-07-01 PROCEDURE — 84100 ASSAY OF PHOSPHORUS: CPT

## 2024-07-01 PROCEDURE — 2580000003 HC RX 258: Performed by: INTERNAL MEDICINE

## 2024-07-01 PROCEDURE — 83735 ASSAY OF MAGNESIUM: CPT

## 2024-07-01 PROCEDURE — 97530 THERAPEUTIC ACTIVITIES: CPT

## 2024-07-01 PROCEDURE — 6360000002 HC RX W HCPCS: Performed by: INTERNAL MEDICINE

## 2024-07-01 PROCEDURE — 80048 BASIC METABOLIC PNL TOTAL CA: CPT

## 2024-07-01 PROCEDURE — 85610 PROTHROMBIN TIME: CPT

## 2024-07-01 PROCEDURE — 6370000000 HC RX 637 (ALT 250 FOR IP): Performed by: INTERNAL MEDICINE

## 2024-07-01 PROCEDURE — 85025 COMPLETE CBC W/AUTO DIFF WBC: CPT

## 2024-07-01 PROCEDURE — 97165 OT EVAL LOW COMPLEX 30 MIN: CPT

## 2024-07-01 RX ORDER — SODIUM CHLORIDE, SODIUM LACTATE, POTASSIUM CHLORIDE, CALCIUM CHLORIDE 600; 310; 30; 20 MG/100ML; MG/100ML; MG/100ML; MG/100ML
INJECTION, SOLUTION INTRAVENOUS CONTINUOUS
Status: CANCELLED | OUTPATIENT
Start: 2024-07-01

## 2024-07-01 RX ORDER — SODIUM CHLORIDE 0.9 % (FLUSH) 0.9 %
5-40 SYRINGE (ML) INJECTION EVERY 12 HOURS SCHEDULED
Status: CANCELLED | OUTPATIENT
Start: 2024-07-01

## 2024-07-01 RX ORDER — SODIUM CHLORIDE 9 MG/ML
INJECTION, SOLUTION INTRAVENOUS PRN
Status: CANCELLED | OUTPATIENT
Start: 2024-07-01

## 2024-07-01 RX ORDER — PANTOPRAZOLE SODIUM 40 MG/1
40 TABLET, DELAYED RELEASE ORAL
Status: DISCONTINUED | OUTPATIENT
Start: 2024-07-02 | End: 2024-07-04

## 2024-07-01 RX ORDER — MAGNESIUM SULFATE IN WATER 40 MG/ML
2000 INJECTION, SOLUTION INTRAVENOUS ONCE
Status: COMPLETED | OUTPATIENT
Start: 2024-07-01 | End: 2024-07-01

## 2024-07-01 RX ORDER — LIDOCAINE HYDROCHLORIDE 10 MG/ML
0.3 INJECTION, SOLUTION INFILTRATION; PERINEURAL
Status: CANCELLED | OUTPATIENT
Start: 2024-07-01 | End: 2024-07-02

## 2024-07-01 RX ORDER — LEVOFLOXACIN 750 MG/1
750 TABLET, FILM COATED ORAL DAILY
Status: DISPENSED | OUTPATIENT
Start: 2024-07-01 | End: 2024-07-06

## 2024-07-01 RX ORDER — SODIUM CHLORIDE 0.9 % (FLUSH) 0.9 %
5-40 SYRINGE (ML) INJECTION PRN
Status: CANCELLED | OUTPATIENT
Start: 2024-07-01

## 2024-07-01 RX ADMIN — CEFEPIME 2000 MG: 2 INJECTION, POWDER, FOR SOLUTION INTRAVENOUS at 06:16

## 2024-07-01 RX ADMIN — POTASSIUM BICARBONATE 40 MEQ: 782 TABLET, EFFERVESCENT ORAL at 10:57

## 2024-07-01 RX ADMIN — MAGNESIUM SULFATE HEPTAHYDRATE 2000 MG: 40 INJECTION, SOLUTION INTRAVENOUS at 10:57

## 2024-07-01 RX ADMIN — SODIUM CHLORIDE: 9 INJECTION, SOLUTION INTRAVENOUS at 19:17

## 2024-07-01 RX ADMIN — ENOXAPARIN SODIUM 40 MG: 100 INJECTION SUBCUTANEOUS at 08:30

## 2024-07-01 RX ADMIN — MUPIROCIN: 20 OINTMENT TOPICAL at 08:30

## 2024-07-01 RX ADMIN — SODIUM CHLORIDE: 9 INJECTION, SOLUTION INTRAVENOUS at 07:15

## 2024-07-01 RX ADMIN — SODIUM CHLORIDE 500 ML: 9 INJECTION, SOLUTION INTRAVENOUS at 06:12

## 2024-07-01 RX ADMIN — LEVOTHYROXINE SODIUM 137 MCG: 0.11 TABLET ORAL at 10:57

## 2024-07-01 RX ADMIN — LEVOFLOXACIN 750 MG: 750 TABLET, FILM COATED ORAL at 10:57

## 2024-07-01 RX ADMIN — VANCOMYCIN HYDROCHLORIDE 1000 MG: 1 INJECTION, POWDER, LYOPHILIZED, FOR SOLUTION INTRAVENOUS at 02:00

## 2024-07-01 ASSESSMENT — PAIN SCALES - WONG BAKER
WONGBAKER_NUMERICALRESPONSE: NO HURT

## 2024-07-01 NOTE — PLAN OF CARE
Problem: Skin/Tissue Integrity  Goal: Absence of new skin breakdown  Description: 1.  Monitor for areas of redness and/or skin breakdown  2.  Assess vascular access sites hourly  3.  Every 4-6 hours minimum:  Change oxygen saturation probe site  4.  Every 4-6 hours:  If on nasal continuous positive airway pressure, respiratory therapy assess nares and determine need for appliance change or resting period.  Outcome: Progressing   Pt encouraged to turn  Problem: ABCDS Injury Assessment  Goal: Absence of physical injury  Outcome: Progressing     Problem: Chronic Conditions and Co-morbidities  Goal: Patient's chronic conditions and co-morbidity symptoms are monitored and maintained or improved  Outcome: Progressing   Levophed off Pt tolerating ell. O2 applied while pt sleeps for low O2 sat.

## 2024-07-02 ENCOUNTER — PREP FOR PROCEDURE (OUTPATIENT)
Dept: SURGERY | Age: 79
End: 2024-07-02

## 2024-07-02 ENCOUNTER — ANESTHESIA (OUTPATIENT)
Dept: ENDOSCOPY | Age: 79
End: 2024-07-02
Payer: MEDICARE

## 2024-07-02 LAB
ANION GAP SERPL CALCULATED.3IONS-SCNC: 12 MMOL/L (ref 3–16)
BASOPHILS # BLD: 0 K/UL (ref 0–0.2)
BASOPHILS NFR BLD: 0.3 %
BUN SERPL-MCNC: 10 MG/DL (ref 7–20)
CALCIUM SERPL-MCNC: 8 MG/DL (ref 8.3–10.6)
CHLORIDE SERPL-SCNC: 100 MMOL/L (ref 99–110)
CO2 SERPL-SCNC: 23 MMOL/L (ref 21–32)
CREAT SERPL-MCNC: <0.5 MG/DL (ref 0.8–1.3)
DEPRECATED RDW RBC AUTO: 14.6 % (ref 12.4–15.4)
EOSINOPHIL # BLD: 0.1 K/UL (ref 0–0.6)
EOSINOPHIL NFR BLD: 1.8 %
GFR SERPLBLD CREATININE-BSD FMLA CKD-EPI: >90 ML/MIN/{1.73_M2}
GLUCOSE BLD-MCNC: 61 MG/DL (ref 70–99)
GLUCOSE BLD-MCNC: 74 MG/DL (ref 70–99)
GLUCOSE BLD-MCNC: 80 MG/DL (ref 70–99)
GLUCOSE BLD-MCNC: 84 MG/DL (ref 70–99)
GLUCOSE BLD-MCNC: 91 MG/DL (ref 70–99)
GLUCOSE SERPL-MCNC: 82 MG/DL (ref 70–99)
HCT VFR BLD AUTO: 30.3 % (ref 40.5–52.5)
HGB BLD-MCNC: 10.1 G/DL (ref 13.5–17.5)
LYMPHOCYTES # BLD: 0.5 K/UL (ref 1–5.1)
LYMPHOCYTES NFR BLD: 8.2 %
MAGNESIUM SERPL-MCNC: 1.7 MG/DL (ref 1.8–2.4)
MCH RBC QN AUTO: 25.5 PG (ref 26–34)
MCHC RBC AUTO-ENTMCNC: 33.4 G/DL (ref 31–36)
MCV RBC AUTO: 76.3 FL (ref 80–100)
MONOCYTES # BLD: 0.5 K/UL (ref 0–1.3)
MONOCYTES NFR BLD: 8.4 %
NEUTROPHILS # BLD: 4.6 K/UL (ref 1.7–7.7)
NEUTROPHILS NFR BLD: 81.3 %
PERFORMED ON: ABNORMAL
PERFORMED ON: NORMAL
PHOSPHATE SERPL-MCNC: 2 MG/DL (ref 2.5–4.9)
PLATELET # BLD AUTO: 152 K/UL (ref 135–450)
PMV BLD AUTO: 8.2 FL (ref 5–10.5)
POTASSIUM SERPL-SCNC: 3.1 MMOL/L (ref 3.5–5.1)
RBC # BLD AUTO: 3.97 M/UL (ref 4.2–5.9)
SODIUM SERPL-SCNC: 135 MMOL/L (ref 136–145)
WBC # BLD AUTO: 5.6 K/UL (ref 4–11)

## 2024-07-02 PROCEDURE — 85025 COMPLETE CBC W/AUTO DIFF WBC: CPT

## 2024-07-02 PROCEDURE — 0DB58ZX EXCISION OF ESOPHAGUS, VIA NATURAL OR ARTIFICIAL OPENING ENDOSCOPIC, DIAGNOSTIC: ICD-10-PCS | Performed by: INTERNAL MEDICINE

## 2024-07-02 PROCEDURE — 6360000002 HC RX W HCPCS: Performed by: NURSE ANESTHETIST, CERTIFIED REGISTERED

## 2024-07-02 PROCEDURE — 3609012400 HC EGD TRANSORAL BIOPSY SINGLE/MULTIPLE: Performed by: INTERNAL MEDICINE

## 2024-07-02 PROCEDURE — 6370000000 HC RX 637 (ALT 250 FOR IP): Performed by: INTERNAL MEDICINE

## 2024-07-02 PROCEDURE — 2580000003 HC RX 258: Performed by: INTERNAL MEDICINE

## 2024-07-02 PROCEDURE — 83735 ASSAY OF MAGNESIUM: CPT

## 2024-07-02 PROCEDURE — 0DB68ZX EXCISION OF STOMACH, VIA NATURAL OR ARTIFICIAL OPENING ENDOSCOPIC, DIAGNOSTIC: ICD-10-PCS | Performed by: INTERNAL MEDICINE

## 2024-07-02 PROCEDURE — 80048 BASIC METABOLIC PNL TOTAL CA: CPT

## 2024-07-02 PROCEDURE — 3700000000 HC ANESTHESIA ATTENDED CARE: Performed by: INTERNAL MEDICINE

## 2024-07-02 PROCEDURE — 3700000001 HC ADD 15 MINUTES (ANESTHESIA): Performed by: INTERNAL MEDICINE

## 2024-07-02 PROCEDURE — 99232 SBSQ HOSP IP/OBS MODERATE 35: CPT | Performed by: INTERNAL MEDICINE

## 2024-07-02 PROCEDURE — 2500000003 HC RX 250 WO HCPCS: Performed by: NURSE ANESTHETIST, CERTIFIED REGISTERED

## 2024-07-02 PROCEDURE — 2580000003 HC RX 258: Performed by: NURSE ANESTHETIST, CERTIFIED REGISTERED

## 2024-07-02 PROCEDURE — 99233 SBSQ HOSP IP/OBS HIGH 50: CPT | Performed by: INTERNAL MEDICINE

## 2024-07-02 PROCEDURE — 84100 ASSAY OF PHOSPHORUS: CPT

## 2024-07-02 PROCEDURE — 88342 IMHCHEM/IMCYTCHM 1ST ANTB: CPT

## 2024-07-02 PROCEDURE — 2709999900 HC NON-CHARGEABLE SUPPLY: Performed by: INTERNAL MEDICINE

## 2024-07-02 PROCEDURE — 6360000002 HC RX W HCPCS: Performed by: INTERNAL MEDICINE

## 2024-07-02 PROCEDURE — 1200000000 HC SEMI PRIVATE

## 2024-07-02 PROCEDURE — 36415 COLL VENOUS BLD VENIPUNCTURE: CPT

## 2024-07-02 PROCEDURE — 88305 TISSUE EXAM BY PATHOLOGIST: CPT

## 2024-07-02 RX ORDER — GLUCAGON 1 MG/ML
1 KIT INJECTION PRN
Status: DISCONTINUED | OUTPATIENT
Start: 2024-07-02 | End: 2024-07-07 | Stop reason: HOSPADM

## 2024-07-02 RX ORDER — LIDOCAINE HYDROCHLORIDE 20 MG/ML
INJECTION, SOLUTION EPIDURAL; INFILTRATION; INTRACAUDAL; PERINEURAL PRN
Status: DISCONTINUED | OUTPATIENT
Start: 2024-07-02 | End: 2024-07-02 | Stop reason: SDUPTHER

## 2024-07-02 RX ORDER — PROPOFOL 10 MG/ML
INJECTION, EMULSION INTRAVENOUS PRN
Status: DISCONTINUED | OUTPATIENT
Start: 2024-07-02 | End: 2024-07-02 | Stop reason: SDUPTHER

## 2024-07-02 RX ORDER — SODIUM CHLORIDE, SODIUM LACTATE, POTASSIUM CHLORIDE, CALCIUM CHLORIDE 600; 310; 30; 20 MG/100ML; MG/100ML; MG/100ML; MG/100ML
INJECTION, SOLUTION INTRAVENOUS CONTINUOUS PRN
Status: DISCONTINUED | OUTPATIENT
Start: 2024-07-02 | End: 2024-07-02 | Stop reason: SDUPTHER

## 2024-07-02 RX ORDER — DEXTROSE MONOHYDRATE 100 MG/ML
INJECTION, SOLUTION INTRAVENOUS CONTINUOUS PRN
Status: DISCONTINUED | OUTPATIENT
Start: 2024-07-02 | End: 2024-07-07 | Stop reason: HOSPADM

## 2024-07-02 RX ADMIN — MUPIROCIN: 20 OINTMENT TOPICAL at 07:56

## 2024-07-02 RX ADMIN — SODIUM CHLORIDE, POTASSIUM CHLORIDE, SODIUM LACTATE AND CALCIUM CHLORIDE: 600; 310; 30; 20 INJECTION, SOLUTION INTRAVENOUS at 15:28

## 2024-07-02 RX ADMIN — LEVOTHYROXINE SODIUM 137 MCG: 0.11 TABLET ORAL at 05:57

## 2024-07-02 RX ADMIN — MAGNESIUM SULFATE HEPTAHYDRATE 2000 MG: 40 INJECTION, SOLUTION INTRAVENOUS at 07:53

## 2024-07-02 RX ADMIN — POTASSIUM BICARBONATE 40 MEQ: 782 TABLET, EFFERVESCENT ORAL at 07:56

## 2024-07-02 RX ADMIN — MUPIROCIN: 20 OINTMENT TOPICAL at 02:35

## 2024-07-02 RX ADMIN — PROPOFOL 100 MG: 10 INJECTION, EMULSION INTRAVENOUS at 15:28

## 2024-07-02 RX ADMIN — MUPIROCIN: 20 OINTMENT TOPICAL at 20:44

## 2024-07-02 RX ADMIN — Medication 5 ML: at 20:44

## 2024-07-02 RX ADMIN — LEVOFLOXACIN 750 MG: 750 TABLET, FILM COATED ORAL at 07:55

## 2024-07-02 RX ADMIN — DEXTROSE MONOHYDRATE 125 ML: 100 INJECTION, SOLUTION INTRAVENOUS at 21:19

## 2024-07-02 RX ADMIN — LIDOCAINE HYDROCHLORIDE 60 MG: 20 INJECTION, SOLUTION EPIDURAL; INFILTRATION; INTRACAUDAL; PERINEURAL at 15:28

## 2024-07-02 RX ADMIN — SODIUM CHLORIDE: 9 INJECTION, SOLUTION INTRAVENOUS at 21:19

## 2024-07-02 ASSESSMENT — PAIN DESCRIPTION - DESCRIPTORS
DESCRIPTORS: ACHING
DESCRIPTORS: ACHING

## 2024-07-02 ASSESSMENT — PAIN DESCRIPTION - FREQUENCY: FREQUENCY: CONTINUOUS

## 2024-07-02 ASSESSMENT — PAIN SCALES - GENERAL
PAINLEVEL_OUTOF10: 6
PAINLEVEL_OUTOF10: 6

## 2024-07-02 ASSESSMENT — PAIN DESCRIPTION - LOCATION: LOCATION: HEAD;ABDOMEN

## 2024-07-02 ASSESSMENT — PAIN DESCRIPTION - ONSET: ONSET: PROGRESSIVE

## 2024-07-02 ASSESSMENT — PAIN DESCRIPTION - PAIN TYPE: TYPE: ACUTE PAIN

## 2024-07-02 ASSESSMENT — ENCOUNTER SYMPTOMS: SHORTNESS OF BREATH: 1

## 2024-07-02 ASSESSMENT — PAIN - FUNCTIONAL ASSESSMENT: PAIN_FUNCTIONAL_ASSESSMENT: ACTIVITIES ARE NOT PREVENTED

## 2024-07-02 ASSESSMENT — PAIN DESCRIPTION - ORIENTATION: ORIENTATION: MID

## 2024-07-02 NOTE — BRIEF OP NOTE
Post-Sedation Assessment  Héctor Samsge   7/2/2024  A pre-sedation re-evaluation was performed immediately prior to beginning the procedure.  Procedure: EGD  Preprocedure Dx: reflux  Postprocedure Dx: gastritis, small hiatal hernia, GJ in good position  Medications: Procedural sedation per anesthesia  Complications: None  Estimated Blood Loss: minimal  Specimens: were obtained    A/P: I suspect that the reflux of duodenal contents into the stomach and subsequently to the esophagus as seen in this exam have predisposed him to regurgitation resulting in aspiration.  I advise converting the current tube to a gtube for decompression purposes only and have general surgery consider placing a jtube to avoid aspiration of tube feeds.  Hold tube feeds for the time being    Ajay Lees MD

## 2024-07-02 NOTE — CARE COORDINATION
07/02/24 1727   Service Assessment   Patient Orientation Alert and Oriented;Person;Place;Situation   Cognition Alert   History Provided By Patient   Primary Caregiver Self   Support Systems Spouse/Significant Other   Patient's Healthcare Decision Maker is: Legal Next of Kin   PCP Verified by CM Yes   Last Visit to PCP Within last 3 months   Prior Functional Level Independent in ADLs/IADLs   Current Functional Level Independent in ADLs/IADLs   Can patient return to prior living arrangement Yes   Ability to make needs known: Good   Family able to assist with home care needs: Yes   Would you like for me to discuss the discharge plan with any other family members/significant others, and if so, who? No   Financial Resources Medicare   Community Resources None   Discharge Planning   Type of Residence House   Living Arrangements Spouse/Significant Other   Current Services Prior To Admission Durable Medical Equipment   Current DME Prior to Arrival Walker;Wheelchair   Potential Assistance Needed N/A   DME Ordered? No   Potential Assistance Purchasing Medications No   Type of Home Care Services None   Patient expects to be discharged to: House   Follow Up Appointment: Best Day/Time    (spouse)   One/Two Story Residence Two story, live on first floor   Lift Chair Available No   History of falls? 1     Case Management Assessment  Initial Evaluation    Date/Time of Evaluation: 7/2/2024 5:31 PM  Assessment Completed by: Teresa Boeck, RN    If patient is discharged prior to next notation, then this note serves as note for discharge by case management.    Patient Name: Héctor Valverde                   YOB: 1945  Diagnosis: Acute respiratory failure with hypoxia (HCC) [J96.01]  Septic shock (HCC) [A41.9, R65.21]  Aspiration pneumonia of left lower lobe, unspecified aspiration pneumonia type (HCC) [J69.0]                   Date / Time: 6/29/2024 12:09 PM    Patient Admission Status: Inpatient   Readmission Risk

## 2024-07-02 NOTE — ACP (ADVANCE CARE PLANNING)
Advance Care Planning     General Advance Care Planning (ACP) Conversation    Date of Conversation: 7/2/2024  Conducted with: Patient with Decision Making Capacity  Other persons present: None    Healthcare Decision Maker:   Primary Decision Maker (Active): Wendy Valverde - Spouse - [phone]    Secondary Decision Maker: Rosas Valverde - Child - [phone]  Click here to complete Healthcare Decision Makers including selection of the Healthcare Decision Maker Relationship (ie \"Primary\").     Patient is . Spouse is legal next of kin. Can speak for patient if he cannot speak for himself.     Content/Action Overview:  Chart states patient  has ACP docs. Unable to open.   Reviewed DNR/DNI and patient - patient remains a full code.         Length of Voluntary ACP Conversation in minutes:  <16 minutes (Non-Billable)    Teresa Boeck, RN

## 2024-07-02 NOTE — PLAN OF CARE
Problem: Nutrition Deficit:  Goal: Optimize nutritional status  Outcome: Progressing   Pt tolerated tube feeding at 20 ml/hr. Currently NPO for EGD in am.   Problem: Chronic Conditions and Co-morbidities  Goal: Patient's chronic conditions and co-morbidity symptoms are monitored and maintained or improved  Outcome: Progressing  Flowsheets (Taken 7/1/2024 1915)  Care Plan - Patient's Chronic Conditions and Co-Morbidity Symptoms are Monitored and Maintained or Improved:   Monitor and assess patient's chronic conditions and comorbid symptoms for stability, deterioration, or improvement   Collaborate with multidisciplinary team to address chronic and comorbid conditions and prevent exacerbation or deterioration   Update acute care plan with appropriate goals if chronic or comorbid symptoms are exacerbated and prevent overall improvement and discharge   Pt O2 sat maintaining Off O2 tonight.

## 2024-07-02 NOTE — ANESTHESIA POSTPROCEDURE EVALUATION
Department of Anesthesiology  Postprocedure Note    Patient: Héctor Valverde  MRN: [id number]  YOB: 1945  Date of evaluation: 7/2/2024    Procedure Summary       Date: 07/02/24 Room / Location: [address]    Anesthesia Start: 1526 Anesthesia Stop: 1547    Procedure: ESOPHAGOGASTRODUODENOSCOPY BIOPSY Diagnosis:       Chronic GERD      (Chronic GERD [K21.9])    Surgeons: Ajay Lees MD Responsible Provider: Isac Peoples MD    Anesthesia Type: MAC ASA Status: 3            Anesthesia Type: No value filed.    Terry Phase I: Terry Score: 10    Terry Phase II:      Anesthesia Post Evaluation    Patient location during evaluation: ICU  Level of consciousness: awake  Airway patency: patent  Nausea & Vomiting: no nausea  Cardiovascular status: blood pressure returned to baseline  Respiratory status: acceptable  Hydration status: euvolemic  Pain management: adequate    No notable events documented.

## 2024-07-02 NOTE — H&P
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     Current Outpatient Medications on File Prior to Encounter   Medication Sig Dispense Refill    simethicone (MYLICON) 80 MG chewable tablet Take 1 tablet by mouth every 6 hours as needed for Flatulence (GERD)      Lactobacillus (PROBIOTIC ACIDOPHILUS PO) Take 1 tablet by mouth daily      lidocaine 4 % external patch Place 1 patch onto the skin daily (Patient not taking: Reported on 2024) 10 patch 0    apixaban (ELIQUIS) 2.5 MG TABS tablet Take 1 tablet by mouth 2 times daily (Patient not taking: Reported on 2024)      levothyroxine (SYNTHROID) 137 MCG tablet Take 1 tablet by mouth Daily      omeprazole (PRILOSEC) 40 MG delayed release capsule Take 1 capsule by mouth nightly      [DISCONTINUED] albuterol sulfate  (90 Base) MCG/ACT inhaler INHALE 2 PUFFS INTO THE LUNGS 4 TIMES DAILY AS NEEDED (PRIOR TO ACTIVITY THAT CAUSES SHORTNESS OF BREATH) (Patient not taking: Reported on 2022) 1 Inhaler 5    [DISCONTINUED] fluticasone (FLONASE) 50 MCG/ACT nasal spray 2 sprays by Nasal route daily (Patient not taking: Reported on 2022)         Nurses notes reviewed and agreed.  Medications reviewed  Allergies:   Allergies   Allergen Reactions    Tamsulosin Hcl Other (See Comments)    Amoxicillin-Pot

## 2024-07-03 ENCOUNTER — ANESTHESIA (OUTPATIENT)
Dept: OPERATING ROOM | Age: 79
End: 2024-07-03
Payer: MEDICARE

## 2024-07-03 ENCOUNTER — ANESTHESIA EVENT (OUTPATIENT)
Dept: OPERATING ROOM | Age: 79
End: 2024-07-03
Payer: MEDICARE

## 2024-07-03 LAB
ANION GAP SERPL CALCULATED.3IONS-SCNC: 15 MMOL/L (ref 3–16)
BACTERIA BLD CULT ORG #2: NORMAL
BACTERIA BLD CULT: NORMAL
BACTERIA SPEC RESP CULT: ABNORMAL
BACTERIA SPEC RESP CULT: ABNORMAL
BASOPHILS # BLD: 0 K/UL (ref 0–0.2)
BASOPHILS NFR BLD: 0.2 %
BUN SERPL-MCNC: 7 MG/DL (ref 7–20)
CALCIUM SERPL-MCNC: 8 MG/DL (ref 8.3–10.6)
CHLORIDE SERPL-SCNC: 99 MMOL/L (ref 99–110)
CO2 SERPL-SCNC: 21 MMOL/L (ref 21–32)
CREAT SERPL-MCNC: <0.5 MG/DL (ref 0.8–1.3)
DEPRECATED RDW RBC AUTO: 14.8 % (ref 12.4–15.4)
EOSINOPHIL # BLD: 0.1 K/UL (ref 0–0.6)
EOSINOPHIL NFR BLD: 1.8 %
GFR SERPLBLD CREATININE-BSD FMLA CKD-EPI: >90 ML/MIN/{1.73_M2}
GLUCOSE BLD-MCNC: 112 MG/DL (ref 70–99)
GLUCOSE BLD-MCNC: 115 MG/DL (ref 70–99)
GLUCOSE BLD-MCNC: 67 MG/DL (ref 70–99)
GLUCOSE BLD-MCNC: 69 MG/DL (ref 70–99)
GLUCOSE BLD-MCNC: 79 MG/DL (ref 70–99)
GLUCOSE BLD-MCNC: 82 MG/DL (ref 70–99)
GLUCOSE BLD-MCNC: 82 MG/DL (ref 70–99)
GLUCOSE BLD-MCNC: 84 MG/DL (ref 70–99)
GLUCOSE BLD-MCNC: 91 MG/DL (ref 70–99)
GLUCOSE SERPL-MCNC: 76 MG/DL (ref 70–99)
GRAM STN SPEC: ABNORMAL
HCT VFR BLD AUTO: 33.2 % (ref 40.5–52.5)
HGB BLD-MCNC: 10.9 G/DL (ref 13.5–17.5)
LYMPHOCYTES # BLD: 0.4 K/UL (ref 1–5.1)
LYMPHOCYTES NFR BLD: 8.3 %
MAGNESIUM SERPL-MCNC: 1.7 MG/DL (ref 1.8–2.4)
MCH RBC QN AUTO: 25.1 PG (ref 26–34)
MCHC RBC AUTO-ENTMCNC: 32.9 G/DL (ref 31–36)
MCV RBC AUTO: 76.3 FL (ref 80–100)
MONOCYTES # BLD: 0.5 K/UL (ref 0–1.3)
MONOCYTES NFR BLD: 9.5 %
NEUTROPHILS # BLD: 4 K/UL (ref 1.7–7.7)
NEUTROPHILS NFR BLD: 80.2 %
ORGANISM: ABNORMAL
PERFORMED ON: ABNORMAL
PERFORMED ON: NORMAL
PHOSPHATE SERPL-MCNC: 2.5 MG/DL (ref 2.5–4.9)
PLATELET # BLD AUTO: 161 K/UL (ref 135–450)
PMV BLD AUTO: 7.9 FL (ref 5–10.5)
POTASSIUM SERPL-SCNC: 3.2 MMOL/L (ref 3.5–5.1)
RBC # BLD AUTO: 4.35 M/UL (ref 4.2–5.9)
SODIUM SERPL-SCNC: 135 MMOL/L (ref 136–145)
WBC # BLD AUTO: 5 K/UL (ref 4–11)

## 2024-07-03 PROCEDURE — 2700000000 HC OXYGEN THERAPY PER DAY

## 2024-07-03 PROCEDURE — 3700000001 HC ADD 15 MINUTES (ANESTHESIA): Performed by: SURGERY

## 2024-07-03 PROCEDURE — 6360000002 HC RX W HCPCS: Performed by: INTERNAL MEDICINE

## 2024-07-03 PROCEDURE — 2580000003 HC RX 258: Performed by: NURSE ANESTHETIST, CERTIFIED REGISTERED

## 2024-07-03 PROCEDURE — 6360000002 HC RX W HCPCS: Performed by: NURSE ANESTHETIST, CERTIFIED REGISTERED

## 2024-07-03 PROCEDURE — 2709999900 HC NON-CHARGEABLE SUPPLY: Performed by: SURGERY

## 2024-07-03 PROCEDURE — 1200000000 HC SEMI PRIVATE

## 2024-07-03 PROCEDURE — 3600000014 HC SURGERY LEVEL 4 ADDTL 15MIN: Performed by: SURGERY

## 2024-07-03 PROCEDURE — 2580000003 HC RX 258: Performed by: INTERNAL MEDICINE

## 2024-07-03 PROCEDURE — 83735 ASSAY OF MAGNESIUM: CPT

## 2024-07-03 PROCEDURE — 99222 1ST HOSP IP/OBS MODERATE 55: CPT | Performed by: SURGERY

## 2024-07-03 PROCEDURE — 3600000004 HC SURGERY LEVEL 4 BASE: Performed by: SURGERY

## 2024-07-03 PROCEDURE — 2500000003 HC RX 250 WO HCPCS: Performed by: NURSE ANESTHETIST, CERTIFIED REGISTERED

## 2024-07-03 PROCEDURE — 99232 SBSQ HOSP IP/OBS MODERATE 35: CPT | Performed by: INTERNAL MEDICINE

## 2024-07-03 PROCEDURE — 0DHA0UZ INSERTION OF FEEDING DEVICE INTO JEJUNUM, OPEN APPROACH: ICD-10-PCS | Performed by: INTERNAL MEDICINE

## 2024-07-03 PROCEDURE — 36415 COLL VENOUS BLD VENIPUNCTURE: CPT

## 2024-07-03 PROCEDURE — 80048 BASIC METABOLIC PNL TOTAL CA: CPT

## 2024-07-03 PROCEDURE — 94761 N-INVAS EAR/PLS OXIMETRY MLT: CPT

## 2024-07-03 PROCEDURE — 6360000002 HC RX W HCPCS: Performed by: SURGERY

## 2024-07-03 PROCEDURE — 6370000000 HC RX 637 (ALT 250 FOR IP): Performed by: INTERNAL MEDICINE

## 2024-07-03 PROCEDURE — 84100 ASSAY OF PHOSPHORUS: CPT

## 2024-07-03 PROCEDURE — 85025 COMPLETE CBC W/AUTO DIFF WBC: CPT

## 2024-07-03 PROCEDURE — 3E0H76Z INTRODUCTION OF NUTRITIONAL SUBSTANCE INTO LOWER GI, VIA NATURAL OR ARTIFICIAL OPENING: ICD-10-PCS | Performed by: INTERNAL MEDICINE

## 2024-07-03 PROCEDURE — 2580000003 HC RX 258: Performed by: SURGERY

## 2024-07-03 PROCEDURE — 3700000000 HC ANESTHESIA ATTENDED CARE: Performed by: SURGERY

## 2024-07-03 PROCEDURE — 6360000002 HC RX W HCPCS

## 2024-07-03 PROCEDURE — A4217 STERILE WATER/SALINE, 500 ML: HCPCS | Performed by: SURGERY

## 2024-07-03 PROCEDURE — APPSS30 APP SPLIT SHARED TIME 16-30 MINUTES

## 2024-07-03 PROCEDURE — 99233 SBSQ HOSP IP/OBS HIGH 50: CPT | Performed by: INTERNAL MEDICINE

## 2024-07-03 RX ORDER — SODIUM CHLORIDE 0.9 % (FLUSH) 0.9 %
5-40 SYRINGE (ML) INJECTION EVERY 12 HOURS SCHEDULED
Status: CANCELLED | OUTPATIENT
Start: 2024-07-03

## 2024-07-03 RX ORDER — METRONIDAZOLE 500 MG/100ML
500 INJECTION, SOLUTION INTRAVENOUS EVERY 8 HOURS
Status: COMPLETED | OUTPATIENT
Start: 2024-07-03 | End: 2024-07-04

## 2024-07-03 RX ORDER — MAGNESIUM HYDROXIDE 1200 MG/15ML
LIQUID ORAL CONTINUOUS PRN
Status: COMPLETED | OUTPATIENT
Start: 2024-07-03 | End: 2024-07-03

## 2024-07-03 RX ORDER — DIPHENHYDRAMINE HYDROCHLORIDE 50 MG/ML
12.5 INJECTION INTRAMUSCULAR; INTRAVENOUS
Status: CANCELLED | OUTPATIENT
Start: 2024-07-03 | End: 2024-07-04

## 2024-07-03 RX ORDER — OXYCODONE HYDROCHLORIDE 5 MG/1
10 TABLET ORAL PRN
Status: CANCELLED | OUTPATIENT
Start: 2024-07-03 | End: 2024-07-03

## 2024-07-03 RX ORDER — BUPIVACAINE HYDROCHLORIDE 5 MG/ML
INJECTION, SOLUTION PERINEURAL PRN
Status: DISCONTINUED | OUTPATIENT
Start: 2024-07-03 | End: 2024-07-03 | Stop reason: ALTCHOICE

## 2024-07-03 RX ORDER — NALOXONE HYDROCHLORIDE 0.4 MG/ML
INJECTION, SOLUTION INTRAMUSCULAR; INTRAVENOUS; SUBCUTANEOUS PRN
Status: CANCELLED | OUTPATIENT
Start: 2024-07-03

## 2024-07-03 RX ORDER — ROCURONIUM BROMIDE 10 MG/ML
INJECTION, SOLUTION INTRAVENOUS PRN
Status: DISCONTINUED | OUTPATIENT
Start: 2024-07-03 | End: 2024-07-03 | Stop reason: SDUPTHER

## 2024-07-03 RX ORDER — OXYCODONE HYDROCHLORIDE 5 MG/1
5 TABLET ORAL PRN
Status: CANCELLED | OUTPATIENT
Start: 2024-07-03 | End: 2024-07-03

## 2024-07-03 RX ORDER — SODIUM CHLORIDE 9 MG/ML
INJECTION, SOLUTION INTRAVENOUS PRN
Status: CANCELLED | OUTPATIENT
Start: 2024-07-03

## 2024-07-03 RX ORDER — LIDOCAINE HYDROCHLORIDE 20 MG/ML
INJECTION, SOLUTION INFILTRATION; PERINEURAL PRN
Status: DISCONTINUED | OUTPATIENT
Start: 2024-07-03 | End: 2024-07-03 | Stop reason: SDUPTHER

## 2024-07-03 RX ORDER — FENTANYL CITRATE 50 UG/ML
INJECTION, SOLUTION INTRAMUSCULAR; INTRAVENOUS PRN
Status: DISCONTINUED | OUTPATIENT
Start: 2024-07-03 | End: 2024-07-03 | Stop reason: SDUPTHER

## 2024-07-03 RX ORDER — ONDANSETRON 2 MG/ML
4 INJECTION INTRAMUSCULAR; INTRAVENOUS
Status: CANCELLED | OUTPATIENT
Start: 2024-07-03

## 2024-07-03 RX ORDER — SODIUM CHLORIDE 0.9 % (FLUSH) 0.9 %
5-40 SYRINGE (ML) INJECTION PRN
Status: CANCELLED | OUTPATIENT
Start: 2024-07-03

## 2024-07-03 RX ORDER — LABETALOL HYDROCHLORIDE 5 MG/ML
5 INJECTION, SOLUTION INTRAVENOUS EVERY 10 MIN PRN
Status: CANCELLED | OUTPATIENT
Start: 2024-07-03

## 2024-07-03 RX ORDER — PROPOFOL 10 MG/ML
INJECTION, EMULSION INTRAVENOUS PRN
Status: DISCONTINUED | OUTPATIENT
Start: 2024-07-03 | End: 2024-07-03 | Stop reason: SDUPTHER

## 2024-07-03 RX ORDER — SODIUM CHLORIDE, SODIUM LACTATE, POTASSIUM CHLORIDE, CALCIUM CHLORIDE 600; 310; 30; 20 MG/100ML; MG/100ML; MG/100ML; MG/100ML
INJECTION, SOLUTION INTRAVENOUS CONTINUOUS PRN
Status: DISCONTINUED | OUTPATIENT
Start: 2024-07-03 | End: 2024-07-03 | Stop reason: SDUPTHER

## 2024-07-03 RX ORDER — MEPERIDINE HYDROCHLORIDE 25 MG/ML
12.5 INJECTION INTRAMUSCULAR; INTRAVENOUS; SUBCUTANEOUS EVERY 5 MIN PRN
Status: CANCELLED | OUTPATIENT
Start: 2024-07-03

## 2024-07-03 RX ADMIN — FENTANYL CITRATE 25 MCG: 50 INJECTION INTRAMUSCULAR; INTRAVENOUS at 11:36

## 2024-07-03 RX ADMIN — METRONIDAZOLE 500 MG: 500 INJECTION, SOLUTION INTRAVENOUS at 09:48

## 2024-07-03 RX ADMIN — DEXTROSE MONOHYDRATE 125 ML: 100 INJECTION, SOLUTION INTRAVENOUS at 20:24

## 2024-07-03 RX ADMIN — Medication 10 ML: at 20:22

## 2024-07-03 RX ADMIN — ACETAMINOPHEN 650 MG: 325 TABLET ORAL at 20:31

## 2024-07-03 RX ADMIN — LIDOCAINE HYDROCHLORIDE 40 MG: 20 INJECTION, SOLUTION INFILTRATION; PERINEURAL at 11:04

## 2024-07-03 RX ADMIN — METRONIDAZOLE 500 MG: 500 INJECTION, SOLUTION INTRAVENOUS at 18:25

## 2024-07-03 RX ADMIN — DEXTROSE MONOHYDRATE 125 ML: 100 INJECTION, SOLUTION INTRAVENOUS at 04:57

## 2024-07-03 RX ADMIN — MUPIROCIN: 20 OINTMENT TOPICAL at 20:21

## 2024-07-03 RX ADMIN — SODIUM CHLORIDE: 9 INJECTION, SOLUTION INTRAVENOUS at 12:05

## 2024-07-03 RX ADMIN — ACETAMINOPHEN 650 MG: 325 TABLET ORAL at 13:14

## 2024-07-03 RX ADMIN — PROPOFOL 120 MG: 10 INJECTION, EMULSION INTRAVENOUS at 11:04

## 2024-07-03 RX ADMIN — POTASSIUM CHLORIDE 10 MEQ: 7.46 INJECTION, SOLUTION INTRAVENOUS at 10:13

## 2024-07-03 RX ADMIN — ROCURONIUM BROMIDE 40 MG: 10 INJECTION INTRAVENOUS at 11:04

## 2024-07-03 RX ADMIN — POTASSIUM CHLORIDE 10 MEQ: 7.46 INJECTION, SOLUTION INTRAVENOUS at 12:07

## 2024-07-03 RX ADMIN — SODIUM CHLORIDE: 9 INJECTION, SOLUTION INTRAVENOUS at 10:12

## 2024-07-03 RX ADMIN — SUGAMMADEX 200 MG: 100 INJECTION, SOLUTION INTRAVENOUS at 11:56

## 2024-07-03 RX ADMIN — SODIUM CHLORIDE, POTASSIUM CHLORIDE, SODIUM LACTATE AND CALCIUM CHLORIDE: 600; 310; 30; 20 INJECTION, SOLUTION INTRAVENOUS at 10:59

## 2024-07-03 RX ADMIN — MUPIROCIN: 20 OINTMENT TOPICAL at 09:49

## 2024-07-03 ASSESSMENT — PAIN DESCRIPTION - ONSET
ONSET: PROGRESSIVE
ONSET: PROGRESSIVE

## 2024-07-03 ASSESSMENT — PAIN SCALES - GENERAL
PAINLEVEL_OUTOF10: 0
PAINLEVEL_OUTOF10: 0
PAINLEVEL_OUTOF10: 6
PAINLEVEL_OUTOF10: 7
PAINLEVEL_OUTOF10: 0
PAINLEVEL_OUTOF10: 6

## 2024-07-03 ASSESSMENT — PAIN - FUNCTIONAL ASSESSMENT
PAIN_FUNCTIONAL_ASSESSMENT: ACTIVITIES ARE NOT PREVENTED

## 2024-07-03 ASSESSMENT — PAIN DESCRIPTION - PAIN TYPE
TYPE: SURGICAL PAIN
TYPE: ACUTE PAIN

## 2024-07-03 ASSESSMENT — PAIN DESCRIPTION - DESCRIPTORS
DESCRIPTORS: ACHING

## 2024-07-03 ASSESSMENT — PAIN DESCRIPTION - FREQUENCY
FREQUENCY: CONTINUOUS
FREQUENCY: CONTINUOUS

## 2024-07-03 ASSESSMENT — PAIN SCALES - WONG BAKER
WONGBAKER_NUMERICALRESPONSE: NO HURT

## 2024-07-03 ASSESSMENT — PAIN DESCRIPTION - ORIENTATION
ORIENTATION: RIGHT;LEFT;MID

## 2024-07-03 ASSESSMENT — PAIN DESCRIPTION - LOCATION
LOCATION: GENERALIZED
LOCATION: ABDOMEN

## 2024-07-03 NOTE — H&P
I have reviewed the history and physical dated June/30/2024 and examined the patient and find no relevant changes.    I have reviewed with the patient and/or family the risks, benefits, and alternatives to the procedure.

## 2024-07-03 NOTE — ANESTHESIA POSTPROCEDURE EVALUATION
Department of Anesthesiology  Postprocedure Note    Patient: Héctor Valverde  MRN: [id number]  YOB: 1945  Date of evaluation: 7/3/2024    Procedure Summary       Date: 07/03/24 Room / Location: [address]    Anesthesia Start: 1059 Anesthesia Stop: 1210    Procedure: JEJUNOSTOMY TUBE INSERTION/CHANGE (Abdomen) Diagnosis:       Dysphagia      (Dysphagia [R13.10])    Surgeons: Kayden Lopez MD Responsible Provider: Nubia Flannery MD    Anesthesia Type: general ASA Status: 3            Anesthesia Type: No value filed.    Terry Phase I: Terry Score: 10    Terry Phase II:      Anesthesia Post Evaluation    Comments: Postoperative Anesthesia Note    Name:    Héctor Valverde  MRN:      [id number]    Patient Vitals in the past 12 hrs:  07/03/24 1500, BP:(!) 152/92, Pulse:98, Resp:20, SpO2:93 %  07/03/24 1400, BP:139/81, Pulse:97, Resp:25, SpO2:95 %  07/03/24 1300, BP:(!) 156/100, Pulse:(!) 104, Resp:30, SpO2:95 %  07/03/24 1205, BP:129/85, Pulse:98, Resp:25, SpO2:96 %  07/03/24 1035, BP:(!) 150/88, Temp:97.5 °F (36.4 °C), Temp src:Infrared, Pulse:88, Resp:18, SpO2:98 %  07/03/24 0800, BP:(!) 134/90, Temp:98.2 °F (36.8 °C), Temp src:Temporal, Pulse:96, Resp:22, SpO2:95 %  07/03/24 0700, BP:132/76, Pulse:93, Resp:27, SpO2:97 %  07/03/24 0600, BP:127/71, Pulse:93, Resp:20, SpO2:(!) 89 %  07/03/24 0500, BP:123/78, Pulse:92, Resp:25, SpO2:97 %  07/03/24 0400, BP:125/75, Temp:98.1 °F (36.7 °C), Temp src:Oral, Pulse:94, Resp:24, SpO2:96 %, Height:1.88 m (6' 2\"), Weight:62.6 kg (137 lb 14.4 oz)     LABS:    CBC  Lab Results       Component                Value               Date/Time                  WBC                      5.0                 07/03/2024 04:50 AM        HGB                      10.9 (L)            07/03/2024 04:50 AM        HCT                      33.2 (L)            07/03/2024 04:50 AM        PLT                      161                 07/03/2024 04:50

## 2024-07-03 NOTE — OP NOTE
[address] [address]                            OPERATIVE REPORT      PATIENT NAME: JUNAID SPARKS            : 1945  MED REC NO: [id number]                      ROOM: 3008  ACCOUNT NO: [id number]                       ADMIT DATE: 2024  PROVIDER: Kayden Lopez MD      DATE OF PROCEDURE:  2024    SURGEON:  Kayden Lopez MD    PREOPERATIVE DIAGNOSES:  Dysphagia and recurrent aspiration pneumonia.    POSTOPERATIVE DIAGNOSES:  Dysphagia and recurrent aspiration pneumonia.    PROCEDURE:  Jejunostomy tube insertion.    ANESTHESIA:  General.    ESTIMATED BLOOD LOSS:  Minimal.    INDICATIONS:  The patient is a 79-year-old gentleman with a history of oropharyngeal cancer.  He has received radiation and has dysphagia.  He has a gastrojejunostomy tube.  However, the tip is within the duodenum and it is felt he has recurrent aspiration pneumonia from reflux.  We are, therefore, asked to place jejunostomy tube.    OPERATIVE SUMMARY:  After preoperative evaluation, the patient was brought into the operating suite and placed in a comfortable supine position on the operating table.  Monitoring equipment was attached and general anesthesia was induced.  His abdomen was sterilely prepped and draped and a small incision was made in the upper midline.  This was dissected down to the fascia and the peritoneal cavity was entered directly.  The proximal jejunum was identified and delivered into the incision.  This was tracked back to the ligament of Treitz.  A pursestring suture of 2-0 silk was placed and an enterotomy was made.  A 14-Croatian regular Harrington catheter was passed distally and the pursestring was tied.  A Witzel tunnel was created with interrupted simple sutures of 3-0 silk.  A small skin nick was created in the left mid abdomen and the tube was delivered through this.  The jejunum was pulled up to the

## 2024-07-03 NOTE — PLAN OF CARE
Problem: Discharge Planning  Goal: Discharge to home or other facility with appropriate resources  Outcome: Progressing     Problem: Skin/Tissue Integrity  Goal: Absence of new skin breakdown  Description: 1.  Monitor for areas of redness and/or skin breakdown  2.  Assess vascular access sites hourly  3.  Every 4-6 hours minimum:  Change oxygen saturation probe site  4.  Every 4-6 hours:  If on nasal continuous positive airway pressure, respiratory therapy assess nares and determine need for appliance change or resting period.  Outcome: Progressing     Problem: Safety - Adult  Goal: Free from fall injury  Outcome: Progressing     Problem: Pain  Goal: Verbalizes/displays adequate comfort level or baseline comfort level  Outcome: Progressing     Problem: ABCDS Injury Assessment  Goal: Absence of physical injury  Outcome: Progressing     Problem: Chronic Conditions and Co-morbidities  Goal: Patient's chronic conditions and co-morbidity symptoms are monitored and maintained or improved  Outcome: Progressing     Problem: Nutrition Deficit:  Goal: Optimize nutritional status  Outcome: Progressing

## 2024-07-03 NOTE — PLAN OF CARE
Problem: Discharge Planning  Goal: Discharge to home or other facility with appropriate resources  7/3/2024 1151 by Sarah Rutherford RN  Outcome: Progressing  7/2/2024 2329 by Mckayla Haynes RN  Outcome: Progressing     Problem: Safety - Adult  Goal: Free from fall injury  7/3/2024 1151 by Sarah Rutherford RN  Outcome: Progressing  7/2/2024 2329 by Mckayla Haynes RN  Outcome: Progressing     Problem: Pain  Goal: Verbalizes/displays adequate comfort level or baseline comfort level  7/3/2024 1151 by Sarah Rutherford RN  Outcome: Progressing  7/2/2024 2329 by Mckayla Haynes RN  Outcome: Progressing

## 2024-07-03 NOTE — CONSULTS
[facility]   Pharmacy Pharmacokinetic Monitoring Service - Vancomycin     Héctor Valverde is a 79 y.o. male starting on vancomycin therapy for CAP x 7 days. Pharmacy consulted by Dr. Francis for monitoring and adjustment.    Target Concentration: Goal AUC/JOANIE 400-600 mg*hr/L    Additional Antimicrobials: Cefepime    Pertinent Laboratory Values:   Wt Readings from Last 1 Encounters:   06/29/24 65.3 kg (144 lb)     Temp Readings from Last 1 Encounters:   06/29/24 99.7 °F (37.6 °C)     Estimated Creatinine Clearance: 79 mL/min (A) (based on SCr of 0.7 mg/dL (L)).  Recent Labs     06/29/24  1241   CREATININE 0.7*   BUN 21*   WBC 9.0     Procalcitonin: 0.23    Pertinent Cultures:  Culture Date Source Results        MRSA Nasal Swab: was ordered by provider, awaiting results.    Plan:  Dosing recommendations based on Bayesian software  Start vancomycin 1000 mg x 1, then 1000 mg every 12 hours.  Anticipated AUC of 539 and trough concentration of 14.7 at steady state  Renal labs as indicated   Vancomycin concentration ordered for 6/30 @ 1300.   Pharmacy will continue to monitor patient and adjust therapy as indicated    Thank you for the consult,  Ronen Dove RPH  6/29/2024 9:21 PM    
GI Consult Note      Reason for Consult:  feeding tube dysfunction  Requesting Physician:  Tito    CHIEF COMPLAINT:  emesis, fever    History Obtained From:  patient, spouse, electronic medical record, staff RN    HISTORY OF PRESENT ILLNESS:                The patient is a 79 y.o. male with significant past medical history of throat cancer, GERD and aspiration pneumonia who presents with fevers, coughing and dyspnea related to an infectious bronchiolitis.  2 days ago he had a percutaneous exchange of the GJ tube in the radiology dept at [facility].  There has been mild leakage of gastric fluid resulting in erythema surrounding the os.  He denies pain in the region.  Abd CT on admission demonstrated the GJ tube to be in good position.  He has had difficulty with regurgitation, pyrosis, dysphonia and aspiration.  Mngmnt has involved once daily Nexium, Mylanta and Carafate.  His most recent EGD took place 3 yrs ago he believes.    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    COVID-19 12/07/2020    Dry mouth     s/p radiation treatment    DVT, lower extremity (HCC)     5/2011    GERD (gastroesophageal reflux disease)     acid reflux    Hip fracture (HCC)     5/2011, RIGHT    Hx of blood clots     Hyperlipidemia     Pneumonia 6/2/2011    Prolonged emergence from general anesthesia     Thyroid disease      Past Surgical History:        Procedure Laterality Date    ABDOMEN SURGERY      gallbladder removed    CHOLECYSTECTOMY      COLONOSCOPY      EYE SURGERY Right     TORN RETINA, LASER    HERNIA REPAIR      HIP ARTHROPLASTY Right 08/08/2016    HIP FRACTURE SURGERY Right     JOINT REPLACEMENT      OTHER SURGICAL HISTORY  05/18/2011    right hip IM nailing    SHOULDER SURGERY Right rotater cuff    TONGUE BIOPSY  10/2010     Current Medications:    Current Facility-Administered Medications: sodium chloride 0.9 % bolus 500 mL, 500 mL, 
General Surgery   Consult Note      Pt Name: Héctor Valverde  MRN: [id number]  YOB: 1945  Primary Care Physician: Suraj De La Torre DO    Patient evaluated at the request of Dr. Lees  Reason for evaluation: Jtube placement  Date of evaluation: 7/3/2024  Admit date: 6/29/2024  LOS: Day 4    SUBJECTIVE:   History of Chief Complaint:    Héctor Valverde is a 79 y.o. male who presented with fever and vomiting. Patient has a hx of head and neck cancer s/p chemo/rad and has not accepted PO for the last 5 years. He has suffered from recurrent aspiration episodes which have precipitated pneumonia (12 times per patient). He had his GJ tube exchanged last Thursday. A day or two following his exchange he developed severe episodes of coughing, choking and sob with associated fever (tmax 103), hypoxia and hypotension. He was admitted to the ICU for O2 and BP support and close monitoring. GI evaluated via EGD yesterday and it was found that the J portion of the GJ tube only extended into the duodenum. There was concern by GI that reflux of duodenal contents have predisposed the patient to these recurrent episodes of aspiration.  Prior abdominal surgical history includes feeding tube placement, CCY and unspecified hernia repair.    We have been consulted  for consideration of J tube placement.       Past Medical History   has a past medical history of Arthritis, Benign hypertrophy of prostate, Cancer (HCC), COVID-19, Dry mouth, DVT, lower extremity (HCC), GERD (gastroesophageal reflux disease), Hip fracture (HCC), Hx of blood clots, Hyperlipidemia, Pneumonia, Prolonged emergence from general anesthesia, and Thyroid disease.    Past Surgical History   has a past surgical history that includes Abdomen surgery; Tongue Biopsy (10/2010); shoulder surgery (Right, rotater cuff); Cholecystectomy; other surgical history (05/18/2011); Hip fracture surgery (Right); eye surgery (Right); Colonoscopy; Hip Arthroplasty (Right, 
Spoke with staff RN, no need to see.    
flush  5-40 mL IntraVENous 2 times per day    enoxaparin  40 mg SubCUTAneous Daily    cefepime  2,000 mg IntraVENous Q8H    vancomycin  1,000 mg IntraVENous Q12H     Continuous Infusions:   norepinephrine 2 mcg/min (06/30/24 0800)    sodium chloride 75 mL/hr at 06/30/24 0623    sodium chloride       PRN Meds:  sodium chloride flush, sodium chloride, potassium chloride **OR** potassium alternative oral replacement **OR** potassium chloride, magnesium sulfate, ondansetron **OR** ondansetron, polyethylene glycol, acetaminophen **OR** acetaminophen    ALLERGIES:  Patient is allergic to tamsulosin hcl, amoxicillin-pot clavulanate, and tetanus toxoids.    REVIEW OF SYSTEMS:  Constitutional: Negative for fever  HENT: Negative for sore throat  Eyes: Negative for redness   Respiratory: +  dyspnea, cough  Cardiovascular: Negative for chest pain  Gastrointestinal: Negative for vomiting, diarrhea   Genitourinary: Negative for hematuria   Musculoskeletal: Negative for arthralgias   Skin: Negative for rash  Neurological: Negative for syncope  Hematological: Negative for adenopathy  Psychiatric/Behavorial: Negative for anxiety    PHYSICAL EXAM:  Blood pressure (!) 96/58, pulse 83, temperature 97.3 °F (36.3 °C), temperature source Temporal, resp. rate 19, height 1.88 m (6' 2\"), weight 67.9 kg (149 lb 11.2 oz), SpO2 100 %.' on RA  Gen: No distress.   Eyes: PERRL. No sclera icterus. No conjunctival injection.   ENT: No discharge. Pharynx clear.   Neck: Trachea midline. No obvious mass.    Resp: No accessory muscle use. No crackles. No wheezes. No rhonchi. No dullness on percussion.  CV: Regular rate. Regular rhythm. No murmur or rub. No edema.  GI: Non-tender. Non-distended. No hernia.   Skin: Warm and dry. No nodule on exposed extremities.   Lymph: No cervical LAD. No supraclavicular LAD.   M/S: No cyanosis. No joint deformity. No clubbing.   Neuro: Awake. Alert. Moves all four extremities.   Psych: Oriented x 3. No anxiety.

## 2024-07-03 NOTE — BRIEF OP NOTE
Brief Postoperative Note      Patient: Héctor Valverde  YOB: 1945  MRN: [id number]    Date of Procedure: 7/3/2024    Pre-Op Diagnosis Codes:     * Dysphagia [R13.10]    Post-Op Diagnosis: Same       Procedure(s):  JEJUNOSTOMY TUBE INSERTION/CHANGE    Surgeon(s):  Kayden Lopez MD    Assistant:  Surgical Assistant: Bebo Flowers    Anesthesia: General    Estimated Blood Loss (mL): Minimal    Complications: None    Specimens:   * No specimens in log *    Implants:  * No implants in log *      Drains:   Gastrostomy/Enterostomy/Jejunostomy Tube PEG-Jejunostomy LLQ (Active)   Drainage Appearance None 07/03/24 0400   Site Description Clean, dry & intact 07/03/24 0400   J Port Status Clamped 07/03/24 0400   G Port Status Clamped 07/03/24 0400   Surrounding Skin Clean, dry & intact 07/03/24 0400   Dressing Status Clean, dry & intact 07/03/24 0400   Dressing Type Dry dressing 07/03/24 0400   G-Tube Care Completed Yes 07/03/24 0400   Tube Feeding Standard with Fiber 07/01/24 2200   Tube feeding/verify rate (mL/hr) 20 mL/hr 07/01/24 2200   Tube Feeding Supplement (Product) Protein Modular 07/01/24 2000   Tube Feeding Intake (mL) 60 ml 07/02/24 0100   Free Water/Flush (mL) 60 mL 07/02/24 0600       Gastrostomy/Enterostomy/Jejunostomy Tube PEG-Jejunostomy 24 fr (Active)       [REMOVED] Urinary Catheter 06/30/24 [place] (Removed)   $ Urethral catheter insertion $ Not inserted for procedure 06/30/24 0230   Catheter Indications Need for fluid volume management of the critically ill patient in a critical care setting 07/01/24 1210   Site Assessment Pink 07/01/24 1210   Urine Color Tea 07/01/24 1210   Urine Appearance Sediment 07/01/24 1210   Collection Container Standard 07/01/24 1210   Securement Method Securing device (Describe) 07/01/24 1210   Catheter Care  Perineal wipes 07/01/24 0855   Catheter Best Practices  Drainage tube clipped to bed;Catheter secured to thigh;Tamper seal intact;Bag below

## 2024-07-03 NOTE — ANESTHESIA PRE PROCEDURE
"Requested Prescriptions   Pending Prescriptions Disp Refills     pantoprazole (PROTONIX) 40 MG EC tablet [Pharmacy Med Name: PANTOPRAZOLE 40MG TAB]  Last Written Prescription Date:  9/6/2019  Last Fill Quantity: 90 tablet,  # refills: 1   Last office visit: 2/3/2020 with prescribing provider:  Jeannette   Future Office Visit:   Next 5 appointments (look out 90 days)    Apr 23, 2020  8:30 AM CDT  (Arrive by 8:15 AM)  Return Visit [facility] KIRAN [facility]o CNP  [facility] [address]  [facility] [address]  [phone]          30 tablet      Sig: TAKE 1 TABLET BY MOUTH DAILY       PPI Protocol Passed - 2/7/2020 11:56 AM        Passed - Not on Clopidogrel (unless Pantoprazole ordered)        Passed - No diagnosis of osteoporosis on record        Passed - Recent (12 mo) or future (30 days) visit within the authorizing provider's specialty     Patient has had an office visit with the authorizing provider or a provider within the authorizing providers department within the previous 12 mos or has a future within next 30 days. See \"Patient Info\" tab in inbasket, or \"Choose Columns\" in Meds & Orders section of the refill encounter.              Passed - Medication is active on med list        Passed - Patient is age 18 or older        Passed - No active pregnacy on record        Passed - No positive pregnancy test in past 12 months           " Department of Anesthesiology  Preprocedure Note       Name:  Héctor Valverde   Age:  79 y.o.  :  1945                                          MRN:  [id number]         Date:  7/3/2024      Surgeon: Surgeon(s):  Kayden Lopez MD    Procedure: Procedure(s):  JEJUNOSTOMY TUBE INSERTION/CHANGE    Medications prior to admission:   Prior to Admission medications    Medication Sig Start Date End Date Taking? Authorizing Provider   simethicone (MYLICON) 80 MG chewable tablet Take 1 tablet by mouth every 6 hours as needed for Flatulence (GERD)   Yes Santiago Eduardo MD   Lactobacillus (PROBIOTIC ACIDOPHILUS PO) Take 1 tablet by mouth daily    Santiago Eduardo MD   lidocaine 4 % external patch Place 1 patch onto the skin daily  Patient not taking: Reported on 2024   Harsha Meneses MD   apixaban (ELIQUIS) 2.5 MG TABS tablet Take 1 tablet by mouth 2 times daily  Patient not taking: Reported on 2024    Santiago Eduardo MD   levothyroxine (SYNTHROID) 137 MCG tablet Take 1 tablet by mouth Daily 21   Santiago Eduardo MD   omeprazole (PRILOSEC) 40 MG delayed release capsule Take 1 capsule by mouth nightly 21   Santiago Eduardo MD   albuterol sulfate  (90 Base) MCG/ACT inhaler INHALE 2 PUFFS INTO THE LUNGS 4 TIMES DAILY AS NEEDED (PRIOR TO ACTIVITY THAT CAUSES SHORTNESS OF BREATH)  Patient not taking: Reported on 20  Bebo Morales MD   fluticasone (FLONASE) 50 MCG/ACT nasal spray 2 sprays by Nasal route daily  Patient not taking: Reported on 2022  Santiago Eduardo MD       Current medications:    Current Facility-Administered Medications   Medication Dose Route Frequency Provider Last Rate Last Admin    metroNIDAZOLE (FLAGYL) 500 mg in 0.9% NaCl 100 mL IVPB premix  500 mg IntraVENous Q8H Rosi Wei  mL/hr at 24 0948 500 mg at 24 0948    glucose chewable tablet 16 g  4 tablet

## 2024-07-04 PROBLEM — C76.0 HEAD AND NECK CANCER (HCC): Status: ACTIVE | Noted: 2024-07-04

## 2024-07-04 LAB
GLUCOSE BLD-MCNC: 123 MG/DL (ref 70–99)
GLUCOSE BLD-MCNC: 76 MG/DL (ref 70–99)
GLUCOSE BLD-MCNC: 77 MG/DL (ref 70–99)
GLUCOSE BLD-MCNC: 78 MG/DL (ref 70–99)
GLUCOSE BLD-MCNC: 98 MG/DL (ref 70–99)
PERFORMED ON: ABNORMAL
PERFORMED ON: NORMAL

## 2024-07-04 PROCEDURE — 6370000000 HC RX 637 (ALT 250 FOR IP): Performed by: INTERNAL MEDICINE

## 2024-07-04 PROCEDURE — 2580000003 HC RX 258: Performed by: INTERNAL MEDICINE

## 2024-07-04 PROCEDURE — 1200000000 HC SEMI PRIVATE

## 2024-07-04 PROCEDURE — 6360000002 HC RX W HCPCS

## 2024-07-04 PROCEDURE — 6360000002 HC RX W HCPCS: Performed by: INTERNAL MEDICINE

## 2024-07-04 PROCEDURE — 99232 SBSQ HOSP IP/OBS MODERATE 35: CPT | Performed by: INTERNAL MEDICINE

## 2024-07-04 RX ADMIN — LEVOFLOXACIN 750 MG: 750 TABLET, FILM COATED ORAL at 08:40

## 2024-07-04 RX ADMIN — LEVOTHYROXINE SODIUM 137 MCG: 0.11 TABLET ORAL at 08:39

## 2024-07-04 RX ADMIN — ENOXAPARIN SODIUM 40 MG: 100 INJECTION SUBCUTANEOUS at 08:40

## 2024-07-04 RX ADMIN — METRONIDAZOLE 500 MG: 500 INJECTION, SOLUTION INTRAVENOUS at 01:58

## 2024-07-04 RX ADMIN — Medication 10 ML: at 08:40

## 2024-07-04 RX ADMIN — SODIUM CHLORIDE: 9 INJECTION, SOLUTION INTRAVENOUS at 00:07

## 2024-07-04 RX ADMIN — Medication 5 ML: at 20:14

## 2024-07-04 RX ADMIN — ACETAMINOPHEN 650 MG: 325 TABLET ORAL at 08:39

## 2024-07-04 RX ADMIN — MUPIROCIN: 20 OINTMENT TOPICAL at 08:39

## 2024-07-04 RX ADMIN — PANTOPRAZOLE SODIUM 40 MG: 40 INJECTION, POWDER, FOR SOLUTION INTRAVENOUS at 15:15

## 2024-07-04 RX ADMIN — MUPIROCIN: 20 OINTMENT TOPICAL at 20:14

## 2024-07-04 RX ADMIN — ACETAMINOPHEN 650 MG: 325 TABLET ORAL at 20:14

## 2024-07-04 ASSESSMENT — PAIN SCALES - GENERAL
PAINLEVEL_OUTOF10: 5
PAINLEVEL_OUTOF10: 2
PAINLEVEL_OUTOF10: 5

## 2024-07-04 ASSESSMENT — PAIN - FUNCTIONAL ASSESSMENT: PAIN_FUNCTIONAL_ASSESSMENT: ACTIVITIES ARE NOT PREVENTED

## 2024-07-04 ASSESSMENT — PAIN DESCRIPTION - DESCRIPTORS
DESCRIPTORS: ACHING

## 2024-07-04 ASSESSMENT — PAIN DESCRIPTION - ORIENTATION
ORIENTATION: LEFT
ORIENTATION: LEFT

## 2024-07-04 ASSESSMENT — PAIN DESCRIPTION - FREQUENCY: FREQUENCY: CONTINUOUS

## 2024-07-04 ASSESSMENT — PAIN DESCRIPTION - ONSET: ONSET: PROGRESSIVE

## 2024-07-04 ASSESSMENT — PAIN DESCRIPTION - LOCATION
LOCATION: ABDOMEN
LOCATION: ABDOMEN
LOCATION: ABDOMEN;INCISION

## 2024-07-04 ASSESSMENT — PAIN DESCRIPTION - PAIN TYPE: TYPE: SURGICAL PAIN

## 2024-07-04 NOTE — PLAN OF CARE
Problem: Discharge Planning  Goal: Discharge to home or other facility with appropriate resources  7/3/2024 2236 by Mckayla Haynes RN  Outcome: Progressing  7/3/2024 1151 by Sarah Rutherford RN  Outcome: Progressing     Problem: Skin/Tissue Integrity  Goal: Absence of new skin breakdown  Description: 1.  Monitor for areas of redness and/or skin breakdown  2.  Assess vascular access sites hourly  3.  Every 4-6 hours minimum:  Change oxygen saturation probe site  4.  Every 4-6 hours:  If on nasal continuous positive airway pressure, respiratory therapy assess nares and determine need for appliance change or resting period.  7/3/2024 2236 by Mckayla Haynes RN  Outcome: Progressing  7/3/2024 1151 by Sarah Rutherford RN  Outcome: Progressing     Problem: Safety - Adult  Goal: Free from fall injury  7/3/2024 2236 by Mckayla Haynes RN  Outcome: Progressing  7/3/2024 1151 by Sarah Rutherford RN  Outcome: Progressing     Problem: Pain  Goal: Verbalizes/displays adequate comfort level or baseline comfort level  7/3/2024 2236 by Mckayla Haynes RN  Outcome: Progressing  7/3/2024 1151 by Sarah Rutherford RN  Outcome: Progressing     Problem: ABCDS Injury Assessment  Goal: Absence of physical injury  7/3/2024 2236 by Mckayla Haynes RN  Outcome: Progressing  7/3/2024 1151 by Sarah Rutherford RN  Outcome: Progressing     Problem: Chronic Conditions and Co-morbidities  Goal: Patient's chronic conditions and co-morbidity symptoms are monitored and maintained or improved  Outcome: Progressing     Problem: Nutrition Deficit:  Goal: Optimize nutritional status  Outcome: Progressing

## 2024-07-05 ENCOUNTER — APPOINTMENT (OUTPATIENT)
Dept: GENERAL RADIOLOGY | Age: 79
DRG: 871 | End: 2024-07-05
Payer: MEDICARE

## 2024-07-05 LAB
ANION GAP SERPL CALCULATED.3IONS-SCNC: 13 MMOL/L (ref 3–16)
BASOPHILS # BLD: 0 K/UL (ref 0–0.2)
BASOPHILS NFR BLD: 0.5 %
BUN SERPL-MCNC: 6 MG/DL (ref 7–20)
CALCIUM SERPL-MCNC: 8.3 MG/DL (ref 8.3–10.6)
CHLORIDE SERPL-SCNC: 95 MMOL/L (ref 99–110)
CO2 SERPL-SCNC: 23 MMOL/L (ref 21–32)
CREAT SERPL-MCNC: <0.5 MG/DL (ref 0.8–1.3)
DEPRECATED RDW RBC AUTO: 14.8 % (ref 12.4–15.4)
EOSINOPHIL # BLD: 0.1 K/UL (ref 0–0.6)
EOSINOPHIL NFR BLD: 1.9 %
GFR SERPLBLD CREATININE-BSD FMLA CKD-EPI: >90 ML/MIN/{1.73_M2}
GLUCOSE BLD-MCNC: 124 MG/DL (ref 70–99)
GLUCOSE BLD-MCNC: 168 MG/DL (ref 70–99)
GLUCOSE SERPL-MCNC: 132 MG/DL (ref 70–99)
HCT VFR BLD AUTO: 35 % (ref 40.5–52.5)
HGB BLD-MCNC: 11.6 G/DL (ref 13.5–17.5)
LYMPHOCYTES # BLD: 0.4 K/UL (ref 1–5.1)
LYMPHOCYTES NFR BLD: 5.8 %
MAGNESIUM SERPL-MCNC: 1.5 MG/DL (ref 1.8–2.4)
MCH RBC QN AUTO: 24.9 PG (ref 26–34)
MCHC RBC AUTO-ENTMCNC: 33.1 G/DL (ref 31–36)
MCV RBC AUTO: 75.3 FL (ref 80–100)
MONOCYTES # BLD: 0.6 K/UL (ref 0–1.3)
MONOCYTES NFR BLD: 7.8 %
NEUTROPHILS # BLD: 6.4 K/UL (ref 1.7–7.7)
NEUTROPHILS NFR BLD: 84 %
PERFORMED ON: ABNORMAL
PERFORMED ON: ABNORMAL
PHOSPHATE SERPL-MCNC: 2.5 MG/DL (ref 2.5–4.9)
PLATELET # BLD AUTO: 234 K/UL (ref 135–450)
PMV BLD AUTO: 7.5 FL (ref 5–10.5)
POTASSIUM SERPL-SCNC: 3.5 MMOL/L (ref 3.5–5.1)
RBC # BLD AUTO: 4.64 M/UL (ref 4.2–5.9)
SODIUM SERPL-SCNC: 131 MMOL/L (ref 136–145)
WBC # BLD AUTO: 7.6 K/UL (ref 4–11)

## 2024-07-05 PROCEDURE — 2700000000 HC OXYGEN THERAPY PER DAY

## 2024-07-05 PROCEDURE — 94761 N-INVAS EAR/PLS OXIMETRY MLT: CPT

## 2024-07-05 PROCEDURE — 6360000002 HC RX W HCPCS: Performed by: INTERNAL MEDICINE

## 2024-07-05 PROCEDURE — 83735 ASSAY OF MAGNESIUM: CPT

## 2024-07-05 PROCEDURE — 6370000000 HC RX 637 (ALT 250 FOR IP): Performed by: INTERNAL MEDICINE

## 2024-07-05 PROCEDURE — 80048 BASIC METABOLIC PNL TOTAL CA: CPT

## 2024-07-05 PROCEDURE — 1200000000 HC SEMI PRIVATE

## 2024-07-05 PROCEDURE — 71045 X-RAY EXAM CHEST 1 VIEW: CPT

## 2024-07-05 PROCEDURE — 84100 ASSAY OF PHOSPHORUS: CPT

## 2024-07-05 PROCEDURE — 36415 COLL VENOUS BLD VENIPUNCTURE: CPT

## 2024-07-05 PROCEDURE — 85025 COMPLETE CBC W/AUTO DIFF WBC: CPT

## 2024-07-05 PROCEDURE — 2580000003 HC RX 258: Performed by: INTERNAL MEDICINE

## 2024-07-05 PROCEDURE — 99233 SBSQ HOSP IP/OBS HIGH 50: CPT | Performed by: INTERNAL MEDICINE

## 2024-07-05 PROCEDURE — 99024 POSTOP FOLLOW-UP VISIT: CPT

## 2024-07-05 PROCEDURE — APPSS15 APP SPLIT SHARED TIME 0-15 MINUTES

## 2024-07-05 RX ORDER — LEVOFLOXACIN 750 MG/1
750 TABLET, FILM COATED ORAL DAILY
Qty: 1 TABLET | Refills: 0 | Status: SHIPPED | OUTPATIENT
Start: 2024-07-06 | End: 2024-07-07

## 2024-07-05 RX ADMIN — ENOXAPARIN SODIUM 40 MG: 100 INJECTION SUBCUTANEOUS at 08:59

## 2024-07-05 RX ADMIN — Medication 10 ML: at 20:48

## 2024-07-05 RX ADMIN — LEVOTHYROXINE SODIUM 137 MCG: 0.11 TABLET ORAL at 07:58

## 2024-07-05 RX ADMIN — LEVOFLOXACIN 750 MG: 750 TABLET, FILM COATED ORAL at 08:59

## 2024-07-05 RX ADMIN — ACETAMINOPHEN 650 MG: 325 TABLET ORAL at 14:28

## 2024-07-05 RX ADMIN — MUPIROCIN: 20 OINTMENT TOPICAL at 20:47

## 2024-07-05 RX ADMIN — ACETAMINOPHEN 650 MG: 325 TABLET ORAL at 07:58

## 2024-07-05 RX ADMIN — PANTOPRAZOLE SODIUM 40 MG: 40 INJECTION, POWDER, FOR SOLUTION INTRAVENOUS at 08:59

## 2024-07-05 RX ADMIN — MAGNESIUM SULFATE HEPTAHYDRATE 2000 MG: 40 INJECTION, SOLUTION INTRAVENOUS at 06:22

## 2024-07-05 ASSESSMENT — PAIN SCALES - GENERAL
PAINLEVEL_OUTOF10: 3
PAINLEVEL_OUTOF10: 0
PAINLEVEL_OUTOF10: 1
PAINLEVEL_OUTOF10: 0
PAINLEVEL_OUTOF10: 3

## 2024-07-05 ASSESSMENT — PAIN DESCRIPTION - LOCATION
LOCATION: ABDOMEN
LOCATION: ABDOMEN

## 2024-07-05 ASSESSMENT — PAIN DESCRIPTION - DESCRIPTORS
DESCRIPTORS: SORE
DESCRIPTORS: SORE

## 2024-07-05 NOTE — DISCHARGE INSTRUCTIONS
[facility]    Bebo Marcial M.D.              Diogo Estrada M.D.              [facility]     Kayden Lopez M.D.               Ronen Mooney M.D.  [facility] - Thursday only                                                                                                                                                         [facility]        [facility] Office         [facility] - Dr. Mooney Only  [address]                     [address]                [address]  [address]                               [address]                                 [place] [address]  [place] [address]              [place] [address] [phone]      POST-OPERATIVE INSTRUCTIONS    Follow up with Dr. Lopez (General Surgeon) as needed.     Tube feed recommendations will be provided via dietician.   During tube feeding, keep your Gtube (clear tube) open to gravity to prevent reflux and aspiration.  It is okay to administer your medications through your Gtube and clamp for 30 minutes.     Change the Jtube (red tube) securement device every 7 days - take care not to pull on the red tube during changes  Undo securement device by squeezing the square plastic piece and backing out the zip tie   Using adhesive remover spray   Clean the skin with washcloth, soap and water and pat to dry  Use a barrier wipe to protect the skin around your Jtube  Expose the hard disc adhesive and place around the Jtube through the slit in the disc and center the tube as best as possible  Press gently to ensure adhesive sticks to your skin  Secure the tube by zip tieing snugly such that the tube does not move but not so tight that it compresses the tube blocking off your feeds  Cut

## 2024-07-05 NOTE — DISCHARGE SUMMARY
Name:  Héctor Valverde  Room:  0208/0208-01  MRN:    [id number]    Discharge Summary      This discharge summary is in conjunction with a complete physical exam done on the day of discharge.      Discharging Physician: BC YBARRA MD      Admit: 6/29/2024  Discharge:      Diagnoses this Admission    Principal Problem:    Septic shock (HCC)  Active Problems:    Chronic GERD    Moderate protein-calorie malnutrition (HCC)    Dysphagia    Dysphagia causing pulmonary aspiration with swallowing    Multifocal pneumonia    Head and neck cancer (HCC)  Resolved Problems:    * No resolved hospital problems. *        Procedures (Please Review Full Report for Details)    DATE OF PROCEDURE:  07/03/2024     SURGEON:  Kayden Lopez MD     PREOPERATIVE DIAGNOSES:  Dysphagia and recurrent aspiration pneumonia.     POSTOPERATIVE DIAGNOSES:  Dysphagia and recurrent aspiration pneumonia.     PROCEDURE:  Jejunostomy tube insertion.     ANESTHESIA:  General.     ESTIMATED BLOOD LOSS:  Minimal.      EGD 7/2/24       Post-Sedation Assessment  Héctor Valverde   7/2/2024  A pre-sedation re-evaluation was performed immediately prior to beginning the procedure.  Procedure: EGD  Preprocedure Dx: reflux  Postprocedure Dx: gastritis, small hiatal hernia, GJ in good position  Medications: Procedural sedation per anesthesia  Complications: None  Estimated Blood Loss: minimal  Specimens: were obtained     A/P: I suspect that the reflux of duodenal contents into the stomach and subsequently to the esophagus as seen in this exam have predisposed him to regurgitation resulting in aspiration.  I advise converting the current tube to a gtube for decompression purposes only and have general surgery consider placing a jtube to avoid aspiration of tube feeds.  Hold tube feeds for the time being     Ajay Lees MD            Consults    IP CONSULT TO HOSPITALIST  PHARMACY TO DOSE VANCOMYCIN  IP CONSULT TO GI  IP CONSULT TO CRITICAL

## 2024-07-05 NOTE — CARE COORDINATION
Per note from surgery:    \"Jtube securement device should be changed once a week and PRN - teaching provided to patient and instructions placed in discharge paperwork - appreciate CM assistance with setting up outpatient procurement of these devices\"    Sanjuana Mariee gave patient a few extra securement devices. CM attempted to get [facility] for patient for teaching and assistance with obtaining device. CM advised that because the patient is not homebound he cannot have a home care nurse. The patient advised CM that he does go to his doctor's appointments.     LVM for Sanjuana Mariee asking how CM can assist patient with getting the device.   Sanjuana Mariee returned call and advised that she provided the patient with the contact information so that he can order the securement devices. Nothing further needed at this time.     Patient was to discharge today. Dr. Xavier did not fee the patient has a diagnosis that will qualify the patient for oxygen.    CM spoke with Haris/Yana who advised the patient can qualify for short term oxygen due to his pneumonia and a walk test.    Per discussion with Heather the patient is not being discharged today. The patient's fever is going up. The patient needs one more day.

## 2024-07-05 NOTE — PLAN OF CARE
Problem: Discharge Planning  Goal: Discharge to home or other facility with appropriate resources  7/4/2024 2122 by Mckayla Haynes RN  Outcome: Progressing  7/4/2024 1539 by Parviz Sanchez RN  Outcome: Progressing     Problem: Skin/Tissue Integrity  Goal: Absence of new skin breakdown  Description: 1.  Monitor for areas of redness and/or skin breakdown  2.  Assess vascular access sites hourly  3.  Every 4-6 hours minimum:  Change oxygen saturation probe site  4.  Every 4-6 hours:  If on nasal continuous positive airway pressure, respiratory therapy assess nares and determine need for appliance change or resting period.  7/4/2024 2122 by Mckayla Haynes RN  Outcome: Progressing  7/4/2024 1539 by Parviz Sanchez RN  Outcome: Progressing     Problem: Safety - Adult  Goal: Free from fall injury  7/4/2024 2122 by Mckayla Haynes RN  Outcome: Progressing  7/4/2024 1539 by Parviz Sanchez RN  Outcome: Progressing     Problem: Pain  Goal: Verbalizes/displays adequate comfort level or baseline comfort level  7/4/2024 2122 by Mckayla Haynes RN  Outcome: Progressing  7/4/2024 1539 by Parviz Sanchez RN  Outcome: Progressing     Problem: ABCDS Injury Assessment  Goal: Absence of physical injury  Outcome: Progressing     Problem: Chronic Conditions and Co-morbidities  Goal: Patient's chronic conditions and co-morbidity symptoms are monitored and maintained or improved  Outcome: Progressing     Problem: Nutrition Deficit:  Goal: Optimize nutritional status  Outcome: Progressing

## 2024-07-06 ENCOUNTER — APPOINTMENT (OUTPATIENT)
Dept: GENERAL RADIOLOGY | Age: 79
DRG: 871 | End: 2024-07-06
Payer: MEDICARE

## 2024-07-06 LAB
ANION GAP SERPL CALCULATED.3IONS-SCNC: 8 MMOL/L (ref 3–16)
BASOPHILS # BLD: 0 K/UL (ref 0–0.2)
BASOPHILS NFR BLD: 0.6 %
BUN SERPL-MCNC: 12 MG/DL (ref 7–20)
CALCIUM SERPL-MCNC: 8.5 MG/DL (ref 8.3–10.6)
CHLORIDE SERPL-SCNC: 98 MMOL/L (ref 99–110)
CO2 SERPL-SCNC: 29 MMOL/L (ref 21–32)
CREAT SERPL-MCNC: <0.5 MG/DL (ref 0.8–1.3)
DEPRECATED RDW RBC AUTO: 15.1 % (ref 12.4–15.4)
EOSINOPHIL # BLD: 0.1 K/UL (ref 0–0.6)
EOSINOPHIL NFR BLD: 2.5 %
GFR SERPLBLD CREATININE-BSD FMLA CKD-EPI: >90 ML/MIN/{1.73_M2}
GLUCOSE BLD-MCNC: 123 MG/DL (ref 70–99)
GLUCOSE BLD-MCNC: 132 MG/DL (ref 70–99)
GLUCOSE BLD-MCNC: 151 MG/DL (ref 70–99)
GLUCOSE BLD-MCNC: 166 MG/DL (ref 70–99)
GLUCOSE BLD-MCNC: 179 MG/DL (ref 70–99)
GLUCOSE SERPL-MCNC: 175 MG/DL (ref 70–99)
HCT VFR BLD AUTO: 32.6 % (ref 40.5–52.5)
HGB BLD-MCNC: 10.8 G/DL (ref 13.5–17.5)
LYMPHOCYTES # BLD: 0.6 K/UL (ref 1–5.1)
LYMPHOCYTES NFR BLD: 10 %
MCH RBC QN AUTO: 24.9 PG (ref 26–34)
MCHC RBC AUTO-ENTMCNC: 33.1 G/DL (ref 31–36)
MCV RBC AUTO: 75.3 FL (ref 80–100)
MONOCYTES # BLD: 0.5 K/UL (ref 0–1.3)
MONOCYTES NFR BLD: 8.6 %
NEUTROPHILS # BLD: 4.4 K/UL (ref 1.7–7.7)
NEUTROPHILS NFR BLD: 78.3 %
PERFORMED ON: ABNORMAL
PLATELET # BLD AUTO: 251 K/UL (ref 135–450)
PMV BLD AUTO: 7.6 FL (ref 5–10.5)
POTASSIUM SERPL-SCNC: 3.4 MMOL/L (ref 3.5–5.1)
RBC # BLD AUTO: 4.33 M/UL (ref 4.2–5.9)
SODIUM SERPL-SCNC: 135 MMOL/L (ref 136–145)
WBC # BLD AUTO: 5.6 K/UL (ref 4–11)

## 2024-07-06 PROCEDURE — 2580000003 HC RX 258: Performed by: INTERNAL MEDICINE

## 2024-07-06 PROCEDURE — 1200000000 HC SEMI PRIVATE

## 2024-07-06 PROCEDURE — 6370000000 HC RX 637 (ALT 250 FOR IP): Performed by: INTERNAL MEDICINE

## 2024-07-06 PROCEDURE — 2700000000 HC OXYGEN THERAPY PER DAY

## 2024-07-06 PROCEDURE — 71045 X-RAY EXAM CHEST 1 VIEW: CPT

## 2024-07-06 PROCEDURE — 99233 SBSQ HOSP IP/OBS HIGH 50: CPT | Performed by: INTERNAL MEDICINE

## 2024-07-06 PROCEDURE — 80048 BASIC METABOLIC PNL TOTAL CA: CPT

## 2024-07-06 PROCEDURE — 94761 N-INVAS EAR/PLS OXIMETRY MLT: CPT

## 2024-07-06 PROCEDURE — 36415 COLL VENOUS BLD VENIPUNCTURE: CPT

## 2024-07-06 PROCEDURE — 97530 THERAPEUTIC ACTIVITIES: CPT

## 2024-07-06 PROCEDURE — 6360000002 HC RX W HCPCS: Performed by: INTERNAL MEDICINE

## 2024-07-06 PROCEDURE — 97116 GAIT TRAINING THERAPY: CPT

## 2024-07-06 PROCEDURE — 85025 COMPLETE CBC W/AUTO DIFF WBC: CPT

## 2024-07-06 RX ADMIN — ENOXAPARIN SODIUM 40 MG: 100 INJECTION SUBCUTANEOUS at 10:17

## 2024-07-06 RX ADMIN — SODIUM CHLORIDE 500 ML: 9 INJECTION, SOLUTION INTRAVENOUS at 11:04

## 2024-07-06 RX ADMIN — ACETAMINOPHEN 650 MG: 325 TABLET ORAL at 06:55

## 2024-07-06 RX ADMIN — Medication 10 ML: at 10:18

## 2024-07-06 RX ADMIN — SODIUM CHLORIDE 500 ML: 9 INJECTION, SOLUTION INTRAVENOUS at 05:18

## 2024-07-06 RX ADMIN — LEVOTHYROXINE SODIUM 137 MCG: 0.11 TABLET ORAL at 06:54

## 2024-07-06 RX ADMIN — PANTOPRAZOLE SODIUM 40 MG: 40 INJECTION, POWDER, FOR SOLUTION INTRAVENOUS at 10:17

## 2024-07-06 RX ADMIN — Medication 10 ML: at 21:57

## 2024-07-06 RX ADMIN — POTASSIUM BICARBONATE 40 MEQ: 782 TABLET, EFFERVESCENT ORAL at 06:54

## 2024-07-06 ASSESSMENT — PAIN - FUNCTIONAL ASSESSMENT: PAIN_FUNCTIONAL_ASSESSMENT: ACTIVITIES ARE NOT PREVENTED

## 2024-07-06 ASSESSMENT — PAIN DESCRIPTION - DESCRIPTORS: DESCRIPTORS: ACHING

## 2024-07-06 ASSESSMENT — PAIN DESCRIPTION - ORIENTATION: ORIENTATION: MID

## 2024-07-06 ASSESSMENT — PAIN SCALES - GENERAL
PAINLEVEL_OUTOF10: 0
PAINLEVEL_OUTOF10: 4

## 2024-07-06 ASSESSMENT — PAIN DESCRIPTION - LOCATION: LOCATION: ABDOMEN

## 2024-07-06 NOTE — FLOWSHEET NOTE
07/06/24 0800   Vitals   Temp 98 °F (36.7 °C)   Temp Source Temporal   Pulse 83   Heart Rate Source Monitor   Respirations 24   BP 94/63   MAP (Calculated) 73   MAP (mmHg) 73   BP Method Automatic   Oxygen Therapy   SpO2 98 %   O2 Device None (Room air)     Vital signs stable. Pt is alert and oriented X4 and denies SOB, pain or nausea at the moment. Nothing new noted on head to toe assessment. Pt is NSR on the monitor. J-tube remains secure in place. Pt continues to tolerate TF well at goal rate. G-tube remains secure in place and draining to gravity. Morning medication administration completed. Pt denies any further assistance at the moment. Will continue to monitor.

## 2024-07-06 NOTE — FLOWSHEET NOTE
07/06/24 1100   Vitals   Pulse 83   Respirations 25   BP (!) 85/51   MAP (Calculated) 62   MAP (mmHg) (!) 62   Oxygen Therapy   SpO2 97 %     Pt continues to be intermittently hypotensive.  ml NS bolus initiated per orders for a SBP less than 90. Pt remains asymptomatic at this time. Will continue to monitor.

## 2024-07-07 ENCOUNTER — APPOINTMENT (OUTPATIENT)
Dept: GENERAL RADIOLOGY | Age: 79
DRG: 871 | End: 2024-07-07
Payer: MEDICARE

## 2024-07-07 VITALS
BODY MASS INDEX: 20.44 KG/M2 | HEART RATE: 99 BPM | RESPIRATION RATE: 16 BRPM | TEMPERATURE: 98.1 F | WEIGHT: 159.3 LBS | HEIGHT: 74 IN | SYSTOLIC BLOOD PRESSURE: 93 MMHG | OXYGEN SATURATION: 93 % | DIASTOLIC BLOOD PRESSURE: 60 MMHG

## 2024-07-07 PROCEDURE — 99239 HOSP IP/OBS DSCHRG MGMT >30: CPT | Performed by: INTERNAL MEDICINE

## 2024-07-07 PROCEDURE — 99233 SBSQ HOSP IP/OBS HIGH 50: CPT | Performed by: INTERNAL MEDICINE

## 2024-07-07 PROCEDURE — 6360000002 HC RX W HCPCS: Performed by: INTERNAL MEDICINE

## 2024-07-07 PROCEDURE — 74022 RADEX COMPL AQT ABD SERIES: CPT

## 2024-07-07 PROCEDURE — 71045 X-RAY EXAM CHEST 1 VIEW: CPT

## 2024-07-07 PROCEDURE — 2580000003 HC RX 258: Performed by: INTERNAL MEDICINE

## 2024-07-07 PROCEDURE — 6370000000 HC RX 637 (ALT 250 FOR IP): Performed by: INTERNAL MEDICINE

## 2024-07-07 PROCEDURE — 6360000002 HC RX W HCPCS: Performed by: SURGERY

## 2024-07-07 PROCEDURE — 6370000000 HC RX 637 (ALT 250 FOR IP): Performed by: SURGERY

## 2024-07-07 PROCEDURE — 2580000003 HC RX 258: Performed by: SURGERY

## 2024-07-07 PROCEDURE — 74019 RADEX ABDOMEN 2 VIEWS: CPT

## 2024-07-07 RX ORDER — SENNOSIDES A AND B 8.6 MG/1
2 TABLET, FILM COATED ORAL ONCE
Status: COMPLETED | OUTPATIENT
Start: 2024-07-07 | End: 2024-07-07

## 2024-07-07 RX ADMIN — MINERAL OIL 330 ML: 1000 LIQUID ORAL at 16:07

## 2024-07-07 RX ADMIN — Medication 10 ML: at 10:19

## 2024-07-07 RX ADMIN — ENOXAPARIN SODIUM 40 MG: 100 INJECTION SUBCUTANEOUS at 10:17

## 2024-07-07 RX ADMIN — LEVOTHYROXINE SODIUM 137 MCG: 0.11 TABLET ORAL at 10:16

## 2024-07-07 RX ADMIN — PANTOPRAZOLE SODIUM 40 MG: 40 INJECTION, POWDER, FOR SOLUTION INTRAVENOUS at 10:18

## 2024-07-07 RX ADMIN — SENNOSIDES 17.2 MG: 8.6 TABLET, FILM COATED ORAL at 14:10

## 2024-07-07 NOTE — CARE COORDINATION
DISCHARGE ORDER  Date/Time 2024 4:03 PM  Completed by: Teresa Boeck, RN, Case Management    Patient Name: Héctor Valverde      : 1945  Admitting Diagnosis: Acute respiratory failure with hypoxia (HCC) [J96.01]  Septic shock (HCC) [A41.9, R65.21]  Aspiration pneumonia of left lower lobe, unspecified aspiration pneumonia type (HCC) [J69.0]      Admit order Date and Status: 24  (verify MD's last order for status of admission)      Noted discharge order.   If applicable PT/OT recommendation at Discharge: Home with 24 hour assist and HHPT  DME recommendation by PT/OT:  Confirmed discharge plan: Yes  with whom _______________  If pt confirmed DC plan does family need to be contacted by CM No if yes who______  Discharge Plan: The patient is discharging to home today. The patient does not want home OT/PT. The patient does not qualify for a home care nurse.     Date of Last IMM Given: 24    Reviewed chart.  Role of discharge planner explained and patient verbalized understanding. Discharge order is noted.    Has Home O2 in place on admit:  No  Informed of need to bring portable home O2 tank on day of discharge for nursing to connect prior to leaving:   Not Indicated  Verbalized agreement/Understanding:   Not Indicated  Pt is being d/c'd to home today. Pt's O2 sats are 93% on RA.    Discharge timeout done with Eunice RN. All discharge needs and concerns addressed.

## 2024-07-07 NOTE — CARE COORDINATION
CM did not deliver 2nd IMM within 4 hours of DC, verbal explanation of patient rights at bedside. Pt voiced understanding of discharge MCR rights and is agreeable to discharge.

## 2024-07-07 NOTE — FLOWSHEET NOTE
07/07/24 0920   Vital Signs   Temp 97.7 °F (36.5 °C)   Temp Source Oral   Pulse 79   Heart Rate Source Monitor   Respirations 18   BP 94/65   MAP (Calculated) 75   BP Location Left upper arm   BP Method Automatic   Patient Position High fowlers   Pain Assessment   Pain Assessment None - Denies Pain   Oxygen Therapy   SpO2 94 %   O2 Device None (Room air)     AM assessment completed, see flow sheet. Pt is alert and oriented. Vital signs are WNL. Respirations are shallow but unlabored. No complaints voiced. Pt denies needs at this time. SR up x 2, and bed in low position. Call light is within reach.

## 2024-07-09 ENCOUNTER — HOSPITAL ENCOUNTER (OUTPATIENT)
Age: 79
Discharge: HOME | End: 2024-07-09
Payer: MEDICARE

## 2024-07-09 ENCOUNTER — HOSPITAL ENCOUNTER (OUTPATIENT)
Dept: GENERAL RADIOLOGY | Age: 79
Discharge: HOME | End: 2024-07-09
Payer: MEDICARE

## 2024-07-09 DIAGNOSIS — J93.9 PNEUMOTHORAX, UNSPECIFIED TYPE: ICD-10-CM

## 2024-07-09 PROCEDURE — 71046 X-RAY EXAM CHEST 2 VIEWS: CPT

## 2024-07-09 NOTE — PROGRESS NOTES
PROGRESS NOTE  S:79 yrs Patient  admitted on 6/29/2024 with Acute respiratory failure with hypoxia ([facility]) [J96.01]  Septic shock ([facility]) [A41.9, R65.21]  Aspiration pneumonia of left lower lobe, unspecified aspiration pneumonia type ([facility]) [J69.0] .  Today, he is alert. He is now on RA. Levophed was weaned off last night. He denies any pain or shortness of breath. He has remained NPO.    Exam:   Vitals:    07/01/24 0900   BP: 108/64   Pulse: 97   Resp: 22   Temp:    SpO2: 97%   Generalized: alert, no acute distress  HEENT sclera clear, anicteric  Neck supple, trachea midline  Heart NSR  Abdomen soft, NT, ND, +peg  Extremities no edema     Medications: Reviewed    Labs:  CBC:   Recent Labs     06/29/24  1241 06/30/24  0752 07/01/24  0615   WBC 9.0 10.0 6.2   HGB 13.0* 11.1* 9.3*   HCT 39.1* 34.0* 29.3*   MCV 76.8* 76.9* 78.5*    154 137     BMP:   Recent Labs     06/29/24  1241 06/30/24  0752 07/01/24  0615    137 135*   K 4.7 3.7 3.3*  3.3*   CL 95* 104 102   CO2 28 25 21   PHOS  --   --  2.5   BUN 21* 14 14   CREATININE 0.7* <0.5* <0.5*     Assessment  78 yo male with pmx of GERD, throat cancer, and recurrent aspiration pneumonia admitted with coughing and hypoxia secondary to recurrent aspiration pneumonia, likely secondary to poorly controlled acid reflux or delayed gastric emptying. CT with infectious/inflammatory bronchiolitis with PEG in proper placement.     Plan  Continue supportive care  PPI BID  Monitor and replenish electrolytes as needed  Continue antibiotics for Pseudomonas pneumonia  NPO at midnight  EGD tomorrow with Dr. Nabeel Vazquez, APRN - CNP  9:41 AM 7/1/2024                        
                                     PROGRESS NOTE  S:79 yrs Patient  admitted on 6/29/2024 with Acute respiratory failure with hypoxia (HCC) [J96.01]  Septic shock (HCC) [A41.9, R65.21]  Aspiration pneumonia of left lower lobe, unspecified aspiration pneumonia type (HCC) [J69.0] .    Patient has no new compaints. He is s/p surgical J tube placement. Has not yet started tube feeds at the time of my assessment.     Current Hospital Schedued Meds   pantoprazole (PROTONIX) 40 mg in sodium chloride (PF) 0.9 % 10 mL injection  40 mg IntraVENous Daily    mupirocin   Each Nostril BID    levoFLOXacin  750 mg PEG Tube Daily    levothyroxine  137 mcg Oral Daily    sodium chloride flush  5-40 mL IntraVENous 2 times per day    enoxaparin  40 mg SubCUTAneous Daily     Current Hospital IV Meds   dextrose      sodium chloride Stopped (07/03/24 1151)     Current Hospital PRN Meds  glucose, dextrose bolus **OR** dextrose bolus, glucagon (rDNA), dextrose, sodium chloride, sodium chloride flush, sodium chloride, potassium chloride **OR** potassium alternative oral replacement **OR** potassium chloride, magnesium sulfate, ondansetron **OR** ondansetron, polyethylene glycol, acetaminophen **OR** acetaminophen    Exam:   Vitals:    07/04/24 1400   BP: 128/81   Pulse: 97   Resp:    Temp:    SpO2: 99%     I/O last 3 completed shifts:  In: 2272.6 [I.V.:2022.6; IV Piggyback:250]  Out: 4150 [Urine:4150]    A medically necessary and appropriate physical exam was performed.     Labs:  CBC:   Recent Labs     07/02/24  0423 07/03/24  0450   WBC 5.6 5.0   HGB 10.1* 10.9*   HCT 30.3* 33.2*   MCV 76.3* 76.3*    161     BMP:   Recent Labs     07/02/24  0423 07/03/24  0450   * 135*   K 3.1* 3.2*    99   CO2 23 21   PHOS 2.0* 2.5   BUN 10 7   CREATININE <0.5* <0.5*     LIVER PROFILE: No results for input(s): \"AST\", \"ALT\", \"LIPASE\", \"AMYLASE\", \"BILIDIR\", \"BILITOT\", \"ALKPHOS\" in the last 72 hours.    Invalid input(s): \"PROT\", 
                                     PROGRESS NOTE  S:79 yrs Patient  admitted on 6/29/2024 with Acute respiratory failure with hypoxia (HCC) [J96.01]  Septic shock (HCC) [A41.9, R65.21]  Aspiration pneumonia of left lower lobe, unspecified aspiration pneumonia type ([facility]) [J69.0] .  Today, he is back from surgery for J tube placement. He reports some mild abdominal pain near site.     Exam:   Vitals:    07/03/24 0800   BP: (!) 134/90   Pulse: 96   Resp: 22   Temp: 98.2 °F (36.8 °C)   SpO2: 95%   Generalized: alert, no acute distress  HEENT sclera clear, anicteric  Neck supple, trachea midline  Heart NSR  Abdomen soft, NT, +G, +J tube  Extremities no edema     Medications: Reviewed    Labs:  CBC:   Recent Labs     07/01/24  0615 07/02/24  0423 07/03/24  0450   WBC 6.2 5.6 5.0   HGB 9.3* 10.1* 10.9*   HCT 29.3* 30.3* 33.2*   MCV 78.5* 76.3* 76.3*    152 161     BMP:   Recent Labs     07/01/24  0615 07/02/24  0423 07/03/24  0450   * 135* 135*   K 3.3*  3.3* 3.1* 3.2*    100 99   CO2 21 23 21   PHOS 2.5 2.0* 2.5   BUN 14 10 7   CREATININE <0.5* <0.5* <0.5*     Assessment  80 yo male with pmx of GERD, throat cancer, and recurrent aspiration pneumonia admitted with coughing and hypoxia secondary to recurrent aspiration pneumonia, likely secondary to reflux of duodenal contents into the stomach. EGD 7/2 with gastritis, small HH, and GJ in good position. J tube placed by General Surgery today.      Plan  Continue supportive care  PPI daily   NPO  Keep site CDI  Continue antibiotics pneumonia  Hold TF until seen by Surgery  Aspiration precautions   General Surgery following     Carina Vazquez, JOHN - CNP  8:53 AM 7/3/2024                        
                                     PROGRESS NOTE  S:79 yrs Patient  admitted on 6/29/2024 with Acute respiratory failure with hypoxia ([facility]) [J96.01]  Septic shock (HCC) [A41.9, R65.21]  Aspiration pneumonia of left lower lobe, unspecified aspiration pneumonia type ([facility]) [J69.0] .  Today, he has no complaints. He has TF running at 40 ml/hr via J tube. He denies nausea.     Exam:   Vitals:    07/05/24 0600   BP: 120/81   Pulse: (!) 103   Resp: 20   Temp:    SpO2: 95%   Generalized: alert, no acute distress  HEENT sclera clear, anicteric  Neck supple, trachea midline  Heart NSR  Abdomen soft, NT, +G, +J tube  Extremities no edema   Medications: Reviewed    Labs:  CBC:   Recent Labs     07/03/24  0450 07/05/24  0406   WBC 5.0 7.6   HGB 10.9* 11.6*   HCT 33.2* 35.0*   MCV 76.3* 75.3*    234     BMP:   Recent Labs     07/03/24  0450 07/05/24  0406   * 131*   K 3.2* 3.5   CL 99 95*   CO2 21 23   PHOS 2.5 2.5   BUN 7 6*   CREATININE <0.5* <0.5*     Assessment  78 yo male with pmx of GERD, throat cancer, and recurrent aspiration pneumonia admitted with coughing and hypoxia secondary to recurrent aspiration pneumonia, likely secondary to reflux of duodenal contents into the stomach. EGD 7/2 with gastritis. J tube placed by Surgery.     Plan  Continue supportive care  PPI daily  Advance TF via J tube as tolerated   Continue G tube for decompression   Continue antibiotics  Aspiration precautions   General Surgery following--f/u as outpatient in 2 weeks     JOHN Giraldo - CNP  9:04 AM 7/5/2024                        
       [facility] GENERAL SURGERY    Surgery Progress Note           POD # 1    PATIENT NAME: Héctor Valverde     TODAY'S DATE: 7/4/2024    INTERVAL HISTORY:    Pt feels okay except for incisional pain.     OBJECTIVE:   VITALS:  /76   Pulse 96   Temp 97.1 °F (36.2 °C) (Temporal)   Resp 27   Ht 1.88 m (6' 2\")   Wt 72.3 kg (159 lb 4.8 oz)   SpO2 97%   BMI 20.45 kg/m²     INTAKE/OUTPUT:    I/O last 3 completed shifts:  In: 2272.6 [I.V.:2022.6; IV Piggyback:250]  Out: 4150 [Urine:4150]  I/O this shift:  In: -   Out: 300 [Urine:300]              CONSTITUTIONAL:  awake and alert  ABDOMEN:   normal bowel sounds, soft, non-distended   INCISION: clean, dry    Data:  CBC:   Recent Labs     07/02/24 0423 07/03/24  0450   WBC 5.6 5.0   HGB 10.1* 10.9*   HCT 30.3* 33.2*    161     BMP:    Recent Labs     07/02/24 0423 07/03/24  0450   * 135*   K 3.1* 3.2*    99   CO2 23 21   BUN 10 7   CREATININE <0.5* <0.5*   GLUCOSE 82 76     Hepatic: No results for input(s): \"AST\", \"ALT\", \"BILITOT\", \"ALKPHOS\" in the last 72 hours.    Invalid input(s): \"ALB\"  Mag:      Recent Labs     07/02/24 0423 07/03/24  0450   MG 1.70* 1.70*      Phos:     Recent Labs     07/02/24  0423 07/03/24  0450   PHOS 2.0* 2.5      INR:   Recent Labs     07/01/24 2005   INR 1.20*         Radiology Review: N/A    ASSESSMENT AND PLAN:  79 y.o. male status post jejunostomy tube insertion.  Put gastric port of G-tube to gravity.  Start low rate tube feeds via jejunostomy tube        Electronically signed by FLO CRUZ MD   
  Internal Medicine  Progress Note      Events of Last 24 hours:     Patient admitted the ICU for history of recurrent aspiration pneumonia.  Came to the ER with coughing.  This is associated with choking and chest congestion.  He was coughing up yellow-green sputum.  He has no hemoptysis.  He has shortness of breath.  Patient recently had his G-tube changed.  Continues to have acid reflux and aspiration.  He was hypoxic requiring oxygen of 4 L and had a temperature of 103.  He required Levophed briefly.  Since his admission he is improved.  He is off Levophed.  He is afebrile.  Patient is n.p.o. chronically.    J-tube placed    Invasive Lines: Peripheral IVs        MV: N/A    No results for input(s): \"PHART\", \"UUE3PYR\", \"PO2ART\" in the last 72 hours.    MV Settings:     / / /     IV:   dextrose      sodium chloride 75 mL/hr at 24 0007    sodium chloride Stopped (24 1151)       Vitals:  Temp  Av.6 °F (36.4 °C)  Min: 97.1 °F (36.2 °C)  Max: 98 °F (36.7 °C)  Pulse  Av.6  Min: 92  Max: 105  BP  Min: 102/72  Max: 156/100  SpO2  Av.2 %  Min: 93 %  Max: 99 %  Patient Vitals for the past 4 hrs:   BP Pulse Resp SpO2   24 1100 -- 95 30 97 %   24 1000 102/72 (!) 105 28 97 %   24 0900 -- 99 24 98 %   24 0800 120/76 96 27 97 %         CVP: CVP (Mean): 2 mmHg      Intake/Output Summary (Last 24 hours) at 2024 1136  Last data filed at 2024 1130  Gross per 24 hour   Intake 754.68 ml   Output 3100 ml   Net -2345.32 ml         EXAM:  General: Chronically ill-appearing.  Awake and  Eyes: PERRL. No sclera icterus. No conjunctiva injected.  ENT: No discharge. Pharynx clear.  Neck: Trachea midline. Normal thyroid.  Resp: No accessory muscle use. No crackles.  no wheezing. No rhonchi. No dullness on percussion.   CV: Regular rate. Regular rhythm. No mumur or rub. No edema. No JVD. Palpable pedal pulses.   GI: Non-tender. Non-distended. No masses. No organmegaly. Normal bowel 
  Internal Medicine ICU Progress Note      Events of Last 24 hours:     Patient admitted the ICU for history of recurrent aspiration pneumonia.  Came to the ER with coughing.  This is associated with choking and chest congestion.  He was coughing up yellow-green sputum.  He has no hemoptysis.  He has shortness of breath.  Patient recently had his G-tube changed.  Continues to have acid reflux and aspiration.  He was hypoxic requiring oxygen of 4 L and had a temperature of 103.  He required Levophed briefly.  Since his admission he is improved.  He is off Levophed.  He is afebrile.  Patient is n.p.o. chronically.    S: Overall he is doing ok. Breathing feels ok. Plan is for EGD today.     Invasive Lines: Peripheral IVs        MV: N/A    No results for input(s): \"PHART\", \"ZVX2XHR\", \"PO2ART\" in the last 72 hours.    MV Settings:     / / /     IV:   sodium chloride 75 mL/hr at 24 0600    sodium chloride         Vitals:  Temp  Av.8 °F (36.6 °C)  Min: 96.9 °F (36.1 °C)  Max: 98.5 °F (36.9 °C)  Pulse  Av.4  Min: 90  Max: 117  BP  Min: 101/64  Max: 131/76  SpO2  Av.4 %  Min: 68 %  Max: 100 %  Patient Vitals for the past 4 hrs:   BP Temp Temp src Pulse Resp SpO2   24 1000 122/76 -- -- 90 19 97 %   24 0900 -- -- -- (!) 106 25 96 %   24 0800 122/76 98 °F (36.7 °C) Temporal 93 25 95 %         CVP: CVP (Mean): 2 mmHg      Intake/Output Summary (Last 24 hours) at 2024 1103  Last data filed at 2024 0600  Gross per 24 hour   Intake 2251.92 ml   Output 1400 ml   Net 851.92 ml         EXAM:  General: Chronically ill-appearing  Eyes: PERRL. No sclera icterus. No conjunctiva injected.  ENT: No discharge. Pharynx clear.  Neck: Trachea midline. Normal thyroid.  Resp: No accessory muscle use. No crackles.  Mild wheezing. No rhonchi. No dullness on percussion.   CV: Regular rate. Regular rhythm. No mumur or rub. No edema. No JVD. Palpable pedal pulses.   GI: Non-tender. Non-distended. No 
  Pulmonary & Critical Care Medicine ICU Progress Note    CC: septic shock, pneumonia    Events of Last 24 hours:     Less sob  Invasive Lines: IV: femoral cvc    MV:       / / /   No results for input(s): \"PHART\", \"SUT0SYM\", \"PO2ART\" in the last 72 hours.    IV:   sodium chloride 75 mL/hr at 07/02/24 0600    sodium chloride         Vitals:  /76   Pulse 93   Temp 98 °F (36.7 °C) (Temporal)   Resp 25   Ht 1.88 m (6' 2.02\")   Wt 74.2 kg (163 lb 8 oz)   SpO2 95%   BMI 20.98 kg/m²   on ra  EXAM:  Constitutional: ill appearing.   Eyes: PERRL. No sclera icterus.   ENT: Normal Nose. Normal Tongue.   Neck:  Trachea is midline.   RespiraNo dDecreased breath sounds. No wheezes. No rales. No Rhonchi.  Cardiovascular: Normal S1S2. No murmur. No digit cyanosis. No digit clubbing. No LE edema.   GI: Non-tender. Non-distended. Normal bowel sounds. No masses.   Skin: No rash on the exposed extremities.   Neuro: Alert. Oriented.  Follows commands.  Moves all extremities.  Psych: No agitation, no anxiety.    Scheduled Meds:   mupirocin   Each Nostril BID    levoFLOXacin  750 mg PEG Tube Daily    levothyroxine  137 mcg Oral Daily    pantoprazole  40 mg Oral QAM AC    sodium chloride flush  5-40 mL IntraVENous 2 times per day    enoxaparin  40 mg SubCUTAneous Daily     PRN Meds:  sodium chloride, sodium chloride flush, sodium chloride, potassium chloride **OR** potassium alternative oral replacement **OR** potassium chloride, magnesium sulfate, ondansetron **OR** ondansetron, polyethylene glycol, acetaminophen **OR** acetaminophen    Results:  CBC:   Recent Labs     06/30/24 0752 07/01/24 0615 07/02/24 0423   WBC 10.0 6.2 5.6   HGB 11.1* 9.3* 10.1*   HCT 34.0* 29.3* 30.3*   MCV 76.9* 78.5* 76.3*    137 152       BMP:   Recent Labs     06/30/24 0752 07/01/24  0615 07/02/24  0423    135* 135*   K 3.7 3.3*  3.3* 3.1*    102 100   CO2 25 21 23   PHOS  --  2.5 2.0*   BUN 14 14 10   CREATININE <0.5* <0.5* 
  Pulmonary & Critical Care Medicine ICU Progress Note    CC: septic shock, pneumonia    Events of Last 24 hours:     Vitals:  /81   Pulse (!) 103   Temp 98 °F (36.7 °C) (Oral)   Resp 20   Ht 1.88 m (6' 2\")   Wt 72.3 kg (159 lb 4.8 oz)   SpO2 95%   BMI 20.45 kg/m²   on ra  EXAM:  Constitutional: ill appearing.   Eyes: PERRL. No sclera icterus.   ENT: Normal Nose. Normal Tongue.   Neck:  Trachea is midline.   Respiratory: No wheezes. No rales. No Rhonchi.  Cardiovascular: Normal S1S2. No murmur. No digit cyanosis. No digit clubbing. No LE edema.   GI: Non-tender. Non-distended. Normal bowel sounds. No masses.   Skin: No rash on the exposed extremities.   Neuro: Alert. Oriented.  Follows commands.  Moves all extremities.  Psych: No agitation, no anxiety.    Scheduled Meds:   pantoprazole (PROTONIX) 40 mg in sodium chloride (PF) 0.9 % 10 mL injection  40 mg IntraVENous Daily    mupirocin   Each Nostril BID    levoFLOXacin  750 mg PEG Tube Daily    levothyroxine  137 mcg Oral Daily    sodium chloride flush  5-40 mL IntraVENous 2 times per day    enoxaparin  40 mg SubCUTAneous Daily     PRN Meds:  glucose, dextrose bolus **OR** dextrose bolus, glucagon (rDNA), dextrose, sodium chloride, sodium chloride flush, sodium chloride, potassium chloride **OR** potassium alternative oral replacement **OR** potassium chloride, magnesium sulfate, ondansetron **OR** ondansetron, polyethylene glycol, acetaminophen **OR** acetaminophen    Results:  CBC:   Recent Labs     07/03/24  0450 07/05/24  0406   WBC 5.0 7.6   HGB 10.9* 11.6*   HCT 33.2* 35.0*   MCV 76.3* 75.3*    234       BMP:   Recent Labs     07/03/24 0450 07/05/24  0406   * 131*   K 3.2* 3.5   CL 99 95*   CO2 21 23   PHOS 2.5 2.5   BUN 7 6*   CREATININE <0.5* <0.5*       LIVER PROFILE:   No results for input(s): \"AST\", \"ALT\", \"LIPASE\", \"AMYLASE\", \"BILIDIR\", \"BILITOT\", \"ALKPHOS\" in the last 72 hours.    Invalid input(s): \"ALB\"    ASSESSMENT:  Small 
  Pulmonary & Critical Care Medicine ICU Progress Note    CC: septic shock, pneumonia    Events of Last 24 hours:   No SOB  Pt to have Gtube converted to Jtube today  Vitals:  /76   Pulse 93   Temp 98.1 °F (36.7 °C) (Oral)   Resp 27   Ht 1.88 m (6' 2\")   Wt 62.6 kg (137 lb 14.4 oz)   SpO2 97%   BMI 17.71 kg/m²   on ra  EXAM:  Constitutional: ill appearing.   Eyes: PERRL. No sclera icterus.   ENT: Normal Nose. Normal Tongue.   Neck:  Trachea is midline.   Respiratory: No wheezes. No rales. No Rhonchi.  Cardiovascular: Normal S1S2. No murmur. No digit cyanosis. No digit clubbing. No LE edema.   GI: Non-tender. Non-distended. Normal bowel sounds. No masses.   Skin: No rash on the exposed extremities.   Neuro: Alert. Oriented.  Follows commands.  Moves all extremities.  Psych: No agitation, no anxiety.    Scheduled Meds:   mupirocin   Each Nostril BID    levoFLOXacin  750 mg PEG Tube Daily    levothyroxine  137 mcg Oral Daily    pantoprazole  40 mg Oral QAM AC    sodium chloride flush  5-40 mL IntraVENous 2 times per day    enoxaparin  40 mg SubCUTAneous Daily     PRN Meds:  glucose, dextrose bolus **OR** dextrose bolus, glucagon (rDNA), dextrose, sodium chloride, sodium chloride flush, sodium chloride, potassium chloride **OR** potassium alternative oral replacement **OR** potassium chloride, magnesium sulfate, ondansetron **OR** ondansetron, polyethylene glycol, acetaminophen **OR** acetaminophen    Results:  CBC:   Recent Labs     07/01/24  0615 07/02/24  0423 07/03/24  0450   WBC 6.2 5.6 5.0   HGB 9.3* 10.1* 10.9*   HCT 29.3* 30.3* 33.2*   MCV 78.5* 76.3* 76.3*    152 161       BMP:   Recent Labs     07/01/24  0615 07/02/24  0423 07/03/24  0450   * 135* 135*   K 3.3*  3.3* 3.1* 3.2*    100 99   CO2 21 23 21   PHOS 2.5 2.0* 2.5   BUN 14 10 7   CREATININE <0.5* <0.5* <0.5*       LIVER PROFILE:   No results for input(s): \"AST\", \"ALT\", \"LIPASE\", \"AMYLASE\", \"BILIDIR\", \"BILITOT\", \"ALKPHOS\" 
  Pulmonary & Critical Care Medicine ICU Progress Note    CC: septic shock, pneumonia    Events of Last 24 hours:   No SOB  S/p Gtube conversion to Jtube  Vitals:  /72   Pulse (!) 105   Temp 97.1 °F (36.2 °C) (Temporal)   Resp 28   Ht 1.88 m (6' 2\")   Wt 72.3 kg (159 lb 4.8 oz)   SpO2 98%   BMI 20.45 kg/m²   on ra  EXAM:  Constitutional: ill appearing.   Eyes: PERRL. No sclera icterus.   ENT: Normal Nose. Normal Tongue.   Neck:  Trachea is midline.   Respiratory: No wheezes. No rales. No Rhonchi.  Cardiovascular: Normal S1S2. No murmur. No digit cyanosis. No digit clubbing. No LE edema.   GI: Non-tender. Non-distended. Normal bowel sounds. No masses.   Skin: No rash on the exposed extremities.   Neuro: Alert. Oriented.  Follows commands.  Moves all extremities.  Psych: No agitation, no anxiety.    Scheduled Meds:   mupirocin   Each Nostril BID    levoFLOXacin  750 mg PEG Tube Daily    levothyroxine  137 mcg Oral Daily    pantoprazole  40 mg Oral QAM AC    sodium chloride flush  5-40 mL IntraVENous 2 times per day    enoxaparin  40 mg SubCUTAneous Daily     PRN Meds:  glucose, dextrose bolus **OR** dextrose bolus, glucagon (rDNA), dextrose, sodium chloride, sodium chloride flush, sodium chloride, potassium chloride **OR** potassium alternative oral replacement **OR** potassium chloride, magnesium sulfate, ondansetron **OR** ondansetron, polyethylene glycol, acetaminophen **OR** acetaminophen    Results:  CBC:   Recent Labs     07/02/24  0423 07/03/24  0450   WBC 5.6 5.0   HGB 10.1* 10.9*   HCT 30.3* 33.2*   MCV 76.3* 76.3*    161       BMP:   Recent Labs     07/02/24 0423 07/03/24  0450   * 135*   K 3.1* 3.2*    99   CO2 23 21   PHOS 2.0* 2.5   BUN 10 7   CREATININE <0.5* <0.5*       LIVER PROFILE:   No results for input(s): \"AST\", \"ALT\", \"LIPASE\", \"AMYLASE\", \"BILIDIR\", \"BILITOT\", \"ALKPHOS\" in the last 72 hours.    Invalid input(s): \"ALB\"      Expand All Collapse All    Pulmonary & 
  Pulmonary & Critical Care Medicine ICU Progress Note    CC: septic shock, pneumonia    Events of Last 24 hours: Chest x-ray yesterday showed trace apical pneumothorax.    Vitals:  BP 94/61   Pulse 84   Temp 97.8 °F (36.6 °C) (Oral)   Resp 26   Ht 1.88 m (6' 2.02\")   Wt 72.3 kg (159 lb 4.8 oz)   SpO2 99%   BMI 20.44 kg/m²   on ra  EXAM:  Constitutional: ill appearing.   Eyes: PERRL. No sclera icterus.   ENT: Normal Nose. Normal Tongue.   Neck:  Trachea is midline.   Respiratory: No wheezes. No rales. No Rhonchi.  Cardiovascular: Normal S1S2. No murmur. No digit cyanosis. No digit clubbing. No LE edema.   GI: Non-tender. Non-distended. Normal bowel sounds. No masses.   Skin: No rash on the exposed extremities.   Neuro: Alert. Oriented.  Follows commands.  Moves all extremities.  Psych: No agitation, no anxiety.    Scheduled Meds:   pantoprazole (PROTONIX) 40 mg in sodium chloride (PF) 0.9 % 10 mL injection  40 mg IntraVENous Daily    mupirocin   Each Nostril BID    levoFLOXacin  750 mg PEG Tube Daily    levothyroxine  137 mcg Oral Daily    sodium chloride flush  5-40 mL IntraVENous 2 times per day    enoxaparin  40 mg SubCUTAneous Daily     PRN Meds:  glucose, dextrose bolus **OR** dextrose bolus, glucagon (rDNA), dextrose, sodium chloride, sodium chloride flush, sodium chloride, potassium chloride **OR** potassium alternative oral replacement **OR** potassium chloride, magnesium sulfate, ondansetron **OR** ondansetron, polyethylene glycol, acetaminophen **OR** acetaminophen    Results:  CBC:   Recent Labs     07/05/24  0406 07/06/24 0425   WBC 7.6 5.6   HGB 11.6* 10.8*   HCT 35.0* 32.6*   MCV 75.3* 75.3*    251       BMP:   Recent Labs     07/05/24  0406 07/06/24  0425   * 135*   K 3.5 3.4*   CL 95* 98*   CO2 23 29   PHOS 2.5  --    BUN 6* 12   CREATININE <0.5* <0.5*       LIVER PROFILE:   No results for input(s): \"AST\", \"ALT\", \"LIPASE\", \"AMYLASE\", \"BILIDIR\", \"BILITOT\", \"ALKPHOS\" in the last 72 
  Pulmonary & Critical Care Medicine ICU Progress Note    CC: septic shock, pneumonia    Events of Last 24 hours: no SOB or CP    Vitals:  BP 94/65   Pulse 79   Temp 97.7 °F (36.5 °C) (Oral)   Resp 18   Ht 1.88 m (6' 2.02\")   Wt 72.3 kg (159 lb 4.8 oz)   SpO2 94%   BMI 20.44 kg/m²   on ra  EXAM:  Constitutional: ill appearing.   Eyes: PERRL. No sclera icterus.   ENT: Normal Nose. Normal Tongue.   Neck:  Trachea is midline.   Respiratory: No wheezes. No rales. No Rhonchi.  Cardiovascular: Normal S1S2. No murmur. No digit cyanosis. No digit clubbing. No LE edema.   GI: Non-tender. Non-distended. Normal bowel sounds. No masses.   Skin: No rash on the exposed extremities.   Neuro: Alert. Oriented.  Follows commands.  Moves all extremities.  Psych: No agitation, no anxiety.    Scheduled Meds:   pantoprazole (PROTONIX) 40 mg in sodium chloride (PF) 0.9 % 10 mL injection  40 mg IntraVENous Daily    levothyroxine  137 mcg Oral Daily    sodium chloride flush  5-40 mL IntraVENous 2 times per day    enoxaparin  40 mg SubCUTAneous Daily     PRN Meds:  HYDROmorphone **OR** HYDROmorphone, glucose, dextrose bolus **OR** dextrose bolus, glucagon (rDNA), dextrose, sodium chloride, sodium chloride flush, sodium chloride, potassium chloride **OR** potassium alternative oral replacement **OR** potassium chloride, magnesium sulfate, ondansetron **OR** ondansetron, polyethylene glycol, acetaminophen **OR** acetaminophen    Results:  CBC:   Recent Labs     07/05/24  0406 07/06/24 0425   WBC 7.6 5.6   HGB 11.6* 10.8*   HCT 35.0* 32.6*   MCV 75.3* 75.3*    251       BMP:   Recent Labs     07/05/24  0406 07/06/24  0425   * 135*   K 3.5 3.4*   CL 95* 98*   CO2 23 29   PHOS 2.5  --    BUN 6* 12   CREATININE <0.5* <0.5*       LIVER PROFILE:   No results for input(s): \"AST\", \"ALT\", \"LIPASE\", \"AMYLASE\", \"BILIDIR\", \"BILITOT\", \"ALKPHOS\" in the last 72 hours.    Invalid input(s): \"ALB\"    Microbiology:  6/29 respiratory: 
  Speech Language Pathology  Attempt Note     Name: Héctor Valverde  : 1945  Medical Diagnosis: Acute respiratory failure with hypoxia (HCC) [J96.01]  Septic shock (HCC) [A41.9, R65.21]  Aspiration pneumonia of left lower lobe, unspecified aspiration pneumonia type (HCC) [J69.0]      SLP attempted to see pt for bedside swallow evaluation (BSE). Order acknowledged and chart reviewed for completion of eval. Evaluation / treatment unable to be completed due to pt politely declining. Pt has hx of head and neck cancer with current use of PEG tube. No desire for PO intake and has had nothing PO for 4 years. Pt educated on options of pleasure feeding, but pt reported no desire. Pt currently having s/s of GERD and educated on safety strategies with PEG tube, however, pt complaint with all. Pt in agreement to be discharged from caseload as pt is not appropriate for PO intake. MD is in agreement with discharge. If status changes, please contact SLP for an evaluation. RN aware. No charges.    Thank you,  Magnolia Matthews M.A., CCC-SLP   [id number]  Speech-Language Pathologist  Desk: [phone]   
4 Eyes Skin Assessment     NAME:  Héctor Valverde  YOB: 1945  MEDICAL RECORD NUMBER:  [id number]    The patient is being assessed for  Other shift assessmnet     I agree that at least one RN has performed a thorough Head to Toe Skin Assessment on the patient. ALL assessment sites listed below have been assessed.      Areas assessed by both nurses:    Head, Face, Ears, Shoulders, Back, Chest, Arms, Elbows, Hands, Sacrum. Buttock, Coccyx, Ischium, Legs. Feet and Heels, and Under Medical Devices         Does the Patient have a Wound? Yes                            Eugene Prevention initiated by RN: No  Wound Care Orders initiated by RN: No    Pressure Injury (Stage 3,4, Unstageable, DTI, NWPT, and Complex wounds) if present, place Wound referral order by RN under : No    New Ostomies, if present place, Ostomy referral order under : No     Nurse 1 eSignature: Electronically signed by Margareth Yuan RN on 6/30/24 at 3:05 AM EDT    **SHARE this note so that the co-signing nurse can place an eSignature**    Nurse 2 eSignature: Electronically signed by Allyson Fernández RN on 6/30/24 at 5:54 AM EDT    
4 Eyes Skin Assessment     NAME:  Héctor Valverde  YOB: 1945  MEDICAL RECORD NUMBER:  [id number]    The patient is being assessed for  Shift Handoff    I agree that at least one RN has performed a thorough Head to Toe Skin Assessment on the patient. ALL assessment sites listed below have been assessed.      Areas assessed by both nurses:    Head, Face, Ears, Shoulders, Back, Chest, Arms, Elbows, Hands, Sacrum. Buttock, Coccyx, Ischium, Legs. Feet and Heels, and Under Medical Devices         Does the Patient have a Wound? Yes wound(s) were present on assessment. LDA wound assessment was Initiated and completed by RN                         Eugene Prevention initiated by RN: Yes  Wound Care Orders initiated by RN: No    Pressure Injury (Stage 3,4, Unstageable, DTI, NWPT, and Complex wounds) if present, place Wound referral order by RN under : No    New Ostomies, if present place, Ostomy referral order under : No     Nurse 1 eSignature: Electronically signed by Mckayla Haynes RN on 7/3/24 at 12:41 AM EDT    **SHARE this note so that the co-signing nurse can place an eSignature**    Nurse 2 eSignature: Electronically signed by Skye Baptiste RN on 7/3/24 at 2:13 AM EDT    
4 Eyes Skin Assessment     NAME:  Héctor Valverde  YOB: 1945  MEDICAL RECORD NUMBER:  [id number]    The patient is being assessed for  Shift Handoff    I agree that at least one RN has performed a thorough Head to Toe Skin Assessment on the patient. ALL assessment sites listed below have been assessed.      Areas assessed by both nurses:    Head, Face, Ears, Shoulders, Back, Chest, Arms, Elbows, Hands, Sacrum. Buttock, Coccyx, Ischium, Legs. Feet and Heels, and Under Medical Devices         Does the Patient have a Wound? Yes wound(s) were present on assessment. LDA wound assessment was Initiated and completed by RN                         Eugene Prevention initiated by RN: Yes  Wound Care Orders initiated by RN: Yes    Pressure Injury (Stage 3,4, Unstageable, DTI, NWPT, and Complex wounds) if present, place Wound referral order by RN under : No    New Ostomies, if present place, Ostomy referral order under : No     Nurse 1 eSignature: Electronically signed by Mckayla Haynes RN on 7/3/24 at 11:18 PM EDT    **SHARE this note so that the co-signing nurse can place an eSignature**    Nurse 2 eSignature: Electronically signed by Ok Schroeder RN on 7/3/24 at 11:19 PM EDT    
4 Eyes Skin Assessment     NAME:  Héctor Valverde  YOB: 1945  MEDICAL RECORD NUMBER:  [id number]    The patient is being assessed for  Shift Handoff    I agree that at least one RN has performed a thorough Head to Toe Skin Assessment on the patient. ALL assessment sites listed below have been assessed.      Areas assessed by both nurses:    Head, Face, Ears, Shoulders, Back, Chest, Arms, Elbows, Hands, Sacrum. Buttock, Coccyx, Ischium, Legs. Feet and Heels, and Under Medical Devices         Does the Patient have a Wound? Yes wound(s) were present on assessment. LDA wound assessment was Initiated and completed by RN                         Eugene Prevention initiated by RN: Yes  Wound Care Orders initiated by RN: Yes    Pressure Injury (Stage 3,4, Unstageable, DTI, NWPT, and Complex wounds) if present, place Wound referral order by RN under : No    New Ostomies, if present place, Ostomy referral order under : No     Nurse 1 eSignature: Electronically signed by Mckayla Haynes RN on 7/5/24 at 5:36 AM EDT    **SHARE this note so that the co-signing nurse can place an eSignature**    Nurse 2 eSignature: Electronically signed by Denny thomas RN on 7/5/24 at 5:43 AM EDT    
AM assessment completed, see flow sheet. Pt is alert and oriented. Vital signs are WNL. Respirations are even & easy on 2L. Will attempt to wean pt from 02 this AM. Pt with c/o pain to abdomen, PRN tylenol given. Tolerating TF through j tube just fine. Pt denies needs at this time. SR up x 2, and bed in low position. Call light is within reach.    
AM assessment done. Pt is alert & oriented. He is resting on 2 liters per NC. Pt is w/out evidence of distress @ this time.  
Admission questions done at 2238 .Spoke with pt about code status pt stated wanted yes to  ( med's, chest compressions. Defibrillation/ cardioversion   ) but no  for vent . also pt receives  Jevity and Osmolite  at home . Prefect serve sent to DR Marcano   
Admission: Patient received to room  3008 from ED. Patient admitted with Dx of septic shock  Patient A&O x 4 upon arrival. Patient denies C/O upon arrival Patient informed to utilize call light with any needs. Pt verbalized understanding.   Patient is not able to demonstrate the ability to move from a reclining position to an upright position within the recliner due to weak .    
Assumed care of the pt   form marli see Flow sheet for full assessment  
B/P 89/54 (65). 500cc NS bolus hung. Urine output 800 cc for the shift.   
B/P responded well to 500cc NS bolus. Urine clearer this am. Report and care of pt to Marisabel NUNES.  
Bedside report and Pt care transferred to Toña   RN. Pt denies any assistance at this time.   
Bedside report given and pt care transferred to TAYLOR RN. .    
CM-SR, VSS, pt remains afebrile, Pt is voiding w/out difficulty. G-tube hooked to gravity drainage bag. Tube feedings infusing in new J tube. Pt is alert & oriented. He is resting w/out evidence of distress @ this time.  
Call to Dr. Castaneda to make him aware of CXR results.   
Comprehensive Nutrition Assessment    Type and Reason for Visit:  Reassess    Nutrition Recommendations/Plan:   Continue TF regimen via J-tube - ADULT TUBE FEEDING; J-tube; Standard with Fiber formula - Jevity 1.5 with a goal rate of 60 ml/hr x 20 hours + 30 ml water flushes every 3 hours + one proteinex P2Go once daily.   Monitor TF rate, intake, and tolerance + water flushes + administration of one proteinex P2Go once daily.   Monitor GI status and bowel function (last documented BM was on 6/28/24).  Monitor nutrition-related labs and weight trends.      Malnutrition Assessment:  Malnutrition Status:  Moderate malnutrition (07/05/24 1042)    Context:  Chronic Illness     Findings of the 6 clinical characteristics of malnutrition:  Energy Intake:  Mild decrease in energy intake   Weight Loss:  No significant weight loss     Body Fat Loss:  Mild body fat loss Buccal region   Muscle Mass Loss:  Mild muscle mass loss Temples (temporalis), Clavicles (pectoralis & deltoids)  Fluid Accumulation:  No significant fluid accumulation     Strength:  Not Performed    Nutrition Assessment:    patient is improving from a nutritional standpoint AEB j-tube was replaced on 7/3/24 and patient is receiving TF at 40 ml/hr + working to increase to goal rate today, however, he remains at risk for further compromise d/t need for EN as sole source of nutrition, altered nutrition-related labs, and last documented BM was on 6/28/24; will continue Jevity 1.5 with a goal rate of 60 ml/hr x 20 hours + 30 ml water flushes every 3 hours + one proteinex P2Go once daily    Nutrition Related Findings:    patient is A & O x 4; TF is currently infusing at 40 ml/hr x 20 hours and RN is working to increase to goal rate today; + j-tube replacement on 7/3/24; last documented BM was on 6/28/24; g-tube hooked to gravity drainage Wound Type: Pressure Injury, Stage I (stage 1 wound documented in flow sheets - area of the body not documented)   
Consult called to [facility]. Dr. Lees is on call today.  
D10 infused per protocol for glucose <70. Glucose rechecked post infusion. Glucose- 123 post recheck.    Will start tube feedings per surgery.  
Dr. Em (urology)Called and notified pt could not void after the 6hr voiding trial. Ordered to hold off on placing a bentley at this time and watch over night. He was going to change around some of the pts medications and give the pt some more time to void on own. Ordered to transfer the pt to 2W. 2W nurse notified.   
G-tube placed to gravity drainage bag.  
General Surgery  Daily Progress Note    Pt Name: Héctor Valverde  Medical Record Number: [id number]  Date of Birth 1945   Today's Date: 7/5/2024  Admit date: 6/29/2024  LOS: Day 6    SUBJECTIVE  No complaints. TF remain at trophic rate currently.       OBJECTIVE  Vitals:    07/05/24 0002 07/05/24 0200 07/05/24 0400 07/05/24 0600   BP:  119/76 107/70 120/81   Pulse: 100 (!) 117 (!) 101 (!) 103   Resp: 25 21 21 20   Temp:   98 °F (36.7 °C)    TempSrc:   Oral    SpO2: 97% 92% 92% 95%   Weight:   72.3 kg (159 lb 4.8 oz)    Height:   1.88 m (6' 2\")        Gen: No distress. Alert.   Resp: Normal rate. Easy and unlabored. No accessory muscle use.   CV: Regular rate. Regular rhythm.   GI: +Mild incisional TTP. Non-distended.  Normal bowel sounds. Surgical site CDI. Jtube securement device replaced.  Skin: Warm and dry. No nodule or rash on exposed extremities.       I/O last 3 completed shifts:  In: 2913.8 [I.V.:2065.7; NG/GT:353; IV Piggyback:495.1]  Out: 3800 [Urine:3500; Emesis/NG output:300]  No intake/output data recorded.    LABS  CBC:   Recent Labs     07/03/24  0450 07/05/24  0406   WBC 5.0 7.6   HGB 10.9* 11.6*   HCT 33.2* 35.0*   MCV 76.3* 75.3*    234     BMP:   Recent Labs     07/03/24  0450 07/05/24  0406   * 131*   K 3.2* 3.5   CL 99 95*   CO2 21 23   PHOS 2.5 2.5   BUN 7 6*   CREATININE <0.5* <0.5*     LIVER PROFILE: No results for input(s): \"AST\", \"ALT\", \"LIPASE\", \"AMYLASE\", \"BILIDIR\", \"BILITOT\", \"ALKPHOS\" in the last 72 hours.    Invalid input(s): \"ALB\"  PT/INR: No results for input(s): \"PROTIME\", \"INR\" in the last 72 hours.  APTT: No results for input(s): \"APTT\" in the last 72 hours.  UA:No results for input(s): \"NITRITE\", \"COLORU\", \"PHUR\", \"LABCAST\", \"WBCUA\", \"RBCUA\", \"MUCUS\", \"TRICHOMONAS\", \"YEAST\", \"BACTERIA\", \"CLARITYU\", \"SPECGRAV\", \"LEUKOCYTESUR\", \"UROBILINOGEN\", \"BILIRUBINUR\", \"BLOODU\", \"GLUCOSEU\", \"AMORPHOUS\" in the last 72 hours.    Invalid input(s): \"KETONESU\"      IMAGING  CT 
General Surgery consult called to office, Electronically signed by Kale Rose on 7/2/2024 at 4:08 PM  
Harrington placed per orders pt tolerated well. Rose Urine obtained with 850 out .   
IV site to left forearm leaking under dressing. New IV began to right arm, tolerated well.  
In pt's chart to print discharge instructions from previous visit per request from pt's wife and Codey Krishnamurthy Manager.   
Inpatient Occupational Therapy Evaluation and Treatment    Unit: ICU  Date:  2024  Patient Name:    Héctor Valverde  Admitting diagnosis:  Acute respiratory failure with hypoxia ([facility]) [J96.01]  Septic shock ([facility]) [A41.9, R65.21]  Aspiration pneumonia of left lower lobe, unspecified aspiration pneumonia type ([facility]) [J69.0]  Admit Date:  2024  Precautions/Restrictions/WB Status/ Lines/ Wounds/ Oxygen: Fall risk, Bed/chair alarm, Lines (IV, internal catheter, PEG tube, and R femoral CVC), Telemetry, Continuous pulse oximetry, and Isolation Precautions: Contact    *RN cleared patient for out of bed activity    Pt seen for cotreatment this date due to acute illness/injury    Treatment Time:  10:50-11:31  Treatment Number:  1  Timed Code Treatment Minutes: 31 minutes  Total Treatment Minutes:  41  minutes    Patient Goals for Therapy: \"to go home \"          Discharge Recommendations: Home with 24/7 assist  and Home OT  DME needs for discharge: Needs Met       Therapy recommendations for staff:   Assist of 1 for transfers with use of rolling walker (RW) and gait belt to/from chair    History of Present Illness: 79-year-old male with a history of throat cancer, recurrent aspiration pneumonia presented to the emergency room with severe coughing.  Associated with choking.  Has a cough with yellow-green sputum.  No hemoptysis.  Had a temp up to 103 in the ER.  Was hypotensive with a blood pressure in the 60s     2 days ago he had a percutaneous exchange of the GJ tube at [facility].  Has been mild leakage of the gastric fluid.     Admitted for septic shock to the ICU       AM-PAC Score: AM-PAC Inpatient Daily Activity Raw Score: 16     Subjective:  Patient lying reclined in bed with no family present.   Pt agreeable to this OT session.     Cognition:    A&O x4   Able to follow 2 step commands    Pain:   Yes  Location: head  Ratin /10  Pain Medicine Status: Pain med requested    Activity Tolerance:   Pt completed therapy 
K= 3.2 this AM, NPO. 2 doses of 10mEq Kcl in 100mL NS per MD  
OR consent signed. Patient has remained NPO since admission.   
Patient follows with Dr. Osman with [facility], please consult gi team.  
Patient returned from OR, Alert and oriented x4, reports sleepiness from anesthesia. Second bag of potassium hung per order.  
Patient transported to Same Day Surgery, Telemetry given to DES Jensen. First of two bags of potassium infusing. Flagyl dose complete. Patient transported with stable vitals, alert and oriented.   
Patient's MAP 55, restarted levophed at this time.    LEVOPHED infusing at a rate of 2 mcg/min.     
Perfect serve to tadeo    7/7/24 12:44 PM   [phone] Hospital or Facility: [facility] From: Eunice Hernandez RE: JUNAID SPARKS 1945 RM: [id number] abd xray results: IMPRESSION: 1. Moderate volume of stool in the colon. No evidence of small-bowel obstruction. Should I go ahead and order senna 2 pills ONCE? Need Callback: NO CALLBACK REQ 2 [facility] ESTABLISHED PATIENTS FYI  Read 12:44 PM   7/7/24 12:44 PM  Yes    
Pre-Operative:  1.  Patient/Caregiver identifies - states name and date of birth.  2.  The patient is free from signs and symptoms of injury.  3.  The patient receives appropriate medication(s), safely administered during the Perioperative period.  4.  The patients's fluid, electrolyte, and acid-base balances are established preoperatively.  5.  The patient's pulmonary function is established preoperatively.  6.  The patient's cardiovascular status is established preoperatively.  7.  The patient / caregiver demonstrates knowledge of nutritional management related to the operative or other invasive procedure.  8.  The patient/caregiver demonstrates knowledge of medication management.  9.  The patient/caregiver demonstrates knowledge of pain management.  10.  The patient/caregiver participates in decisions affection his or her Perioperative plan of care.  11.  The patient's care is consistent with the individualized Perioperative plan of care.  12.  The patient's right to privacy is maintained.  13.  The patient is the recipient of competent and ethical care within legal standards of practice.  14.  The patient's value system, lifestyle, ethnicity, and culture are considered, respected, and incorporated in the Perioperative plan of care and understands special services available.  15.  The patient demonstrates and/or reports adequate pain control throughout the the Perioperative period.  16.  The patient's neurological status is established preoperatively.  17.  The patient/caregiver demonstrates knowledge of the expected responses to the endoscopy procedure.  18.  Patient/Caregiver has reduced anxiety.  Interventions- Familiarize with environment and equipment.  19. Patient/Caregiver verbalizes understanding of Phase II and/or Phase I process.  20.  Patient pain level is established preoperatively using age appropriate pain scale.  21.  The patient will move to fall risk upon sedation- during and through the recovery 
Pt C/O ABD pain and unable to void bladder scan showed 970 . perfect serve sent to DR Marcano   
Pt assist up to recliner with assist x 1.  Pt was on room air and sp02 maintained >90% at all times.    
Pt assisted back into bed and got into a coughing fit. Sats dropped and had difficulty maintaining > 90%.   Sat got as low as 79%.  Oral temp also noted to be 99.8 now.  Message sent to Dr. Xavier who ordered to d/c pts discharge order.  Wife at bedside and made aware.   
Pt bathed then stood at bedside and marched in place X 20 steps X2. Pt returned to bed and settled for the night. Pt able to void 200cc once in bed.   
Pt c/o rt heel discomfort. Heel is red but blanches. Meplex replaced and heels off loaded on pillows.  
Pt dropping O2 sat to 67% with periods of sleep apnea. O2 applied at 1 l/min.   
Pt has had no further episodes of desaturation after 1 liter O2 applied via n/c.   
Pt has not had a BM since 6/28 and is not passing gas, Dr. Xavier notified.    See new orders.  
Pt sleeping Levophed D/Fadi. O2 sat 88-89% on room air while sleping. O2 1 liter/min started per N/C.   
Pt tolerated bath and hair wash well. Tolerating Levophed being titrated down. Pt is amintaining a B/P of 136/72.  
Reassessment completed (see Flowsheet )  Pt resting in bed eyes closed resp 19 sp02 100% on 2 L .infusing levo 3 mcg/min NS at 75 ml. All ICU lines and monitoring  in place .  
Reassessment completed at this time.  No major changes.     
Reassessment done patient is on oxygen via HFNC at 2l/min,has a PEG but remains to be NPO,is o NS at 75mls/hr and is able to void via rinal.Vitals stable.  
Reassessment done,patient is on oxygen via HFNC at 2l/min,reains to be NPO,on NS at 75mls/hr and is able to void via urinal.  No changes in reassessment.  
Reassessment done,patient is on oxygen via NC at 2l/min,has a PEG that is open to gravity drainage,a J-tube that is running continuous tube feeds at 20mls/hr and water flushes 30mls Q 4hrs,is able to void via urinal .  No changes in reassessment.  
Reassessment done,patient is on oxygen via NC at 2l/min,on NS at 75mls/hr,has a PEG tube and remains to be NPO,is able to void via urinal.  No changes in reassessment.  
Report for surgery from Yesi NUNES.   
Report given to nurse at bedside  
Report to Charlene avery transferred to [facility]  room 216  
Shift assessment complete.    Patient seen awake in bed. Patient is alert and oriented x 4. Patient seen on 2 lpm per NC. Patient saturations 99%, removed to room air at this time. Patient with no c/o pain. Patient with no s/s of distress noted.    Physical assessment as charted in flow sheets.    Central line dressing is clean, dry and intact with no signs of drainage. All tubing is current and dated. IPA caps applied to all access hubs.   Peripheral IV C/D/I and functioning properly.    Harrington intact and secure, dark nicholas urine draining.    G-J tube intact and secure, redness to surrounding skin. Clamped at this time.    Patient assisted with repositioning. Patient assisted with mouth swab. Patient educated on use of call light and to call out with needs, verbalized understanding. Call light within reach.   
Shift assessment complete.    Patient seen awake in bed. Patient is alert and oriented x 4.Patient seen on room air. Patient denies pain. Patient with no s/s of distress noted.    Physical assessment as charted in flow sheets.    Scheduled medications given per orders.    IVF infusing at 75 mL/hr.    Peripheral IV C/D/I and functioning properly.  Central line dressing is clean, dry and intact with no signs of drainage. All tubing is current and dated. IPA caps applied to all access hubs.     Patient assisted with mouth care. Patient educated on use of call light and to call out with needs, verbalized understanding. Call light and personal belongings within reach.   
Shift assessment complete. See flow sheet.   Patients head-toe complete, VS are logged, and hypoactive bowel sound noted in all four quadrants.    Awaiting plan from GI and Surgery.     No further needs  noted at this time. Call light and bedside table are within reach. The bed is locked and is in the lowest position.  
Shift assessment done,patient is alert and oriented X 4.  He is on oxygen via HFNC at 2l/min,has diminished breath sounds bilaterally.  He has a PEG that is currently clamped,and no drainage noted and is NPO.  He has generalised weaness.  He s on NS at 75mls/hr.  All ICU lines and connections checked.  He is able to void via urinal.  Bed is at its lowest level,call light within reach,will continue to monitor patient.    
Shift assessment done,patient is awake,alert and well oriented X 4.  He is on room air,has diminished breath sounds bilaterally.  He has a PEG that is clamped and remains to  be NPO.  He is able to void via urinal.  He is on NS at 75mls/hr.  All ICU lines and connections checked.  Bed is at its lowest level,call light within reach,will continue to monitor patient.    
Shift assessment done,patient is calm,alert and oriented X 4.  He is on oxygen via NC at 2l/min,has diminished breath sounds bilaterally.   He has a PEG that is open to gravity draining greenish output,and has a J-tube that is on continuous tube feeds,Jevity 1.5 at 20mls/hr and water flushes 30mls Q 4hrs.  Meds given as per MAR.  Bed is at its lowest level,call light within reach,will continue to monitor patient.  
This RN checked CVP r/t restarted levophed. CVP is 7. Started 500 mL NS bolus.    Dr. ALCANTAR updated via perfect serve instructed to d/c patient transfer order.  
Vanco day 2  Trough 7.4 drawn/ run from blood collected at 0300 this AM have posted..  Continue same Rx no changes.  Will recheck on 7/2 at 1300.    Gutierrez Rivera RPH PharmD 6/30/2024 1:41 PM  
PERRL. No sclera icterus. No conjunctiva injected.  ENT: No discharge. Pharynx clear.  Neck: Trachea midline. Normal thyroid.  Resp: No accessory muscle use. No crackles.  no wheezing. No rhonchi. No dullness on percussion.   CV: Regular rate. Regular rhythm. No mumur or rub. No edema. No JVD. Palpable pedal pulses.   GI: Non-tender. Non-distended. No masses. No organmegaly. Normal bowel sounds. No hernia.  G-tube  Skin: Warm and dry. No nodule on exposed extremities. No rash on exposed extremities.  Lymph: No cervical LAD. No supraclavicular LAD.   M/S: No cyanosis. No joint deformity. No clubbing.   Neuro: Awake. Follows commands. Positive pupils/gag/corneals. Normal pain response.  Psych: Oriented to person, place, time. No anxiety or agitation.     Medications:  Scheduled Meds:   metroNIDAZOLE  500 mg IntraVENous Q8H    mupirocin   Each Nostril BID    levoFLOXacin  750 mg PEG Tube Daily    levothyroxine  137 mcg Oral Daily    pantoprazole  40 mg Oral QAM AC    sodium chloride flush  5-40 mL IntraVENous 2 times per day    enoxaparin  40 mg SubCUTAneous Daily       PRN Meds:  glucose, dextrose bolus **OR** dextrose bolus, glucagon (rDNA), dextrose, sodium chloride, sodium chloride flush, sodium chloride, potassium chloride **OR** potassium alternative oral replacement **OR** potassium chloride, magnesium sulfate, ondansetron **OR** ondansetron, polyethylene glycol, acetaminophen **OR** acetaminophen    Results:  CBC:   Recent Labs     07/01/24  0615 07/02/24 0423 07/03/24  0450   WBC 6.2 5.6 5.0   HGB 9.3* 10.1* 10.9*   HCT 29.3* 30.3* 33.2*   MCV 78.5* 76.3* 76.3*    152 161       BMP:   Recent Labs     07/01/24 0615 07/02/24 0423 07/03/24  0450   * 135* 135*   K 3.3*  3.3* 3.1* 3.2*    100 99   CO2 21 23 21   PHOS 2.5 2.0* 2.5   BUN 14 10 7   CREATININE <0.5* <0.5* <0.5*       LIVER PROFILE:   No results for input(s): \"AST\", \"ALT\", \"LIPASE\", \"AMYLASE\", \"BILIDIR\", \"BILITOT\", \"ALKPHOS\" in 
difficulty, altered GI function as evidenced by NPO or clear liquid status due to medical condition, nutrition support - enteral nutrition, lab values, GI abnormality, wounds, mild muscle loss, mild loss of subcutaneous fat    Nutrition Interventions:   Food and/or Nutrient Delivery: Continue NPO, Start Tube Feeding  Nutrition Education/Counseling: No recommendation at this time  Coordination of Nutrition Care: Continue to monitor while inpatient, Coordination of Care, Interdisciplinary Rounds  Plan of Care discussed with: ICU Team    Goals:     Goals: Initiate nutrition support       Nutrition Monitoring and Evaluation:   Behavioral-Environmental Outcomes: None Identified  Food/Nutrient Intake Outcomes: Enteral Nutrition Intake/Tolerance, IVF Intake  Physical Signs/Symptoms Outcomes: Biochemical Data, GI Status, Nutrition Focused Physical Findings, Skin, Weight    Discharge Planning:    Enteral Nutrition     Aundrea Cartagena RD, LD  Contact: [phone]    
monitoring spO2 @ on RA    Static Standing:  CGA  Dynamic Standing:  CGA  Comments: to RW    Posture  Seated: Forward head and neck  Standing: Forward head and neck    Bed Mobility   Supine to Sit:    CGA  Sit to Supine:   Not Tested  Rolling:   Not Tested   Scooting in sitting: SBA   Scooting in supine:  Not Tested    Bridging:  Not Tested    Transfer Training     Sit to stand:   CGA at   Stand to sit:   CGA at   Bed to/from Chair:  CGA with use of gait belt and rolling walker (RW)    Gait gait completed as indicated below  Distance:      5 ft for stand pivot transfer  Deviations (firm surface/linoleum):  decreased alvaro, narrow OC, and decreased step length bilaterally  Assistive Device Used:    gait belt and rolling walker (RW)  Level of Assist:    CGA   Comment:     Stair Training deferred, pt unsafe/ not appropriate to complete stairs at this time  # of Steps:   N/A  Level of Assist:  Not Tested   UE Support:  NA  Assistive Device:  N/A  Pattern:   N/A  Comments:      Therapeutic Exercises Initiated  all completed bilaterally unless indicated  Supine:  N/A    Seated:  N/A    Standing:  N/A  Marching: 10 reps       Positioning Needs   Pt in bed, ICU monitoring, positioned in proper neutral alignment and pressure relief provided.   Call light provided and all needs within reach  RN aware of pt position/status    Other Activities  None.    Patient/Family Education   Pt educated on role of inpatient PT, POC, importance of continued activity, DC recommendations, transfer techniques, pursed lip breathing, pacing activity, and calling for assist with mobility.      Assessment  Pt seen today for physical therapy Treatment. Pt with asymptomatic orthostatic hypotension with position changes that were brief and BP was able to adjust.  SpO2 on RA was stable and remained 90% or above during today's session.  Pt tolerating therapy well and progressing towards his goals.    Recommending Home 24 hr assist and with home 
mother; Diabetes in an other family member; Heart Disease in his father; High Blood Pressure in his mother; Stroke in his maternal grandmother.     SOCIAL HISTORY:   reports that he quit smoking about 53 years ago. His smoking use included cigarettes. He started smoking about 93 years ago. He has never used smokeless tobacco.     Scheduled Meds:  Scheduled Medications    sodium chloride flush  5-40 mL IntraVENous 2 times per day    enoxaparin  40 mg SubCUTAneous Daily    cefepime  2,000 mg IntraVENous Q8H    vancomycin  1,000 mg IntraVENous Q12H         Continuous Infusions:  Infusions Meds    norepinephrine 2 mcg/min (06/30/24 0800)    sodium chloride 75 mL/hr at 06/30/24 0623    sodium chloride           PRN Meds:  PRN Medications   sodium chloride flush, sodium chloride, potassium chloride **OR** potassium alternative oral replacement **OR** potassium chloride, magnesium sulfate, ondansetron **OR** ondansetron, polyethylene glycol, acetaminophen **OR** acetaminophen        ALLERGIES:  Patient is allergic to tamsulosin hcl, amoxicillin-pot clavulanate, and tetanus toxoids.     REVIEW OF SYSTEMS:  Constitutional: Negative for fever  HENT: Negative for sore throat  Eyes: Negative for redness   Respiratory: +  dyspnea, cough  Cardiovascular: Negative for chest pain  Gastrointestinal: Negative for vomiting, diarrhea   Genitourinary: Negative for hematuria   Musculoskeletal: Negative for arthralgias   Skin: Negative for rash  Neurological: Negative for syncope  Hematological: Negative for adenopathy  Psychiatric/Behavorial: Negative for anxiety     PHYSICAL EXAM:  Blood pressure (!) 96/58, pulse 83, temperature 97.3 °F (36.3 °C), temperature source Temporal, resp. rate 19, height 1.88 m (6' 2\"), weight 67.9 kg (149 lb 11.2 oz), SpO2 100 %.' on RA  Gen: No distress.   Eyes: PERRL. No sclera icterus. No conjunctival injection.   ENT: No discharge. Pharynx clear.   Neck: Trachea midline. No obvious mass.    Resp: No 
discharge as patient functioning below baseline level    Goals :   To be met in 3 visits:  1). Independent with LE Ex x 10 reps  2). Sit to/from stand: SBA  3). Bed to chair: SBA  4). CHF goal: N/A    To be met in 6 visits:  1).  Supine to/from sit: Independent  2).  Sit to/from stand: Supervision  3).  Bed to chair: Supervision  4).  Gait: Ambulate 50 ft.  with Supervision and use of gait belt and rolling walker (RW)  5).  Tolerate B LE exercises 3 sets of 10-15 reps  6).  Ascend/descend 2 steps with SBA with use of hand rail bilateral and gait belt    Rehabilitation Potential: Good  Strengths for achieving goals include:   Family Support and Pt cooperative   Barriers to achieving goals include:    Complexity of condition, Pain, and Weakness    Plan    To be seen 2-3 x/ week while in acute care setting for therapeutic exercises, bed mobility, transfers, progressive gait training, balance training, and family/patient education.    Signature: Kalli Parrish PT     If patient discharges from this facility prior to next visit, this note will serve as the Discharge Summary.    CHF Education  N/A    
gentamicin <=2 mcg/mL Sensitive      levofloxacin 1 mcg/mL Sensitive      meropenem <=1 mcg/mL Sensitive      piperacillin-tazobactam <=8 mcg/mL Sensitive      tobramycin <=2 mcg/mL Sensitive                           Clostridium Difficile Toxin/Antigen [[id number]]     Order Status: No result Specimen: Stool     COVID-19 & Influenza Combo [[id number]] Collected: 06/29/24 1331    Order Status: Completed Specimen: Nasopharyngeal Swab Updated: 06/29/24 1358     SARS-CoV-2 RNA, RT PCR NOT DETECTED     Comment: Not Detected results do not preclude SARS-CoV-2 infection and  should not be used as the sole basis for patient management  decisions.  Results must be combined with clinical observations,  patient history, and epidemiological information.  Testing was performed using LEOBARDO JAMAL SARS-CoV-2 and Influenza A/B  nucleic acid assay. This test is a multiplex Real-Time Reverse  Transcriptase Polymerase Chain Reaction (RT-PCR)-based in vitro  diagnostic test intended for the qualitative detection of nucleic  acids from SARS-CoV-2, influenza A, and influenza B in nasopharyngeal  and nasal swab specimens for use under the FDA’s Emergency Use  Authorization (EUA) only.    Patient Fact Sheet:  https://www.fda.gov/media/618911/download  Provider Fact Sheet: https://www.fda.gov/media/757163/download  EUA: https://www.fda.gov/media/939965/download  IFU: https://www.fda.gov/media/838053/download    Methodology:  RT-PCR          Influenza A NOT DETECTED     Influenza B NOT DETECTED    Strep Pneumoniae Antigen [[id number]] Collected: 06/29/24 1330    Order Status: Completed Specimen: Urine, clean catch Updated: 07/01/24 0814     STREP PNEUMONIAE ANTIGEN, URINE --     Presumptive Negative  Presumptive negative suggests no current or recent  pneumococcal infection. Infection due to Strep pneumoniae  cannot be ruled out since the antigen present in the sample  may be below the detection limit of the test.  Normal Range:Presumptive 
:  06/29/24 23:25    Legionella antigen, urine [[id number]] Collected: 06/29/24 1330    Order Status: Completed Specimen: Urine, clean catch Updated: 07/01/24 0814     L. pneumophila Serogp 1 Ur Ag --     Presumptive Negative  No Legionella pneumophila serogroup 1 antigens detected.  A negative result does not exclude infection with  Legionella pneumophila serogroup 1 nor does it rule out  other microbial-caused respiratory infections or  disease caused by other serogroups of  Legionella pneumophila.  Normal Range: Presumptive Negative      Narrative:      ORDER#: [id number]                          ORDERED BY: TAYLOR RODRIGUEZ  SOURCE: Urine Clean Catch                  COLLECTED:  06/29/24 13:30  ANTIBIOTICS AT KAI.:                      RECEIVED :  06/29/24 23:25    Culture, Blood 2 [[id number]] Collected: 06/29/24 1241    Order Status: Completed Specimen: Blood Updated: 07/03/24 1315     Culture, Blood 2 No Growth after 4 days of incubation.    Narrative:      ORDER#: [id number]                          ORDERED BY: PACO CASTRO  SOURCE: Blood left ac                      COLLECTED:  06/29/24 12:41  ANTIBIOTICS AT KAI.:                      RECEIVED :  06/29/24 12:54  If child <=2 yrs old please draw pediatric bottle.~Blood Culture #2    Culture, Blood 1 [[id number]] Collected: 06/29/24 1241    Order Status: Completed Specimen: Blood Updated: 07/03/24 1315     Blood Culture, Routine No Growth after 4 days of incubation.    Narrative:      ORDER#: [id number]                          ORDERED BY: PACO CASTRO  SOURCE: Blood No site given                COLLECTED:  06/29/24 12:41  ANTIBIOTICS AT KAI.:                      RECEIVED :  06/29/24 12:54  If child <=2 yrs old please draw pediatric bottle.~Blood Culture 1             CT CHEST ABDOMEN PELVIS W CONTRAST Additional Contrast? None   Final Result   1. Infectious/inflammatory bronchiolitis in the lingula and left lower lobe.   2. No acute process in the abdomen 
pulmonary aspiration with swallowing    Multifocal pneumonia  Resolved Problems:    * No resolved hospital problems. *      Plan:    Septic shock  2 to pneumonia  Blood, urine and sputum cultures  IV fluid resuscitation  Continue antibiotics  IV Levophed--> now off pressors     Multifocal pneumonia-favor aspiration  Started on vancomycin and cefepime.  Pulmonologist changed to Levaquin.  He is growing Pseudomonas.  Sensitivities pending.  Sputum cultures  Inhaled bronchodilators  Patient is chronically NPO.  No need for swallowing eval.     History of head and neck cancer status post chemo and XRT     History of DVT-finished anticoagulation     GI consulted for leakage around the G-tube.  He has repeated aspiration suggesting a pulse of active acid reflux and regurgitation.  The tube has been tightened and dressing applied  Esophagogastroduodenoscopy when clinically stable     Full code  G-tube feeds  Lovenox for DVT prophylaxis      Note above makes him higher risk for morbidity and mortality requiring testing and treatment.     We can transfer to [facility].    All questions and concerns were addressed to the patient/family. Alternatives to my treatment were discussed. The note was completed using EMR. Every effort was made to ensure accuracy; however, inadvertent computerized transcription errors may be present.         BC YBARRA MD 1:31 PM 7/1/2024

## 2024-07-10 ENCOUNTER — OFFICE VISIT (OUTPATIENT)
Dept: SURGERY | Age: 79
End: 2024-07-10

## 2024-07-10 VITALS — BODY MASS INDEX: 20.45 KG/M2 | SYSTOLIC BLOOD PRESSURE: 100 MMHG | HEIGHT: 74 IN | DIASTOLIC BLOOD PRESSURE: 70 MMHG

## 2024-07-10 DIAGNOSIS — Z98.890 POST-OPERATIVE STATE: Primary | ICD-10-CM

## 2024-07-10 DIAGNOSIS — Z93.4 S/P JEJUNOSTOMY (HCC): ICD-10-CM

## 2024-07-10 DIAGNOSIS — T85.598A CLOGGED FEEDING TUBE: ICD-10-CM

## 2024-07-10 PROCEDURE — 99024 POSTOP FOLLOW-UP VISIT: CPT

## 2024-07-10 RX ORDER — LEVOFLOXACIN 750 MG/1
750 TABLET, FILM COATED ORAL DAILY
COMMUNITY
Start: 2024-07-05

## 2024-07-10 NOTE — PROGRESS NOTES
[facility]      S:   Patient presents s/p open Jtube placement 7/3/24 with Dr. Lopez.   He reports his tube is clogged.    O:   Comfortable   Incision CDI   WALT securement device in place         Unable to flush WALT     Instilled ClogZapper and let sit for 15 min. Afterwards, Jtube flushed well. 60 cc of water flushed easily.      A:   S/P Jtube placement, now with clog.    P:   Jtube unclogged in office. Follow up with Dr. Lopze as needed.

## 2024-07-21 ENCOUNTER — APPOINTMENT (OUTPATIENT)
Dept: CT IMAGING | Age: 79
End: 2024-07-21
Payer: MEDICARE

## 2024-07-21 ENCOUNTER — HOSPITAL ENCOUNTER (INPATIENT)
Age: 79
LOS: 3 days | Discharge: HOME | End: 2024-07-24
Attending: STUDENT IN AN ORGANIZED HEALTH CARE EDUCATION/TRAINING PROGRAM | Admitting: INTERNAL MEDICINE
Payer: MEDICARE

## 2024-07-21 ENCOUNTER — APPOINTMENT (OUTPATIENT)
Dept: GENERAL RADIOLOGY | Age: 79
End: 2024-07-21
Payer: MEDICARE

## 2024-07-21 DIAGNOSIS — J96.01 ACUTE RESPIRATORY FAILURE WITH HYPOXIA AND HYPERCAPNIA (HCC): Primary | ICD-10-CM

## 2024-07-21 DIAGNOSIS — L89.151 PRESSURE INJURY OF SACRAL REGION, STAGE 1: ICD-10-CM

## 2024-07-21 DIAGNOSIS — J96.02 ACUTE RESPIRATORY FAILURE WITH HYPOXIA AND HYPERCAPNIA (HCC): Primary | ICD-10-CM

## 2024-07-21 PROBLEM — R53.1 WEAKNESS: Status: ACTIVE | Noted: 2024-07-21

## 2024-07-21 LAB
ALBUMIN SERPL-MCNC: 4.2 G/DL (ref 3.4–5)
ALP SERPL-CCNC: 152 U/L (ref 40–129)
ALT SERPL-CCNC: 66 U/L (ref 10–40)
ANION GAP SERPL CALCULATED.3IONS-SCNC: 11 MMOL/L (ref 3–16)
AST SERPL-CCNC: 51 U/L (ref 15–37)
BASE EXCESS BLDV CALC-SCNC: 9.5 MMOL/L (ref -3–3)
BASOPHILS # BLD: 0 K/UL (ref 0–0.2)
BASOPHILS NFR BLD: 0.4 %
BILIRUB DIRECT SERPL-MCNC: 0.3 MG/DL (ref 0–0.3)
BILIRUB INDIRECT SERPL-MCNC: 0.4 MG/DL (ref 0–1)
BILIRUB SERPL-MCNC: 0.7 MG/DL (ref 0–1)
BILIRUB UR QL STRIP.AUTO: NEGATIVE
BUN SERPL-MCNC: 32 MG/DL (ref 7–20)
CALCIUM SERPL-MCNC: 9.9 MG/DL (ref 8.3–10.6)
CHLORIDE SERPL-SCNC: 88 MMOL/L (ref 99–110)
CLARITY UR: ABNORMAL
CO2 BLDV-SCNC: 38 MMOL/L
CO2 SERPL-SCNC: 40 MMOL/L (ref 21–32)
COHGB MFR BLDV: 1 % (ref 0–1.5)
COLOR UR: YELLOW
CREAT SERPL-MCNC: 0.6 MG/DL (ref 0.8–1.3)
DEPRECATED RDW RBC AUTO: 16.5 % (ref 12.4–15.4)
EOSINOPHIL # BLD: 0.1 K/UL (ref 0–0.6)
EOSINOPHIL NFR BLD: 0.6 %
EPI CELLS #/AREA URNS HPF: ABNORMAL /HPF (ref 0–5)
GFR SERPLBLD CREATININE-BSD FMLA CKD-EPI: >90 ML/MIN/{1.73_M2}
GLUCOSE SERPL-MCNC: 100 MG/DL (ref 70–99)
GLUCOSE UR STRIP.AUTO-MCNC: NEGATIVE MG/DL
HCO3 BLDV-SCNC: 36 MMOL/L (ref 23–29)
HCT VFR BLD AUTO: 43.5 % (ref 40.5–52.5)
HGB BLD-MCNC: 14 G/DL (ref 13.5–17.5)
HGB UR QL STRIP.AUTO: ABNORMAL
KETONES UR STRIP.AUTO-MCNC: NEGATIVE MG/DL
LACTATE BLDV-SCNC: 2 MMOL/L (ref 0.4–2)
LEUKOCYTE ESTERASE UR QL STRIP.AUTO: NEGATIVE
LIPASE SERPL-CCNC: 21 U/L (ref 13–60)
LYMPHOCYTES # BLD: 0.7 K/UL (ref 1–5.1)
LYMPHOCYTES NFR BLD: 7.9 %
MCH RBC QN AUTO: 24.8 PG (ref 26–34)
MCHC RBC AUTO-ENTMCNC: 32.1 G/DL (ref 31–36)
MCV RBC AUTO: 77.4 FL (ref 80–100)
METHGB MFR BLDV: 0 %
MONOCYTES # BLD: 0.6 K/UL (ref 0–1.3)
MONOCYTES NFR BLD: 6.8 %
NEUTROPHILS # BLD: 7.5 K/UL (ref 1.7–7.7)
NEUTROPHILS NFR BLD: 84.3 %
NITRITE UR QL STRIP.AUTO: NEGATIVE
O2 CT VFR BLDV CALC: 12 VOL %
O2 THERAPY: ABNORMAL
PCO2 BLDV: 57.8 MMHG (ref 40–50)
PH BLDV: 7.41 [PH] (ref 7.35–7.45)
PH UR STRIP.AUTO: 8.5 [PH] (ref 5–8)
PLATELET # BLD AUTO: 316 K/UL (ref 135–450)
PMV BLD AUTO: 8.5 FL (ref 5–10.5)
PO2 BLDV: 37.5 MMHG (ref 25–40)
POTASSIUM SERPL-SCNC: 3.6 MMOL/L (ref 3.5–5.1)
PROT SERPL-MCNC: 9.2 G/DL (ref 6.4–8.2)
PROT UR STRIP.AUTO-MCNC: 30 MG/DL
RBC # BLD AUTO: 5.62 M/UL (ref 4.2–5.9)
RBC #/AREA URNS HPF: ABNORMAL /HPF (ref 0–4)
SAO2 % BLDV: 71 %
SODIUM SERPL-SCNC: 139 MMOL/L (ref 136–145)
SP GR UR STRIP.AUTO: 1.01 (ref 1–1.03)
TROPONIN, HIGH SENSITIVITY: 20 NG/L (ref 0–22)
UA COMPLETE W REFLEX CULTURE PNL UR: ABNORMAL
UA DIPSTICK W REFLEX MICRO PNL UR: YES
URN SPEC COLLECT METH UR: ABNORMAL
UROBILINOGEN UR STRIP-ACNC: 1 E.U./DL
WBC # BLD AUTO: 8.9 K/UL (ref 4–11)
WBC #/AREA URNS HPF: ABNORMAL /HPF (ref 0–5)

## 2024-07-21 PROCEDURE — 2700000000 HC OXYGEN THERAPY PER DAY

## 2024-07-21 PROCEDURE — 93005 ELECTROCARDIOGRAM TRACING: CPT | Performed by: STUDENT IN AN ORGANIZED HEALTH CARE EDUCATION/TRAINING PROGRAM

## 2024-07-21 PROCEDURE — 80048 BASIC METABOLIC PNL TOTAL CA: CPT

## 2024-07-21 PROCEDURE — 71045 X-RAY EXAM CHEST 1 VIEW: CPT

## 2024-07-21 PROCEDURE — 83605 ASSAY OF LACTIC ACID: CPT

## 2024-07-21 PROCEDURE — 83690 ASSAY OF LIPASE: CPT

## 2024-07-21 PROCEDURE — 71260 CT THORAX DX C+: CPT

## 2024-07-21 PROCEDURE — 36415 COLL VENOUS BLD VENIPUNCTURE: CPT

## 2024-07-21 PROCEDURE — 2060000000 HC ICU INTERMEDIATE R&B

## 2024-07-21 PROCEDURE — 6360000002 HC RX W HCPCS: Performed by: INTERNAL MEDICINE

## 2024-07-21 PROCEDURE — 82803 BLOOD GASES ANY COMBINATION: CPT

## 2024-07-21 PROCEDURE — 96361 HYDRATE IV INFUSION ADD-ON: CPT

## 2024-07-21 PROCEDURE — 85025 COMPLETE CBC W/AUTO DIFF WBC: CPT

## 2024-07-21 PROCEDURE — 81001 URINALYSIS AUTO W/SCOPE: CPT

## 2024-07-21 PROCEDURE — 99285 EMERGENCY DEPT VISIT HI MDM: CPT

## 2024-07-21 PROCEDURE — 96360 HYDRATION IV INFUSION INIT: CPT

## 2024-07-21 PROCEDURE — 2580000003 HC RX 258: Performed by: STUDENT IN AN ORGANIZED HEALTH CARE EDUCATION/TRAINING PROGRAM

## 2024-07-21 PROCEDURE — 2580000003 HC RX 258: Performed by: INTERNAL MEDICINE

## 2024-07-21 PROCEDURE — 2580000003 HC RX 258: Performed by: PHYSICIAN ASSISTANT

## 2024-07-21 PROCEDURE — 94761 N-INVAS EAR/PLS OXIMETRY MLT: CPT

## 2024-07-21 PROCEDURE — 84484 ASSAY OF TROPONIN QUANT: CPT

## 2024-07-21 PROCEDURE — 87040 BLOOD CULTURE FOR BACTERIA: CPT

## 2024-07-21 PROCEDURE — 80076 HEPATIC FUNCTION PANEL: CPT

## 2024-07-21 PROCEDURE — 6360000004 HC RX CONTRAST MEDICATION: Performed by: PHYSICIAN ASSISTANT

## 2024-07-21 RX ORDER — SODIUM CHLORIDE 9 MG/ML
INJECTION, SOLUTION INTRAVENOUS PRN
Status: DISCONTINUED | OUTPATIENT
Start: 2024-07-21 | End: 2024-07-24 | Stop reason: HOSPADM

## 2024-07-21 RX ORDER — SODIUM CHLORIDE, SODIUM LACTATE, POTASSIUM CHLORIDE, CALCIUM CHLORIDE 600; 310; 30; 20 MG/100ML; MG/100ML; MG/100ML; MG/100ML
1000 INJECTION, SOLUTION INTRAVENOUS CONTINUOUS
Status: DISCONTINUED | OUTPATIENT
Start: 2024-07-21 | End: 2024-07-21 | Stop reason: ALTCHOICE

## 2024-07-21 RX ORDER — POTASSIUM CHLORIDE 7.45 MG/ML
10 INJECTION INTRAVENOUS PRN
Status: DISCONTINUED | OUTPATIENT
Start: 2024-07-21 | End: 2024-07-24 | Stop reason: HOSPADM

## 2024-07-21 RX ORDER — ACETAMINOPHEN 650 MG/1
650 SUPPOSITORY RECTAL EVERY 6 HOURS PRN
Status: DISCONTINUED | OUTPATIENT
Start: 2024-07-21 | End: 2024-07-24 | Stop reason: HOSPADM

## 2024-07-21 RX ORDER — ACETAMINOPHEN 325 MG/1
650 TABLET ORAL EVERY 6 HOURS PRN
Status: DISCONTINUED | OUTPATIENT
Start: 2024-07-21 | End: 2024-07-24 | Stop reason: HOSPADM

## 2024-07-21 RX ORDER — MAGNESIUM SULFATE IN WATER 40 MG/ML
2000 INJECTION, SOLUTION INTRAVENOUS PRN
Status: DISCONTINUED | OUTPATIENT
Start: 2024-07-21 | End: 2024-07-24 | Stop reason: HOSPADM

## 2024-07-21 RX ORDER — POLYETHYLENE GLYCOL 3350 17 G/17G
17 POWDER, FOR SOLUTION ORAL DAILY PRN
Status: DISCONTINUED | OUTPATIENT
Start: 2024-07-21 | End: 2024-07-24 | Stop reason: HOSPADM

## 2024-07-21 RX ORDER — ONDANSETRON 2 MG/ML
4 INJECTION INTRAMUSCULAR; INTRAVENOUS EVERY 6 HOURS PRN
Status: DISCONTINUED | OUTPATIENT
Start: 2024-07-21 | End: 2024-07-24 | Stop reason: HOSPADM

## 2024-07-21 RX ORDER — POTASSIUM CHLORIDE 20 MEQ/1
40 TABLET, EXTENDED RELEASE ORAL PRN
Status: DISCONTINUED | OUTPATIENT
Start: 2024-07-21 | End: 2024-07-24 | Stop reason: HOSPADM

## 2024-07-21 RX ORDER — LANSOPRAZOLE 30 MG/1
30 TABLET, ORALLY DISINTEGRATING, DELAYED RELEASE ORAL
Status: DISCONTINUED | OUTPATIENT
Start: 2024-07-22 | End: 2024-07-24 | Stop reason: HOSPADM

## 2024-07-21 RX ORDER — ONDANSETRON 4 MG/1
4 TABLET, ORALLY DISINTEGRATING ORAL EVERY 8 HOURS PRN
Status: DISCONTINUED | OUTPATIENT
Start: 2024-07-21 | End: 2024-07-24 | Stop reason: HOSPADM

## 2024-07-21 RX ORDER — SODIUM CHLORIDE 0.9 % (FLUSH) 0.9 %
5-40 SYRINGE (ML) INJECTION PRN
Status: DISCONTINUED | OUTPATIENT
Start: 2024-07-21 | End: 2024-07-24 | Stop reason: HOSPADM

## 2024-07-21 RX ORDER — SODIUM CHLORIDE 0.9 % (FLUSH) 0.9 %
5-40 SYRINGE (ML) INJECTION EVERY 12 HOURS SCHEDULED
Status: DISCONTINUED | OUTPATIENT
Start: 2024-07-21 | End: 2024-07-24 | Stop reason: HOSPADM

## 2024-07-21 RX ORDER — SODIUM CHLORIDE 9 MG/ML
INJECTION, SOLUTION INTRAVENOUS CONTINUOUS
Status: ACTIVE | OUTPATIENT
Start: 2024-07-21 | End: 2024-07-22

## 2024-07-21 RX ORDER — SIMETHICONE 80 MG
80 TABLET,CHEWABLE ORAL EVERY 6 HOURS PRN
Status: DISCONTINUED | OUTPATIENT
Start: 2024-07-21 | End: 2024-07-24 | Stop reason: HOSPADM

## 2024-07-21 RX ORDER — ENOXAPARIN SODIUM 100 MG/ML
40 INJECTION SUBCUTANEOUS NIGHTLY
Status: DISCONTINUED | OUTPATIENT
Start: 2024-07-21 | End: 2024-07-24 | Stop reason: HOSPADM

## 2024-07-21 RX ORDER — SODIUM CHLORIDE 9 MG/ML
INJECTION, SOLUTION INTRAVENOUS CONTINUOUS
Status: DISCONTINUED | OUTPATIENT
Start: 2024-07-21 | End: 2024-07-21

## 2024-07-21 RX ORDER — PANTOPRAZOLE SODIUM 40 MG/1
40 TABLET, DELAYED RELEASE ORAL
Status: DISCONTINUED | OUTPATIENT
Start: 2024-07-22 | End: 2024-07-21

## 2024-07-21 RX ORDER — 0.9 % SODIUM CHLORIDE 0.9 %
1000 INTRAVENOUS SOLUTION INTRAVENOUS ONCE
Status: COMPLETED | OUTPATIENT
Start: 2024-07-21 | End: 2024-07-21

## 2024-07-21 RX ADMIN — SODIUM CHLORIDE, POTASSIUM CHLORIDE, SODIUM LACTATE AND CALCIUM CHLORIDE 1000 ML: 600; 310; 30; 20 INJECTION, SOLUTION INTRAVENOUS at 14:12

## 2024-07-21 RX ADMIN — Medication 10 ML: at 21:23

## 2024-07-21 RX ADMIN — SODIUM CHLORIDE: 9 INJECTION, SOLUTION INTRAVENOUS at 22:19

## 2024-07-21 RX ADMIN — IOPAMIDOL 75 ML: 755 INJECTION, SOLUTION INTRAVENOUS at 16:18

## 2024-07-21 RX ADMIN — SODIUM CHLORIDE 1000 ML: 9 INJECTION, SOLUTION INTRAVENOUS at 13:17

## 2024-07-21 RX ADMIN — SODIUM CHLORIDE: 9 INJECTION, SOLUTION INTRAVENOUS at 21:23

## 2024-07-21 RX ADMIN — ENOXAPARIN SODIUM 40 MG: 100 INJECTION SUBCUTANEOUS at 21:24

## 2024-07-21 ASSESSMENT — PAIN - FUNCTIONAL ASSESSMENT: PAIN_FUNCTIONAL_ASSESSMENT: NONE - DENIES PAIN

## 2024-07-21 NOTE — ED PROVIDER NOTES
THIS IS MY [facility] SUPERVISORY AND SHARED VISIT NOTE:    I personally saw the patient and made/approved the management plan and take responsibility for the patient management.    Labs Reviewed   HEPATIC FUNCTION PANEL - Abnormal; Notable for the following components:       Result Value    Total Protein 9.2 (*)     Alkaline Phosphatase 152 (*)     ALT 66 (*)     AST 51 (*)     All other components within normal limits   CBC WITH AUTO DIFFERENTIAL - Abnormal; Notable for the following components:    MCV 77.4 (*)     MCH 24.8 (*)     RDW 16.5 (*)     Lymphocytes Absolute 0.7 (*)     All other components within normal limits   BASIC METABOLIC PANEL - Abnormal; Notable for the following components:    Chloride 88 (*)     CO2 40 (*)     Glucose 100 (*)     BUN 32 (*)     Creatinine 0.6 (*)     All other components within normal limits   BLOOD GAS, VENOUS - Abnormal; Notable for the following components:    pCO2, Jan 57.8 (*)     HCO3, Venous 36.0 (*)     Base Excess, Jan 9.5 (*)     All other components within normal limits   CULTURE, BLOOD 1   CULTURE, BLOOD 2   LACTIC ACID   TROPONIN   LIPASE   URINALYSIS WITH REFLEX TO CULTURE     XR CHEST PORTABLE   Final Result   1. No definite acute finding; similar underlying interstitial and hazy   airspace opacities and apical pleural thickening.   2. Probable small pleural effusion.  No pneumothorax appreciated on the   current study.           The Ekg interpreted by me shows  sinus tachycardia, uwtz=[id number]    Axis is   Normal  QTc is  within an acceptable range  Intervals and Durations are unremarkable.      ST Segments: normal  Rate has decreased compared to previous performed 6/29/2024    History: Patient is a 79-year-old male, coming in with concerns for nausea and vomiting, shortness of breath, dehydration.  He states that he has a history of aspiration pneumonia and feels similar.  He denies any chest pain or abdominal pain.  Denies other complaints or concerns.    Exam:  Patient is in no acute distress.  He has rhonchi bilaterally throughout lung fields.  Abdomen soft, nondistended, nontender.    MDM: Patient found to be in hypoxic and hypercapnic respiratory failure.  Chest x-ray remains relatively unchanged compared to previous so CT will be performed for further characterization of his symptoms but ultimately feel that he would benefit from hospitalization for further workup and treatment of his condition. Please refer to [facility] note for remainder of workup/treatment and ultimate disposition.    I, Dr. Aldrich, am the primary clinician of record.     1. Acute respiratory failure with hypoxia and hypercapnia (HCC)      Comment: Please note this report has been produced using speech recognition software and may contain errors related to that system including errors in grammar, punctuation, and spelling, as well as words and phrases that may be inappropriate. If there are any questions or concerns please feel free to contact the dictating provider for clarification.       Latasha Aldrich MD  07/21/24 9026

## 2024-07-21 NOTE — ED NOTES
Resting quietly in bed, wife at bedside. Pt provided with urinal for sample. Will continue to monitor. Marina Miller RN

## 2024-07-21 NOTE — ED PROVIDER NOTES
[facility] ED  EMERGENCY DEPARTMENT ENCOUNTER        Pt Name: Héctor Valverde  MRN: [id number]  Birthdate 1945  Date of evaluation: 7/21/2024  Provider: Aidan Shelton PA-C  PCP: Suraj De La Torre DO  Note Started: 1:39 PM EDT 7/21/24       I have seen and evaluated this patient with my supervising physician Latasha Aldrich MD.      CHIEF COMPLAINT       Chief Complaint   Patient presents with    Dehydration     Pt comes from home with c/o dehydration and weakness. Pt states recent hospital admission and now has no energy.       HISTORY OF PRESENT ILLNESS: 1 or more Elements     History From: Patient    Limitations to history : None    Social Determinants Significantly Affecting Health : None    Chief Complaint: Dehydration and weakness.    Héctor Valverde is a 79 y.o. male who presents to the ED by life squad with concerns for dehydration.  Patient has history of multiple (12) episodes of aspiration pneumonia and now has tube feeding.  He is concerned that he is becoming dehydrated as he has been becoming weaker and weaker.  Today he stumbled/fell against a kitchen sink but did not hurt himself.  He did not fall to the ground, nor did he hit his head.  He is not complaining of neck or back pain.  He denies dizziness or syncope.  He denies fever, rash, nausea, vomiting, chest pain or difficulty breathing, abdominal pain, change in bowels or urine, paresthesias, ataxia, imbalance, focal numbness or weakness, or any additional systemic or neurologic complaints.    Nursing Notes were all reviewed and agreed with or any disagreements were addressed in the HPI.    REVIEW OF SYSTEMS :      Review of Systems    Positives and Pertinent negatives as per HPI.     SURGICAL HISTORY     Past Surgical History:   Procedure Laterality Date    ABDOMEN SURGERY      gallbladder removed    CHOLECYSTECTOMY      COLONOSCOPY      EYE SURGERY Right     TORN RETINA, LASER    HERNIA REPAIR      HIP  40 (*)     Glucose 100 (*)     BUN 32 (*)     Creatinine 0.6 (*)     All other components within normal limits   BLOOD GAS, VENOUS - Abnormal; Notable for the following components:    pCO2, Jan 57.8 (*)     HCO3, Venous 36.0 (*)     Base Excess, Jan 9.5 (*)     All other components within normal limits   MICROSCOPIC URINALYSIS - Abnormal; Notable for the following components:    RBC, UA 5-10 (*)     All other components within normal limits   CULTURE, BLOOD 1   CULTURE, BLOOD 2   LACTIC ACID   TROPONIN   LIPASE       When ordered only abnormal lab results are displayed. All other labs were within normal range or not returned as of this dictation.    EKG: When ordered, EKG's are interpreted by the Emergency Department Physician in the absence of a cardiologist.  Please see their note for interpretation of EKG.    RADIOLOGY:   Non-plain film images such as CT, Ultrasound and MRI are read by the radiologist. Plain radiographic images are visualized and preliminarily interpreted by the ED Provider with the below findings:    CT chest with contrast read by radiologist and reviewed by me shows interval improvement in overall aeration    CT abdomen pelvis shows G-tube and NG tube in place with mild thickening of the jejunum around the J-tube but no evidence of infection.  Read by radiologist and reviewed by me.    Interpretation per the Radiologist below, if available at the time of this note:    CT CHEST ABDOMEN PELVIS W CONTRAST Additional Contrast? None   Final Result      XR CHEST PORTABLE   Final Result   1. No definite acute finding; similar underlying interstitial and hazy   airspace opacities and apical pleural thickening.   2. Probable small pleural effusion.  No pneumothorax appreciated on the   current study.           No results found.     No results found.    PROCEDURES   Unless otherwise noted below, none     Procedures    CRITICAL CARE TIME (.cctime)         EMERGENCY DEPARTMENT COURSE and DIFFERENTIAL

## 2024-07-21 NOTE — ED NOTES
Pt c/o pain to buttock, upon assessment pt noted to have 2 small open areas to buttock, preventative dressing placed. Urine sample obtained via straight catheter procedure 400 ml obtained. Marina Miller RN

## 2024-07-21 NOTE — PLAN OF CARE
-79-year-old male from home; has G-tube and J-tube.  History of recurrent aspiration pneumonia.   presenting with weakness.    Appears dehydrated in ED; BUN elevated; hypotensive; got IV fluid bolus .   Mildly hypoxic requiring 2 L of oxygen.   Afebrile.  No clear\infiltrates.      Admitted, hydrated.   Resume tube feeds.     - .

## 2024-07-22 PROBLEM — E86.0 DEHYDRATION: Status: ACTIVE | Noted: 2024-07-22

## 2024-07-22 LAB
ANION GAP SERPL CALCULATED.3IONS-SCNC: 7 MMOL/L (ref 3–16)
ANION GAP SERPL CALCULATED.3IONS-SCNC: 7 MMOL/L (ref 3–16)
BASOPHILS # BLD: 0 K/UL (ref 0–0.2)
BASOPHILS NFR BLD: 0.4 %
BUN SERPL-MCNC: 18 MG/DL (ref 7–20)
BUN SERPL-MCNC: 22 MG/DL (ref 7–20)
CALCIUM SERPL-MCNC: 8.1 MG/DL (ref 8.3–10.6)
CALCIUM SERPL-MCNC: 8.1 MG/DL (ref 8.3–10.6)
CHLORIDE SERPL-SCNC: 98 MMOL/L (ref 99–110)
CHLORIDE SERPL-SCNC: 98 MMOL/L (ref 99–110)
CO2 SERPL-SCNC: 31 MMOL/L (ref 21–32)
CO2 SERPL-SCNC: 32 MMOL/L (ref 21–32)
CREAT SERPL-MCNC: <0.5 MG/DL (ref 0.8–1.3)
CREAT SERPL-MCNC: <0.5 MG/DL (ref 0.8–1.3)
DEPRECATED RDW RBC AUTO: 16.1 % (ref 12.4–15.4)
EKG ATRIAL RATE: 103 BPM
EKG DIAGNOSIS: NORMAL
EKG P AXIS: 81 DEGREES
EKG P-R INTERVAL: 122 MS
EKG Q-T INTERVAL: 358 MS
EKG QRS DURATION: 74 MS
EKG QTC CALCULATION (BAZETT): 468 MS
EKG R AXIS: 23 DEGREES
EKG T AXIS: 54 DEGREES
EKG VENTRICULAR RATE: 103 BPM
EOSINOPHIL # BLD: 0.1 K/UL (ref 0–0.6)
EOSINOPHIL NFR BLD: 1.7 %
GFR SERPLBLD CREATININE-BSD FMLA CKD-EPI: >90 ML/MIN/{1.73_M2}
GFR SERPLBLD CREATININE-BSD FMLA CKD-EPI: >90 ML/MIN/{1.73_M2}
GLUCOSE BLD-MCNC: 92 MG/DL (ref 70–99)
GLUCOSE SERPL-MCNC: 108 MG/DL (ref 70–99)
GLUCOSE SERPL-MCNC: 134 MG/DL (ref 70–99)
HCT VFR BLD AUTO: 32 % (ref 40.5–52.5)
HGB BLD-MCNC: 10.5 G/DL (ref 13.5–17.5)
LYMPHOCYTES # BLD: 0.8 K/UL (ref 1–5.1)
LYMPHOCYTES NFR BLD: 12.3 %
MAGNESIUM SERPL-MCNC: 2 MG/DL (ref 1.8–2.4)
MCH RBC QN AUTO: 25.1 PG (ref 26–34)
MCHC RBC AUTO-ENTMCNC: 32.9 G/DL (ref 31–36)
MCV RBC AUTO: 76.1 FL (ref 80–100)
MONOCYTES # BLD: 0.7 K/UL (ref 0–1.3)
MONOCYTES NFR BLD: 11.9 %
NEUTROPHILS # BLD: 4.5 K/UL (ref 1.7–7.7)
NEUTROPHILS NFR BLD: 73.7 %
PERFORMED ON: NORMAL
PLATELET # BLD AUTO: 199 K/UL (ref 135–450)
PMV BLD AUTO: 8.5 FL (ref 5–10.5)
POTASSIUM SERPL-SCNC: 3.7 MMOL/L (ref 3.5–5.1)
POTASSIUM SERPL-SCNC: 3.7 MMOL/L (ref 3.5–5.1)
RBC # BLD AUTO: 4.2 M/UL (ref 4.2–5.9)
SODIUM SERPL-SCNC: 136 MMOL/L (ref 136–145)
SODIUM SERPL-SCNC: 137 MMOL/L (ref 136–145)
WBC # BLD AUTO: 6.2 K/UL (ref 4–11)

## 2024-07-22 PROCEDURE — 80048 BASIC METABOLIC PNL TOTAL CA: CPT

## 2024-07-22 PROCEDURE — 2580000003 HC RX 258: Performed by: INTERNAL MEDICINE

## 2024-07-22 PROCEDURE — 85025 COMPLETE CBC W/AUTO DIFF WBC: CPT

## 2024-07-22 PROCEDURE — 83735 ASSAY OF MAGNESIUM: CPT

## 2024-07-22 PROCEDURE — 93010 ELECTROCARDIOGRAM REPORT: CPT | Performed by: INTERNAL MEDICINE

## 2024-07-22 PROCEDURE — 6360000002 HC RX W HCPCS: Performed by: INTERNAL MEDICINE

## 2024-07-22 PROCEDURE — 2060000000 HC ICU INTERMEDIATE R&B

## 2024-07-22 PROCEDURE — 36415 COLL VENOUS BLD VENIPUNCTURE: CPT

## 2024-07-22 PROCEDURE — 2700000000 HC OXYGEN THERAPY PER DAY

## 2024-07-22 PROCEDURE — 6370000000 HC RX 637 (ALT 250 FOR IP): Performed by: STUDENT IN AN ORGANIZED HEALTH CARE EDUCATION/TRAINING PROGRAM

## 2024-07-22 PROCEDURE — 99232 SBSQ HOSP IP/OBS MODERATE 35: CPT | Performed by: INTERNAL MEDICINE

## 2024-07-22 PROCEDURE — 94761 N-INVAS EAR/PLS OXIMETRY MLT: CPT

## 2024-07-22 RX ORDER — SODIUM CHLORIDE 9 MG/ML
INJECTION, SOLUTION INTRAVENOUS CONTINUOUS
Status: ACTIVE | OUTPATIENT
Start: 2024-07-22 | End: 2024-07-23

## 2024-07-22 RX ADMIN — SODIUM CHLORIDE: 9 INJECTION, SOLUTION INTRAVENOUS at 15:20

## 2024-07-22 RX ADMIN — LANSOPRAZOLE 30 MG: 30 TABLET, ORALLY DISINTEGRATING, DELAYED RELEASE ORAL at 11:37

## 2024-07-22 RX ADMIN — LEVOTHYROXINE SODIUM 137 MCG: 0.11 TABLET ORAL at 06:59

## 2024-07-22 RX ADMIN — Medication 10 ML: at 11:38

## 2024-07-22 RX ADMIN — Medication 10 ML: at 20:00

## 2024-07-22 RX ADMIN — ENOXAPARIN SODIUM 40 MG: 100 INJECTION SUBCUTANEOUS at 20:00

## 2024-07-22 NOTE — PROGRESS NOTES
Patient admitted to room 319 from ER. Patient oriented to room, call light, bed rails, phone, lights and bathroom. Patient instructed about the schedule of the day including: vital sign frequency, lab draws, possible tests, frequency of MD and staff rounds, daily weights, I &O's and prescribed diet.  Telemetry box in place, patient aware of placement and reason. Bed locked, in lowest position, side rails up 2/4, call light within reach.        Recliner Assessment  Patient is not able to demonstrated the ability to move from a reclining position to an upright position within the recliner. however patient is alert, oriented and able to provide informed consent       4 Eyes Skin Assessment     NAME:  Héctor Valverde  YOB: 1945  MEDICAL RECORD NUMBER:  [id number]    The patient is being assessed for  Admission    I agree that at least one RN has performed a thorough Head to Toe Skin Assessment on the patient. ALL assessment sites listed below have been assessed.      Areas assessed by both nurses:  2 open areas on buttocks, covered with mepilex  Head, Face, Ears, Shoulders, Back, Chest, Arms, Elbows, Hands, Sacrum. Buttock, Coccyx, Ischium, Legs. Feet and Heels, and Under Medical Devices         Does the Patient have a Wound? Yes wound(s) were present on assessment. LDA wound assessment was Initiated and completed by RN       Eugene Prevention initiated by RN: Yes  Wound Care Orders initiated by RN: No    Pressure Injury (Stage 3,4, Unstageable, DTI, NWPT, and Complex wounds) if present, place Wound referral order by RN under : No    New Ostomies, if present place, Ostomy referral order under : No     Nurse 1 eSignature: Electronically signed by Nilam Manning RN on 7/21/24 at 11:53 PM EDT    **SHARE this note so that the co-signing nurse can place an eSignature**    Nurse 2 eSignature: Electronically signed by Kelle Prabhakar RN on 7/22/24 at 1:30 AM EDT

## 2024-07-22 NOTE — H&P
History and Physical      Name:  Héctor Valverde /Age/Sex: 1945  (79 y.o. male)   MRN & CSN:  [id number] & [id number] Encounter Date/Time: 2024 8:56 PM EDT   Location:  /0319-01 PCP: Suraj De La Torre DO       Assessment and Plan:   Héctor Valverde is a 79 y.o. male with GERD, history of head and neck cancer status post chemo and XRT, provoked DVT, dysphagia status post GJ tube, protein calorie malnutrition presented with generalized weakness    Generalized weakness   Likely related to inadequate dietary intake   CT chest abdomen and pelvis reviewed negative for acute abnormality   Initiate Tube feed, consult dietitian   IV fluid infusion    GERD  Consult Protonix    Hypothyroidism  Continue home Synthroid    History of head and neck cancer status post chemo and XRT    Inpatient PCU telemetry  Limited code, no intubation- ok for medications, defibrillator and chest compressions    Disposition:     Current Living situation: Home  Expected Disposition: Home  Estimated D/C: 1-2 days    Diet Diet NPO  ADULT TUBE FEEDING; Gastrostomy; Standard with Fiber; Continuous; 30; Yes; 5; Q 4 hours; 70; 200; Q 4 hours   DVT Prophylaxis [x] Lovenox, []  Heparin, [] SCDs, [] Ambulation,  [] Eliquis, [] Xarelto, [] Coumadin   Code Status Limited   Surrogate Decision Maker/ POA Wendy     Personally reviewed Lab Studies and Imaging   EKG interpreted personally and results Sinus tachycardia 103/min no acute ST-T wave abnormalities    History from:     Patient     History of Present Illness:     Chief Complaint   Patient presents with    Dehydration     Pt comes from home with c/o dehydration and weakness. Pt states recent hospital admission and now has no energy.     Héctor Valverde is a 79 y.o. male with GERD, history of head and neck cancer status post chemo and XRT, provoked DVT, dysphagia status post GJ tube, protein calorie malnutrition presented with generalized weakness. Patient states that he has no          Electronically signed by Jacquelyn May MD on 7/21/2024 at 11:54 PM

## 2024-07-22 NOTE — ACP (ADVANCE CARE PLANNING)
Advance Care Planning     General Advance Care Planning (ACP) Conversation    Date of Conversation: 7/22/2024  Conducted with: Patient with Decision Making Capacity  Other persons present: None    Healthcare Decision Maker:   Primary Decision Maker (Active): Wendy Valverde - Spouse - [phone]    Secondary Decision Maker: Rosas Valverde - Child - [phone]  Click here to complete Healthcare Decision Makers including selection of the Healthcare Decision Maker Relationship (ie \"Primary\").       Content/Action Overview:  Has ACP document(s) on file - reflects the patient's care preferences-unable to open  Reviewed DNR/DNI and patient elects DNR order - referred to ACP Clinical Specialist & placed order--noted in md note        Length of Voluntary ACP Conversation in minutes:  <16 minutes (Non-Billable)    Araseli Cisse RN

## 2024-07-22 NOTE — PROGRESS NOTES
[facility] Progress [facility]    Daily Progress Note for 2024 1:17 PM /0319-01  Héctor Valverde : 1945 Age: 79 y.o. Sex: male  Length of Stay:  1    Interval History:      CC: F/U Dehydration (Pt comes from home with c/o dehydration and weakness. Pt states recent hospital admission and now has no energy.)    Subjective:       Feels better than last night. Takes nothing by mouth. His tube can get clogged at times he flushed it twice a day but mostly is working just fine for meds and TFs also. He is not sure if he is getting enough fluid with TF. He mostly manages his TF and meds himself. Says he finished all his home antibiotics.     Objective:     Vitals:    24 0030 24 0427 24 0738 24 1132   BP: 101/67 105/63 (!) 94/54 (!) 99/56   Pulse: 91 84 84 80   Resp: 16 18 18 18   Temp: 97.5 °F (36.4 °C) 97.9 °F (36.6 °C) 98.1 °F (36.7 °C) 98.1 °F (36.7 °C)   TempSrc: Oral Oral Oral Oral   SpO2: 91%  91% 96%   Weight:  59.9 kg (132 lb 1.6 oz)     Height:  1.88 m (6' 2\")            Intake/Output Summary (Last 24 hours) at 2024 1317  Last data filed at 2024 0913  Gross per 24 hour   Intake 1093.15 ml   Output 1000 ml   Net 93.15 ml     Body mass index is 16.96 kg/m².    Physical Exam:  General: Cooperative, pleasant/Ill appearing, on  Nasal cannula  HEENT:  Head: normocephalic,atraumatic, anicteric sclera, clear conjunctiva. Hoarse voice chronic with history of throat cancer  Neck: Normal size, Jugular venous pulsations: normal  Respiratory:unlabored breathing, clear to auscultation with no crackles, wheezes rhonchi  Heart: Regular rate and rhythm, S1, S2-normal, No murmurs  Abdomen: soft, nondistended, nontender, normoactive bowel sounds,  Neurological/Psych: Alert and oriented times three, no focal neurological deficits, Mood and affect appropriate.  Skin: No obvious rashes    Extremities:  no edema, Pedal pulses 2+ bilaterally    Scheduled

## 2024-07-22 NOTE — CARE COORDINATION
Case Management Assessment  Initial Evaluation    Date/Time of Evaluation: 7/22/2024 8:53 AM  Assessment Completed by: Araseli Cisse RN    If patient is discharged prior to next notation, then this note serves as note for discharge by case management.    Patient Name: Héctor Valverde                   YOB: 1945  Diagnosis: Weakness [R53.1]  Acute respiratory failure with hypoxia and hypercapnia (HCC) [J96.01, J96.02]  Pressure injury of sacral region, stage 1 [L89.151]                   Date / Time: 7/21/2024 12:52 PM    Patient Admission Status: Inpatient   Readmission Risk (Low < 19, Mod (19-27), High > 27): Readmission Risk Score: 19.2    Current PCP: Suraj De La Torre, DO  PCP verified by CM? Yes    Chart Reviewed: Yes      History Provided by: Patient  Patient Orientation: Alert and Oriented    Patient Cognition: Alert    Hospitalization in the last 30 days (Readmission):  Yes    If yes, Readmission Assessment in CM Navigator will be completed.    Advance Directives:      Code Status: Limited   Patient's Primary Decision Maker is: Named in Scanned ACP Document    Primary Decision Maker (Active): Wendy Valverde - Spouse - [phone]    Secondary Decision Maker: Rosas Valverde - Child - [phone]    Discharge Planning:    Patient lives with: Spouse/Significant Other Type of Home: House  Primary Care Giver: Self  Patient Support Systems include: Spouse/Significant Other, Children   Current Financial resources: Medicare  Current community resources: None  Current services prior to admission: Durable Medical Equipment, Other (Comment) (TF via [facility])            Current DME: Cane, Walker, Shower Chair            Type of Home Care services:  None    ADLS  Prior functional level: Independent in ADLs/IADLs  Current functional level: Independent in ADLs/IADLs    PT AM-PAC:   /24  OT AM-PAC:   /24    Family can provide assistance at DC: Yes  Would you like Case Management to discuss the discharge

## 2024-07-22 NOTE — FLOWSHEET NOTE
07/22/24 0735   Handoff   Communication Given Shift Handoff   Handoff Given To DES Decker   Handoff Received From DES Demarco   Handoff Communication Face to Face   Time Handoff Given 0735   End of Shift Check Performed Yes     EOS report given to DES Decker. Pt in bed, call light within reach. Pt is stable, care is transferred.

## 2024-07-22 NOTE — PROGRESS NOTES
Occupational Therapy  Attempted to see pt for OT evaluation. Pt declining therapy d/t fatigue and \"needing to rest.\" Pt educated on reason for OT eval and importance of therapy. Pt continues to decline and asks therapist to come back the following day. Will continue to follow as schedule allows and pt medically appropriate.    Teagan Wagoner, OTR/L

## 2024-07-22 NOTE — PROGRESS NOTES
Orthostatic Vitals:      7/22/2024     3:15 PM   Orthostatic Vitals   Orthostatic B/P and Pulse? Yes   Blood Pressure Lying 100/60   Pulse Lying 78 PER MINUTE   Blood Pressure Sitting 72/49   Pulse Sitting 86 PER MINUTE   Blood Pressure Standing 93/56   Pulse Standing 90 PER MINUTE

## 2024-07-22 NOTE — PROGRESS NOTES
Handoff report given to Allyson NUNES.  Patient is in stable condition and has no needs at this time. Call light is in reach and bed is in the lowest position.  Care is transferred at this time.

## 2024-07-22 NOTE — FLOWSHEET NOTE
07/21/24 1941   Vital Signs   Temp 97.8 °F (36.6 °C)   Temp Source Oral   Pulse 95   Heart Rate Source Monitor   Respirations 16   /66   MAP (Calculated) 77   MAP (mmHg) 77   BP Location Left upper arm   BP Method Automatic   Patient Position Semi fowlers     Admission assessment & vital signs completed. Called down to Security to have tube feed brought up. Meds given per MAR. Pt denies any further needs at this time. Call light within reach, pt is stable.

## 2024-07-23 PROBLEM — J96.02 ACUTE RESPIRATORY FAILURE WITH HYPOXIA AND HYPERCAPNIA (HCC): Status: ACTIVE | Noted: 2022-09-19

## 2024-07-23 LAB
ANION GAP SERPL CALCULATED.3IONS-SCNC: 6 MMOL/L (ref 3–16)
BUN SERPL-MCNC: 16 MG/DL (ref 7–20)
CALCIUM SERPL-MCNC: 7.8 MG/DL (ref 8.3–10.6)
CHLORIDE SERPL-SCNC: 100 MMOL/L (ref 99–110)
CO2 SERPL-SCNC: 29 MMOL/L (ref 21–32)
CREAT SERPL-MCNC: <0.5 MG/DL (ref 0.8–1.3)
GFR SERPLBLD CREATININE-BSD FMLA CKD-EPI: >90 ML/MIN/{1.73_M2}
GLUCOSE SERPL-MCNC: 103 MG/DL (ref 70–99)
POTASSIUM SERPL-SCNC: 3.9 MMOL/L (ref 3.5–5.1)
SODIUM SERPL-SCNC: 135 MMOL/L (ref 136–145)

## 2024-07-23 PROCEDURE — 2060000000 HC ICU INTERMEDIATE R&B

## 2024-07-23 PROCEDURE — 97161 PT EVAL LOW COMPLEX 20 MIN: CPT

## 2024-07-23 PROCEDURE — 99232 SBSQ HOSP IP/OBS MODERATE 35: CPT | Performed by: INTERNAL MEDICINE

## 2024-07-23 PROCEDURE — 99024 POSTOP FOLLOW-UP VISIT: CPT | Performed by: SURGERY

## 2024-07-23 PROCEDURE — 2580000003 HC RX 258: Performed by: INTERNAL MEDICINE

## 2024-07-23 PROCEDURE — 6370000000 HC RX 637 (ALT 250 FOR IP): Performed by: STUDENT IN AN ORGANIZED HEALTH CARE EDUCATION/TRAINING PROGRAM

## 2024-07-23 PROCEDURE — 97166 OT EVAL MOD COMPLEX 45 MIN: CPT

## 2024-07-23 PROCEDURE — 97530 THERAPEUTIC ACTIVITIES: CPT

## 2024-07-23 PROCEDURE — 6360000002 HC RX W HCPCS: Performed by: INTERNAL MEDICINE

## 2024-07-23 PROCEDURE — 36415 COLL VENOUS BLD VENIPUNCTURE: CPT

## 2024-07-23 PROCEDURE — 80048 BASIC METABOLIC PNL TOTAL CA: CPT

## 2024-07-23 PROCEDURE — 97535 SELF CARE MNGMENT TRAINING: CPT

## 2024-07-23 RX ADMIN — ENOXAPARIN SODIUM 40 MG: 100 INJECTION SUBCUTANEOUS at 20:52

## 2024-07-23 RX ADMIN — Medication 10 ML: at 20:52

## 2024-07-23 RX ADMIN — LEVOTHYROXINE SODIUM 137 MCG: 0.11 TABLET ORAL at 05:26

## 2024-07-23 RX ADMIN — LANSOPRAZOLE 30 MG: 30 TABLET, ORALLY DISINTEGRATING, DELAYED RELEASE ORAL at 11:42

## 2024-07-23 NOTE — PROGRESS NOTES
[facility] Progress Note    Daily Progress Note for 2024 2:08 PM /0319-01  Héctor Valverde : 1945 Age: 79 y.o. Sex: male  Length of Stay:  2    Interval History:      CC: F/U Dehydration (Pt comes from home with c/o dehydration and weakness. Pt states recent hospital admission and now has no energy.)    Subjective:       He is very concerned about difficulties using the feeding tube, discussed with him about the increased water flushes for hydration and to keep the tube cleared. He says he is only doing flush in the morning, explained again plan is to increase these. Will try to do more teaching on this today as he is managing his TFs. He says the tube not working and he will end up coming back to the hospital again.     Objective:     Vitals:    24 0408 24 0723 24 1111 24 1113   BP: (!) 95/56 104/63 96/62    Pulse: 81 77 86    Resp: 16 16 18    Temp: 98.1 °F (36.7 °C) 98 °F (36.7 °C) 98.6 °F (37 °C)    TempSrc: Oral Oral Oral    SpO2: 94% 93% 96%    Weight: 60.8 kg (134 lb)      Height:    1.88 m (6' 2.02\")          Intake/Output Summary (Last 24 hours) at 2024 1408  Last data filed at 2024 0916  Gross per 24 hour   Intake 2922.95 ml   Output 1350 ml   Net 1572.95 ml       Body mass index is 17.2 kg/m².    Physical Exam:  General: Cooperative, pleasant/Ill appearing, on  Nasal cannula  HEENT:  Head: normocephalic,atraumatic, anicteric sclera, clear conjunctiva. Hoarse voice chronic with history of throat cancer  Neck: Normal size, Jugular venous pulsations: normal  Respiratory:unlabored breathing, clear to auscultation with no crackles, wheezes rhonchi  Heart: Regular rate and rhythm, S1, S2-normal, No murmurs  Abdomen: soft, nondistended, nontender, normoactive bowel sounds,  Neurological/Psych: Alert and oriented times three, no focal neurological deficits, Mood and affect appropriate.  Skin: No obvious rashes    Extremities:  no edema, Pedal

## 2024-07-23 NOTE — PROGRESS NOTES
Inpatient Physical Therapy Evaluation & Treatment    Unit: PCU  Date:  7/23/2024  Patient Name:    Héctor Valverde  Admitting diagnosis:  Weakness [R53.1]  Acute respiratory failure with hypoxia and hypercapnia (HCC) [J96.01, J96.02]  Pressure injury of sacral region, stage 1 [L89.151]  Admit Date:  7/21/2024  Precautions/Restrictions/WB Status/ Lines/ Wounds/ Oxygen: Fall risk, Bed/chair alarm, Lines (IV, external catheter, PEG tube, and Ostomy bag), and Telemetry      Pt seen for cotreatment this date due to patient safety, patient endurance, complexity of condition, and acute illness/injury    Treatment Time:  0825 - 0908  Treatment Number:  1   Timed Code Treatment Minutes: 33 minutes  Total Treatment Minutes:  43  minutes    Patient Stated Goals for Therapy: \" to go home \"          Discharge Recommendations: Home with 24hr assistance and Home PT  DME needs for discharge: Needs Met       Therapy recommendation for EMS Transport: can transport by wheelchair    Therapy recommendations for staff:   Assist of 1 for ambulation with use of rolling walker (RW) and gait belt to/from chair  to/from bathroom  within room    History of Present Illness:   Per admission H&P:  \"  Chief Complaint   Patient presents with    Dehydration       Pt comes from home with c/o dehydration and weakness. Pt states recent hospital admission and now has no energy.      Héctor Valverde is a 79 y.o. male with GERD, history of head and neck cancer status post chemo and XRT, provoked DVT, dysphagia status post GJ tube, protein calorie malnutrition presented with generalized weakness. Patient states that he has no energy to do anything, has been sleeping the entire day today.  Further tells me he has been having problems with GJ tube intermittently and has no fluid intake via tube.  Denies fever night sweats chills LOC dizziness nausea vomiting diarrhea.  Patient was alert oriented x 3 hemodynamically stable with low normal blood pressure  limitations during ambulation and places the patient at an increased risk of falling. Today's treatment consisted of transfers training, ambulation, and therapeutic exercises in order to address the above impairments and functional limitations. Continued skilled physical therapy is necessary to address the above impairments, in order to help the patient make a full return to PLOF without complaints of pain, difficulty or compensation.    Recommending Home 24 hr assist and with home PT upon discharge as patient functioning close to baseline level and would benefit from continued therapy services    Goals :   To be met in 3 visits:  1). Independent with LE Ex x 10 reps  2). Sit to/from stand: SBA  3). Bed to chair: SBA  4). CHF goal: N/A    To be met in 6 visits:  1).  Supine to/from sit: Supervision  2).  Sit to/from stand: Supervision  3).  Bed to chair: Supervision  4).  Gait: Ambulate 50 ft.  with CGA and use of gait belt and rolling walker (RW)  5).  Tolerate B LE exercises 3 sets of 10-15 reps  6).  Ascend/descend 4 steps with SBA with use of hand rail unilateral and gait belt    Rehabilitation Potential: Good  Strengths for achieving goals include:   Pt motivated, PLOF, Family Support, and Pt cooperative   Barriers to achieving goals include:    Complexity of condition, Pain, and Weakness    Plan    To be seen 3-5 x/ week while in acute care setting for therapeutic exercises, bed mobility, transfers, progressive gait training, balance training, and family/patient education.    Signature: Emil Webber, PT     If patient discharges from this facility prior to next visit, this note will serve as the Discharge Summary.    CHF Education  N/A

## 2024-07-23 NOTE — PROGRESS NOTES
Comprehensive Nutrition Assessment    Type and Reason for Visit:  Initial, Positive Nutrition Screen, Consult (+ screen for home EN regimen; consult for TF ordering and management)    Nutrition Recommendations/Plan:   Modified TF order to ADULT TUBE FEEDING; PEG/J tube; Standard with Fiber formula - Jevity 1.5 with a goal rate of 60 ml/hr x 20 hours. Start with 20 ml/hr and increase by 15 ml every 4 hours, as tolerated by patient, until goal rate can be achieved and maintained. Water flushes, 200 ml every 4 hours for tube patency and hydration. Please administer one proteinex P2Go once daily.   Monitor TF rate, intake, and tolerance + water flushes + administration of one proteinex P2Go once daily.   Monitor nutrition-related labs, bowel function + GI status, and weight trends.      Malnutrition Assessment:  Malnutrition Status:  Moderate malnutrition (07/23/24 1126)    Context:  Acute Illness     Findings of the 6 clinical characteristics of malnutrition:  Energy Intake:  Mild decrease in energy intake   Weight Loss:  Greater than 5% over 1 month (-9# or 6.5% weight loss since 6/27/24)     Body Fat Loss:  Mild body fat loss Buccal region   Muscle Mass Loss:  Mild muscle mass loss Temples (temporalis), Clavicles (pectoralis & deltoids)  Fluid Accumulation:  No significant fluid accumulation     Strength:  Not Performed    Nutrition Assessment:    patient is moderately malnourished r/t NPO status, difficulty swallowing + need for feeding tube and EN as sole source of nutrition AEB NPO status, EN regimen, and mild fat loss and muscle wasting present; patient is at risk for further compromise d/t altered nutrition-related labs, patient presented with dehydration + weakness, and weight loss; will modify TF order to    Nutrition Related Findings:    patient is A & O; he presented with c/o dehydration and weakness; [place] [facility] [place]; j-tube was replaced on 7/3/24; patient reported that he  stumbled/fell against the kitchen sink at home PTA but that he did not hit his head or fall to the ground; TF is currently infusing at 40 ml/hr, per flow sheets data; no documented BM for this admission Wound Type:  (red + open area but location is documented in flow sheets)       Current Nutrition Intake & Therapies:    Average Meal Intake: NPO  Average Supplements Intake: NPO  Current Tube Feeding (TF) Orders:  Feeding Route: PEG/J  Formula: Standard with Fiber  Schedule: Continuous  Feeding Regimen: Jevity 1.5 with a goal rate of 60 ml/hr x 20 hours  Additives/Modulars: Protein (one proteinex P2Go once daily)  Water Flushes: 200 ml every 4 hours or per MD guidance  Current TF & Flush Orders Provides: Jevity 1.5 with a goal rate of 70 ml/hr x 20 hours + 200 ml water flushes every 4 hours  Goal TF & Flush Orders Provides: Jevity 1.5 with a goal rate of 60 ml/hr x 20 hours = 1200 ml TV, 1800 kcals, 77 g protein, and 912 ml free water + 200 ml water flushes every 4 hours + 26 g protein and 104 kcals from one proteinex P2Go once daily (103 g protein and 1904 kcals total)    Anthropometric Measures:  Height: 188 cm (6' 2.02\")  Ideal Body Weight (IBW): 190 lbs (86 kg)    Admission Body Weight: 59.8 kg (131 lb 14.4 oz) (obtained on 7/21/24; actual weight)  Current Body Weight: 60.8 kg (134 lb) (obtained on 7/23/24; actual weight), 70.5 % IBW. Weight Source: Bed Scale  Current BMI (kg/m2): 17.2  Usual Body Weight: 65 kg (143 lb 4.8 oz) (obtained on 6/27/24; actual weight)  % Weight Change (Calculated): -6.5  Weight Adjustment For: No Adjustment                 BMI Categories: Underweight (BMI less than 22) age over 65    Estimated Daily Nutrient Needs:  Energy Requirements Based On: Kcal/kg  Weight Used for Energy Requirements: Current  Energy (kcal/day): 1823 - 1945 kcals based on 30-32 kcals/kg/CBW  Weight Used for Protein Requirements: Current  Protein (g/day): 91 - 103 g protein based on 1.5-1.7 g/kg/CBW  Method Used  for Fluid Requirements: 1 ml/kcal  Fluid (ml/day): 1823 - 1945 ml    Nutrition Diagnosis:   Moderate malnutrition related to inadequate protein-energy intake, swallowing difficulty as evidenced by NPO or clear liquid status due to medical condition, nutrition support - enteral nutrition, swallow study results, lab values, mild loss of subcutaneous fat, mild muscle loss    Nutrition Interventions:   Food and/or Nutrient Delivery: Continue NPO, Modify Tube Feeding  Nutrition Education/Counseling: No recommendation at this time  Coordination of Nutrition Care: Continue to monitor while inpatient, Coordination of Care       Goals:     Goals: Tolerate nutrition support at goal rate, by next RD assessment       Nutrition Monitoring and Evaluation:   Behavioral-Environmental Outcomes: None Identified  Food/Nutrient Intake Outcomes: Enteral Nutrition Intake/Tolerance  Physical Signs/Symptoms Outcomes: Biochemical Data, Hemodynamic Status, Nutrition Focused Physical Findings, Skin, Weight    Discharge Planning:    Enteral Nutrition     Aundrea Cartagena RD, LD  Contact: [phone]

## 2024-07-23 NOTE — CONSULTS
[facility]ound Ostomy Continence Nurse  Consult Note       NAME:  Héctor Valverde  MEDICAL RECORD NUMBER:  [id number]  AGE: 79 y.o.   GENDER: male  : 1945  TODAY'S DATE:  2024    Subjective  Pt awake and alert, wife in room   Reason for WOCN Evaluation and Assessment: sacrum      Héctor Valverde is a 79 y.o. male referred by:   [x] Physician  [x] Nursing  [] Other:     Wound Identification:  Wound Type: pressure and MASD  Contributing Factors: chronic pressure, decreased mobility, malnutrition, incontinence of stool, and incontinence of urine    Pt seen for wound care.  Héctor Valverde is a 79 y.o. male with GERD, history of head and neck cancer status post chemo and XRT, provoked DVT, dysphagia status post GJ tube, protein calorie malnutrition presented with generalized weakness from home.  He is cared for by his wife.  Pt has G tube and Jtube in place.  He is cachectic.  Tube feed infusing.  GI and surgery consulted.  VTAD is soiled, pt refusing for it to be changed.  He states his wife changes it on .  Purwick in place, brief saturated, new Purwik and attends placed on pt.           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

## 2024-07-23 NOTE — PROGRESS NOTES
Occupational Therapy  Inpatient Occupational Therapy Evaluation and Treatment    Unit: PCU  Date:  7/23/2024  Patient Name:    Héctor Valverde  Admitting diagnosis:  Weakness [R53.1]  Acute respiratory failure with hypoxia and hypercapnia (HCC) [J96.01, J96.02]  Pressure injury of sacral region, stage 1 [L89.151]  Admit Date:  7/21/2024  Precautions/Restrictions/WB Status/ Lines/ Wounds/ Oxygen: Fall risk, Bed/chair alarm, Lines (IV, external catheter, PEG tube, and Ostomy bag), and Telemetry    Pt seen for cotreatment this date due to patient safety, patient endurance, and limited functional status information    Treatment Time:  0825-0908  Treatment Number:  1  Timed Code Treatment Minutes: 33 minutes  Total Treatment Minutes:  43  minutes (10 eval)    Patient Goals for Therapy: \"get home \"          Discharge Recommendations: Home with 24/7 supervision and Home OT  DME needs for discharge: Continue to Assess       Therapy recommendations for staff:   Assist of 1 for transfers with use of rolling walker (RW) and gait belt to/from BSC  to/from chair  to/from bathroom  within room    History of Present Illness: Per Aidan Shelton on 7/21, \"Héctor Valverde is a 79 y.o. male who presents to the ED by life squad with concerns for dehydration.  Patient has history of multiple (12) episodes of aspiration pneumonia and now has tube feeding.  He is concerned that he is becoming dehydrated as he has been becoming weaker and weaker.  Today he stumbled/fell against a kitchen sink but did not hurt himself.  He did not fall to the ground, nor did he hit his head.  He is not complaining of neck or back pain.  He denies dizziness or syncope.  He denies fever, rash, nausea, vomiting, chest pain or difficulty breathing, abdominal pain, change in bowels or urine, paresthesias, ataxia, imbalance, focal numbness or weakness, or any additional systemic or neurologic complaints.\"    AM-PAC Score: AM-PAC Inpatient Daily Activity  needing increased assist with ADLs for past ~1 month d/t increased fatigue/weakness. Currently, pt completing bed mobility w/ SBA and increased time (use of rails and HOB elevated d/t pt sleeping in recliner at home), completes sit<>stands w/ CGA to/from RW, functional transfers w/ CGA (chair), functional mobility w/ CGA w/ RW in room , toileting w/ totalA (purewick and toilet hygiene at bed level), and LB dressing w/ totalA (brief exchange). Pt functioning below personal baseline and will benefit from skilled OT while in acute setting to increase functional activity tolerance, safety w/ mobility/transfers, and increase independence and safety w/ ADLs. Recommending pt receive further skilled OT at at home w/ initital 24hr SPV to increase function to baseline. Demonstrates good rehabilitation potential. Continue POC. Co-tx collaboration this date to safely meet goals and will have better occupational performance outcomes with in a co-treatment than 1:1 session.      Recommending Home 24 hr supervision and with home OT upon discharge as patient functioning below baseline level    Goal(s) :   To be met in 3 Visits:  Bed to toilet/BSC:       SBA  Pt will complete 3/3 CHF goals     N/A    To be met in 5 Visits:  Supine to/from Sit in preparation for ADL task:   Modified Independent  Toileting        SBA  Grooming       SBA  Upper Body Dressing:      Modified Independent  Lower Body Dressing:      CGA  Pt to demonstrate UE therapeutic exs x 15 reps with minimal cues    Rehabilitation Potential: Good  Strengths for achieving goals include: Pt motivated, PLOF, Family Support, and Pt cooperative   Barriers to achieving goals include:  Complexity of condition and Weakness    Plan:  To be seen 3-5 x/wk while in acute care setting for therapeutic exercises, bed mobility, transfers, family/patient education, ADL/IADL retraining, and energy conservation training.    Electronically signed by Skylar Clark OT on 7/23/2024 at

## 2024-07-23 NOTE — CONSULTS
Department of General Surgery Consult    PATIENT NAME: Héctor Valverde   YOB: 1945    ADMISSION DATE: 7/21/2024 12:52 PM      TODAY'S DATE: 7/23/2024    Reason for Consult:  Jtube issue    Chief Complaint: weakness    Historian: pt    Requesting Practitioner:  Black    HISTORY OF PRESENT ILLNESS:              The patient is a 79 y.o. male who presents with weakness. C/o issues related to j-tube including clogging.    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    COVID-19 12/07/2020    Dry mouth     s/p radiation treatment    DVT, lower extremity (HCC)     5/2011    GERD (gastroesophageal reflux disease)     acid reflux    Hip fracture (HCC)     5/2011, RIGHT    Hx of blood clots     Hyperlipidemia     Pneumonia 6/2/2011    Prolonged emergence from general anesthesia     Thyroid disease        Past Surgical History:        Procedure Laterality Date    ABDOMEN SURGERY      gallbladder removed    CHOLECYSTECTOMY      COLONOSCOPY      EYE SURGERY Right     TORN RETINA, LASER    HERNIA REPAIR      HIP ARTHROPLASTY Right 08/08/2016    HIP FRACTURE SURGERY Right     JOINT REPLACEMENT      OTHER SURGICAL HISTORY  05/18/2011    right hip IM nailing    SHOULDER SURGERY Right rotater cuff    STOMACH SURGERY N/A 7/3/2024    JEJUNOSTOMY TUBE INSERTION/CHANGE performed by Kayden Lopez MD at [facility] OR    TONGUE BIOPSY  10/2010    UPPER GASTROINTESTINAL ENDOSCOPY N/A 7/2/2024    ESOPHAGOGASTRODUODENOSCOPY BIOPSY performed by Ajay Lees MD at [facility] ENDOSCOPY       Current Medications:   Current Facility-Administered Medications: simethicone (MYLICON) chewable tablet 80 mg, 80 mg, Oral, Q6H PRN  sodium chloride flush 0.9 % injection 5-40 mL, 5-40 mL, IntraVENous, 2 times per day  sodium chloride flush 0.9 % injection 5-40 mL, 5-40 mL, IntraVENous, PRN  0.9 % sodium chloride infusion, , IntraVENous,  daily  Patient not taking: Reported on 9/16/2022 5/11/20 9/19/22  Provider, MD Santiago        Allergies:  Tamsulosin, Tamsulosin hcl, Amoxicillin-pot clavulanate, and Tetanus toxoids    Social History:   TOBACCO:   reports that he has never smoked. He has never used smokeless tobacco.  ETOH:   reports that he does not currently use alcohol.  DRUGS:   reports no history of drug use.      Family History:        Problem Relation Age of Onset    Cancer Mother     Depression Mother     High Blood Pressure Mother     Heart Disease Father     Stroke Maternal Grandmother     Diabetes Other            PHYSICAL EXAM:  VITALS:  /71   Pulse 84   Temp 98.3 °F (36.8 °C) (Oral)   Resp 18   Ht 1.88 m (6' 2.02\")   Wt 60.8 kg (134 lb)   SpO2 98%   BMI 17.20 kg/m²   24HR INTAKE/OUTPUT:    I/O last 3 completed shifts:  In: 4016.1 [I.V.:2014.1; NG/GT:2002]  Out: 1950 [Urine:1950]  No intake/output data recorded.      CONSTITUTIONAL:  alert, no apparent distress and thin  EYES:  PERRL, sclera clear  ENT:  Normocephalic,atraumatic, without obvious abnormality  NECK:  supple, symmetrical, trachea midline  LUNGS: Resp effort easy and unlabored, clear to auscultation  CARDIOVASCULAR:  NO JVD, regular rate and rhythm and no murmur noted  ABDOMEN: incision healing well. J-tube flushes easily      DATA:    CBC:   Recent Labs     07/21/24  1333 07/22/24  0451   WBC 8.9 6.2   HGB 14.0 10.5*   HCT 43.5 32.0*    199     BMP:    Recent Labs     07/22/24  0451 07/22/24 1952 07/23/24  0547    136 135*   K 3.7 3.7 3.9   CL 98* 98* 100   CO2 32 31 29   BUN 22* 18 16   CREATININE <0.5* <0.5* <0.5*   GLUCOSE 134* 108* 103*     Hepatic:   Recent Labs     07/21/24  1333   AST 51*   ALT 66*   BILITOT 0.7   ALKPHOS 152*     Mag:      Recent Labs     07/22/24 1952   MG 2.00      Phos:   No results for input(s): \"PHOS\" in the last 72 hours.   INR: No results for input(s): \"INR\" in the last 72  hours.          IMPRESSION/RECOMMENDATIONS:    S/P jejunostomy tube. It flushes well. Recommend more frequent water flushes at home to keep it open    Electronically signed by FLO CRUZ MD     [facility]

## 2024-07-23 NOTE — FLOWSHEET NOTE
07/22/24 1928   Vital Signs   Temp 98.5 °F (36.9 °C)   Temp Source Oral   Pulse 93   Heart Rate Source Monitor   Respirations 18   /61   MAP (Calculated) 74   BP Method Automatic   Patient Position Semi fowlers     Assessment & vital signs completed. Evening meds given per MAR. Orders placed for wound consult for wound on buttocks. Pt denies any further needs at this time. Call light within reach, pt is stable.

## 2024-07-23 NOTE — PROGRESS NOTES
Handoff report given to David NUNES.  Patient is in stable condition and has no needs at this time. Call light is in reach and bed is in the lowest position.  Care is transferred at this time.

## 2024-07-23 NOTE — FLOWSHEET NOTE
07/23/24 0712   Handoff   Communication Given Shift Handoff   Handoff Given To DES Decker   Handoff Received From DES Demarco   Handoff Communication Face to Face   Time Handoff Given 0712   End of Shift Check Performed Yes     EOS report given to DES Decker. Pt in bed, call light within reach. Pt is stable, care is transferred.

## 2024-07-24 VITALS
HEART RATE: 78 BPM | DIASTOLIC BLOOD PRESSURE: 57 MMHG | WEIGHT: 133 LBS | SYSTOLIC BLOOD PRESSURE: 90 MMHG | RESPIRATION RATE: 18 BRPM | TEMPERATURE: 97.9 F | HEIGHT: 74 IN | OXYGEN SATURATION: 95 % | BODY MASS INDEX: 17.07 KG/M2

## 2024-07-24 LAB
ANION GAP SERPL CALCULATED.3IONS-SCNC: 5 MMOL/L (ref 3–16)
BUN SERPL-MCNC: 13 MG/DL (ref 7–20)
CALCIUM SERPL-MCNC: 8.4 MG/DL (ref 8.3–10.6)
CHLORIDE SERPL-SCNC: 101 MMOL/L (ref 99–110)
CO2 SERPL-SCNC: 31 MMOL/L (ref 21–32)
CREAT SERPL-MCNC: <0.5 MG/DL (ref 0.8–1.3)
GFR SERPLBLD CREATININE-BSD FMLA CKD-EPI: >90 ML/MIN/{1.73_M2}
GLUCOSE SERPL-MCNC: 130 MG/DL (ref 70–99)
POTASSIUM SERPL-SCNC: 4.9 MMOL/L (ref 3.5–5.1)
SODIUM SERPL-SCNC: 137 MMOL/L (ref 136–145)

## 2024-07-24 PROCEDURE — 80048 BASIC METABOLIC PNL TOTAL CA: CPT

## 2024-07-24 PROCEDURE — 6370000000 HC RX 637 (ALT 250 FOR IP): Performed by: STUDENT IN AN ORGANIZED HEALTH CARE EDUCATION/TRAINING PROGRAM

## 2024-07-24 PROCEDURE — 36415 COLL VENOUS BLD VENIPUNCTURE: CPT

## 2024-07-24 PROCEDURE — 99238 HOSP IP/OBS DSCHRG MGMT 30/<: CPT | Performed by: INTERNAL MEDICINE

## 2024-07-24 PROCEDURE — 2580000003 HC RX 258: Performed by: INTERNAL MEDICINE

## 2024-07-24 RX ADMIN — LEVOTHYROXINE SODIUM 137 MCG: 0.11 TABLET ORAL at 05:19

## 2024-07-24 RX ADMIN — LANSOPRAZOLE 30 MG: 30 TABLET, ORALLY DISINTEGRATING, DELAYED RELEASE ORAL at 09:07

## 2024-07-24 RX ADMIN — Medication 10 ML: at 09:02

## 2024-07-24 ASSESSMENT — PAIN SCALES - GENERAL
PAINLEVEL_OUTOF10: 0

## 2024-07-24 NOTE — PROGRESS NOTES
Patient resting in bed and denies any needs at this time. IV's have been removed for discharge. Patient is disconnected from Tube feed.

## 2024-07-24 NOTE — DISCHARGE INSTR - COC
Continuity of Care Form    Patient Name: Héctor Valverde   :  1945  MRN:  [id number]    Admit date:  2024  Discharge date:  24    Code Status Order: Limited   Advance Directives:     Admitting Physician:  Isiah Oro MD  PCP: Suraj De La Torre DO    Discharging Nurse: chandu  Discharging Hospital Unit/Room#: /0319-01  Discharging Unit Phone Number: [phone]    Emergency Contact:   Extended Emergency Contact Information  Primary Emergency Contact: Wendy Valverde  Address: [address] [place] [address]  Home Phone: [phone]  Mobile Phone: [phone]  Relation: Spouse  Secondary Emergency Contact: Rosas Valverde  Home Phone: [phone]  Mobile Phone: [phone]  Relation: Child    Past Surgical History:  Past Surgical History:   Procedure Laterality Date    ABDOMEN SURGERY      gallbladder removed    CHOLECYSTECTOMY      COLONOSCOPY      EYE SURGERY Right     TORN RETINA, LASER    HERNIA REPAIR      HIP ARTHROPLASTY Right 2016    HIP FRACTURE SURGERY Right     JOINT REPLACEMENT      OTHER SURGICAL HISTORY  2011    right hip IM nailing    SHOULDER SURGERY Right rotater cuff    STOMACH SURGERY N/A 7/3/2024    JEJUNOSTOMY TUBE INSERTION/CHANGE performed by Kayden Lopez MD at [facility] OR    TONGUE BIOPSY  10/2010    UPPER GASTROINTESTINAL ENDOSCOPY N/A 2024    ESOPHAGOGASTRODUODENOSCOPY BIOPSY performed by Ajay Lees MD at [facility] ENDOSCOPY       Immunization History:   Immunization History   Administered Date(s) Administered    Influenza Virus Vaccine 10/06/2010, 2012, 10/07/2013, 2014, 2021, 2021    Influenza Whole 10/06/2010, 10/20/2010, 2011, 10/01/2015    Influenza, High Dose (Fluzone 65 yrs and older) 2017, 2020    Pneumococcal, PCV-13, PREVNAR 13, (age 6w+), IM, 0.5mL 2015    Pneumococcal, PPSV23, PNEUMOVAX 23, (age 2y+), SC/IM, 0.5mL 2010, 10/06/2010

## 2024-07-24 NOTE — FLOWSHEET NOTE
07/23/24 1933   Vital Signs   Temp 98.6 °F (37 °C)   Temp Source Oral   Pulse 77   Heart Rate Source Monitor   Respirations 18   /60   MAP (Calculated) 73   BP Method Automatic   Patient Position Semi fowlers   Pain Assessment   Pain Assessment None - Denies Pain   Oxygen Therapy   SpO2 97 %   O2 Device None (Room air)     Shift assessment completed- see flow sheet.  Pt alert resting in bed.  Nightly meds given per order see mar.  Vitals stable.  Patient denies any pain or discomfort at this time.  Call light and bedside table within reach  Care continues.

## 2024-07-24 NOTE — CARE COORDINATION
Met with pt at bedside. Pt is ready to DC home. Pt states his spouse will pick him up and transport home. Juan from [facility] to see pt at bedside and provide additional TF education. [facility] RAQUEL ordered and asked to provide TF education once home as well. Pt declines any additional questions    IMM 7/24    O2 95% RA

## 2024-07-24 NOTE — PROGRESS NOTES
Patient refused to take lansoprazole with synthroid. Move lansoprazole to 0900, as patient wants to wait 4 hours between medications.

## 2024-07-24 NOTE — PROGRESS NOTES
Patient denied any needs prior to leaving facility. DC instructions have been reviewed with patient and spouse with no further questions.

## 2024-07-24 NOTE — DISCHARGE SUMMARY
Hospital Medicine Discharge Summary    Patient: Héctor Valverde     Gender: male  : 1945   Age: 79 y.o.  MRN: [id number]    Admitting Physician: Isiah Oro MD  Discharge Physician: Marisabel Francis,     Code Status: Limited     Admit Date: 2024   Discharge Date:  2024     Discharge Diagnoses:    Active Hospital Problems    Diagnosis Date Noted    Acute respiratory failure with hypoxia and hypercapnia (HCC) [J96.01, J96.02] 2022     Priority: Medium    Dehydration [E86.0] 2024    Weakness [R53.1] 2024    Moderate protein-calorie malnutrition (HCC) [E44.0] 2020       Condition at Discharge: Stable    Feels comfortable going home, getting home care teaching for taking care of the tube and doing the water flushes prior to dc. Orders faxed to home care.     Hospital Course:     Héctor Valverde is a 79 y.o. male with GERD, history of head and neck cancer status post chemo and XRT, provoked DVT, dysphagia status post GJ tube, protein calorie malnutrition presented with generalized weakness     Generalized weakness   Likely related to inadequate dietary intake   CT chest abdomen and pelvis reviewed negative for acute abnormality   Initiate Tube feed, consult dietitian   IV fluid infusion - completed  Dietician consult regarding the TF if getting adequate or if needs change to formula  -Increased the fluid flushes, discussed with him needs to increase the frequency to keep the tube clear  -concerned due to blocked feeding tube yesterday, gen surgery evaluated, tube appears patent  -Seems he needs more education on managing the TFs and this may be part of the issue. Arranged for teaching and assistance from home care  - new orders faxed     GERD  Consult Protonix     Hypothyroidism  Continue home Synthroid     History of head and neck cancer status post chemo and XRT     PT/OT         Additional findings or notes to primary provider:  None at this time    Discharge Medications:

## 2024-07-25 ENCOUNTER — APPOINTMENT (OUTPATIENT)
Dept: GENERAL RADIOLOGY | Age: 79
End: 2024-07-25
Payer: MEDICARE

## 2024-07-25 ENCOUNTER — HOSPITAL ENCOUNTER (EMERGENCY)
Age: 79
Discharge: HOME | End: 2024-07-25
Attending: STUDENT IN AN ORGANIZED HEALTH CARE EDUCATION/TRAINING PROGRAM
Payer: MEDICARE

## 2024-07-25 VITALS
TEMPERATURE: 97.2 F | RESPIRATION RATE: 15 BRPM | SYSTOLIC BLOOD PRESSURE: 100 MMHG | DIASTOLIC BLOOD PRESSURE: 67 MMHG | HEART RATE: 108 BPM | OXYGEN SATURATION: 97 %

## 2024-07-25 DIAGNOSIS — Z43.1 ATTENTION TO G-TUBE (HCC): Primary | ICD-10-CM

## 2024-07-25 LAB
BACTERIA BLD CULT ORG #2: NORMAL
BACTERIA BLD CULT: NORMAL

## 2024-07-25 PROCEDURE — 49465 FLUORO EXAM OF G/COLON TUBE: CPT

## 2024-07-25 PROCEDURE — 6360000004 HC RX CONTRAST MEDICATION: Performed by: PHYSICIAN ASSISTANT

## 2024-07-25 PROCEDURE — 43762 RPLC GTUBE NO REVJ TRC: CPT

## 2024-07-25 PROCEDURE — 99283 EMERGENCY DEPT VISIT LOW MDM: CPT

## 2024-07-25 RX ADMIN — DIATRIZOATE MEGLUMINE AND DIATRIZOATE SODIUM 60 ML: 660; 100 LIQUID ORAL; RECTAL at 19:41

## 2024-07-25 NOTE — ED PROVIDER NOTES
[facility] ED  EMERGENCY DEPARTMENT ENCOUNTER        Pt Name: Héctor Valverde  MRN: [id number]  Birthdate 1945  Date of evaluation: 7/25/2024  Provider: Aidan Shelton PA-C  PCP: Suraj De La Torre DO  Note Started: 4:26 PM EDT 7/25/24       I have seen and evaluated this patient with my supervising physician West Morrow MD.      CHIEF COMPLAINT       Chief Complaint   Patient presents with    Post-op Problem     Pt has g-tube that was dislodged after changing positions and got caught on something. Pt appears to still have an open tract       HISTORY OF PRESENT ILLNESS: 1 or more Elements     History From: Patient and wife    Limitations to history : None    Social Determinants Significantly Affecting Health : None    Chief Complaint: Dislodged G-tube    Héctor Valverde is a 79 y.o. male who presents to the ED after accidentally dislodging his G-tube.  Patient had a GJ tube placed in his abdomen and subsequently had a J-tube placed for feeding.  Patient moved on his recliner and accidentally pulled the GJ tube out.  The GJ tube was functioning as a G-tube.  He denies additional complaints.  He denies abdominal pain, nausea, vomiting, hematemesis, back pain, fever, rash, or any additional systemic or neurologic complaints.    Nursing Notes were all reviewed and agreed with or any disagreements were addressed in the HPI.    REVIEW OF SYSTEMS :      Review of Systems    Positives and Pertinent negatives as per HPI.     SURGICAL HISTORY     Past Surgical History:   Procedure Laterality Date    ABDOMEN SURGERY      gallbladder removed    CHOLECYSTECTOMY      COLONOSCOPY      EYE SURGERY Right     TORN RETINA, LASER    HERNIA REPAIR      HIP ARTHROPLASTY Right 08/08/2016    HIP FRACTURE SURGERY Right     JOINT REPLACEMENT      OTHER SURGICAL HISTORY  05/18/2011    right hip IM nailing    SHOULDER SURGERY Right rotater cuff    STOMACH SURGERY N/A 7/3/2024    JEJUNOSTOMY TUBE

## 2024-07-25 NOTE — ED PROVIDER NOTES
Emergency Department Attending Provider Note  Location: [facility] ED      Héctor Valverde was evaluated in the Emergency Department. Although initial history and physical exam information was obtained by Aidan Shelton PA-C (who also dictated a record of this visit), I personally saw the patient and made/approved the management plan and take responsibility for the patient management.     Patient seen and evaluated.  Relevant records reviewed. Chronic medical conditions reviewed.    HPI: Patient is a 79-year-old male with history of hyperlipidemia, DVT, thyroid disease, cancer that has a GJ tube as well as a J-tube in place.  Presents after his GJ tube was dislodged.  Patient denies pain, fever/chills, nausea/vomiting recent trauma or other associated symptoms.     Physical Exam: Patient appears in no acute distress, resting comfortably lying supine and is nontoxic-appearing.  Patient has a regular rate at time of my exam and bilateral radial pulses are 2+ and symmetrical.  Patient has even, unlabored respirations with no conversational dyspnea or signs of respiratory distress.  Mental status and speech are normal, moving all extremities without difficulty.     MDM: Patient is a 79-year-old male who is seen and evaluated for dislodged GJ tube.  Per PA he spoke with GI who stated that the patient has a separate J-tube and only needs to have a G-tube replaced, does not need to have GJ tube anymore.  Initially the patient did not wish to have this done in the ED.  I discussed it with the patient and assured him that we were not replacing the GJ tube but just the G-tube itself and it does not have to be done by interventional radiology.  Patient was agreeable and G-tube was replaced by PA.  Placement was confirmed with x-ray imaging with contrast and patient discharged with outpatient follow-up and return precautions.    DISPOSITION Decision To Discharge 07/25/2024 09:14:33 PM      This chart

## 2024-07-26 ENCOUNTER — APPOINTMENT (OUTPATIENT)
Dept: GENERAL RADIOLOGY | Age: 79
End: 2024-07-26
Payer: MEDICARE

## 2024-07-26 ENCOUNTER — HOSPITAL ENCOUNTER (EMERGENCY)
Age: 79
Discharge: HOME | End: 2024-07-26
Attending: STUDENT IN AN ORGANIZED HEALTH CARE EDUCATION/TRAINING PROGRAM
Payer: MEDICARE

## 2024-07-26 ENCOUNTER — TELEPHONE (OUTPATIENT)
Dept: SURGERY | Age: 79
End: 2024-07-26

## 2024-07-26 VITALS
WEIGHT: 133 LBS | HEART RATE: 106 BPM | BODY MASS INDEX: 17.07 KG/M2 | RESPIRATION RATE: 18 BRPM | DIASTOLIC BLOOD PRESSURE: 61 MMHG | OXYGEN SATURATION: 97 % | SYSTOLIC BLOOD PRESSURE: 102 MMHG | TEMPERATURE: 98.8 F

## 2024-07-26 DIAGNOSIS — Z46.59 ENCOUNTER FOR FEEDING TUBE PLACEMENT: ICD-10-CM

## 2024-07-26 DIAGNOSIS — T85.528A JEJUNOSTOMY TUBE FELL OUT: Primary | ICD-10-CM

## 2024-07-26 LAB
ALBUMIN SERPL-MCNC: 3.2 G/DL (ref 3.4–5)
ALBUMIN/GLOB SERPL: 0.8 {RATIO} (ref 1.1–2.2)
ALP SERPL-CCNC: 101 U/L (ref 40–129)
ALT SERPL-CCNC: 18 U/L (ref 10–40)
ANION GAP SERPL CALCULATED.3IONS-SCNC: 9 MMOL/L (ref 3–16)
AST SERPL-CCNC: 28 U/L (ref 15–37)
BASOPHILS # BLD: 0 K/UL (ref 0–0.2)
BASOPHILS NFR BLD: 0.5 %
BILIRUB SERPL-MCNC: 0.8 MG/DL (ref 0–1)
BUN SERPL-MCNC: 18 MG/DL (ref 7–20)
CALCIUM SERPL-MCNC: 8.7 MG/DL (ref 8.3–10.6)
CHLORIDE SERPL-SCNC: 93 MMOL/L (ref 99–110)
CO2 SERPL-SCNC: 29 MMOL/L (ref 21–32)
CREAT SERPL-MCNC: <0.5 MG/DL (ref 0.8–1.3)
DEPRECATED RDW RBC AUTO: 16.7 % (ref 12.4–15.4)
EOSINOPHIL # BLD: 0 K/UL (ref 0–0.6)
EOSINOPHIL NFR BLD: 0.4 %
GFR SERPLBLD CREATININE-BSD FMLA CKD-EPI: >90 ML/MIN/{1.73_M2}
GLUCOSE SERPL-MCNC: 104 MG/DL (ref 70–99)
HCT VFR BLD AUTO: 35 % (ref 40.5–52.5)
HGB BLD-MCNC: 11.6 G/DL (ref 13.5–17.5)
LYMPHOCYTES # BLD: 0.4 K/UL (ref 1–5.1)
LYMPHOCYTES NFR BLD: 4.7 %
MCH RBC QN AUTO: 25.1 PG (ref 26–34)
MCHC RBC AUTO-ENTMCNC: 33 G/DL (ref 31–36)
MCV RBC AUTO: 76.1 FL (ref 80–100)
MONOCYTES # BLD: 0.6 K/UL (ref 0–1.3)
MONOCYTES NFR BLD: 7.5 %
NEUTROPHILS # BLD: 6.5 K/UL (ref 1.7–7.7)
NEUTROPHILS NFR BLD: 86.9 %
PLATELET # BLD AUTO: 156 K/UL (ref 135–450)
PMV BLD AUTO: 8.3 FL (ref 5–10.5)
POTASSIUM SERPL-SCNC: 5.3 MMOL/L (ref 3.5–5.1)
PROT SERPL-MCNC: 7 G/DL (ref 6.4–8.2)
RBC # BLD AUTO: 4.6 M/UL (ref 4.2–5.9)
SODIUM SERPL-SCNC: 131 MMOL/L (ref 136–145)
WBC # BLD AUTO: 7.5 K/UL (ref 4–11)

## 2024-07-26 PROCEDURE — 99284 EMERGENCY DEPT VISIT MOD MDM: CPT

## 2024-07-26 PROCEDURE — 85025 COMPLETE CBC W/AUTO DIFF WBC: CPT

## 2024-07-26 PROCEDURE — 43762 RPLC GTUBE NO REVJ TRC: CPT

## 2024-07-26 PROCEDURE — 80053 COMPREHEN METABOLIC PANEL: CPT

## 2024-07-26 PROCEDURE — 74018 RADEX ABDOMEN 1 VIEW: CPT

## 2024-07-26 RX ORDER — SODIUM CHLORIDE 9 MG/ML
1000 INJECTION, SOLUTION INTRAVENOUS CONTINUOUS
Status: DISCONTINUED | OUTPATIENT
Start: 2024-07-26 | End: 2024-07-26 | Stop reason: HOSPADM

## 2024-07-26 RX ORDER — 0.9 % SODIUM CHLORIDE 0.9 %
1000 INTRAVENOUS SOLUTION INTRAVENOUS ONCE
Status: DISCONTINUED | OUTPATIENT
Start: 2024-07-26 | End: 2024-07-26 | Stop reason: HOSPADM

## 2024-07-26 RX ADMIN — Medication 1000 ML: at 16:21

## 2024-07-26 NOTE — TELEPHONE ENCOUNTER
Spoke with pts wife.. she is very frustrated and trying to reach out to GI, she is still waiting a call from them.

## 2024-07-26 NOTE — DISCHARGE INSTRUCTIONS
Follow-up with your gastroenterologist.  Call in the morning.  Return immediately for any new complaints or worsening.

## 2024-07-26 NOTE — TELEPHONE ENCOUNTER
Pt wife called stating that the pts GJ tube fell out yesterday and he was taken to the ER.  She stated that they replaced the J tube and it is suppose to have an adapter in order to drain the bile.  The new tube does not have the adapter and she states that the ER \"rigged\" something up for them to get them thru the night.  She stated they did the best that they could but could not find anything to use as this adapter.  She is needing this adapter. Please advise.

## 2024-07-26 NOTE — ED NOTES
1603- Call placed to general surgery for consult  1607- Call returned by  and spoke with Shanelle VAN

## 2024-07-26 NOTE — ED NOTES
Pts bag for JG tube changed. Educated on how to drain and proper care around site. PA wanted a new set of vitals after pt had already been dc

## 2024-07-26 NOTE — ED PROVIDER NOTES
Emergency Department Attending Provider Note  Location: [facility] ED      Héctor Valverde was evaluated in the Emergency Department. Although initial history and physical exam information was obtained by Shanelle Puckett PA-C (who also dictated a record of this visit), I personally saw the patient and made/approved the management plan and take responsibility for the patient management.     Patient seen and evaluated.  Relevant records reviewed. Chronic medical conditions reviewed.    HPI: Patient is a 79-year-old male who presents for dislodgment of his J-tube.  Patient was seen by myself and TALIB yesterday for dislodgment of his G-tube which was replaced at that time.  States that this morning his wife was cleaning his J-tube and replacing the adhesive when it accidentally was removed.  Denies abdominal pain, nausea/vomiting, fever/chills or other associated symptoms as above.  Patient states he has no symptoms simply unable to replace his tube.  This was discussed with the surgeon who placed the J-tube, Dr. Lopez by the PA, see her note for details of the conversation however states that he recommends direct replacement with a 14 Prydeinig red rubber.     Physical Exam: Patient sitting up with no acute distress, nontoxic-appearing.  Mental status and speech are normal, moving all extremities equally without difficulty.  Even unlabored respirations without conversational dyspnea or other signs of respiratory distress.  Abdomen is soft, nontender without distention.  G-tube that was placed yesterday is still in place and appears clean without drainage or erythema.  Stoma of the J-tube that has been dislodged is also clean and without drainage or erythema.     MDM: In brief patient is a 79-year-old male who presented for dislodged J-tube and no other symptoms or complaints and after discussing it with the surgeon who placed the J-tube at his recommendations it was replaced with a 14 Prydeinig sutured in 
mLs IntraVENous Bolus from Bag 7/26/24 1621)   diatrizoate meglumine-sodium (GASTROGRAFIN) 66-10 % solution 30 mL (has no administration in time range)   0.9 % sodium chloride infusion ( IntraVENous Restarted 7/26/24 5268)             Is this patient to be included in the SEP-1 Core Measure due to severe sepsis or septic shock?   No   Exclusion criteria - the patient is NOT to be included for SEP-1 Core Measure due to:  Infection is not suspected    Chronic Conditions affecting care:    has a past medical history of Arthritis, Benign hypertrophy of prostate, Cancer ([facility]), COVID-19 (12/07/2020), Dry mouth, DVT, lower extremity ([facility]), GERD (gastroesophageal reflux disease), Hip fracture ([facility]), blood clots, Hyperlipidemia, Pneumonia (6/2/2011), Prolonged emergence from general anesthesia, and Thyroid disease.    CONSULTS: (Who and What was discussed)  IP CONSULT TO GENERAL SURGERY          CC/HPI Summary, DDx, ED Course, and Reassessment:       History obtained from patient and wife came in after his J-tube accidentally was pulled out about an hour ago.  This was originally placed about 3 weeks ago by Dr Lopez.  He was seen here yesterday for G-tube replacement after this had come out.  G-tube is currently in place.  He denies any new symptoms no abdominal pain no vomiting no fever.  Wife concerned that he may be dehydrated and requested IV fluids these were provided we also check basic labs.  He is stable.      Consulted Dr. Lopez recommends replacing J-tube here in the ER states to place a 14 [place] red rubber catheter and check placement with contrast and x-ray and he can be discharged.      J-tube replaced with red rubber catheter by Dr Morrow and placement confirmed by xray.       Patient is appropriate for discharge and to follow-up with his doctor.  Return to the ER for any worsening.      I am the Primary Clinician of Record.  FINAL IMPRESSION      1. Jejunostomy tube fell out    2. Encounter for feeding tube

## 2024-08-06 ENCOUNTER — APPOINTMENT (OUTPATIENT)
Dept: CT IMAGING | Age: 79
End: 2024-08-06
Payer: MEDICARE

## 2024-08-06 ENCOUNTER — APPOINTMENT (OUTPATIENT)
Dept: GENERAL RADIOLOGY | Age: 79
End: 2024-08-06
Payer: MEDICARE

## 2024-08-06 ENCOUNTER — HOSPITAL ENCOUNTER (INPATIENT)
Age: 79
LOS: 2 days | Discharge: HOME | End: 2024-08-08
Attending: EMERGENCY MEDICINE | Admitting: INTERNAL MEDICINE
Payer: MEDICARE

## 2024-08-06 DIAGNOSIS — N40.0 ENLARGED PROSTATE: ICD-10-CM

## 2024-08-06 DIAGNOSIS — E86.0 DEHYDRATION: ICD-10-CM

## 2024-08-06 DIAGNOSIS — I26.99 OTHER ACUTE PULMONARY EMBOLISM WITHOUT ACUTE COR PULMONALE (HCC): ICD-10-CM

## 2024-08-06 DIAGNOSIS — A41.9 SEPTICEMIA (HCC): ICD-10-CM

## 2024-08-06 DIAGNOSIS — J18.9 MULTIFOCAL PNEUMONIA: Primary | ICD-10-CM

## 2024-08-06 DIAGNOSIS — E87.1 HYPONATREMIA: ICD-10-CM

## 2024-08-06 LAB
ALBUMIN SERPL-MCNC: 3.5 G/DL (ref 3.4–5)
ALBUMIN/GLOB SERPL: 1 {RATIO} (ref 1.1–2.2)
ALP SERPL-CCNC: 101 U/L (ref 40–129)
ALT SERPL-CCNC: 13 U/L (ref 10–40)
ANION GAP SERPL CALCULATED.3IONS-SCNC: 5 MMOL/L (ref 3–16)
ANION GAP SERPL CALCULATED.3IONS-SCNC: 6 MMOL/L (ref 3–16)
AST SERPL-CCNC: 13 U/L (ref 15–37)
BACTERIA URNS QL MICRO: ABNORMAL /HPF
BASE EXCESS BLDV CALC-SCNC: 12.6 MMOL/L (ref -3–3)
BASOPHILS # BLD: 0.1 K/UL (ref 0–0.2)
BASOPHILS NFR BLD: 0.5 %
BILIRUB SERPL-MCNC: 0.7 MG/DL (ref 0–1)
BILIRUB UR QL STRIP.AUTO: NEGATIVE
BUN SERPL-MCNC: 23 MG/DL (ref 7–20)
BUN SERPL-MCNC: 30 MG/DL (ref 7–20)
CALCIUM SERPL-MCNC: 8.2 MG/DL (ref 8.3–10.6)
CALCIUM SERPL-MCNC: 8.8 MG/DL (ref 8.3–10.6)
CHLORIDE SERPL-SCNC: 82 MMOL/L (ref 99–110)
CHLORIDE SERPL-SCNC: 87 MMOL/L (ref 99–110)
CLARITY UR: CLEAR
CO2 BLDV-SCNC: 41 MMOL/L
CO2 SERPL-SCNC: 36 MMOL/L (ref 21–32)
CO2 SERPL-SCNC: 38 MMOL/L (ref 21–32)
COHGB MFR BLDV: 1.8 % (ref 0–1.5)
COLOR UR: YELLOW
CREAT SERPL-MCNC: 0.6 MG/DL (ref 0.8–1.3)
CREAT SERPL-MCNC: <0.5 MG/DL (ref 0.8–1.3)
D-DIMER QUANTITATIVE: 1.44 UG/ML FEU (ref 0–0.6)
DEPRECATED RDW RBC AUTO: 16.8 % (ref 12.4–15.4)
EKG ATRIAL RATE: 106 BPM
EKG DIAGNOSIS: NORMAL
EKG P AXIS: 69 DEGREES
EKG P-R INTERVAL: 144 MS
EKG Q-T INTERVAL: 360 MS
EKG QRS DURATION: 84 MS
EKG QTC CALCULATION (BAZETT): 478 MS
EKG R AXIS: 2 DEGREES
EKG T AXIS: 36 DEGREES
EKG VENTRICULAR RATE: 106 BPM
EOSINOPHIL # BLD: 0 K/UL (ref 0–0.6)
EOSINOPHIL NFR BLD: 0.5 %
EPI CELLS #/AREA URNS HPF: ABNORMAL /HPF (ref 0–5)
FLUAV RNA RESP QL NAA+PROBE: NOT DETECTED
FLUBV RNA RESP QL NAA+PROBE: NOT DETECTED
GFR SERPLBLD CREATININE-BSD FMLA CKD-EPI: >90 ML/MIN/{1.73_M2}
GFR SERPLBLD CREATININE-BSD FMLA CKD-EPI: >90 ML/MIN/{1.73_M2}
GLUCOSE SERPL-MCNC: 115 MG/DL (ref 70–99)
GLUCOSE SERPL-MCNC: 94 MG/DL (ref 70–99)
GLUCOSE UR STRIP.AUTO-MCNC: NEGATIVE MG/DL
HCO3 BLDV-SCNC: 38.8 MMOL/L (ref 23–29)
HCT VFR BLD AUTO: 35.6 % (ref 40.5–52.5)
HGB BLD-MCNC: 11.9 G/DL (ref 13.5–17.5)
HGB UR QL STRIP.AUTO: ABNORMAL
KETONES UR STRIP.AUTO-MCNC: NEGATIVE MG/DL
LACTATE BLDV-SCNC: 1.8 MMOL/L (ref 0.4–2)
LEUKOCYTE ESTERASE UR QL STRIP.AUTO: ABNORMAL
LYMPHOCYTES # BLD: 0.8 K/UL (ref 1–5.1)
LYMPHOCYTES NFR BLD: 8.2 %
MAGNESIUM SERPL-MCNC: 1.9 MG/DL (ref 1.8–2.4)
MAGNESIUM SERPL-MCNC: 2.3 MG/DL (ref 1.8–2.4)
MCH RBC QN AUTO: 25 PG (ref 26–34)
MCHC RBC AUTO-ENTMCNC: 33.5 G/DL (ref 31–36)
MCV RBC AUTO: 74.8 FL (ref 80–100)
METHGB MFR BLDV: 0.3 %
MONOCYTES # BLD: 0.8 K/UL (ref 0–1.3)
MONOCYTES NFR BLD: 7.8 %
NEUTROPHILS # BLD: 8.5 K/UL (ref 1.7–7.7)
NEUTROPHILS NFR BLD: 83 %
NITRITE UR QL STRIP.AUTO: NEGATIVE
NT-PROBNP SERPL-MCNC: 434 PG/ML (ref 0–449)
O2 CT VFR BLDV CALC: 13 VOL %
O2 THERAPY: ABNORMAL
OSMOLALITY SERPL: 286 MOSM/KG (ref 280–301)
OSMOLALITY UR: 499 MOSM/KG (ref 390–1070)
PCO2 BLDV: 56.4 MMHG (ref 40–50)
PH BLDV: 7.46 [PH] (ref 7.35–7.45)
PH UR STRIP.AUTO: 8 [PH] (ref 5–8)
PLATELET # BLD AUTO: 291 K/UL (ref 135–450)
PMV BLD AUTO: 7.4 FL (ref 5–10.5)
PO2 BLDV: 36.4 MMHG (ref 25–40)
POTASSIUM SERPL-SCNC: 3.3 MMOL/L (ref 3.5–5.1)
POTASSIUM SERPL-SCNC: 3.4 MMOL/L (ref 3.5–5.1)
PROT SERPL-MCNC: 7.1 G/DL (ref 6.4–8.2)
PROT UR STRIP.AUTO-MCNC: NEGATIVE MG/DL
RBC # BLD AUTO: 4.76 M/UL (ref 4.2–5.9)
RBC #/AREA URNS HPF: ABNORMAL /HPF (ref 0–4)
SAO2 % BLDV: 71 %
SARS-COV-2 RNA RESP QL NAA+PROBE: NOT DETECTED
SODIUM SERPL-SCNC: 126 MMOL/L (ref 136–145)
SODIUM SERPL-SCNC: 128 MMOL/L (ref 136–145)
SODIUM UR-SCNC: 46 MMOL/L
SP GR UR STRIP.AUTO: 1.01 (ref 1–1.03)
TROPONIN, HIGH SENSITIVITY: 22 NG/L (ref 0–22)
TROPONIN, HIGH SENSITIVITY: 23 NG/L (ref 0–22)
TROPONIN, HIGH SENSITIVITY: 27 NG/L (ref 0–22)
UA COMPLETE W REFLEX CULTURE PNL UR: YES
UA DIPSTICK W REFLEX MICRO PNL UR: YES
URN SPEC COLLECT METH UR: ABNORMAL
UROBILINOGEN UR STRIP-ACNC: 1 E.U./DL
WBC # BLD AUTO: 10.2 K/UL (ref 4–11)
WBC #/AREA URNS HPF: ABNORMAL /HPF (ref 0–5)

## 2024-08-06 PROCEDURE — 83735 ASSAY OF MAGNESIUM: CPT

## 2024-08-06 PROCEDURE — 99223 1ST HOSP IP/OBS HIGH 75: CPT | Performed by: INTERNAL MEDICINE

## 2024-08-06 PROCEDURE — 99285 EMERGENCY DEPT VISIT HI MDM: CPT

## 2024-08-06 PROCEDURE — 87186 SC STD MICRODIL/AGAR DIL: CPT

## 2024-08-06 PROCEDURE — 2580000003 HC RX 258: Performed by: INTERNAL MEDICINE

## 2024-08-06 PROCEDURE — 2060000000 HC ICU INTERMEDIATE R&B

## 2024-08-06 PROCEDURE — 93010 ELECTROCARDIOGRAM REPORT: CPT | Performed by: INTERNAL MEDICINE

## 2024-08-06 PROCEDURE — 2580000003 HC RX 258: Performed by: EMERGENCY MEDICINE

## 2024-08-06 PROCEDURE — 6360000002 HC RX W HCPCS: Performed by: EMERGENCY MEDICINE

## 2024-08-06 PROCEDURE — 83880 ASSAY OF NATRIURETIC PEPTIDE: CPT

## 2024-08-06 PROCEDURE — 87086 URINE CULTURE/COLONY COUNT: CPT

## 2024-08-06 PROCEDURE — 71045 X-RAY EXAM CHEST 1 VIEW: CPT

## 2024-08-06 PROCEDURE — 83930 ASSAY OF BLOOD OSMOLALITY: CPT

## 2024-08-06 PROCEDURE — 74177 CT ABD & PELVIS W/CONTRAST: CPT

## 2024-08-06 PROCEDURE — 83605 ASSAY OF LACTIC ACID: CPT

## 2024-08-06 PROCEDURE — 36415 COLL VENOUS BLD VENIPUNCTURE: CPT

## 2024-08-06 PROCEDURE — 2580000003 HC RX 258

## 2024-08-06 PROCEDURE — 81001 URINALYSIS AUTO W/SCOPE: CPT

## 2024-08-06 PROCEDURE — 93005 ELECTROCARDIOGRAM TRACING: CPT | Performed by: EMERGENCY MEDICINE

## 2024-08-06 PROCEDURE — 87040 BLOOD CULTURE FOR BACTERIA: CPT

## 2024-08-06 PROCEDURE — 6360000004 HC RX CONTRAST MEDICATION: Performed by: EMERGENCY MEDICINE

## 2024-08-06 PROCEDURE — 85025 COMPLETE CBC W/AUTO DIFF WBC: CPT

## 2024-08-06 PROCEDURE — 85379 FIBRIN DEGRADATION QUANT: CPT

## 2024-08-06 PROCEDURE — 71260 CT THORAX DX C+: CPT

## 2024-08-06 PROCEDURE — 80053 COMPREHEN METABOLIC PANEL: CPT

## 2024-08-06 PROCEDURE — 87449 NOS EACH ORGANISM AG IA: CPT

## 2024-08-06 PROCEDURE — 84300 ASSAY OF URINE SODIUM: CPT

## 2024-08-06 PROCEDURE — 82803 BLOOD GASES ANY COMBINATION: CPT

## 2024-08-06 PROCEDURE — 83935 ASSAY OF URINE OSMOLALITY: CPT

## 2024-08-06 PROCEDURE — 6360000002 HC RX W HCPCS

## 2024-08-06 PROCEDURE — 87077 CULTURE AEROBIC IDENTIFY: CPT

## 2024-08-06 PROCEDURE — 87636 SARSCOV2 & INF A&B AMP PRB: CPT

## 2024-08-06 PROCEDURE — 84484 ASSAY OF TROPONIN QUANT: CPT

## 2024-08-06 RX ORDER — SODIUM CHLORIDE, SODIUM LACTATE, POTASSIUM CHLORIDE, AND CALCIUM CHLORIDE .6; .31; .03; .02 G/100ML; G/100ML; G/100ML; G/100ML
1000 INJECTION, SOLUTION INTRAVENOUS ONCE
Status: COMPLETED | OUTPATIENT
Start: 2024-08-06 | End: 2024-08-06

## 2024-08-06 RX ORDER — ONDANSETRON 4 MG/1
4 TABLET, ORALLY DISINTEGRATING ORAL EVERY 8 HOURS PRN
Status: DISCONTINUED | OUTPATIENT
Start: 2024-08-06 | End: 2024-08-08 | Stop reason: HOSPADM

## 2024-08-06 RX ORDER — 0.9 % SODIUM CHLORIDE 0.9 %
500 INTRAVENOUS SOLUTION INTRAVENOUS ONCE
Status: COMPLETED | OUTPATIENT
Start: 2024-08-06 | End: 2024-08-06

## 2024-08-06 RX ORDER — SODIUM CHLORIDE 9 MG/ML
INJECTION, SOLUTION INTRAVENOUS PRN
Status: DISCONTINUED | OUTPATIENT
Start: 2024-08-06 | End: 2024-08-08 | Stop reason: HOSPADM

## 2024-08-06 RX ORDER — ACETAMINOPHEN 325 MG/1
650 TABLET ORAL EVERY 6 HOURS PRN
Status: DISCONTINUED | OUTPATIENT
Start: 2024-08-06 | End: 2024-08-08 | Stop reason: HOSPADM

## 2024-08-06 RX ORDER — SODIUM CHLORIDE 0.9 % (FLUSH) 0.9 %
5-40 SYRINGE (ML) INJECTION EVERY 12 HOURS SCHEDULED
Status: DISCONTINUED | OUTPATIENT
Start: 2024-08-06 | End: 2024-08-08 | Stop reason: HOSPADM

## 2024-08-06 RX ORDER — ENOXAPARIN SODIUM 100 MG/ML
1 INJECTION SUBCUTANEOUS 2 TIMES DAILY
Status: DISCONTINUED | OUTPATIENT
Start: 2024-08-06 | End: 2024-08-07

## 2024-08-06 RX ORDER — POLYETHYLENE GLYCOL 3350 17 G/17G
17 POWDER, FOR SOLUTION ORAL DAILY PRN
Status: DISCONTINUED | OUTPATIENT
Start: 2024-08-06 | End: 2024-08-08 | Stop reason: HOSPADM

## 2024-08-06 RX ORDER — SODIUM CHLORIDE 0.9 % (FLUSH) 0.9 %
10 SYRINGE (ML) INJECTION PRN
Status: DISCONTINUED | OUTPATIENT
Start: 2024-08-06 | End: 2024-08-08 | Stop reason: HOSPADM

## 2024-08-06 RX ORDER — POTASSIUM CHLORIDE 7.45 MG/ML
10 INJECTION INTRAVENOUS PRN
Status: DISCONTINUED | OUTPATIENT
Start: 2024-08-06 | End: 2024-08-08 | Stop reason: HOSPADM

## 2024-08-06 RX ORDER — POTASSIUM CHLORIDE 20 MEQ/1
40 TABLET, EXTENDED RELEASE ORAL PRN
Status: DISCONTINUED | OUTPATIENT
Start: 2024-08-06 | End: 2024-08-08 | Stop reason: HOSPADM

## 2024-08-06 RX ORDER — SODIUM CHLORIDE 9 MG/ML
INJECTION, SOLUTION INTRAVENOUS CONTINUOUS
Status: DISCONTINUED | OUTPATIENT
Start: 2024-08-06 | End: 2024-08-06

## 2024-08-06 RX ORDER — ACETAMINOPHEN 650 MG/1
650 SUPPOSITORY RECTAL EVERY 6 HOURS PRN
Status: DISCONTINUED | OUTPATIENT
Start: 2024-08-06 | End: 2024-08-08 | Stop reason: HOSPADM

## 2024-08-06 RX ORDER — MAGNESIUM SULFATE 1 G/100ML
1000 INJECTION INTRAVENOUS PRN
Status: DISCONTINUED | OUTPATIENT
Start: 2024-08-06 | End: 2024-08-08 | Stop reason: HOSPADM

## 2024-08-06 RX ORDER — ONDANSETRON 2 MG/ML
4 INJECTION INTRAMUSCULAR; INTRAVENOUS EVERY 6 HOURS PRN
Status: DISCONTINUED | OUTPATIENT
Start: 2024-08-06 | End: 2024-08-08 | Stop reason: HOSPADM

## 2024-08-06 RX ADMIN — CEFEPIME 2000 MG: 2 INJECTION, POWDER, FOR SOLUTION INTRAVENOUS at 17:40

## 2024-08-06 RX ADMIN — SODIUM CHLORIDE 500 ML: 9 INJECTION, SOLUTION INTRAVENOUS at 13:17

## 2024-08-06 RX ADMIN — IOPAMIDOL 75 ML: 755 INJECTION, SOLUTION INTRAVENOUS at 13:19

## 2024-08-06 RX ADMIN — SODIUM CHLORIDE, POTASSIUM CHLORIDE, SODIUM LACTATE AND CALCIUM CHLORIDE 1000 ML: 600; 310; 30; 20 INJECTION, SOLUTION INTRAVENOUS at 15:07

## 2024-08-06 RX ADMIN — VANCOMYCIN HYDROCHLORIDE 1500 MG: 10 INJECTION, POWDER, LYOPHILIZED, FOR SOLUTION INTRAVENOUS at 17:33

## 2024-08-06 RX ADMIN — SODIUM CHLORIDE 500 ML: 9 INJECTION, SOLUTION INTRAVENOUS at 12:03

## 2024-08-06 RX ADMIN — VASOPRESSIN: 20 INJECTION, SOLUTION INTRAVENOUS at 17:51

## 2024-08-06 RX ADMIN — ENOXAPARIN SODIUM 60 MG: 100 INJECTION SUBCUTANEOUS at 20:12

## 2024-08-06 RX ADMIN — SODIUM CHLORIDE 1000 MG: 900 INJECTION INTRAVENOUS at 15:42

## 2024-08-06 ASSESSMENT — ENCOUNTER SYMPTOMS
DIARRHEA: 0
SHORTNESS OF BREATH: 0
ABDOMINAL PAIN: 0
NAUSEA: 0
VOMITING: 0

## 2024-08-06 ASSESSMENT — LIFESTYLE VARIABLES
HOW MANY STANDARD DRINKS CONTAINING ALCOHOL DO YOU HAVE ON A TYPICAL DAY: PATIENT DOES NOT DRINK
HOW OFTEN DO YOU HAVE A DRINK CONTAINING ALCOHOL: NEVER
HOW OFTEN DO YOU HAVE A DRINK CONTAINING ALCOHOL: NEVER
HOW MANY STANDARD DRINKS CONTAINING ALCOHOL DO YOU HAVE ON A TYPICAL DAY: PATIENT DOES NOT DRINK

## 2024-08-06 ASSESSMENT — PAIN - FUNCTIONAL ASSESSMENT: PAIN_FUNCTIONAL_ASSESSMENT: NONE - DENIES PAIN

## 2024-08-06 NOTE — PROGRESS NOTES
Multiple attempts with ultrasound guidance to establish a second peripheral line due to incompatibility of medication ordered. All attempts failed. Orders obtained for a midline. Patient has one peripheral line established at this time. IV antibiotics administered - once completed. IV fluids will be initiated. Clinical and charge updated. Midline expected to be placed in the morning. Patient and spouse informed.

## 2024-08-06 NOTE — H&P
Hospital Medicine History & Physical      PCP: Suraj De La Torre DO    Date of Admission: 8/6/2024    Date of Service: Pt seen/examined on 08/06/24     Chief Complaint:    Chief Complaint   Patient presents with    Fatigue     Pt arrives via EMS from home for fatigue for a significant amount of time. Pt with hx throat CA 17 years ago, slightly difficult to understand in triage. Pt reports he is unable to get up out of bed.          History Of Present Illness:      The patient is a 79 y.o. male with PMH of BPH, head and neck cancer, DVT, GERD, HLD, thyroid disease, and dysphagia s/p J tube placement who presented to [facility] ED with complaint of fatigue. Pt states that he has been having increased fatigue as well as generalized weakness for the past few days. He states that he has been in and out of the hospital lately and his J tube has helped him be less nauseous with tube feeds. He has a G tube and J tube in place. He states that he has been out of breath quicker with exertion than he normally is. He states that he was unable to get out of bed this morning he felt so weak.     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    COVID-19 12/07/2020    Dry mouth     s/p radiation treatment    DVT, lower extremity (HCC)     5/2011    GERD (gastroesophageal reflux disease)     acid reflux    Hip fracture (HCC)     5/2011, RIGHT    Hx of blood clots     Hyperlipidemia     Pneumonia 6/2/2011    Prolonged emergence from general anesthesia     Thyroid disease        Past Surgical History:        Procedure Laterality Date    ABDOMEN SURGERY      gallbladder removed    CHOLECYSTECTOMY      COLONOSCOPY      EYE SURGERY Right     TORN RETINA, LASER    HERNIA REPAIR      HIP ARTHROPLASTY Right 08/08/2016    HIP FRACTURE SURGERY Right     JOINT REPLACEMENT      OTHER SURGICAL HISTORY  05/18/2011    right hip IM nailing    SHOULDER SURGERY Right  bilateral pleural effusions.   4. Irregular opacities at the lung apices and along the periphery of the   upper lobes are similar to the most recent comparison although increased from   12/07/2020, and most suggestive of scarring/fibrosis.  Continued follow-up is   recommended.   5. No acute process in the abdomen or pelvis.   6. Moderate to severe enlargement of the prostate.   7. Bilateral pars defects at L5-S1 with grade 2 anterolisthesis.   Critical results were called by Dr. Baldomero Sarmiento MD to Dr. Albarran on   8/6/2024 at 14:31.         XR CHEST PORTABLE   Final Result   Stable examination with areas of scarring and fibrosis.  Findings of COPD and   emphysema.      No new acute focal airspace opacity.               I BC YBARRA MD have reviewed the chart on Héctor Valverde and personally interviewed and examined patient, reviewed the data (labs and imaging) and after discussion with my PA formulated the plan.  Agree with note with the following edits.    HPI:     79 year old white male who was just discharged from the hospital recently after he had a J tube inserted. He came to the ER for weakness and fatigue. Work up showed tachycardia and elevated d dimer. CTPA showed acute pulmonary embolism. He has noted dyspnea at home. He is on room air at present.    I reviewed the patient's Past Medical History, Past Surgical History, Medications, and Allergies.     Physical exam:    BP (!) 92/55   Pulse 87   Temp 98.1 °F (36.7 °C) (Oral)   Resp 21   Ht 1.88 m (6' 2\")   Wt 65 kg (143 lb 3.2 oz)   SpO2 95%   BMI 18.39 kg/m²     Gen: No distress. Alert.   Eyes: PERRL. No sclera icterus. No conjunctival injection.   ENT: No discharge. Pharynx clear.   Neck: Trachea midline. Normal thyroid.   Resp: No accessory muscle use. No crackles. No wheezes. No rhonchi. No dullness on percussion.  CV: Regular rate. Regular rhythm. No murmur or rub. No edema.   GI: Non-tender. Non-distended. No masses. No

## 2024-08-06 NOTE — PROGRESS NOTES
4 Eyes Skin Assessment     NAME:  Héctro Valverde  YOB: 1945  MEDICAL RECORD NUMBER:  [id number]    The patient is being assessed for  Admission    I agree that at least one RN has performed a thorough Head to Toe Skin Assessment on the patient. ALL assessment sites listed below have been assessed.      Areas assessed by both nurses:    Head, Face, Ears, Shoulders, Back, Chest, Arms, Elbows, Hands, Sacrum. Buttock, Coccyx, Ischium, Legs. Feet and Heels, and Under Medical Devices     Redness/irritation from G-tube site from gastric drainage of contents. J-tube site has a dressing that does not get changed until Thursday. At this time unable to see what the skin looks like underneath.     Stage 2 to coccyx. Purplish skin in color to sacrum, barely blanchable.     Scattered bruising and abrasions.          Does the Patient have a Wound? Yes wound(s) were present on assessment. LDA wound assessment was Initiated and completed by RN       Eugene Prevention initiated by RN: Yes  Wound Care Orders initiated by RN: Yes    Pressure Injury (Stage 3,4, Unstageable, DTI, NWPT, and Complex wounds) if present, place Wound referral order by RN under : No    New Ostomies, if present place, Ostomy referral order under : No     Nurse 1 eSignature: Electronically signed by Mohamud Sanchez RN on 8/6/24 at 4:36 PM EDT    **SHARE this note so that the co-signing nurse can place an eSignature**    Nurse 2 eSignature: {Esignature:[id number]}

## 2024-08-06 NOTE — ED PROVIDER NOTES
[facility] ED  EMERGENCY DEPARTMENT ENCOUNTER        Pt Name: Héctor Valverde  MRN: [id number]  Birthdate 1945  Date of evaluation: 8/6/2024  Provider: Madhu Albarran MD  PCP: Suraj De La Torre DO      CHIEF COMPLAINT       Chief Complaint   Patient presents with    Fatigue     Pt arrives via EMS from home for fatigue for a significant amount of time. Pt with hx throat CA 17 years ago, slightly difficult to understand in triage. Pt reports he is unable to get up out of bed.        HISTORY OFPRESENT ILLNESS   (Location/Symptom, Timing/Onset, Context/Setting, Quality, Duration, Modifying Factors,Severity)  Note limiting factors.     Héctor Valverde is a 79 y.o. male presenting today due to concern for increased fatigue recently along with generalized weakness.  He states he feels like he is dehydrated.  He has a prior history of throat cancer needing chemotherapy and radiation and states that was 13 years ago.  I was overall able to understand his voice and had a good discussion with him.  He denies any new unilateral numbness or weakness.  He reports chronic weakness of the right leg but denies anything out of the ordinary today.  He denies any headache.  No chest pain or shortness of breath.  No abdominal pain.  He has a J-tube and a G-tube in place.  He denies any diarrhea.  No fever.  No reported falls or trauma.  Due to concern for increased fatigue and generalized weakness, he came to the emergency department by EMS for further evaluation.  He does have a history of DVTs but is not on any blood thinners currently.        REVIEW OF SYSTEMS    (2-9 systems for level 4, 10 or more for level 5)     Review of Systems   Constitutional:  Positive for fatigue. Negative for fever.   Respiratory:  Negative for shortness of breath.    Cardiovascular:  Negative for chest pain.   Gastrointestinal:  Negative for abdominal pain, diarrhea, nausea and vomiting.   Genitourinary:  Negative for  couple of days associated with generalized weakness and fatigue.  He reports chronic weakness of the right leg but no new changes and story not suggestive of TIA/stroke.  He does report some increased trouble with walking recently.  He feels like he is dehydrated therefore I did order some IV fluids to see if this helps.  Otherwise, we will check basic labs to assess for any other causes for his symptoms.  He denies any chest pain and story not suggestive of acute coronary syndrome or pulmonary embolism.  He was tachycardic and does have a history of DVT although at this time I do not suspect pulmonary embolism.    He was tachycardic on arrival and therefore I did order a D-dimer which was positive and therefore CT of the chest and abdomen were obtained showing concern for acute pulmonary embolism along with worsening bilateral multifocal pneumonia.  I did start the patient on IV antibiotics and he will need further evaluation in the hospital.  He was also borderline hypoxic when I went to reevaluate him at about 89% and therefore I did order some nasal cannula oxygen as well.  He was stable for admission and agreed with this plan.  I did speak to pharmacy and they felt that 5 mg oral Eliquis was reasonable for new pulmonary embolism since he has a history of blood clots.        Exclusion criteria - the patient is NOT to be included for SEP-1 Core Measure due to:  May have criteria for sepsis, but does not meet criteria for severe sepsis or septic shock     I was the primary provider for the patient.      The patient tolerated their visit well.   The patient and / or the family were informed of the results of any tests, a time was given to answer questions.    FINAL IMPRESSION      1. Multifocal pneumonia    2. Hyponatremia    3. Dehydration    4. Other acute pulmonary embolism without acute cor pulmonale (HCC)    5. Septicemia (HCC)          DISPOSITION/PLAN   DISPOSITION Decision To Admit 08/06/2024 02:31:56 PM-

## 2024-08-06 NOTE — FLOWSHEET NOTE
08/06/24 1555   Vital Signs   Temp 98.4 °F (36.9 °C)   Temp Source Oral   Pulse (!) 104   Heart Rate Source Monitor   Respirations 16   /66   MAP (Calculated) 79   BP Location Right upper arm   Patient Position Semi fowlers   Pain Assessment   Pain Assessment None - Denies Pain   Oxygen Therapy   SpO2 93 %   O2 Device None (Room air)   Height and Weight   Height 1.88 m (6' 2\")   Weight - Scale 65 kg (143 lb 3.2 oz)   Weight Method Bed scale   BSA (Calculated - sq m) 1.84 sq meters   BMI (Calculated) 18.4       Patient admitted to room 310 from ER bed 3. Patient oriented to room, call light, bed rails, phone, lights and bathroom. Patient instructed about the schedule of the day including: vital sign frequency, lab draws, possible tests, frequency of MD and staff rounds, daily weights, I &O's and prescribed diet. Mxtel 42 Telemetry box in place, patient aware of placement and reason. Bed locked, in lowest position, side rails up 2/4, call light within reach.        Recliner Assessment  Patient is not able to demonstrate the ability to move from a reclining position to an upright position within the recliner due to generalized weakness.

## 2024-08-06 NOTE — CONSULTS
Pharmacy to dose IV Vanco - nosocomial PNA x7 days  Please give a 1,500mg IV loading dose to provide Gram+ organism coverage to include MRSA.  Continue with 1g IV Q12hrs starting in the AM. Trough check 8/8 at 0500.  Pred , tr 13.4 if renal function remains stable.  Gutierrez Rivera RPH PharmD 8/6/2024 4:38 PM

## 2024-08-06 NOTE — PROGRESS NOTES
Bedside report and transfer of care given to DES Ma. Pt currently resting in bed with the call light within reach. Pt denies any other care needs at this time. Pt stable at this time.

## 2024-08-06 NOTE — PROGRESS NOTES
Consult has been called to Dr. Solorzano on 8/6/24. Spoke with Chantale. 4:23 PM    Sindhu Machuca  8/6/2024

## 2024-08-06 NOTE — PROGRESS NOTES
Consult has been called to Dr. Miranda on 8/6/24. Spoke with Ferny. 4:22 PM    Sindhu Machuca  8/6/2024

## 2024-08-07 ENCOUNTER — APPOINTMENT (OUTPATIENT)
Dept: INTERVENTIONAL RADIOLOGY/VASCULAR | Age: 79
End: 2024-08-07
Payer: MEDICARE

## 2024-08-07 PROBLEM — E43 SEVERE PROTEIN-CALORIE MALNUTRITION (HCC): Status: ACTIVE | Noted: 2020-08-27

## 2024-08-07 LAB
ANION GAP SERPL CALCULATED.3IONS-SCNC: 10 MMOL/L (ref 3–16)
ANION GAP SERPL CALCULATED.3IONS-SCNC: 11 MMOL/L (ref 3–16)
ANION GAP SERPL CALCULATED.3IONS-SCNC: 5 MMOL/L (ref 3–16)
BASOPHILS # BLD: 0 K/UL (ref 0–0.2)
BASOPHILS NFR BLD: 0.5 %
BUN SERPL-MCNC: 20 MG/DL (ref 7–20)
BUN SERPL-MCNC: 22 MG/DL (ref 7–20)
BUN SERPL-MCNC: 22 MG/DL (ref 7–20)
CALCIUM SERPL-MCNC: 8 MG/DL (ref 8.3–10.6)
CALCIUM SERPL-MCNC: 8.1 MG/DL (ref 8.3–10.6)
CALCIUM SERPL-MCNC: 8.4 MG/DL (ref 8.3–10.6)
CHLORIDE SERPL-SCNC: 91 MMOL/L (ref 99–110)
CHLORIDE SERPL-SCNC: 96 MMOL/L (ref 99–110)
CHLORIDE SERPL-SCNC: 97 MMOL/L (ref 99–110)
CO2 SERPL-SCNC: 31 MMOL/L (ref 21–32)
CO2 SERPL-SCNC: 32 MMOL/L (ref 21–32)
CO2 SERPL-SCNC: 34 MMOL/L (ref 21–32)
CREAT SERPL-MCNC: <0.5 MG/DL (ref 0.8–1.3)
DEPRECATED RDW RBC AUTO: 17.5 % (ref 12.4–15.4)
EOSINOPHIL # BLD: 0.2 K/UL (ref 0–0.6)
EOSINOPHIL NFR BLD: 2.9 %
GFR SERPLBLD CREATININE-BSD FMLA CKD-EPI: >90 ML/MIN/{1.73_M2}
GLUCOSE SERPL-MCNC: 86 MG/DL (ref 70–99)
GLUCOSE SERPL-MCNC: 91 MG/DL (ref 70–99)
GLUCOSE SERPL-MCNC: 92 MG/DL (ref 70–99)
HCT VFR BLD AUTO: 29.2 % (ref 40.5–52.5)
HGB BLD-MCNC: 9.7 G/DL (ref 13.5–17.5)
LEGIONELLA AG UR QL: NORMAL
LYMPHOCYTES # BLD: 0.7 K/UL (ref 1–5.1)
LYMPHOCYTES NFR BLD: 10.7 %
MAGNESIUM SERPL-MCNC: 1.9 MG/DL (ref 1.8–2.4)
MAGNESIUM SERPL-MCNC: 2.1 MG/DL (ref 1.8–2.4)
MAGNESIUM SERPL-MCNC: 2.1 MG/DL (ref 1.8–2.4)
MCH RBC QN AUTO: 25.1 PG (ref 26–34)
MCHC RBC AUTO-ENTMCNC: 33 G/DL (ref 31–36)
MCV RBC AUTO: 76.1 FL (ref 80–100)
MONOCYTES # BLD: 0.6 K/UL (ref 0–1.3)
MONOCYTES NFR BLD: 9 %
NEUTROPHILS # BLD: 5 K/UL (ref 1.7–7.7)
NEUTROPHILS NFR BLD: 76.9 %
PLATELET # BLD AUTO: 255 K/UL (ref 135–450)
PMV BLD AUTO: 7.4 FL (ref 5–10.5)
POTASSIUM SERPL-SCNC: 3.3 MMOL/L (ref 3.5–5.1)
POTASSIUM SERPL-SCNC: 3.5 MMOL/L (ref 3.5–5.1)
POTASSIUM SERPL-SCNC: 3.5 MMOL/L (ref 3.5–5.1)
PROCALCITONIN SERPL IA-MCNC: 0.08 NG/ML (ref 0–0.15)
RBC # BLD AUTO: 3.84 M/UL (ref 4.2–5.9)
S PNEUM AG UR QL: NORMAL
SODIUM SERPL-SCNC: 130 MMOL/L (ref 136–145)
SODIUM SERPL-SCNC: 138 MMOL/L (ref 136–145)
SODIUM SERPL-SCNC: 139 MMOL/L (ref 136–145)
WBC # BLD AUTO: 6.6 K/UL (ref 4–11)

## 2024-08-07 PROCEDURE — 6360000002 HC RX W HCPCS: Performed by: INTERNAL MEDICINE

## 2024-08-07 PROCEDURE — 84145 PROCALCITONIN (PCT): CPT

## 2024-08-07 PROCEDURE — 97530 THERAPEUTIC ACTIVITIES: CPT

## 2024-08-07 PROCEDURE — 87449 NOS EACH ORGANISM AG IA: CPT

## 2024-08-07 PROCEDURE — 80048 BASIC METABOLIC PNL TOTAL CA: CPT

## 2024-08-07 PROCEDURE — 36415 COLL VENOUS BLD VENIPUNCTURE: CPT

## 2024-08-07 PROCEDURE — 2580000003 HC RX 258: Performed by: INTERNAL MEDICINE

## 2024-08-07 PROCEDURE — 99223 1ST HOSP IP/OBS HIGH 75: CPT | Performed by: INTERNAL MEDICINE

## 2024-08-07 PROCEDURE — 83735 ASSAY OF MAGNESIUM: CPT

## 2024-08-07 PROCEDURE — 6360000002 HC RX W HCPCS

## 2024-08-07 PROCEDURE — 99233 SBSQ HOSP IP/OBS HIGH 50: CPT | Performed by: INTERNAL MEDICINE

## 2024-08-07 PROCEDURE — 97165 OT EVAL LOW COMPLEX 30 MIN: CPT

## 2024-08-07 PROCEDURE — 85025 COMPLETE CBC W/AUTO DIFF WBC: CPT

## 2024-08-07 PROCEDURE — 2060000000 HC ICU INTERMEDIATE R&B

## 2024-08-07 PROCEDURE — 97110 THERAPEUTIC EXERCISES: CPT

## 2024-08-07 PROCEDURE — 2580000003 HC RX 258

## 2024-08-07 PROCEDURE — 97162 PT EVAL MOD COMPLEX 30 MIN: CPT

## 2024-08-07 RX ORDER — AMOXICILLIN AND CLAVULANATE POTASSIUM 250; 62.5 MG/5ML; MG/5ML
500 POWDER, FOR SUSPENSION ORAL EVERY 8 HOURS
Status: DISCONTINUED | OUTPATIENT
Start: 2024-08-07 | End: 2024-08-07

## 2024-08-07 RX ORDER — AMOXICILLIN AND CLAVULANATE POTASSIUM 250; 62.5 MG/5ML; MG/5ML
500 POWDER, FOR SUSPENSION ORAL EVERY 8 HOURS
Status: DISCONTINUED | OUTPATIENT
Start: 2024-08-07 | End: 2024-08-08 | Stop reason: HOSPADM

## 2024-08-07 RX ADMIN — CEFEPIME 2000 MG: 2 INJECTION, POWDER, FOR SOLUTION INTRAVENOUS at 00:30

## 2024-08-07 RX ADMIN — SODIUM CHLORIDE, PRESERVATIVE FREE 10 ML: 5 INJECTION INTRAVENOUS at 09:37

## 2024-08-07 RX ADMIN — ENOXAPARIN SODIUM 60 MG: 100 INJECTION SUBCUTANEOUS at 09:37

## 2024-08-07 RX ADMIN — VANCOMYCIN HYDROCHLORIDE 1000 MG: 1 INJECTION, POWDER, LYOPHILIZED, FOR SOLUTION INTRAVENOUS at 05:55

## 2024-08-07 RX ADMIN — CEFEPIME 2000 MG: 2 INJECTION, POWDER, FOR SOLUTION INTRAVENOUS at 14:14

## 2024-08-07 RX ADMIN — PIPERACILLIN AND TAZOBACTAM 3375 MG: 3; .375 INJECTION, POWDER, LYOPHILIZED, FOR SOLUTION INTRAVENOUS at 23:27

## 2024-08-07 ASSESSMENT — PAIN SCALES - GENERAL
PAINLEVEL_OUTOF10: 0

## 2024-08-07 NOTE — PROGRESS NOTES
Comprehensive Nutrition Assessment    Type and Reason for Visit:  Initial, Positive Nutrition Screen, Consult (home TF)    Nutrition Recommendations/Plan:   Continue NPO   Start TF of Jevity 1.5 @ 65 ml/hr x 20 hr with 200 ml water flush q 6 hr  Monitor weight ( bed zeroed while RD in room)  and BM'S may need formula change if excessive loose stool occurs, consult RD TO EVALUATE      Malnutrition Assessment:  Malnutrition Status:  Severe malnutrition (08/07/24 1251)    Context:  Chronic Illness     Findings of the 6 clinical characteristics of malnutrition:  Energy Intake:  No significant decrease in energy intake  Weight Loss:  No significant weight loss     Body Fat Loss:  Severe body fat loss Buccal region, Orbital   Muscle Mass Loss:  Severe muscle mass loss Temples (temporalis), Clavicles (pectoralis & deltoids)  Fluid Accumulation:  Unable to assess     Strength:  Not Performed    Nutrition Assessment:    Pt. severely malnourished AEB sever muscle and fat wasting d/t head and neck cancer with PEG place 7/3/24 d/t dysphagia .  At risk for further nutritional compromise r/t NPO and dependence on TF to meet 100% of needs and altered nutritional related labs .  Will continue NPO and initiation TF of Jevity 1.5  at 65 ml /hr and water flush f 200 mls q 6 hr. .     Nutrition Related Findings:      Wound Type: Pressure Injury, Stage I       Current Nutrition Intake & Therapies:    Average Meal Intake: NPO  Average Supplements Intake: NPO  Diet NPO  ADULT TUBE FEEDING; PEJ; Standard with Fiber; Continuous; 65; No; 200; Q 6 hours    Anthropometric Measures:  Height: 188 cm (6' 2.02\")  Ideal Body Weight (IBW): 190 lbs (86 kg)    Admission Body Weight: 64.9 kg (143 lb)  Current Body Weight: 64.9 kg (143 lb), 75.3 % IBW. Weight Source: Bed Scale  Current BMI (kg/m2): 18.4  Usual Body Weight: 60.3 kg (133 lb)  % Weight Change (Calculated): 7.5                    BMI Categories: Underweight (BMI less than 22) age over

## 2024-08-07 NOTE — PROGRESS NOTES
Patients J tube clogged, multiple failed attempts. Dr. Francis has been contacted and Surgery Consult has been placed.

## 2024-08-07 NOTE — PROGRESS NOTES
Vancomycin Day: 2 of 7  Current Regimen: 1000 mg IV every 12 hours    Patient's labs, cultures, vitals, and vancomycin regimen reviewed.   SCr stable  U/O adequate (> 0.5 to 1.0 mL/kg/hr)  [facility] updated    Plan:   No change today. Next trough 8/8 @ 0500. Pharmacy will continue to monitor.      Rica Holguin, Phyllis, [facility], 8/7/2024 8:00 AM

## 2024-08-07 NOTE — CONSULTS
[facility]                                 Pulmonary/Critical care  Consult /Progress Note :                                                                  CC :cough ,fatigue   Patient is being seen at the request of Dr Xavier  for a consultation for PE    HISTORY OF PRESENT ILLNESS:   Patient is  79 y.o. male with PMH of BPH, head and neck cancer,smoke only for few years and has history of  DVT,she has dysphagia s/p J tube placement who [place] [facility] [place] with complaint of fatigue. Pt states that he has been having increased fatigue as well as generalized weakness for the past few days with worsening SOB along with PETERSON and some pleuritic pain . He states that he has been in and out of the hospital lately and his J tube has helped him be less nauseous with tube feeds. He has a G tube and J tube in place.    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    COVID-19 12/07/2020    Dry mouth     s/p radiation treatment    DVT, lower extremity (HCC)     5/2011    GERD (gastroesophageal reflux disease)     acid reflux    Hip fracture (HCC)     5/2011, RIGHT    Hx of blood clots     Hyperlipidemia     Pneumonia 6/2/2011    Prolonged emergence from general anesthesia     Thyroid disease      PAST SURGICAL HISTORY:  Past Surgical History:   Procedure Laterality Date    ABDOMEN SURGERY      gallbladder removed    CHOLECYSTECTOMY      COLONOSCOPY      EYE SURGERY Right     TORN RETINA, LASER    HERNIA REPAIR      HIP ARTHROPLASTY Right 08/08/2016    HIP FRACTURE SURGERY Right     JOINT REPLACEMENT      OTHER SURGICAL HISTORY  05/18/2011    right hip IM nailing    SHOULDER SURGERY Right rotater cuff    STOMACH SURGERY N/A 7/3/2024    JEJUNOSTOMY TUBE  midline. No obvious mass.    Resp: scattered rhonchi   CV: Regular rate. Regular rhythm. No murmur or rub. No edema. Peripheral pulses are 2+.  Capillary refill is less than 3 seconds.  GI: Non-tender. Non-distended. No hernia.   Skin: Warm and dry. No nodule on exposed extremities.   Lymph: No cervical LAD. No supraclavicular LAD.   M/S: No cyanosis. No joint deformity. No clubbing.   Neuro: Awake. Alert. Moves all four extremities.   Psych: Oriented x 3. No anxiety.     LABS:  CBC:   Recent Labs     08/06/24  1151 08/07/24  0249   WBC 10.2 6.6   HGB 11.9* 9.7*   HCT 35.6* 29.2*   MCV 74.8* 76.1*    255     BMP:   Recent Labs     08/06/24  2108 08/07/24  0249 08/07/24  0859   * 130* 138   K 3.3* 3.3* 3.5   CL 87* 91* 96*   CO2 36* 34* 32   BUN 23* 22* 20   CREATININE <0.5* <0.5* <0.5*     LIVER PROFILE:   Recent Labs     08/06/24  1151   AST 13*   ALT 13   BILITOT 0.7   ALKPHOS 101     PT/INR: No results for input(s): \"PROTIME\", \"INR\" in the last 72 hours.  APTT: No results for input(s): \"APTT\" in the last 72 hours.  UA:  Recent Labs     08/06/24  1500   COLORU Yellow   PHUR 8.0   WBCUA 21-50*   RBCUA 3-4   BACTERIA 4+*   CLARITYU Clear   LEUKOCYTESUR MODERATE*   UROBILINOGEN 1.0   BILIRUBINUR Negative   BLOODU TRACE-INTACT*   GLUCOSEU Negative       Microbiology:  Sent     Imaging:  Chest imaging was reviewed by me and showed multifocal infiltre     ASSESSMENT:   Multifocal infiltrate ,inflammatory vs chronic aspiration vs other etiology  PE   Hypotension   Hyponateremia   Dysphagia s/p jejunostomy tube placement 7/3/24  Severe PCM      PLAN:  On elquis   CT with multifocal infiltrates ,could be viral vs chronic aspiration as has some bronchiectasis   Change abx Augmentin X 10 days  Will need repeat CT ,if persist will do may consider bronch and biopsy   Wean O2 and keep sat 95'  Has some lower lobe infiltrate /bronchiectasis ,most likley aspiration related

## 2024-08-07 NOTE — PROGRESS NOTES
Inpatient Occupational Therapy Evaluation and Treatment    Unit: PCU  Date:  8/7/2024  Patient Name:    Héctor Valverde  Admitting diagnosis:  Dehydration [E86.0]  Hyponatremia [E87.1]  Septicemia (HCC) [A41.9]  Other acute pulmonary embolism without acute cor pulmonale (HCC) [I26.99]  Multifocal pneumonia [J18.9]  Acute pulmonary embolism without acute cor pulmonale, unspecified pulmonary embolism type (HCC) [I26.99]  Admit Date:  8/6/2024  Precautions/Restrictions/WB Status/ Lines/ Wounds/ Oxygen: Fall risk, Bed/chair alarm, Lines (IV, external catheter, and PEG tube), and Telemetry, NPO    Pt seen for cotreatment this date due to complexity of condition and acute illness/injury    Treatment Time:  11:20-12:18  Treatment Number:  1  Timed Code Treatment Minutes: 48 minutes  Total Treatment Minutes:  58  minutes    Patient Goals for Therapy: \"to get stronger \"          Discharge Recommendations: Acute Rehab (ARU)/ Inpatient Rehab Facility (IRF)  DME needs for discharge: Defer to facility       Therapy recommendations for staff:   Assist of 2 for transfers with use of MONTY STEDY to/from chair    History of Present Illness: 79 y.o. male with PMH of BPH, head and neck cancer, DVT, GERD, HLD, thyroid disease, and dysphagia s/p J tube placement who presented to [facility] ED with complaint of fatigue. Pt states that he has been having increased fatigue as well as generalized weakness for the past few days. He states that he has been in and out of the hospital lately and his J tube has helped him be less nauseous with tube feeds. He has a G tube and J tube in place. He states that he has been out of breath quicker with exertion than he normally is. He states that he was unable to get out of bed this morning he felt so weak.     AM-PAC Score: AM-PAC Inpatient Daily Activity Raw Score: 12     Subjective:  Patient lying reclined in bed with no family present.   Pt agreeable to this OT session. Patient frequently coughing  Proprioception:  Impaired    Coordination:  Impaired- poor control of lower body when attempting to don socks.     Neuro:      Tone:  WFL    Balance:  Sitting:    CGA  Standing:    Mod A in STEDY       Bed Mobility:   Supine to Sit:    Mod A   Sit to Supine:   Not Tested  Rolling:   Not Tested  Scooting in sitting: Mod A   Scooting in supine:  Not Tested  Comments:     Transfers:    Sit to stand:    Max A of 2 from EOB to RW. Mod A from EOB to STEDY  Stand to sit:    Mod A from STEDY  Bed to chair:     Total A and With MONTY STEDY and gait belt  Bed/ chair to standard toilet:  Not Tested  Bed/chair to BSC:   Not Tested  Functional Mobility:   Not Tested  Comments: Patient demonstrated difficulty keeping feet on the ground and organized when attempting to stand with RW.      ADLs:  Dressing:      UE:   Not Tested  LE:    Max A     Bathing:    UE:  Min A   LE:  Max A     Eating:   Min A     Toileting:  Not Tested    Grooming/hygiene: Modified Independent and With set up for frequent suctioning      Positioning Needs:   Pt up in chair, alarm set, call light provided and all needs within reach .     Ther Ex / Activities Initiated:   Finger flex/ext x10  Wrist flex/ext:  x10  Shoulder flex/ext:  x10  Shoulder abd/add:  x10  Shoulder horizontal abd/add:  x10    Patient/Family Education:   Pt educated on role of inpatient OT, plan of care, importance of continued activity, DC recommendations, Functional transfer/mobility safety, Safety awareness, Transfer techniques, and Calling for assist with mobility.    CHF Education  N/A    Assessment:  Pt seen for Occupational therapy evaluation in acute care setting.  Pt demonstrated decreased Activity tolerance, ADLs, IADLs, Balance , Bathing, Bed mobility, Dressing, ROM, Safety Awareness, Strength, Transfers, and Coping Skills. Pt functioning below baseline and will likely benefit from skilled occupational therapy services to maximize safety and independence.     Patient with

## 2024-08-07 NOTE — PROGRESS NOTES
Inpatient Physical Therapy Evaluation & Treatment    Unit: PCU  Date:  8/7/2024  Patient Name:    Héctor Valverde  Admitting diagnosis:  Dehydration [E86.0]  Hyponatremia [E87.1]  Septicemia (HCC) [A41.9]  Other acute pulmonary embolism without acute cor pulmonale (HCC) [I26.99]  Multifocal pneumonia [J18.9]  Acute pulmonary embolism without acute cor pulmonale, unspecified pulmonary embolism type (HCC) [I26.99]  Admit Date:  8/6/2024  Precautions/Restrictions/WB Status/ Lines/ Wounds/ Oxygen: Fall risk, Bed/chair alarm, Lines (IV, external catheter, and PEG tube), Telemetry, and NPO      Pt seen for cotreatment this date due to patient safety, patient endurance, complexity of condition, acute illness/injury, staff recommendation, and need for the assistance of 2 skilled therapists    Treatment Time:  7030-4012  Treatment Number:  1   Timed Code Treatment Minutes: 39 minutes  Total Treatment Minutes:  49  minutes    Patient Stated Goals for Therapy: \" To get stronger. \"          Discharge Recommendations: ARU/IRF (inpatient rehab facility)   DME needs for discharge: Defer to facility       Therapy recommendation for EMS Transport: requires transport by cot due to pt needs lift equipment for safe transfers and pt needs A x 2 for safe transfers    Therapy recommendations for staff:   Assist of 2 for transfers with use of MONTY STEDY and gait belt to/from chair    History of Present Illness:   79 y.o. male with PMH of BPH, head and neck cancer, DVT, GERD, HLD, thyroid disease, and dysphagia s/p J tube placement who presented to [facility] ED with complaint of fatigue. Pt states that he has been having increased fatigue as well as generalized weakness for the past few days. He states that he has been in and out of the hospital lately and his J tube has helped him be less nauseous with tube feeds. He has a G tube and J tube in place. He states that he has been out of breath quicker with exertion than he normally is. He states

## 2024-08-07 NOTE — CARE COORDINATION
Reviewed chart, met with pt bedside. Pt agreeable to rehab but does not feel he can do 3 hours of therapy per day and would rather have SNF vs ARU. Pt has been to [facility] prior and would like to return there. Referral called to [facility]. Will continue to monitor.

## 2024-08-07 NOTE — CARE COORDINATION
Case Management Assessment  Initial Evaluation    Date/Time of Evaluation: 8/7/2024 8:29 AM  Assessment Completed by: Jyothi Frias    If patient is discharged prior to next notation, then this note serves as note for discharge by case management.    Patient Name: Héctor Valverde                   YOB: 1945  Diagnosis: Dehydration [E86.0]  Hyponatremia [E87.1]  Septicemia (HCC) [A41.9]  Other acute pulmonary embolism without acute cor pulmonale (HCC) [I26.99]  Multifocal pneumonia [J18.9]  Acute pulmonary embolism without acute cor pulmonale, unspecified pulmonary embolism type (HCC) [I26.99]                   Date / Time: 8/6/2024 11:23 AM    Patient Admission Status: Inpatient   Readmission Risk (Low < 19, Mod (19-27), High > 27): Readmission Risk Score: 24.1    Current PCP: Suraj De La Torre, DO  PCP verified by CM? Yes (Britt)    Chart Reviewed: Yes      History Provided by: Patient  Patient Orientation: Alert and Oriented    Patient Cognition: Alert    Hospitalization in the last 30 days (Readmission):  Yes    If yes, Readmission Assessment in CM Navigator will be completed.    Advance Directives:      Code Status: Limited   Patient's Primary Decision Maker is: Named in Scanned ACP Document    Primary Decision Maker (Active): NicolleWendy - Spouse - [phone]    Secondary Decision Maker: NicolleRosas - Child - [phone]    Discharge Planning:    Patient lives with: Spouse/Significant Other Type of Home: House  Primary Care Giver: Self  Patient Support Systems include: Spouse/Significant Other, Children   Current Financial resources: Medicare  Current community resources: None  Current services prior to admission: Durable Medical Equipment, Other (Comment), Home Care ([facility])            Current DME: Cane, Walker, Shower Chair            Type of Home Care services:  None    ADLS  Prior functional level: Independent in ADLs/IADLs  Current functional level: Independent in

## 2024-08-07 NOTE — PLAN OF CARE
Problem: Discharge Planning  Goal: Discharge to home or other facility with appropriate resources  Outcome: Progressing  Flowsheets (Taken 8/6/2024 1645 by Mohamud Sanchez, RN)  Discharge to home or other facility with appropriate resources: Identify barriers to discharge with patient and caregiver     Problem: Safety - Adult  Goal: Free from fall injury  Outcome: Progressing     Problem: Skin/Tissue Integrity  Goal: Absence of new skin breakdown  Description: 1.  Monitor for areas of redness and/or skin breakdown  2.  Assess vascular access sites hourly  3.  Every 4-6 hours minimum:  Change oxygen saturation probe site  4.  Every 4-6 hours:  If on nasal continuous positive airway pressure, respiratory therapy assess nares and determine need for appliance change or resting period.  Outcome: Progressing

## 2024-08-07 NOTE — PROGRESS NOTES
Progress Note    Admit Date:  8/6/2024    Subjective:  Mr. Valverde is having fatigue and weakness. Pulmonary has reviewed imaging and feel he has chronic aspiration of oral secretions.   Patient has had a prior DVT and his PE during this hospital stay is a second thromboembolic event.  He is wiling to go to rehab.     Objective:   BP 92/68   Pulse 81   Temp 98 °F (36.7 °C) (Oral)   Resp 16   Ht 1.88 m (6' 2\")   Wt 65 kg (143 lb 3.2 oz)   SpO2 95%   BMI 18.39 kg/m²        Intake/Output Summary (Last 24 hours) at 8/7/2024 1148  Last data filed at 8/7/2024 0730  Gross per 24 hour   Intake 0 ml   Output 1200 ml   Net -1200 ml       Physical Exam:    Gen: No distress. Alert.   Eyes: PERRL. No sclera icterus. No conjunctival injection.   ENT: No discharge. Pharynx clear.   Neck: Trachea midline. Normal thyroid.   Resp: No accessory muscle use. No crackles. No wheezes. No rhonchi. No dullness on percussion.  CV: Regular rate. Regular rhythm. No murmur or rub. No edema.   GI: Non-tender. Non-distended. No masses. No organomegaly. Normal bowel sounds. No hernia. G tube and J tube present  Skin: Warm and dry. No nodule on exposed extremities. No rash on exposed extremities.   Lymph: No cervical LAD. No supraclavicular LAD.   M/S: No cyanosis. No joint deformity. No clubbing.   Neuro: Awake. Reflexes 2+ symmetric bilaterally. Moves all 4 extremities, non focal  Psych: Oriented x 3. No anxiety or agitation.     Scheduled Meds:   apixaban  10 mg Oral BID    Followed by    [START ON 8/14/2024] apixaban  5 mg Oral BID    cefepime  2,000 mg IntraVENous Q12H    sodium chloride flush  5-40 mL IntraVENous 2 times per day    vancomycin  1,000 mg IntraVENous Q12H       Continuous Infusions:   sodium chloride         PRN Meds:  sodium chloride flush, sodium chloride, potassium chloride **OR** potassium alternative oral replacement **OR** potassium chloride, magnesium sulfate, ondansetron **OR** ondansetron, polyethylene glycol,

## 2024-08-07 NOTE — FLOWSHEET NOTE
08/06/24 1935   Vital Signs   Temp 98.1 °F (36.7 °C)   Temp Source Oral   Pulse 87   Heart Rate Source Monitor   Respirations 21   BP (!) 92/55   MAP (Calculated) 67   BP Location Left upper arm   BP Method Automatic   Patient Position Semi fowlers   Oxygen Therapy   SpO2 95 %   O2 Device None (Room air)     Shift assessment done  Vitals done and charted  Rx given per MAR  Unlaboured breathing  Pt A/OX4  Call light within reach  Bed in lowest position   Pt resting watching tv without any care need

## 2024-08-07 NOTE — CONSULTS
Thank you to requesting provider:  Dr. Xavier, for asking us to see Héctor Valverde  Reason for consultation:  Hyponatremia   Chief Complaint:  weakness / shortness of breath    History of Presenting Illness      80 y/o male admitted with weakness and fatigue.  We have been consulted for hyponatremia.  Patient has been in the hospital twice over the last month.  He now has g-tube and j-tube.  He is very week.  CT showed pneumonia and PE.  He also has UTI.  With antibiotics and IV fluids his sodium is back to normal.  Kidney function is normal.  He is on room air but still more short of breath than usual.        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    NSTEMI (non-ST elevated myocardial infarction) ([facility])     Septicemia ([facility]) 08/27/2020    Hypothyroidism     Severe protein-calorie malnutrition ([facility]) 08/27/2020    Pneumonia 09/14/2020    Fever     Hematuria     Dysphagia     Sepsis ([facility])     Severe malnutrition ([facility]) 06/11/2021    Septic shock ([facility]) 09/16/2022    Acute respiratory failure with hypoxia and hypercapnia ([facility]) 09/19/2022    Abdominal pain 06/27/2023    Intra-abdominal free air of unknown etiology 06/27/2023    Acute cystitis with hematuria 06/27/2023    Nausea and vomiting 06/27/2023    Hypotension 06/27/2023    Tongue cancer ([facility]) 06/28/2023    Dysphagia causing pulmonary aspiration with swallowing 06/30/2024    Multifocal pneumonia 06/30/2024    Head and neck cancer (HCC) 07/04/2024    Weakness 07/21/2024    Dehydration 07/22/2024     Resolved Ambulatory Problems     Diagnosis  Date Noted    Pneumonia due to organism 06/02/2011    Cough 06/02/2011    Elevated troponin      Past Medical History:   Diagnosis Date    Arthritis     Benign hypertrophy of prostate     Cancer (HCC)     COVID-19 12/07/2020    GERD (gastroesophageal reflux disease)     Hx of blood clots     Prolonged emergence from general anesthesia     Thyroid disease          Review of Systems     Constitutional:  No weight loss, no fever/chills  Eyes:  No eye pain, no eye redness  Cardiovascular:  No chest pain, no worsening of edema  Respiratory:  No hemoptysis, no stridor  Gastrointestinal:  No blood in stool, no n/v, no diarrhea  Genitoruinary:  No hematuria, no difficulty with urination  Musculoskeletal:  No joint swelling, no redness  Integumentary:  No Rash, no itching  Neurological:  No focal weakness, No new sensory deficit  Psychiatric:  No depression, no confusion  Endocrine:  No polyuria, no polydipsia       Medications      Reviewed in EMR     Allergies     Tamsulosin, Tamsulosin hcl, Amoxicillin-pot clavulanate, and Tetanus toxoids      Family History       Negative for Kidney Disease    Social History      Social History     Socioeconomic History    Marital status:      Spouse name: None    Number of children: 2    Years of education: None    Highest education level: None   Tobacco Use    Smoking status: Never    Smokeless tobacco: Never   Vaping Use    Vaping Use: Never used   Substance and Sexual Activity    Alcohol use: Not Currently     Alcohol/week: 0.0 standard drinks of alcohol     Comment: x1 half glass before bed    Drug use: No    Sexual activity: Yes     Partners: Female     Social Determinants of Health     Food Insecurity: No Food Insecurity (8/6/2024)    Hunger Vital Sign     Worried About Running Out of Food in the Last Year: Never true     Ran Out of Food in the Last Year: Never true   Transportation Needs: No Transportation Needs (8/6/2024)    PRAPARE - Transportation     Lack of

## 2024-08-08 VITALS
BODY MASS INDEX: 18.22 KG/M2 | HEIGHT: 74 IN | HEART RATE: 62 BPM | WEIGHT: 142 LBS | RESPIRATION RATE: 19 BRPM | OXYGEN SATURATION: 95 % | SYSTOLIC BLOOD PRESSURE: 124 MMHG | DIASTOLIC BLOOD PRESSURE: 72 MMHG | TEMPERATURE: 98 F

## 2024-08-08 LAB
ALBUMIN SERPL-MCNC: 2.7 G/DL (ref 3.4–5)
ANION GAP SERPL CALCULATED.3IONS-SCNC: 14 MMOL/L (ref 3–16)
BACTERIA UR CULT: ABNORMAL
BASOPHILS # BLD: 0 K/UL (ref 0–0.2)
BASOPHILS NFR BLD: 0.5 %
BUN SERPL-MCNC: 23 MG/DL (ref 7–20)
CALCIUM SERPL-MCNC: 8.4 MG/DL (ref 8.3–10.6)
CHLORIDE SERPL-SCNC: 98 MMOL/L (ref 99–110)
CO2 SERPL-SCNC: 29 MMOL/L (ref 21–32)
CREAT SERPL-MCNC: <0.5 MG/DL (ref 0.8–1.3)
DEPRECATED RDW RBC AUTO: 17.9 % (ref 12.4–15.4)
EOSINOPHIL # BLD: 0.1 K/UL (ref 0–0.6)
EOSINOPHIL NFR BLD: 3 %
FERRITIN SERPL IA-MCNC: 310.3 NG/ML (ref 30–400)
GFR SERPLBLD CREATININE-BSD FMLA CKD-EPI: >90 ML/MIN/{1.73_M2}
GLUCOSE BLD-MCNC: 189 MG/DL (ref 70–99)
GLUCOSE BLD-MCNC: 73 MG/DL (ref 70–99)
GLUCOSE SERPL-MCNC: 60 MG/DL (ref 70–99)
HCT VFR BLD AUTO: 30.7 % (ref 40.5–52.5)
HGB BLD-MCNC: 10.1 G/DL (ref 13.5–17.5)
IRON SATN MFR SERPL: 22 % (ref 20–50)
IRON SERPL-MCNC: 39 UG/DL (ref 59–158)
LYMPHOCYTES # BLD: 0.6 K/UL (ref 1–5.1)
LYMPHOCYTES NFR BLD: 12.6 %
MAGNESIUM SERPL-MCNC: 1.9 MG/DL (ref 1.8–2.4)
MCH RBC QN AUTO: 25.5 PG (ref 26–34)
MCHC RBC AUTO-ENTMCNC: 33 G/DL (ref 31–36)
MCV RBC AUTO: 77.5 FL (ref 80–100)
MONOCYTES # BLD: 0.4 K/UL (ref 0–1.3)
MONOCYTES NFR BLD: 8.6 %
NEUTROPHILS # BLD: 3.7 K/UL (ref 1.7–7.7)
NEUTROPHILS NFR BLD: 75.3 %
ORGANISM: ABNORMAL
PERFORMED ON: ABNORMAL
PERFORMED ON: NORMAL
PHOSPHATE SERPL-MCNC: 3.3 MG/DL (ref 2.5–4.9)
PLATELET # BLD AUTO: 267 K/UL (ref 135–450)
PMV BLD AUTO: 7.5 FL (ref 5–10.5)
POTASSIUM SERPL-SCNC: 3.4 MMOL/L (ref 3.5–5.1)
POTASSIUM SERPL-SCNC: 3.4 MMOL/L (ref 3.5–5.1)
RBC # BLD AUTO: 3.96 M/UL (ref 4.2–5.9)
SODIUM SERPL-SCNC: 141 MMOL/L (ref 136–145)
TIBC SERPL-MCNC: 174 UG/DL (ref 260–445)
TRANSFERRIN SERPL-MCNC: 146 MG/DL (ref 200–360)
WBC # BLD AUTO: 5 K/UL (ref 4–11)

## 2024-08-08 PROCEDURE — 99222 1ST HOSP IP/OBS MODERATE 55: CPT | Performed by: SURGERY

## 2024-08-08 PROCEDURE — 99233 SBSQ HOSP IP/OBS HIGH 50: CPT | Performed by: INTERNAL MEDICINE

## 2024-08-08 PROCEDURE — 6370000000 HC RX 637 (ALT 250 FOR IP)

## 2024-08-08 PROCEDURE — 83735 ASSAY OF MAGNESIUM: CPT

## 2024-08-08 PROCEDURE — 6370000000 HC RX 637 (ALT 250 FOR IP): Performed by: INTERNAL MEDICINE

## 2024-08-08 PROCEDURE — 83540 ASSAY OF IRON: CPT

## 2024-08-08 PROCEDURE — 2580000003 HC RX 258: Performed by: INTERNAL MEDICINE

## 2024-08-08 PROCEDURE — 97530 THERAPEUTIC ACTIVITIES: CPT

## 2024-08-08 PROCEDURE — 36415 COLL VENOUS BLD VENIPUNCTURE: CPT

## 2024-08-08 PROCEDURE — 82728 ASSAY OF FERRITIN: CPT

## 2024-08-08 PROCEDURE — 85025 COMPLETE CBC W/AUTO DIFF WBC: CPT

## 2024-08-08 PROCEDURE — 84466 ASSAY OF TRANSFERRIN: CPT

## 2024-08-08 PROCEDURE — 97535 SELF CARE MNGMENT TRAINING: CPT

## 2024-08-08 PROCEDURE — 99238 HOSP IP/OBS DSCHRG MGMT 30/<: CPT | Performed by: INTERNAL MEDICINE

## 2024-08-08 PROCEDURE — 2580000003 HC RX 258

## 2024-08-08 PROCEDURE — 97110 THERAPEUTIC EXERCISES: CPT

## 2024-08-08 PROCEDURE — 80069 RENAL FUNCTION PANEL: CPT

## 2024-08-08 PROCEDURE — 6360000002 HC RX W HCPCS: Performed by: INTERNAL MEDICINE

## 2024-08-08 RX ORDER — CIPROFLOXACIN 250 MG/1
250 TABLET, FILM COATED ORAL EVERY 12 HOURS SCHEDULED
Status: DISCONTINUED | OUTPATIENT
Start: 2024-08-08 | End: 2024-08-08 | Stop reason: HOSPADM

## 2024-08-08 RX ORDER — GLUCAGON 1 MG/ML
1 KIT INJECTION PRN
Status: DISCONTINUED | OUTPATIENT
Start: 2024-08-08 | End: 2024-08-08 | Stop reason: HOSPADM

## 2024-08-08 RX ORDER — CIPROFLOXACIN 250 MG/1
250 TABLET, FILM COATED ORAL 2 TIMES DAILY
Qty: 14 TABLET | Refills: 0 | Status: SHIPPED | OUTPATIENT
Start: 2024-08-08 | End: 2024-08-15

## 2024-08-08 RX ORDER — AMOXICILLIN AND CLAVULANATE POTASSIUM 250; 62.5 MG/5ML; MG/5ML
500 POWDER, FOR SUSPENSION ORAL EVERY 8 HOURS
Qty: 210 ML | Refills: 0
Start: 2024-08-08 | End: 2024-08-15

## 2024-08-08 RX ORDER — DEXTROSE MONOHYDRATE 100 MG/ML
INJECTION, SOLUTION INTRAVENOUS CONTINUOUS PRN
Status: DISCONTINUED | OUTPATIENT
Start: 2024-08-08 | End: 2024-08-08 | Stop reason: HOSPADM

## 2024-08-08 RX ADMIN — SODIUM CHLORIDE, PRESERVATIVE FREE 10 ML: 5 INJECTION INTRAVENOUS at 07:48

## 2024-08-08 RX ADMIN — AMOXICILLIN AND CLAVULANATE POTASSIUM 500 MG: 250; 62.5 POWDER, FOR SUSPENSION ORAL at 07:47

## 2024-08-08 RX ADMIN — POTASSIUM BICARBONATE 40 MEQ: 782 TABLET, EFFERVESCENT ORAL at 07:45

## 2024-08-08 RX ADMIN — LEVOTHYROXINE SODIUM 137 MCG: 0.11 TABLET ORAL at 10:43

## 2024-08-08 RX ADMIN — APIXABAN 10 MG: 5 TABLET, FILM COATED ORAL at 07:44

## 2024-08-08 RX ADMIN — PIPERACILLIN AND TAZOBACTAM 3375 MG: 3; .375 INJECTION, POWDER, LYOPHILIZED, FOR SOLUTION INTRAVENOUS at 07:47

## 2024-08-08 ASSESSMENT — PAIN SCALES - GENERAL
PAINLEVEL_OUTOF10: 0

## 2024-08-08 NOTE — PROGRESS NOTES
Physical Therapy  Facility/Department: [facility] PCU TELEMETRY  Daily Treatment Note  NAME: Héctor Valverde  : 1945  MRN: [id number]    Date of Service: 2024    Discharge Recommendations:  IP Rehab   PT Equipment Recommendations  Equipment Needed: No  Other: defer    Patient Diagnosis(es): The primary encounter diagnosis was Multifocal pneumonia. Diagnoses of Hyponatremia, Dehydration, Other acute pulmonary embolism without acute cor pulmonale (HCC), and Septicemia (HCC) were also pertinent to this visit.    Assessment   Assessment: Pt progressed significantly with sit to stand transfers. Pt was able to stand from elevatd bed and from chair height with Min-CGA and support of RW. Pt was seen as a co-tx due to previous sessions, pt required Ax2 and the use of the stedy for tranfers. Today, pt completed SPT to the bedside chair. Able to stand from the chair with marching in place and stepping forward and backward. Pt remained in the chair at EOS.  Activity Tolerance: Patient tolerated treatment well  Equipment Needed: No  Other: defer     Plan    Physical Therapy Plan  General Plan: 3-5 times per week  Current Treatment Recommendations: Strengthening;Balance training;Functional mobility training;Transfer training;Gait training;Patient/Caregiver education & training;Safety education & training;Home exercise program           Subjective    Subjective  Subjective: Pt resting in bed  Pain: 6/10 pain \"all over\". Denies need for intervention  Orientation  Overall Orientation Status: Within Functional Limits  Cognition  Overall Cognitive Status: WFL     Objective   Vitals     Bed Mobility Training  Bed Mobility Training: Yes  Overall Level of Assistance: Supervision  Supine to Sit: Supervision  Sit to Supine: Other (comment) (up to the chair)  Balance  Sitting: Intact  Standing: Impaired  Standing - Static: Constant support  Standing - Dynamic: Constant support  Transfer Training  Transfer Training: Yes  Overall

## 2024-08-08 NOTE — PROGRESS NOTES
Corina NUNES from [facility] has been called report. Patient left facility in stable condition. I flushed his PEG tube before leaving to prevent any clogs. Patient stated he had all of his belongings.

## 2024-08-08 NOTE — CARE COORDINATION
DISCHARGE ORDER  Date/Time 2024 12:49 PM  Completed by: Araseli Cisse RN, Case Management    Patient Name: Héctor Valverde    : 1945      Admit order Date and Status:ip   Noted discharge order. (verify MD's last order for status of admission/Traditional Medicare 3 [place] Inpatient qualifying stay required for SNF)    Confirmed discharge plan with:              Patient:  Yes              When pt confirms DC plan does any support person need to be contacted by CM No                  Discharge to Facility: [facility]   Facility phone number for staff giving report: [phone]   Pre-certification completed: na   Hospital Exemption Notification (HENS) completed: yes   Discharge orders and Continuity of Care faxed to facility:  [facility]      Transportation:               Medical Transport explained with choice list offered to pt/family.                Choice:(no preference)  Agency used: [facility]   time:   1600      Pt/family/Nursing/Facility aware of  time:   Yes Names: rn, cm, pt, facility  Ambulance form completed:  yes:      Date Last IMM Given:     Comments:pt to DC to [facility] for SNF. Pt and spouse agreeable to DC. Transport arranged and facility aware of pt's DC today    Pt is being d/c'd to [facility] today. Pt's O2 sats are 94% on ra.    Discharge timeout done with rn, cm, pt. All discharge needs and concerns addressed.    Discharging nurse to complete CLEO, reconcile AVS, and place final copy with patient's discharge packet. Discharging RN to ensure that written prescriptions for  Level II medications are sent with patient to the facility as per protocol.

## 2024-08-08 NOTE — PROGRESS NOTES
Progress Note    HISTORY     CC:   Shortness of breath           We are following for hyponatremia        Subjective/   HPI:  Sodium remains normal.  Off IV fluids.  Says that he has not gotten his home levothyroxine.  Very weak     ROS:  Constitutional:  No fevers, No Chills, + weakness  Cardiovascular:  No palpations, no edema  Respiratory:  No wheezing, + cough  Skin:  No rash, no itching  :  No hematuria, no dysuria     Social Hx:  No Family at the bedside     Past Medical and Surgical History:  - Reviewed, no changes     EXAM       Objective/     Vitals:    08/08/24 0406 08/08/24 0741 08/08/24 0833 08/08/24 1215   BP: 105/65 97/60 129/77 124/72   Pulse: 88 (!) 119 51 62   Resp: 18   19   Temp: 97.6 °F (36.4 °C) 97.8 °F (36.6 °C) 98.3 °F (36.8 °C) 98 °F (36.7 °C)   TempSrc: Oral Oral Axillary Oral   SpO2: 97% 97% 94% 95%   Weight: 64.4 kg (142 lb)      Height:         24HR INTAKE/OUTPUT:    Intake/Output Summary (Last 24 hours) at 8/8/2024 1549  Last data filed at 8/7/2024 1812  Gross per 24 hour   Intake 0 ml   Output --   Net 0 ml     Constitutional:  Alert, awake, ill appearing   Eyes:  Pupils reactive, sclera clear   Neck:  Normal thyroid, no masses   Cardiovascular:  Regular, no rub  Respiratory:  No distress, no wheezing  Psychiatry:  Flat mood/affect, alert  Abdomen: +bs, soft, nt, no masses   Musculoskeletal: No LE edema, no clubbing   Lymphatics:  No LAD in neck, no supraclavicular nodes       MEDICAL DECISION MAKING       Data/  Recent Labs     08/06/24  1151 08/07/24  0249 08/08/24  0510   WBC 10.2 6.6 5.0   HGB 11.9* 9.7* 10.1*   HCT 35.6* 29.2* 30.7*   MCV 74.8* 76.1* 77.5*    255 267     Recent Labs     08/07/24  0859 08/07/24  1353 08/08/24  0510    139 141   K 3.5 3.5 3.4*  3.4*   CL 96* 97* 98*   CO2 32 31 29   GLUCOSE 86 92 60*   PHOS  --   --  3.3   MG 2.10 2.10 1.90   BUN 20 22* 23*   CREATININE <0.5* <0.5* <0.5*   LABGLOM >90 >90 >90       Assessment/

## 2024-08-08 NOTE — PROGRESS NOTES
Per report and from patient, Dr. Lopez came in this morning right before shift change and unclogged J Tube. Tube Feed has been started. Patient resting in bed and denies any needs at this time. Blood sugar was checked as patient was NPO and morning glucose was 60, repeat was 73. Patient alert and oriented X4. Patients wife is at bedside and denies any needs at this time.

## 2024-08-08 NOTE — DISCHARGE INSTR - COC
Continuity of Care Form    Patient Name: Héctor Valverde   :  1945  MRN:  [id number]    Admit date:  2024  Discharge date:  24    Code Status Order: Limited   Advance Directives:     Admitting Physician:  Randa Xavier MD  PCP: Suraj De La Torre DO    Discharging Nurse: chandu  Discharging Hospital Unit/Room#: /0310-01  Discharging Unit Phone Number: [phone]    Emergency Contact:   Extended Emergency Contact Information  Primary Emergency Contact: Wendy Valverde  Address: [address] [place] [address]  Home Phone: [phone]  Mobile Phone: [phone]  Relation: Spouse  Secondary Emergency Contact: Rosas Valverde  Home Phone: [phone]  Mobile Phone: [phone]  Relation: Child    Past Surgical History:  Past Surgical History:   Procedure Laterality Date    ABDOMEN SURGERY      gallbladder removed    CHOLECYSTECTOMY      COLONOSCOPY      EYE SURGERY Right     TORN RETINA, LASER    HERNIA REPAIR      HIP ARTHROPLASTY Right 2016    HIP FRACTURE SURGERY Right     JOINT REPLACEMENT      OTHER SURGICAL HISTORY  2011    right hip IM nailing    SHOULDER SURGERY Right rotater cuff    STOMACH SURGERY N/A 7/3/2024    JEJUNOSTOMY TUBE INSERTION/CHANGE performed by Kayden Lopez MD at [facility]    TONGUE BIOPSY  10/2010    UPPER GASTROINTESTINAL ENDOSCOPY N/A 2024    ESOPHAGOGASTRODUODENOSCOPY BIOPSY performed by Ajay Lees MD at [facility] ENDOSCOPY       Immunization History:   Immunization History   Administered Date(s) Administered    Influenza Virus Vaccine 10/06/2010, 2012, 10/07/2013, 2014, 2021, 2021    Influenza Whole 10/06/2010, 10/20/2010, 2011, 10/01/2015    Influenza, High Dose (Fluzone 65 yrs and older) 2017, 2020    Pneumococcal, PCV-13, PREVNAR 13, (age 6w+), IM, 0.5mL 2015    Pneumococcal, PPSV23, PNEUMOVAX 23, (age 2y+), SC/IM, 0.5mL 2010,

## 2024-08-08 NOTE — FLOWSHEET NOTE
08/07/24 2030   Vital Signs   Temp 97.6 °F (36.4 °C)   Temp Source Oral   Pulse 89   Heart Rate Source Monitor   Respirations 18   /61   MAP (Calculated) 76   BP Location Right upper arm   BP Method Automatic   Patient Position Semi fowlers   Pain Assessment   Pain Assessment 0-10   Oxygen Therapy   SpO2 97 %   O2 Device None (Room air)     Shift assessment done  Vitals taken  Pt A/OX4  unlaboured breathing  Call light wihin reach  Bed in lowest position  Pt resting in bed without care needs

## 2024-08-08 NOTE — CONSULTS
Department of General Surgery Consult    PATIENT NAME: Héctor Valverde   YOB: 1945    ADMISSION DATE: 8/6/2024 11:23 AM      TODAY'S DATE: 8/8/2024    Reason for Consult: J-tube dysfunction    Chief Complaint: Same    Historian: Patient    Requesting Practitioner:  Duc    HISTORY OF PRESENT ILLNESS:              The patient is a 79 y.o. male who presents with pneumonia.  His J-tube has become dysfunctional.  We are asked to evaluate to try to get it unclogged or replace it.  This was placed about a month ago and has already been replaced once due to being pulled out.  Nursing is tried numerous times to get it unclogged but are unsuccessful    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    COVID-19 12/07/2020    Dry mouth     s/p radiation treatment    DVT, lower extremity (HCC)     5/2011    GERD (gastroesophageal reflux disease)     acid reflux    Hip fracture (HCC)     5/2011, RIGHT    Hx of blood clots     Hyperlipidemia     Pneumonia 6/2/2011    Prolonged emergence from general anesthesia     Thyroid disease        Past Surgical History:        Procedure Laterality Date    ABDOMEN SURGERY      gallbladder removed    CHOLECYSTECTOMY      COLONOSCOPY      EYE SURGERY Right     TORN RETINA, LASER    HERNIA REPAIR      HIP ARTHROPLASTY Right 08/08/2016    HIP FRACTURE SURGERY Right     JOINT REPLACEMENT      OTHER SURGICAL HISTORY  05/18/2011    right hip IM nailing    SHOULDER SURGERY Right rotater cuff    STOMACH SURGERY N/A 7/3/2024    JEJUNOSTOMY TUBE INSERTION/CHANGE performed by Kayden Lopez MD at [facility]    TONGUE BIOPSY  10/2010    UPPER GASTROINTESTINAL ENDOSCOPY N/A 7/2/2024    ESOPHAGOGASTRODUODENOSCOPY BIOPSY performed by Ajay Lees MD at [facility] ENDOSCOPY       Current Medications:   Current Facility-Administered Medications: glucose chewable tablet 16 g, 4 tablet, Oral,  PRN  dextrose bolus 10% 125 mL, 125 mL, IntraVENous, PRN **OR** dextrose bolus 10% 250 mL, 250 mL, IntraVENous, PRN  glucagon injection 1 mg, 1 mg, SubCUTAneous, PRN  dextrose 10 % infusion, , IntraVENous, Continuous PRN  levothyroxine (SYNTHROID) tablet 137 mcg, 137 mcg, Oral, Daily  apixaban (ELIQUIS) tablet 10 mg, 10 mg, Oral, BID **FOLLOWED BY** [START ON 8/14/2024] apixaban (ELIQUIS) tablet 5 mg, 5 mg, Oral, BID  amoxicillin-clavulanate (AUGMENTIN) 250-62.5 MG/5ML suspension 500 mg, 500 mg, Per J Tube, Q8H  piperacillin-tazobactam (ZOSYN) 3,375 mg in sodium chloride 0.9 % 100 mL extended IVPB (mini-bag), 3,375 mg, IntraVENous, Q8H  sodium chloride flush 0.9 % injection 5-40 mL, 5-40 mL, IntraVENous, 2 times per day  sodium chloride flush 0.9 % injection 10 mL, 10 mL, IntraVENous, PRN  0.9 % sodium chloride infusion, , IntraVENous, PRN  potassium chloride (KLOR-CON M) extended release tablet 40 mEq, 40 mEq, Oral, PRN **OR** potassium bicarb-citric acid (EFFER-K) effervescent tablet 40 mEq, 40 mEq, Oral, PRN **OR** potassium chloride 10 mEq/100 mL IVPB (Peripheral Line), 10 mEq, IntraVENous, PRN  magnesium sulfate 1000 mg in dextrose 5% 100 mL IVPB, 1,000 mg, IntraVENous, PRN  ondansetron (ZOFRAN-ODT) disintegrating tablet 4 mg, 4 mg, Oral, Q8H PRN **OR** ondansetron (ZOFRAN) injection 4 mg, 4 mg, IntraVENous, Q6H PRN  polyethylene glycol (GLYCOLAX) packet 17 g, 17 g, Oral, Daily PRN  acetaminophen (TYLENOL) tablet 650 mg, 650 mg, Oral, Q6H PRN **OR** acetaminophen (TYLENOL) suppository 650 mg, 650 mg, Rectal, Q6H PRN  Prior to Admission medications    Medication Sig Start Date End Date Taking? Authorizing Provider   Lactobacillus (PROBIOTIC ACIDOPHILUS PO) Take 1 tablet by mouth daily    Provider, MD Santiago   simethicone (MYLICON) 80 MG chewable tablet Take 1 tablet by mouth every 6 hours as needed for Flatulence (GERD)    Provider, MD Santiago   levothyroxine (SYNTHROID) 137 MCG tablet Take 1 tablet by  mouth Daily 4/23/21   Santiago Eduardo MD   omeprazole (PRILOSEC) 40 MG delayed release capsule Take 1 capsule by mouth nightly 6/8/21   Santiago Eduardo MD   albuterol sulfate  (90 Base) MCG/ACT inhaler INHALE 2 PUFFS INTO THE LUNGS 4 TIMES DAILY AS NEEDED (PRIOR TO ACTIVITY THAT CAUSES SHORTNESS OF BREATH)  Patient not taking: Reported on 9/16/2022 11/16/20 9/19/22  Bebo Morales MD   fluticasone (FLONASE) 50 MCG/ACT nasal spray 2 sprays by Nasal route daily  Patient not taking: Reported on 9/16/2022 5/11/20 9/19/22  Santiago Eduardo MD        Allergies:  Tamsulosin, Tamsulosin hcl, Amoxicillin-pot clavulanate, and Tetanus toxoids    Social History:   TOBACCO:   reports that he has never smoked. He has never used smokeless tobacco.  ETOH:   reports that he does not currently use alcohol.  DRUGS:   reports no history of drug use.      Family History:        Problem Relation Age of Onset    Cancer Mother     Depression Mother     High Blood Pressure Mother     Heart Disease Father     Stroke Maternal Grandmother     Diabetes Other        REVIEW OF SYSTEMS:  CONSTITUTIONAL:  positive for  fatigue  HEENT:  negative  RESPIRATORY:  negative except for shortness of breath  CARDIOVASCULAR:  negative  GASTROINTESTINAL:  negative except for clogged J-tube  GENITOURINARY:  negative  HEMATOLOGIC/LYMPHATIC:  negative  NEUROLOGICAL:  Negative  * All other ROS reviewed and negative.       PHYSICAL EXAM:  VITALS:  /77   Pulse 51   Temp 98.3 °F (36.8 °C) (Axillary)   Resp 18   Ht 1.88 m (6' 2.02\")   Wt 64.4 kg (142 lb)   SpO2 94%   BMI 18.22 kg/m²   24HR INTAKE/OUTPUT:    I/O last 3 completed shifts:  In: 0   Out: 1200 [Urine:1200]  No intake/output data recorded.      CONSTITUTIONAL:  alert, no apparent distress and thin  EYES:  PERRL, sclera clear  ENT:  Normocephalic,atraumatic, without obvious abnormality  NECK:  supple, symmetrical, trachea midline  LUNGS: Resp effort easy and

## 2024-08-08 NOTE — PROGRESS NOTES
Inpatient Occupational Therapy Treatment     Unit: PCU  Date:  8/8/2024  Patient Name:    Hcétor Valverde  Admitting diagnosis:  Dehydration [E86.0]  Hyponatremia [E87.1]  Septicemia (HCC) [A41.9]  Other acute pulmonary embolism without acute cor pulmonale (HCC) [I26.99]  Multifocal pneumonia [J18.9]  Acute pulmonary embolism without acute cor pulmonale, unspecified pulmonary embolism type (HCC) [I26.99]  Admit Date:  8/6/2024  Precautions/Restrictions/WB Status/ Lines/ Wounds/ Oxygen: Fall risk, Bed/chair alarm, Lines (IV, external catheter, and PEG tube), and Telemetry, NPO, up with assist, seizure     Pt seen for cotreatment this date due to complexity of condition and acute illness/injury     Treatment Time: 9:11am to 9:55am  Treatment Number:  2  Timed Code Treatment Minutes: 44 minutes  Total Treatment Minutes:  44  minutes     Patient Goals for Therapy: \"to get stronger \"            Discharge Recommendations: Acute Rehab (ARU)/ Inpatient Rehab Facility (IRF)  DME needs for discharge: Defer to facility         Therapy recommendations for staff:   Assist of 2 for transfers with RW stand pivot to/from chair     History of Present Illness: 79 y.o. male with PMH of BPH, head and neck cancer, DVT, GERD, HLD, thyroid disease, and dysphagia s/p J tube placement who [place] [facility] [place] with complaint of fatigue. Pt states that he has been having increased fatigue as well as generalized weakness for the past few days. He states that he has been in and out of the hospital lately and his J tube has helped him be less nauseous with tube feeds. He has a G tube and J tube in place. He states that he has been out of breath quicker with exertion than he normally is. He states that he was unable to get out of bed this morning he felt so weak. Patient admitted with dehydration, hyponatremia, pneumonia, acute PE, UTI, anemia, septicemia, elevated troponin     AM-PAC Score: AM-PAC Inpatient Daily Activity Raw Score: 13       Subjective:  Patient lying reclined in bed with no family present.   Pt agreeable to this OT session. Patient eager to participate in therapy in order to regain strength and independence. Patient reports fatigue with standing activity. SpO2 remains >95% on 1.5L O2. HR ranges up to 113bpm with activity.      Cognition:    A&O x4   Able to follow 2 step commands  Patient verbalizes with a whisper and is difficult to hear.      Pain:   Yes  Location: B LEs  Ratin /10  Pain Medicine Status: Received pain med prior to tx     Activity Tolerance:   Pt completed therapy session with fatigue       BP (mmHg) HR (bpm) SpO2 (%) on RA Comments   Supine at rest 124/74 93 95% 1.5L O2    Seated at EOB           Standing    up to 113bpm        End of session      98%  1.5L O2         Preadmission Environment:   Pt. Lives                                              Spouse  Home environment:                            two story home (all needs on 1st floor)  Steps to enter first floor:                     2 steps to enter with HR  Steps to second floor/basement:        N/A  Laundry:                                              1st floor  Bathroom:                                           tub/shower unit, grab bars in shower, shower chair , and standard height toilet  Pt sleeps in a:                                     Recliner  Equipment owned:                              RW and quad cane     Preadmission Status:  Pt. Able to drive:                                 No  Pt. Fully independent with ADLs:       Yes  Pt. Required assistance for:               Cleaning, Cooking, Laundry , and grocery shopping  Pt. independent for functional transfers and utilized Quad Cane for mobility in home and RW out in community  History of falls:                                                Yes- ~2 months ago knees buckled   Home Health Services:                       RN, OT, PT     Objective:  Does this pt have an acute or acute on chronic  stable

## 2024-08-08 NOTE — PROGRESS NOTES
[facility]                                 Pulmonary/Critical care  Consult /Progress Note :                                                                  CC :cough ,fatigue   Patient is being seen at the request of Dr Xavier  for a consultation for PE      Subjective  Doing much better  No CP  No fever or chills     PHYSICAL EXAM:  Vitals:    08/08/24 0833   BP: 129/77   Pulse: 51   Resp:    Temp: 98.3 °F (36.8 °C)   SpO2: 94%     Gen: No distress.   Eyes: PERRL. No sclera icterus. No conjunctival injection.   ENT: No discharge. Pharynx clear.   Neck: Trachea midline. No obvious mass.    Resp: scattered rhonchi   CV: Regular rate. Regular rhythm. No murmur or rub. No edema. Peripheral pulses are 2+.  Capillary refill is less than 3 seconds.  GI: Non-tender. Non-distended. No hernia.   Skin: Warm and dry. No nodule on exposed extremities.   Lymph: No cervical LAD. No supraclavicular LAD.   M/S: No cyanosis. No joint deformity. No clubbing.   Neuro: Awake. Alert. Moves all four extremities.   Psych: Oriented x 3. No anxiety.     LABS:  CBC:   Recent Labs     08/06/24  1151 08/07/24  0249 08/08/24  0510   WBC 10.2 6.6 5.0   HGB 11.9* 9.7* 10.1*   HCT 35.6* 29.2* 30.7*   MCV 74.8* 76.1* 77.5*    255 267       BMP:   Recent Labs     08/07/24  0859 08/07/24  1353 08/08/24  0510    139 141   K 3.5 3.5 3.4*  3.4*   CL 96* 97* 98*   CO2 32 31 29   PHOS  --   --  3.3   BUN 20 22* 23*   CREATININE <0.5* <0.5* <0.5*       LIVER PROFILE:   Recent Labs     08/06/24  1151   AST 13*   ALT 13   BILITOT 0.7   ALKPHOS 101       PT/INR: No results for input(s): \"PROTIME\", \"INR\" in the last 72 hours.  APTT: No results for input(s): \"APTT\" in the last 72 hours.  UA:  Recent Labs     08/06/24  1500   COLORU Yellow   PHUR 8.0   WBCUA 21-50*   RBCUA 3-4   BACTERIA 4+*   CLARITYU Clear   LEUKOCYTESUR MODERATE*   UROBILINOGEN 1.0

## 2024-08-08 NOTE — PROGRESS NOTES
Contacted hosp about changing  a/biotic.changed to zosyn.perfect serve sent to gen surgery for consult will see in the AM

## 2024-08-10 LAB
BACTERIA BLD CULT ORG #2: NORMAL
BACTERIA BLD CULT: NORMAL

## 2024-08-13 NOTE — RESULT ENCOUNTER NOTE
Culture reviewed, culture is positive, patient was discharged on appropriate antibiotics, and hospitalist was aware of positive culture with multiple different bacteria - please see their notes related to this.  No further action needed.

## 2024-08-17 ENCOUNTER — HOSPITAL ENCOUNTER (EMERGENCY)
Age: 79
Discharge: NURSING, REHAB OR LONG-TERM CARE | End: 2024-08-17
Attending: STUDENT IN AN ORGANIZED HEALTH CARE EDUCATION/TRAINING PROGRAM
Payer: MEDICARE

## 2024-08-17 ENCOUNTER — APPOINTMENT (OUTPATIENT)
Dept: GENERAL RADIOLOGY | Age: 79
End: 2024-08-17
Payer: MEDICARE

## 2024-08-17 VITALS
DIASTOLIC BLOOD PRESSURE: 60 MMHG | SYSTOLIC BLOOD PRESSURE: 98 MMHG | TEMPERATURE: 98 F | RESPIRATION RATE: 16 BRPM | WEIGHT: 140 LBS | BODY MASS INDEX: 17.97 KG/M2 | OXYGEN SATURATION: 97 % | HEART RATE: 86 BPM | HEIGHT: 74 IN

## 2024-08-17 DIAGNOSIS — T85.528A JEJUNOSTOMY TUBE FELL OUT: Primary | ICD-10-CM

## 2024-08-17 PROCEDURE — 99283 EMERGENCY DEPT VISIT LOW MDM: CPT

## 2024-08-17 PROCEDURE — 74018 RADEX ABDOMEN 1 VIEW: CPT

## 2024-08-17 PROCEDURE — 6370000000 HC RX 637 (ALT 250 FOR IP): Performed by: STUDENT IN AN ORGANIZED HEALTH CARE EDUCATION/TRAINING PROGRAM

## 2024-08-17 PROCEDURE — 49451 REPLACE DUOD/JEJ TUBE PERC: CPT

## 2024-08-17 PROCEDURE — 6360000004 HC RX CONTRAST MEDICATION: Performed by: STUDENT IN AN ORGANIZED HEALTH CARE EDUCATION/TRAINING PROGRAM

## 2024-08-17 RX ADMIN — DIATRIZOATE MEGLUMINE AND DIATRIZOATE SODIUM 30 ML: 660; 100 LIQUID ORAL; RECTAL at 11:45

## 2024-08-17 RX ADMIN — Medication 3 ML: at 10:34

## 2024-08-17 ASSESSMENT — PAIN SCALES - GENERAL: PAINLEVEL_OUTOF10: 0

## 2024-08-17 ASSESSMENT — LIFESTYLE VARIABLES
HOW OFTEN DO YOU HAVE A DRINK CONTAINING ALCOHOL: NEVER
HOW MANY STANDARD DRINKS CONTAINING ALCOHOL DO YOU HAVE ON A TYPICAL DAY: PATIENT DOES NOT DRINK

## 2024-08-17 ASSESSMENT — PAIN - FUNCTIONAL ASSESSMENT: PAIN_FUNCTIONAL_ASSESSMENT: NONE - DENIES PAIN

## 2024-08-17 NOTE — DISCHARGE INSTRUCTIONS
Please to the emergency department if you develop any new, ongoing or worsening symptoms.  We hope you feel better soon!

## 2024-08-17 NOTE — ED PROVIDER NOTES
Emergency Department Provider Note  Location: [facility] ED  8/17/2024     Patient Identification  Héctor Valverde is a 79 y.o. male    Chief Complaint  J Tube Complications (Pt in by [facility] from [place]. Pt's J tube came out and needs replaced. )      Mode of Arrival  EMS    HPI  (History provided by patient)  This is a 79 y.o. male with a PMH significant for ` carcinoma of the tongue, protein calorie malnutrition  presented today for J-tube dislodged.  Original J-tube was placed on July 3, 2024 by Dr. Lopez.  He required replacement on July 26, 2024.  Patient has a 14 [place] red rubber catheter.  Patient is currently at [facility].  He denies any fever or pain.  Denies any chest pain or shortness of breath.         ROS  Review of Systems   All other systems reviewed and are negative.        I have reviewed the following nursing documentation:  Allergies:   Allergies   Allergen Reactions    Tamsulosin      Other Reaction(s): Unknown    Tamsulosin Hcl Other (See Comments)    Amoxicillin-Pot Clavulanate Nausea And Vomiting     Other Reaction(s): GI Upset    Tetanus Toxoids Nausea And Vomiting     Other Reaction(s): Not available       Past medical history:  has a past medical history of Arthritis, Benign hypertrophy of prostate, Cancer ([facility]), COVID-19 (12/07/2020), Dry mouth, DVT, lower extremity (HCC), GERD (gastroesophageal reflux disease), Hip fracture (HCC), blood clots, Hyperlipidemia, Pneumonia (6/2/2011), Prolonged emergence from general anesthesia, and Thyroid disease.    Past surgical history:  has a past surgical history that includes Abdomen surgery; Tongue Biopsy (10/2010); shoulder surgery (Right, rotater cuff); Cholecystectomy; other surgical history (05/18/2011); Hip fracture surgery (Right); eye surgery (Right); Colonoscopy; Hip Arthroplasty (Right, 08/08/2016); hernia repair; joint replacement; Upper gastrointestinal endoscopy (N/A, 7/2/2024); and Stomach surgery (N/A,  follow-up with PCP and general surgery as advised.          Clinical Impression:  1. Jejunostomy tube fell out          Disposition:  Discharge to nursing home in good condition.    Blood pressure 102/71, pulse (!) 101, temperature 98 °F (36.7 °C), temperature source Oral, resp. rate 17, height 1.88 m (6' 2\"), weight 63.5 kg (140 lb), SpO2 97%.    Patient was given scripts for the following medications. I counseled patient how to take these medications.   New Prescriptions    No medications on file       Disposition referral (if applicable):  Suraj De La Torre,   [address] [facility] [address] [phone]          Kayden Lopez MD  [address] [place] [address] Dr Decker  [place] [address] [phone]              Total critical care time is 0 minutes, which excludes separately billable procedures and updating family. Time spent is specifically for management of the presenting complaint and symptoms initially, direct bedside care, reevaluation, review of records, and consultation.  There was a high probability of clinically significant life-threatening deterioration in the patient's condition, which required my urgent intervention.     This chart was generated in part by using Dragon Dictation system and may contain errors related to that system including errors in grammar, punctuation, and spelling, as well as words and phrases that may be inappropriate. If there are any questions or concerns please feel free to contact the dictating provider for clarification.     Jyothi Hough MD   Acute Care [facility]        Jyothi Hough MD  08/17/24 5133

## 2024-08-21 PROBLEM — E86.0 DEHYDRATION: Status: RESOLVED | Noted: 2024-07-22 | Resolved: 2024-08-21

## 2024-08-25 NOTE — DISCHARGE SUMMARY
Name:  Héctor Valverde  Room:  /0310-01  MRN:    [id number]    Discharge Summary      This discharge summary is in conjunction with a complete physical exam done on the day of discharge.      Discharging Physician: BC YBARRA MD      Admit: 8/6/2024  Discharge:  8/8/2024    Diagnoses this Admission    Principal Problem:    Acute pulmonary embolism without acute cor pulmonale, unspecified pulmonary embolism type (HCC)  Active Problems:    Chronic GERD    Hypothyroidism    Severe protein-calorie malnutrition (HCC)    Acute cystitis with hematuria    Multifocal pneumonia    Head and neck cancer (HCC)    Hyponatremia  Resolved Problems:    * No resolved hospital problems. *        Procedures (Please Review Full Report for Details)  none    Consults    IP CONSULT TO HOSPITALIST  PHARMACY TO DOSE VANCOMYCIN  IP CONSULT TO PULMONOLOGY  IP CONSULT TO NEPHROLOGY  IP CONSULT TO DIETITIAN  IP CONSULT TO VASCULAR ACCESS TEAM  IP CONSULT TO GENERAL SURGERY      HPI:      The patient is a 79 y.o. male with PMH of BPH, head and neck cancer, DVT, GERD, HLD, thyroid disease, and dysphagia s/p J tube placement who presented to [facility] ED with complaint of fatigue. Pt states that he has been having increased fatigue as well as generalized weakness for the past few days. He states that he has been in and out of the hospital lately and his J tube has helped him be less nauseous with tube feeds. He has a G tube and J tube in place. He states that he has been out of breath quicker with exertion than he normally is. He states that he was unable to get out of bed this morning he felt so weak.     Physical Exam at Discharge:  /72   Pulse 62   Temp 98 °F (36.7 °C) (Oral)   Resp 19   Ht 1.88 m (6' 2.02\")   Wt 64.4 kg (142 lb)   SpO2 95%   BMI 18.22 kg/m²     Gen: No distress. Alert.   Eyes: PERRL. No sclera icterus. No conjunctival injection.   ENT: No discharge. Pharynx clear.   Neck: Trachea midline. Normal

## 2024-11-02 ENCOUNTER — APPOINTMENT (OUTPATIENT)
Dept: GENERAL RADIOLOGY | Age: 79
End: 2024-11-02
Payer: MEDICARE

## 2024-11-02 ENCOUNTER — HOSPITAL ENCOUNTER (EMERGENCY)
Age: 79
Discharge: HOME | End: 2024-11-02
Attending: EMERGENCY MEDICINE
Payer: MEDICARE

## 2024-11-02 VITALS
TEMPERATURE: 98.3 F | RESPIRATION RATE: 16 BRPM | OXYGEN SATURATION: 98 % | HEART RATE: 89 BPM | DIASTOLIC BLOOD PRESSURE: 78 MMHG | SYSTOLIC BLOOD PRESSURE: 116 MMHG

## 2024-11-02 DIAGNOSIS — Z46.59 ENCOUNTER FOR FEEDING TUBE PLACEMENT: Primary | ICD-10-CM

## 2024-11-02 PROCEDURE — 6360000004 HC RX CONTRAST MEDICATION: Performed by: EMERGENCY MEDICINE

## 2024-11-02 PROCEDURE — 49465 FLUORO EXAM OF G/COLON TUBE: CPT

## 2024-11-02 PROCEDURE — 43762 RPLC GTUBE NO REVJ TRC: CPT

## 2024-11-02 PROCEDURE — 99283 EMERGENCY DEPT VISIT LOW MDM: CPT

## 2024-11-02 RX ORDER — DIATRIZOATE MEGLUMINE AND DIATRIZOATE SODIUM 660; 100 MG/ML; MG/ML
30 SOLUTION ORAL; RECTAL
Status: DISCONTINUED | OUTPATIENT
Start: 2024-11-02 | End: 2024-11-02 | Stop reason: HOSPADM

## 2024-11-02 RX ORDER — SUCRALFATE 1 G/1
1 TABLET ORAL DAILY
Status: ON HOLD | COMMUNITY
Start: 2024-10-16

## 2024-11-02 RX ADMIN — DIATRIZOATE MEGLUMINE AND DIATRIZOATE SODIUM 30 ML: 660; 100 LIQUID ORAL; RECTAL at 07:40

## 2024-11-02 ASSESSMENT — ENCOUNTER SYMPTOMS
COUGH: 0
VOMITING: 0
SHORTNESS OF BREATH: 0
NAUSEA: 0

## 2024-11-02 NOTE — ED NOTES
Patient received discharge instructions. Patient and wife verbalize understanding to the instructions and have no questions at this time.

## 2024-11-02 NOTE — ED NOTES
Patient had a bowel movement and has been cleaned and changed at this time. The patients wife has been called and made aware that the patient is ready to be picked up. The patients wife will be here as soon as possible. The patient has been made aware of this plan. He is resting comfortably, aox4, and has no complaints at this time.

## 2024-11-02 NOTE — ED PROVIDER NOTES
Emergency Department Attending Physician Note  Location: [facility] ED  11/2/2024       Pt Name: Héctor Valverde  MRN: [id number]  Birthdate 1945    Date of evaluation: 11/2/2024  Provider: PHILLIP GARCÍA DO  PCP: Suraj De La Torre DO    Note Started: 7:01 AM EDT 11/2/24    CHIEF COMPLAINT  Chief Complaint   Patient presents with    G Tube Complications     Patient has a tube draining his bile fluid that has came out sometime during the evening.        ROOM: 10    HISTORY OF PRESENT ILLNESS:  History obtained by patient. Limitations to history : None.     Héctor Valverde is a 79 y.o. male with a significant PMHx of tongue cancer, GERD, hyperlipidemia, and other comorbidities as listed below, who has what appears to be a J-tube and a G-tube in place, presenting to the emergency department today with a G-tube falling out.  Fell out between 2 AM and 4:30 AM.  He says it has been there for years.  Usually is uncomplicated replacement. No recent illnesses or fevers.  No vomiting.  Denies any real pain. No other concerns today in the emergency department.    Nursing Notes were all reviewed and agreed with or any disagreements were addressed in the HPI.      MEDICAL HISTORY  Past Medical History:   Diagnosis Date    Arthritis     Benign hypertrophy of prostate     Cancer (HCC)     growth on tongue    COVID-19 12/07/2020    Dry mouth     s/p radiation treatment    DVT, lower extremity (HCC)     5/2011    GERD (gastroesophageal reflux disease)     acid reflux    Hip fracture (HCC)     5/2011, RIGHT    Hx of blood clots     Hyperlipidemia     Pneumonia 6/2/2011    Prolonged emergence from general anesthesia     Thyroid disease         SURGICAL HISTORY  Past Surgical History:   Procedure Laterality Date    ABDOMEN SURGERY      gallbladder removed    CHOLECYSTECTOMY      COLONOSCOPY      EYE SURGERY Right     TORN RETINA, LASER    HERNIA REPAIR      HIP ARTHROPLASTY Right 08/08/2016    HIP FRACTURE

## 2024-11-03 ENCOUNTER — APPOINTMENT (OUTPATIENT)
Dept: GENERAL RADIOLOGY | Age: 79
DRG: 871 | End: 2024-11-03
Payer: MEDICARE

## 2024-11-03 ENCOUNTER — HOSPITAL ENCOUNTER (EMERGENCY)
Age: 79
Discharge: HOME | DRG: 871 | End: 2024-11-03
Attending: EMERGENCY MEDICINE
Payer: MEDICARE

## 2024-11-03 VITALS
OXYGEN SATURATION: 97 % | DIASTOLIC BLOOD PRESSURE: 72 MMHG | SYSTOLIC BLOOD PRESSURE: 109 MMHG | RESPIRATION RATE: 18 BRPM | HEART RATE: 99 BPM | TEMPERATURE: 98.6 F

## 2024-11-03 DIAGNOSIS — Z43.1 ATTENTION TO G-TUBE (HCC): Primary | ICD-10-CM

## 2024-11-03 PROCEDURE — 99283 EMERGENCY DEPT VISIT LOW MDM: CPT

## 2024-11-03 PROCEDURE — 6360000004 HC RX CONTRAST MEDICATION: Performed by: EMERGENCY MEDICINE

## 2024-11-03 PROCEDURE — 43762 RPLC GTUBE NO REVJ TRC: CPT

## 2024-11-03 PROCEDURE — 49465 FLUORO EXAM OF G/COLON TUBE: CPT

## 2024-11-03 RX ORDER — DIATRIZOATE MEGLUMINE AND DIATRIZOATE SODIUM 660; 100 MG/ML; MG/ML
30 SOLUTION ORAL; RECTAL
Status: DISCONTINUED | OUTPATIENT
Start: 2024-11-03 | End: 2024-11-03 | Stop reason: HOSPADM

## 2024-11-03 RX ADMIN — DIATRIZOATE MEGLUMINE AND DIATRIZOATE SODIUM 30 ML: 660; 100 LIQUID ORAL; RECTAL at 16:42

## 2024-11-03 ASSESSMENT — PAIN - FUNCTIONAL ASSESSMENT: PAIN_FUNCTIONAL_ASSESSMENT: NONE - DENIES PAIN

## 2024-11-03 NOTE — ED PROVIDER NOTES
[facility] ED  EMERGENCY DEPARTMENT ENCOUNTER        Patient Name: Héctor Valverde  MRN: [id number]  Birthdate 1945  Date of evaluation: 11/3/2024  Provider: Pepito Martínez MD  PCP: Suraj De La Torre DO  Note Started: 3:59 PM EST 11/3/24    CHIEF COMPLAINT       J Tube Complications (The patient presents via ems after G tube and J tube both came out of the patient. )      HISTORY OF PRESENT ILLNESS: 1 or more Elements     History from : Patient    Limitations to history : None    Héctor Valverde is a 79 y.o. male who presents for evaluation of G-tube dislodgment.  He was just seen emergency room yesterday after a G-tube replacement.  He states today, this got caught on a clothing item when he was walking and pulled the G-tube out with the balloon still inflated.  He also has a J-tube in place in the lower abdomen and this was also pulled out.    Nursing Notes were all reviewed and agreed with or any disagreements were addressed in the HPI.    REVIEW OF SYSTEMS :      Review of Systems    Positives and Pertinent negatives as per HPI.     SURGICAL HISTORY     Past Surgical History:   Procedure Laterality Date    ABDOMEN SURGERY      gallbladder removed    CHOLECYSTECTOMY      COLONOSCOPY      EYE SURGERY Right     TORN RETINA, LASER    HERNIA REPAIR      HIP ARTHROPLASTY Right 08/08/2016    HIP FRACTURE SURGERY Right     JOINT REPLACEMENT      OTHER SURGICAL HISTORY  05/18/2011    right hip IM nailing    SHOULDER SURGERY Right rotater cuff    STOMACH SURGERY N/A 7/3/2024    JEJUNOSTOMY TUBE INSERTION/CHANGE performed by Kayden Lopez MD at [facility] [place] OR    TONGUE BIOPSY  10/2010    UPPER GASTROINTESTINAL ENDOSCOPY N/A 7/2/2024    ESOPHAGOGASTRODUODENOSCOPY BIOPSY performed by Ajay Lees MD at [facility] [place] [facility] ENDOSCOPY       CURRENTMEDICATIONS       Previous Medications    APIXABAN (ELIQUIS) 5 MG TABS TABLET    Take 2 tablets by mouth 2 times daily for 13 doses    APIXABAN (ELIQUIS) 5 MG  non-peritonitic.  There is multiple ostomies present of the abdomen from prior G-tube and J-tube sites, granulation tissue is present no purulent drainage or surrounding erythema  Extremities:No significant lower extremity edema. Lower extremities are symmetric.   Neuro: Moving all extremities. No focal deficits. Speech is clear.   Skin:No rash, no erythema  Psych: Calm and cooperative.      DIAGNOSTIC RESULTS   LABS:    Labs Reviewed - No data to display    When ordered only abnormal lab results are displayed. All other labs were within normal range or not returned as of this dictation.          RADIOLOGY:   Non-plain film images such as CT, Ultrasound and MRI are read by the radiologist. Plain radiographic images are visualized and preliminarily interpreted by the ED Provider with the below findings:        Interpretation per the Radiologist below, if available at the time of this note:    XR INJ CONTRAST GASTRIC TUBE PERC   Final Result   1. Balloon tip catheter opacifies the gastric lumen.  There is no evidence of   extraluminal contrast.   2. Additional contrast in the colon and probably residual contrast from prior   injection.           XR INJ CONTRAST GASTRIC TUBE PERC    Result Date: 11/2/2024  EXAMINATION: ONE SUPINE XRAY VIEW(S) OF THE ABDOMEN 11/2/2024 7:39 am COMPARISON: None. HISTORY: ORDERING SYSTEM PROVIDED HISTORY: confirm bedside replacement TECHNOLOGIST PROVIDED HISTORY: Reason for exam:->confirm bedside replacement Reason for Exam: confirm bedside replacement FINDINGS: Water-soluble contrast was injected through the patient's indwelling gastrostomy tube demonstrating intraluminal placement with antegrade flow of contrast into the proximal duodenum.  There is no extravasation.     Satisfactory G-tube placement.         Bedside Ultrasound, as interpreted by me, if performed:    No results found.    PROCEDURES     Unless otherwise noted below, none     Feeding Tube    Date/Time: 11/3/2024 4:16

## 2024-11-03 NOTE — DISCHARGE INSTRUCTIONS
The G-tube and J-tube are replaced.  Please reach out to your GI doctor tomorrow.  Return for worsening symptoms

## 2024-11-06 ENCOUNTER — APPOINTMENT (OUTPATIENT)
Dept: CT IMAGING | Age: 79
DRG: 871 | End: 2024-11-06
Payer: MEDICARE

## 2024-11-06 ENCOUNTER — HOSPITAL ENCOUNTER (INPATIENT)
Age: 79
LOS: 4 days | Discharge: ANOTHER HOSPITAL | DRG: 871 | End: 2024-11-10
Attending: STUDENT IN AN ORGANIZED HEALTH CARE EDUCATION/TRAINING PROGRAM | Admitting: INTERNAL MEDICINE
Payer: MEDICARE

## 2024-11-06 ENCOUNTER — APPOINTMENT (OUTPATIENT)
Dept: GENERAL RADIOLOGY | Age: 79
DRG: 871 | End: 2024-11-06
Payer: MEDICARE

## 2024-11-06 DIAGNOSIS — J18.9 MULTIFOCAL PNEUMONIA: Primary | ICD-10-CM

## 2024-11-06 DIAGNOSIS — E87.1 HYPONATREMIA: ICD-10-CM

## 2024-11-06 DIAGNOSIS — R65.20 SEPSIS WITH ACUTE HYPOXIC RESPIRATORY FAILURE WITHOUT SEPTIC SHOCK, DUE TO UNSPECIFIED ORGANISM (HCC): ICD-10-CM

## 2024-11-06 DIAGNOSIS — A41.9 SEPSIS WITH ACUTE HYPOXIC RESPIRATORY FAILURE WITHOUT SEPTIC SHOCK, DUE TO UNSPECIFIED ORGANISM (HCC): ICD-10-CM

## 2024-11-06 DIAGNOSIS — J96.01 SEPSIS WITH ACUTE HYPOXIC RESPIRATORY FAILURE WITHOUT SEPTIC SHOCK, DUE TO UNSPECIFIED ORGANISM (HCC): ICD-10-CM

## 2024-11-06 LAB
ALBUMIN SERPL-MCNC: 4 G/DL (ref 3.4–5)
ALBUMIN/GLOB SERPL: 1.2 {RATIO} (ref 1.1–2.2)
ALP SERPL-CCNC: 90 U/L (ref 40–129)
ALT SERPL-CCNC: 8 U/L (ref 10–40)
ANION GAP SERPL CALCULATED.3IONS-SCNC: 10 MMOL/L (ref 3–16)
AST SERPL-CCNC: 18 U/L (ref 15–37)
BACTERIA URNS QL MICRO: ABNORMAL /HPF
BASOPHILS # BLD: 0 K/UL (ref 0–0.2)
BASOPHILS NFR BLD: 0.2 %
BILIRUB SERPL-MCNC: 0.6 MG/DL (ref 0–1)
BILIRUB UR QL STRIP.AUTO: NEGATIVE
BUN SERPL-MCNC: 21 MG/DL (ref 7–20)
CALCIUM SERPL-MCNC: 9.7 MG/DL (ref 8.3–10.6)
CHLORIDE SERPL-SCNC: 79 MMOL/L (ref 99–110)
CLARITY UR: CLEAR
CO2 SERPL-SCNC: 38 MMOL/L (ref 21–32)
COLOR UR: YELLOW
CREAT SERPL-MCNC: 0.7 MG/DL (ref 0.8–1.3)
DEPRECATED RDW RBC AUTO: 15.6 % (ref 12.4–15.4)
EKG ATRIAL RATE: 104 BPM
EKG DIAGNOSIS: NORMAL
EKG P AXIS: 72 DEGREES
EKG P-R INTERVAL: 140 MS
EKG Q-T INTERVAL: 344 MS
EKG QRS DURATION: 80 MS
EKG QTC CALCULATION (BAZETT): 452 MS
EKG R AXIS: 50 DEGREES
EKG T AXIS: 62 DEGREES
EKG VENTRICULAR RATE: 104 BPM
EOSINOPHIL # BLD: 0 K/UL (ref 0–0.6)
EOSINOPHIL NFR BLD: 0 %
EPI CELLS #/AREA URNS HPF: ABNORMAL /HPF (ref 0–5)
FLUAV RNA RESP QL NAA+PROBE: NOT DETECTED
FLUBV RNA RESP QL NAA+PROBE: NOT DETECTED
GFR SERPLBLD CREATININE-BSD FMLA CKD-EPI: >90 ML/MIN/{1.73_M2}
GLUCOSE BLD-MCNC: 117 MG/DL (ref 70–99)
GLUCOSE SERPL-MCNC: 118 MG/DL (ref 70–99)
GLUCOSE UR STRIP.AUTO-MCNC: NEGATIVE MG/DL
HCT VFR BLD AUTO: 38.1 % (ref 40.5–52.5)
HGB BLD-MCNC: 12.6 G/DL (ref 13.5–17.5)
HGB UR QL STRIP.AUTO: ABNORMAL
KETONES UR STRIP.AUTO-MCNC: NEGATIVE MG/DL
LACTATE BLDV-SCNC: 1.5 MMOL/L (ref 0.4–1.9)
LEUKOCYTE ESTERASE UR QL STRIP.AUTO: NEGATIVE
LYMPHOCYTES # BLD: 0.3 K/UL (ref 1–5.1)
LYMPHOCYTES NFR BLD: 2 %
MCH RBC QN AUTO: 25 PG (ref 26–34)
MCHC RBC AUTO-ENTMCNC: 33.1 G/DL (ref 31–36)
MCV RBC AUTO: 75.4 FL (ref 80–100)
MONOCYTES # BLD: 0.7 K/UL (ref 0–1.3)
MONOCYTES NFR BLD: 4.4 %
MUCOUS THREADS #/AREA URNS LPF: ABNORMAL /LPF
NEUTROPHILS # BLD: 15.1 K/UL (ref 1.7–7.7)
NEUTROPHILS NFR BLD: 93.4 %
NITRITE UR QL STRIP.AUTO: NEGATIVE
PERFORMED ON: ABNORMAL
PH UR STRIP.AUTO: 8.5 [PH] (ref 5–8)
PLATELET # BLD AUTO: 270 K/UL (ref 135–450)
PMV BLD AUTO: 7.2 FL (ref 5–10.5)
POTASSIUM SERPL-SCNC: 3.8 MMOL/L (ref 3.5–5.1)
PROCALCITONIN SERPL IA-MCNC: 0.28 NG/ML (ref 0–0.15)
PROT SERPL-MCNC: 7.3 G/DL (ref 6.4–8.2)
PROT UR STRIP.AUTO-MCNC: ABNORMAL MG/DL
RBC # BLD AUTO: 5.05 M/UL (ref 4.2–5.9)
RBC #/AREA URNS HPF: ABNORMAL /HPF (ref 0–4)
SARS-COV-2 RNA RESP QL NAA+PROBE: NOT DETECTED
SODIUM SERPL-SCNC: 127 MMOL/L (ref 136–145)
SP GR UR STRIP.AUTO: 1.01 (ref 1–1.03)
TROPONIN, HIGH SENSITIVITY: 15 NG/L (ref 0–22)
TROPONIN, HIGH SENSITIVITY: 20 NG/L (ref 0–22)
UA COMPLETE W REFLEX CULTURE PNL UR: ABNORMAL
UA DIPSTICK W REFLEX MICRO PNL UR: YES
URN SPEC COLLECT METH UR: ABNORMAL
UROBILINOGEN UR STRIP-ACNC: 1 E.U./DL
WBC # BLD AUTO: 16.1 K/UL (ref 4–11)
WBC #/AREA URNS HPF: ABNORMAL /HPF (ref 0–5)

## 2024-11-06 PROCEDURE — 6370000000 HC RX 637 (ALT 250 FOR IP)

## 2024-11-06 PROCEDURE — 2580000003 HC RX 258: Performed by: STUDENT IN AN ORGANIZED HEALTH CARE EDUCATION/TRAINING PROGRAM

## 2024-11-06 PROCEDURE — 96375 TX/PRO/DX INJ NEW DRUG ADDON: CPT

## 2024-11-06 PROCEDURE — 87449 NOS EACH ORGANISM AG IA: CPT

## 2024-11-06 PROCEDURE — 81001 URINALYSIS AUTO W/SCOPE: CPT

## 2024-11-06 PROCEDURE — 6360000002 HC RX W HCPCS: Performed by: STUDENT IN AN ORGANIZED HEALTH CARE EDUCATION/TRAINING PROGRAM

## 2024-11-06 PROCEDURE — 84145 PROCALCITONIN (PCT): CPT

## 2024-11-06 PROCEDURE — 83605 ASSAY OF LACTIC ACID: CPT

## 2024-11-06 PROCEDURE — 93010 ELECTROCARDIOGRAM REPORT: CPT | Performed by: STUDENT IN AN ORGANIZED HEALTH CARE EDUCATION/TRAINING PROGRAM

## 2024-11-06 PROCEDURE — 96361 HYDRATE IV INFUSION ADD-ON: CPT

## 2024-11-06 PROCEDURE — 94761 N-INVAS EAR/PLS OXIMETRY MLT: CPT

## 2024-11-06 PROCEDURE — 80053 COMPREHEN METABOLIC PANEL: CPT

## 2024-11-06 PROCEDURE — 2580000003 HC RX 258

## 2024-11-06 PROCEDURE — 87636 SARSCOV2 & INF A&B AMP PRB: CPT

## 2024-11-06 PROCEDURE — 36415 COLL VENOUS BLD VENIPUNCTURE: CPT

## 2024-11-06 PROCEDURE — 99285 EMERGENCY DEPT VISIT HI MDM: CPT

## 2024-11-06 PROCEDURE — 2060000000 HC ICU INTERMEDIATE R&B

## 2024-11-06 PROCEDURE — 2700000000 HC OXYGEN THERAPY PER DAY

## 2024-11-06 PROCEDURE — 71260 CT THORAX DX C+: CPT

## 2024-11-06 PROCEDURE — 99223 1ST HOSP IP/OBS HIGH 75: CPT | Performed by: INTERNAL MEDICINE

## 2024-11-06 PROCEDURE — 84484 ASSAY OF TROPONIN QUANT: CPT

## 2024-11-06 PROCEDURE — 6360000004 HC RX CONTRAST MEDICATION: Performed by: STUDENT IN AN ORGANIZED HEALTH CARE EDUCATION/TRAINING PROGRAM

## 2024-11-06 PROCEDURE — 85025 COMPLETE CBC W/AUTO DIFF WBC: CPT

## 2024-11-06 PROCEDURE — 96365 THER/PROPH/DIAG IV INF INIT: CPT

## 2024-11-06 PROCEDURE — 6360000002 HC RX W HCPCS

## 2024-11-06 PROCEDURE — 93005 ELECTROCARDIOGRAM TRACING: CPT | Performed by: STUDENT IN AN ORGANIZED HEALTH CARE EDUCATION/TRAINING PROGRAM

## 2024-11-06 PROCEDURE — 71045 X-RAY EXAM CHEST 1 VIEW: CPT

## 2024-11-06 PROCEDURE — 87040 BLOOD CULTURE FOR BACTERIA: CPT

## 2024-11-06 RX ORDER — ACETAMINOPHEN 650 MG/1
650 SUPPOSITORY RECTAL EVERY 6 HOURS PRN
Status: DISCONTINUED | OUTPATIENT
Start: 2024-11-06 | End: 2024-11-10 | Stop reason: HOSPADM

## 2024-11-06 RX ORDER — SODIUM CHLORIDE, SODIUM LACTATE, POTASSIUM CHLORIDE, AND CALCIUM CHLORIDE .6; .31; .03; .02 G/100ML; G/100ML; G/100ML; G/100ML
1000 INJECTION, SOLUTION INTRAVENOUS ONCE
Status: COMPLETED | OUTPATIENT
Start: 2024-11-06 | End: 2024-11-06

## 2024-11-06 RX ORDER — SODIUM CHLORIDE 9 MG/ML
INJECTION, SOLUTION INTRAVENOUS CONTINUOUS
Status: ACTIVE | OUTPATIENT
Start: 2024-11-06 | End: 2024-11-07

## 2024-11-06 RX ORDER — ONDANSETRON 2 MG/ML
4 INJECTION INTRAMUSCULAR; INTRAVENOUS EVERY 6 HOURS PRN
Status: DISCONTINUED | OUTPATIENT
Start: 2024-11-06 | End: 2024-11-10 | Stop reason: HOSPADM

## 2024-11-06 RX ORDER — SIMETHICONE 80 MG
80 TABLET,CHEWABLE ORAL EVERY 6 HOURS PRN
Status: DISCONTINUED | OUTPATIENT
Start: 2024-11-06 | End: 2024-11-10 | Stop reason: HOSPADM

## 2024-11-06 RX ORDER — BENZONATATE 100 MG/1
100 CAPSULE ORAL 3 TIMES DAILY PRN
Status: DISCONTINUED | OUTPATIENT
Start: 2024-11-06 | End: 2024-11-10 | Stop reason: HOSPADM

## 2024-11-06 RX ORDER — ONDANSETRON 4 MG/1
4 TABLET, ORALLY DISINTEGRATING ORAL EVERY 8 HOURS PRN
Status: DISCONTINUED | OUTPATIENT
Start: 2024-11-06 | End: 2024-11-10 | Stop reason: HOSPADM

## 2024-11-06 RX ORDER — SODIUM CHLORIDE 0.9 % (FLUSH) 0.9 %
5-40 SYRINGE (ML) INJECTION PRN
Status: DISCONTINUED | OUTPATIENT
Start: 2024-11-06 | End: 2024-11-10 | Stop reason: HOSPADM

## 2024-11-06 RX ORDER — IOPAMIDOL 755 MG/ML
75 INJECTION, SOLUTION INTRAVASCULAR
Status: COMPLETED | OUTPATIENT
Start: 2024-11-06 | End: 2024-11-06

## 2024-11-06 RX ORDER — ACETAMINOPHEN 325 MG/1
650 TABLET ORAL EVERY 6 HOURS PRN
Status: DISCONTINUED | OUTPATIENT
Start: 2024-11-06 | End: 2024-11-10 | Stop reason: HOSPADM

## 2024-11-06 RX ORDER — GUAIFENESIN 600 MG/1
600 TABLET, EXTENDED RELEASE ORAL 2 TIMES DAILY
Status: DISCONTINUED | OUTPATIENT
Start: 2024-11-06 | End: 2024-11-10 | Stop reason: HOSPADM

## 2024-11-06 RX ORDER — ONDANSETRON 4 MG/1
4 TABLET, ORALLY DISINTEGRATING ORAL EVERY 8 HOURS PRN
COMMUNITY
Start: 2024-10-14

## 2024-11-06 RX ORDER — VANCOMYCIN 1.5 G/300ML
1500 INJECTION, SOLUTION INTRAVENOUS ONCE
Status: COMPLETED | OUTPATIENT
Start: 2024-11-06 | End: 2024-11-06

## 2024-11-06 RX ORDER — POLYETHYLENE GLYCOL 3350 17 G/17G
17 POWDER, FOR SOLUTION ORAL DAILY PRN
Status: DISCONTINUED | OUTPATIENT
Start: 2024-11-06 | End: 2024-11-10 | Stop reason: HOSPADM

## 2024-11-06 RX ORDER — OMEPRAZOLE/SODIUM BICARBONATE 2-84 MG/ML
20 SUSPENSION, RECONSTITUTED, ORAL (ML) ORAL DAILY
Status: ON HOLD | COMMUNITY
Start: 2024-10-18 | End: 2024-11-10 | Stop reason: ALTCHOICE

## 2024-11-06 RX ORDER — VANCOMYCIN 1 G/200ML
1000 INJECTION, SOLUTION INTRAVENOUS EVERY 12 HOURS
Status: DISCONTINUED | OUTPATIENT
Start: 2024-11-07 | End: 2024-11-09

## 2024-11-06 RX ORDER — SODIUM CHLORIDE 9 MG/ML
INJECTION, SOLUTION INTRAVENOUS PRN
Status: DISCONTINUED | OUTPATIENT
Start: 2024-11-06 | End: 2024-11-10 | Stop reason: HOSPADM

## 2024-11-06 RX ORDER — ENOXAPARIN SODIUM 100 MG/ML
40 INJECTION SUBCUTANEOUS DAILY
Status: CANCELLED | OUTPATIENT
Start: 2024-11-06

## 2024-11-06 RX ORDER — SODIUM CHLORIDE 0.9 % (FLUSH) 0.9 %
5-40 SYRINGE (ML) INJECTION EVERY 12 HOURS SCHEDULED
Status: DISCONTINUED | OUTPATIENT
Start: 2024-11-06 | End: 2024-11-10 | Stop reason: HOSPADM

## 2024-11-06 RX ORDER — SUCRALFATE 1 G/1
1 TABLET ORAL
Status: DISCONTINUED | OUTPATIENT
Start: 2024-11-06 | End: 2024-11-10 | Stop reason: HOSPADM

## 2024-11-06 RX ADMIN — SODIUM CHLORIDE: 9 INJECTION, SOLUTION INTRAVENOUS at 17:24

## 2024-11-06 RX ADMIN — CEFEPIME 2000 MG: 2 INJECTION, POWDER, FOR SOLUTION INTRAVENOUS at 13:49

## 2024-11-06 RX ADMIN — SODIUM CHLORIDE: 9 INJECTION, SOLUTION INTRAVENOUS at 17:38

## 2024-11-06 RX ADMIN — GUAIFENESIN 600 MG: 600 TABLET, EXTENDED RELEASE ORAL at 21:07

## 2024-11-06 RX ADMIN — SODIUM CHLORIDE, POTASSIUM CHLORIDE, SODIUM LACTATE AND CALCIUM CHLORIDE 1000 ML: 600; 310; 30; 20 INJECTION, SOLUTION INTRAVENOUS at 11:42

## 2024-11-06 RX ADMIN — SUCRALFATE 1 G: 1 TABLET ORAL at 21:07

## 2024-11-06 RX ADMIN — VANCOMYCIN 1500 MG: 1.5 INJECTION, SOLUTION INTRAVENOUS at 14:31

## 2024-11-06 RX ADMIN — CEFEPIME 2000 MG: 2 INJECTION, POWDER, FOR SOLUTION INTRAVENOUS at 21:07

## 2024-11-06 RX ADMIN — APIXABAN 5 MG: 5 TABLET, FILM COATED ORAL at 21:07

## 2024-11-06 RX ADMIN — SUCRALFATE 1 G: 1 TABLET ORAL at 18:40

## 2024-11-06 RX ADMIN — IOPAMIDOL 75 ML: 755 INJECTION, SOLUTION INTRAVENOUS at 13:35

## 2024-11-06 ASSESSMENT — PAIN - FUNCTIONAL ASSESSMENT: PAIN_FUNCTIONAL_ASSESSMENT: NONE - DENIES PAIN

## 2024-11-06 NOTE — ED PROVIDER NOTES
[facility] ED  EMERGENCY DEPARTMENT ENCOUNTER      Pt Name: Héctor Valverde  MRN: [id number]  Birthdate 1945  Date of evaluation: 11/6/2024  Provider: MONTEZ BRYAN MD     CHIEF COMPLAINT       Chief Complaint   Patient presents with   • G Tube Complications     Pt was here on Sunday.  Had g tube and j tube replaced in the ed.  Home health nurse concerned that it had slipped out a bit again and maybe was leaking.         HISTORY OF PRESENT ILLNESS   (Location/Symptom, Timing/Onset, Context/Setting, Quality, Duration, Modifying Factors, Severity) Note limiting factors.   I wore appropriate PPE for the entirety of this encounter.      HPI    Héctor Valverde is a 79 y.o. male who presents to the emergency department for evaluation of generalized weakness, generally feeling unwell.  Complicated medical history, has history of throat cancer and does have recurrent pneumonias per his wife at bedside.  HPI limited as patient has difficulty speaking but is able to answer questions in one-word sentences or by nodding/shaking his head due to history of throat cancer.  Denies pain, nausea, change in bowel or bladder habits, difficulty breathing though his wife does note he seemed to be working harder to breathe when he got up this morning.  States he does not walk but he is usually able to transition with assistance and this morning he seemed to be working harder to breathe afterwards.      Nursing Notes were reviewed.  Limitations to history:  Outside historians:    REVIEW OF SYSTEMS     Review of Systems as documented in HPI above.     PAST MEDICAL HISTORY     Past Medical History:   Diagnosis Date   • Arthritis    • Benign hypertrophy of prostate    • Cancer (HCC)     growth on tongue   • COVID-19 12/07/2020   • Dry mouth     s/p radiation treatment   • DVT, lower extremity (HCC)     5/2011   • GERD (gastroesophageal reflux disease)     acid reflux   • Hip fracture (HCC)     5/2011, RIGHT   • Hx of

## 2024-11-06 NOTE — FLOWSHEET NOTE
Nursing Admission Note    Patient admitted to room 0316 from ED room 7.    Deterioration Index on arrival: >50       11/06/24 1645   Vitals   Temp 99.5 °F (37.5 °C)   Temp Source Oral   Pulse (!) 115   Heart Rate Source Monitor   Respirations 24   BP 99/66   MAP (Calculated) 77   BP Location Left upper arm   BP Upper/Lower Upper   BP Method Automatic   Patient Position Sitting   Cardiac Rhythm Sinus tachy   Pain Assessment   Pain Assessment None - Denies Pain   Opioid-Induced Sedation   POSS Score 1   RASS   White Agitation Sedation Scale (RASS) 0   Oxygen Therapy   SpO2 91 %   Pulse Oximetry Type Continuous   SPO2 High Alarm Limit 100   SPO2 Low Alarm Limit POX 90   Pulse Oximeter Device Mode Continuous   Pulse Oximeter Device Location Right;Finger   O2 Device Nasal cannula   Oximetry Probe Site Changed Yes   Skin Assessment Clean, dry, & intact   O2 Flow Rate (L/min) 2 L/min   Blood Gas  Performed? No   Oxygen Therapy Supplemental oxygen         Education  Oriented to room, call light, and floor policies.  Plan of care reviewed with patient/family.    Educated on hourly rounding    Fall Risk   95 fall risk  Standard Fall Risk Precaution in place: call light and bedside table within reach.  encouraged to call for needs or ambulation.    Bed Alarm ON  Educated patient/ family on Fall Precautions     Eugene Score  12 score.  Eugene Prevention initiated:  Yes   Wound Care Orders initiated:  Yes    Admission documentation completed:   YES

## 2024-11-07 ENCOUNTER — APPOINTMENT (OUTPATIENT)
Dept: GENERAL RADIOLOGY | Age: 79
DRG: 871 | End: 2024-11-07
Payer: MEDICARE

## 2024-11-07 PROBLEM — R09.02 HYPOXIA: Status: ACTIVE | Noted: 2024-11-07

## 2024-11-07 PROBLEM — R09.89 PULMONARY CONGESTION: Status: ACTIVE | Noted: 2024-11-07

## 2024-11-07 PROBLEM — J43.9 ACUTE EXACERBATION OF EMPHYSEMA (HCC): Status: ACTIVE | Noted: 2024-11-07

## 2024-11-07 PROBLEM — R93.89 ABNORMAL CT OF THE CHEST: Status: ACTIVE | Noted: 2024-11-07

## 2024-11-07 PROBLEM — E43 SEVERE PROTEIN-CALORIE MALNUTRITION (HCC): Chronic | Status: ACTIVE | Noted: 2020-08-27

## 2024-11-07 LAB
ALBUMIN SERPL-MCNC: 3 G/DL (ref 3.4–5)
ALBUMIN/GLOB SERPL: 1.1 {RATIO} (ref 1.1–2.2)
ALP SERPL-CCNC: 68 U/L (ref 40–129)
ALT SERPL-CCNC: 6 U/L (ref 10–40)
ANION GAP SERPL CALCULATED.3IONS-SCNC: 7 MMOL/L (ref 3–16)
AST SERPL-CCNC: 13 U/L (ref 15–37)
BASOPHILS # BLD: 0 K/UL (ref 0–0.2)
BASOPHILS NFR BLD: 0.4 %
BILIRUB SERPL-MCNC: 0.7 MG/DL (ref 0–1)
BUN SERPL-MCNC: 17 MG/DL (ref 7–20)
CALCIUM SERPL-MCNC: 8.9 MG/DL (ref 8.3–10.6)
CHLORIDE SERPL-SCNC: 88 MMOL/L (ref 99–110)
CO2 SERPL-SCNC: 35 MMOL/L (ref 21–32)
CREAT SERPL-MCNC: 0.7 MG/DL (ref 0.8–1.3)
DEPRECATED RDW RBC AUTO: 15.4 % (ref 12.4–15.4)
EOSINOPHIL # BLD: 0 K/UL (ref 0–0.6)
EOSINOPHIL NFR BLD: 0.3 %
GFR SERPLBLD CREATININE-BSD FMLA CKD-EPI: >90 ML/MIN/{1.73_M2}
GLUCOSE BLD-MCNC: 103 MG/DL (ref 70–99)
GLUCOSE BLD-MCNC: 104 MG/DL (ref 70–99)
GLUCOSE BLD-MCNC: 109 MG/DL (ref 70–99)
GLUCOSE BLD-MCNC: 77 MG/DL (ref 70–99)
GLUCOSE BLD-MCNC: 97 MG/DL (ref 70–99)
GLUCOSE SERPL-MCNC: 97 MG/DL (ref 70–99)
HCT VFR BLD AUTO: 32 % (ref 40.5–52.5)
HGB BLD-MCNC: 10.7 G/DL (ref 13.5–17.5)
LEGIONELLA AG UR QL: NORMAL
LYMPHOCYTES # BLD: 0.5 K/UL (ref 1–5.1)
LYMPHOCYTES NFR BLD: 5.5 %
MCH RBC QN AUTO: 25.3 PG (ref 26–34)
MCHC RBC AUTO-ENTMCNC: 33.5 G/DL (ref 31–36)
MCV RBC AUTO: 75.6 FL (ref 80–100)
MONOCYTES # BLD: 0.6 K/UL (ref 0–1.3)
MONOCYTES NFR BLD: 6.7 %
NEUTROPHILS # BLD: 8.5 K/UL (ref 1.7–7.7)
NEUTROPHILS NFR BLD: 87.1 %
PERFORMED ON: ABNORMAL
PERFORMED ON: NORMAL
PERFORMED ON: NORMAL
PLATELET # BLD AUTO: 224 K/UL (ref 135–450)
PMV BLD AUTO: 7.7 FL (ref 5–10.5)
POTASSIUM SERPL-SCNC: 3.6 MMOL/L (ref 3.5–5.1)
PROT SERPL-MCNC: 5.7 G/DL (ref 6.4–8.2)
RBC # BLD AUTO: 4.23 M/UL (ref 4.2–5.9)
S PNEUM AG UR QL: NORMAL
SODIUM SERPL-SCNC: 130 MMOL/L (ref 136–145)
WBC # BLD AUTO: 9.7 K/UL (ref 4–11)

## 2024-11-07 PROCEDURE — 88305 TISSUE EXAM BY PATHOLOGIST: CPT

## 2024-11-07 PROCEDURE — 6370000000 HC RX 637 (ALT 250 FOR IP)

## 2024-11-07 PROCEDURE — 87077 CULTURE AEROBIC IDENTIFY: CPT

## 2024-11-07 PROCEDURE — 74018 RADEX ABDOMEN 1 VIEW: CPT

## 2024-11-07 PROCEDURE — 0BCB8ZZ EXTIRPATION OF MATTER FROM LEFT LOWER LOBE BRONCHUS, VIA NATURAL OR ARTIFICIAL OPENING ENDOSCOPIC: ICD-10-PCS | Performed by: INTERNAL MEDICINE

## 2024-11-07 PROCEDURE — 7100000010 HC PHASE II RECOVERY - FIRST 15 MIN: Performed by: INTERNAL MEDICINE

## 2024-11-07 PROCEDURE — 3609010800 HC BRONCHOSCOPY ALVEOLAR LAVAGE: Performed by: INTERNAL MEDICINE

## 2024-11-07 PROCEDURE — 2060000000 HC ICU INTERMEDIATE R&B

## 2024-11-07 PROCEDURE — 3609010900 HC BRONCHOSCOPY THERAPUTIC ASPIRATION INITIAL: Performed by: INTERNAL MEDICINE

## 2024-11-07 PROCEDURE — 6360000004 HC RX CONTRAST MEDICATION

## 2024-11-07 PROCEDURE — 87116 MYCOBACTERIA CULTURE: CPT

## 2024-11-07 PROCEDURE — 2580000003 HC RX 258: Performed by: INTERNAL MEDICINE

## 2024-11-07 PROCEDURE — 6360000002 HC RX W HCPCS: Performed by: INTERNAL MEDICINE

## 2024-11-07 PROCEDURE — 94640 AIRWAY INHALATION TREATMENT: CPT

## 2024-11-07 PROCEDURE — 2580000003 HC RX 258

## 2024-11-07 PROCEDURE — 6370000000 HC RX 637 (ALT 250 FOR IP): Performed by: INTERNAL MEDICINE

## 2024-11-07 PROCEDURE — 94761 N-INVAS EAR/PLS OXIMETRY MLT: CPT

## 2024-11-07 PROCEDURE — 80053 COMPREHEN METABOLIC PANEL: CPT

## 2024-11-07 PROCEDURE — 2709999900 HC NON-CHARGEABLE SUPPLY: Performed by: INTERNAL MEDICINE

## 2024-11-07 PROCEDURE — 36415 COLL VENOUS BLD VENIPUNCTURE: CPT

## 2024-11-07 PROCEDURE — 7100000011 HC PHASE II RECOVERY - ADDTL 15 MIN: Performed by: INTERNAL MEDICINE

## 2024-11-07 PROCEDURE — 6360000002 HC RX W HCPCS

## 2024-11-07 PROCEDURE — 87186 SC STD MICRODIL/AGAR DIL: CPT

## 2024-11-07 PROCEDURE — 87102 FUNGUS ISOLATION CULTURE: CPT

## 2024-11-07 PROCEDURE — 0B9J8ZX DRAINAGE OF LEFT LOWER LUNG LOBE, VIA NATURAL OR ARTIFICIAL OPENING ENDOSCOPIC, DIAGNOSTIC: ICD-10-PCS | Performed by: INTERNAL MEDICINE

## 2024-11-07 PROCEDURE — 85025 COMPLETE CBC W/AUTO DIFF WBC: CPT

## 2024-11-07 PROCEDURE — 87206 SMEAR FLUORESCENT/ACID STAI: CPT

## 2024-11-07 PROCEDURE — 0BDB8ZX EXTRACTION OF LEFT LOWER LOBE BRONCHUS, VIA NATURAL OR ARTIFICIAL OPENING ENDOSCOPIC, DIAGNOSTIC: ICD-10-PCS | Performed by: INTERNAL MEDICINE

## 2024-11-07 PROCEDURE — 88112 CYTOPATH CELL ENHANCE TECH: CPT

## 2024-11-07 PROCEDURE — 99233 SBSQ HOSP IP/OBS HIGH 50: CPT | Performed by: INTERNAL MEDICINE

## 2024-11-07 PROCEDURE — 87205 SMEAR GRAM STAIN: CPT

## 2024-11-07 PROCEDURE — 2700000000 HC OXYGEN THERAPY PER DAY

## 2024-11-07 PROCEDURE — 99152 MOD SED SAME PHYS/QHP 5/>YRS: CPT | Performed by: INTERNAL MEDICINE

## 2024-11-07 PROCEDURE — 87070 CULTURE OTHR SPECIMN AEROBIC: CPT

## 2024-11-07 RX ORDER — 0.9 % SODIUM CHLORIDE 0.9 %
500 INTRAVENOUS SOLUTION INTRAVENOUS ONCE
Status: COMPLETED | OUTPATIENT
Start: 2024-11-07 | End: 2024-11-07

## 2024-11-07 RX ORDER — IPRATROPIUM BROMIDE AND ALBUTEROL SULFATE 2.5; .5 MG/3ML; MG/3ML
1 SOLUTION RESPIRATORY (INHALATION)
Status: DISCONTINUED | OUTPATIENT
Start: 2024-11-07 | End: 2024-11-10 | Stop reason: HOSPADM

## 2024-11-07 RX ORDER — IPRATROPIUM BROMIDE AND ALBUTEROL SULFATE 2.5; .5 MG/3ML; MG/3ML
1 SOLUTION RESPIRATORY (INHALATION) ONCE
Status: COMPLETED | OUTPATIENT
Start: 2024-11-07 | End: 2024-11-07

## 2024-11-07 RX ORDER — SODIUM CHLORIDE FOR INHALATION 3 %
4 VIAL, NEBULIZER (ML) INHALATION 2 TIMES DAILY
Status: DISCONTINUED | OUTPATIENT
Start: 2024-11-07 | End: 2024-11-10

## 2024-11-07 RX ORDER — IPRATROPIUM BROMIDE AND ALBUTEROL SULFATE 2.5; .5 MG/3ML; MG/3ML
1 SOLUTION RESPIRATORY (INHALATION)
Status: DISCONTINUED | OUTPATIENT
Start: 2024-11-07 | End: 2024-11-07

## 2024-11-07 RX ORDER — ENOXAPARIN SODIUM 100 MG/ML
1 INJECTION SUBCUTANEOUS 2 TIMES DAILY
Status: DISCONTINUED | OUTPATIENT
Start: 2024-11-07 | End: 2024-11-09

## 2024-11-07 RX ORDER — PREDNISONE 20 MG/1
40 TABLET ORAL DAILY
Status: DISCONTINUED | OUTPATIENT
Start: 2024-11-07 | End: 2024-11-10 | Stop reason: HOSPADM

## 2024-11-07 RX ORDER — MIDAZOLAM HYDROCHLORIDE 5 MG/ML
INJECTION, SOLUTION INTRAMUSCULAR; INTRAVENOUS PRN
Status: DISCONTINUED | OUTPATIENT
Start: 2024-11-07 | End: 2024-11-07 | Stop reason: ALTCHOICE

## 2024-11-07 RX ORDER — DIATRIZOATE MEGLUMINE AND DIATRIZOATE SODIUM 660; 100 MG/ML; MG/ML
30 SOLUTION ORAL; RECTAL
Status: DISCONTINUED | OUTPATIENT
Start: 2024-11-07 | End: 2024-11-10 | Stop reason: HOSPADM

## 2024-11-07 RX ORDER — FENTANYL CITRATE 50 UG/ML
INJECTION, SOLUTION INTRAMUSCULAR; INTRAVENOUS PRN
Status: DISCONTINUED | OUTPATIENT
Start: 2024-11-07 | End: 2024-11-07 | Stop reason: ALTCHOICE

## 2024-11-07 RX ADMIN — Medication 10 ML: at 20:28

## 2024-11-07 RX ADMIN — CEFEPIME 2000 MG: 2 INJECTION, POWDER, FOR SOLUTION INTRAVENOUS at 14:17

## 2024-11-07 RX ADMIN — CEFEPIME 2000 MG: 2 INJECTION, POWDER, FOR SOLUTION INTRAVENOUS at 05:30

## 2024-11-07 RX ADMIN — SUCRALFATE 1 G: 1 TABLET ORAL at 05:30

## 2024-11-07 RX ADMIN — IPRATROPIUM BROMIDE AND ALBUTEROL SULFATE 1 DOSE: 2.5; .5 SOLUTION RESPIRATORY (INHALATION) at 16:07

## 2024-11-07 RX ADMIN — SODIUM CHLORIDE: 9 INJECTION, SOLUTION INTRAVENOUS at 10:38

## 2024-11-07 RX ADMIN — VANCOMYCIN 1000 MG: 1 INJECTION, SOLUTION INTRAVENOUS at 12:08

## 2024-11-07 RX ADMIN — SODIUM CHLORIDE 500 ML: 9 INJECTION, SOLUTION INTRAVENOUS at 16:13

## 2024-11-07 RX ADMIN — LEVOTHYROXINE SODIUM 137 MCG: 0.11 TABLET ORAL at 05:30

## 2024-11-07 RX ADMIN — ENOXAPARIN SODIUM 60 MG: 100 INJECTION SUBCUTANEOUS at 20:27

## 2024-11-07 RX ADMIN — PANTOPRAZOLE SODIUM 40 MG: 40 INJECTION, POWDER, FOR SOLUTION INTRAVENOUS at 17:54

## 2024-11-07 RX ADMIN — APIXABAN 5 MG: 5 TABLET, FILM COATED ORAL at 10:35

## 2024-11-07 RX ADMIN — GUAIFENESIN 600 MG: 600 TABLET, EXTENDED RELEASE ORAL at 10:35

## 2024-11-07 RX ADMIN — IPRATROPIUM BROMIDE AND ALBUTEROL SULFATE 1 DOSE: 2.5; .5 SOLUTION RESPIRATORY (INHALATION) at 20:28

## 2024-11-07 RX ADMIN — SUCRALFATE 1 G: 1 TABLET ORAL at 10:35

## 2024-11-07 RX ADMIN — VANCOMYCIN 1000 MG: 1 INJECTION, SOLUTION INTRAVENOUS at 00:13

## 2024-11-07 RX ADMIN — DIATRIZOATE MEGLUMINE AND DIATRIZOATE SODIUM 30 ML: 600; 100 SOLUTION ORAL; RECTAL at 16:55

## 2024-11-07 RX ADMIN — CEFEPIME 2000 MG: 2 INJECTION, POWDER, FOR SOLUTION INTRAVENOUS at 21:48

## 2024-11-07 RX ADMIN — Medication 4 ML: at 20:28

## 2024-11-07 ASSESSMENT — PAIN - FUNCTIONAL ASSESSMENT: PAIN_FUNCTIONAL_ASSESSMENT: 0-10

## 2024-11-07 NOTE — PROCEDURES
PROCEDURE: Diagnostic and therapeutic bronchoscopy with BAL     The risks and benefits as well as alternatives to the procedure have been discussed with the patient and or family.  The patient and or next of kin understands and agrees to proceed.     DESCRIPTION OF PROCEDURE: A time out was taken.   Type of sedation used: Moderate Sedation:   Conscious sedation with 0.5 mg versed and 12.5 mcg fentanyl.    Patient was monitored continuously 1:1 throughout the entire procedure while sedation was administered      The scope was passed with ease via the mouth.  A complete airway inspection was performed.  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  Vocal cord paralysis.  Scope passed easily through the vocal cords.  No endobronchial lesions or foreign body were identified throughout the airway exam. There were thin purulent secretions throughout the bilateral airways, worse with small mucous plugs.  Saline injection followed by therapeutic aspiration was performed.  Washings were obtained throughout the airways.  A Bronchoalveolar lavage was obtained from the left lower lobe with good return.        The patient tolerated the procedure well. Estimated blood was none.   Recovery will be per endoscopy protocol.     FOLLOW UP:    Cultures and cytology in  three to five days.     ENT consult

## 2024-11-07 NOTE — ACP (ADVANCE CARE PLANNING)
Advance Care Planning     General Advance Care Planning (ACP) Conversation    Date of Conversation: 11/7/2024  Conducted with: Patient with Decision Making Capacity  Other persons present: None    Healthcare Decision Maker:   Primary Decision Maker (Active): Wendy Valverde - Spouse - [phone]    Secondary Decision Maker: Rosas Valverde - Child - [phone]       Content/Action Overview:  Has ACP document(s) on file - reflects the patient's care preferences  Reviewed DNR/DNI and patient elects Full Code (Attempt Resuscitation)        Length of Voluntary ACP Conversation in minutes:  <16 minutes (Non-Billable)    Araseli Cisse RN

## 2024-11-07 NOTE — CARE COORDINATION
Case Management Assessment  Initial Evaluation    Date/Time of Evaluation: 11/7/2024 9:42 AM  Assessment Completed by: Araseli Cisse RN    If patient is discharged prior to next notation, then this note serves as note for discharge by case management.    Patient Name: Héctor Valverde                   YOB: 1945  Diagnosis: Hyponatremia [E87.1]  Pneumonia due to organism [J18.9]  Multifocal pneumonia [J18.9]  Sepsis with acute hypoxic respiratory failure without septic shock, due to unspecified organism (HCC) [A41.9, R65.20, J96.01]                   Date / Time: 11/6/2024 10:18 AM    Patient Admission Status: Inpatient   Readmission Risk (Low < 19, Mod (19-27), High > 27): Readmission Risk Score: 21.9    Current PCP: Suraj De La Torre, DO  PCP verified by CM? Yes    Chart Reviewed: Yes      History Provided by: Patient  Patient Orientation: Alert and Oriented    Patient Cognition: Alert    Hospitalization in the last 30 days (Readmission):  No    If yes, Readmission Assessment in CM Navigator will be completed.    Advance Directives:      Code Status: Full Code   Patient's Primary Decision Maker is: Named in Scanned ACP Document    Primary Decision Maker (Active): PayetteWendy - Spouse - [phone]    Secondary Decision Maker: PayetteRosas - Child - [phone]    Discharge Planning:    Patient lives with: Spouse/Significant Other Type of Home: House  Primary Care Giver: Spouse  Patient Support Systems include: Children, Spouse/Significant Other, Family Members   Current Financial resources: Medicare  Current community resources: None  Current services prior to admission: Home Care, Other (Comment), Durable Medical Equipment ([facility] and has Gtube with TF)            Current DME: Walker, Wheelchair, Enteral Feedings            Type of Home Care services:  Nursing Services    ADLS  Prior functional level: Assistance with the following:, Bathing, Dressing, Toileting, Feeding, Cooking, Housework,

## 2024-11-07 NOTE — H&P
Fiberoptic bronchoscopy history:     Nursing History and Physical reviewed and agreed upon:  For additional findings, please see my notes in EPIC.      Patient is allergic to tamsulosin, tamsulosin hcl, amoxicillin-pot clavulanate, and tetanus toxoids.    Past Medical History:   Diagnosis Date    Acute exacerbation of emphysema (HCC) 11/7/2024    Arthritis     Benign hypertrophy of prostate     Cancer (HCC)     growth on tongue    COVID-19 12/07/2020    Dry mouth     s/p radiation treatment    DVT, lower extremity (HCC)     5/2011    GERD (gastroesophageal reflux disease)     acid reflux    Hip fracture (HCC)     5/2011, RIGHT    Hx of blood clots     Hyperlipidemia     Pneumonia 6/2/2011    Prolonged emergence from general anesthesia     Thyroid disease        Past Surgical History:   Procedure Laterality Date    ABDOMEN SURGERY      gallbladder removed    CHOLECYSTECTOMY      COLONOSCOPY      EYE SURGERY Right     TORN RETINA, LASER    HERNIA REPAIR      HIP ARTHROPLASTY Right 08/08/2016    HIP FRACTURE SURGERY Right     JOINT REPLACEMENT      OTHER SURGICAL HISTORY  05/18/2011    right hip IM nailing    SHOULDER SURGERY Right rotater cuff    STOMACH SURGERY N/A 7/3/2024    JEJUNOSTOMY TUBE INSERTION/CHANGE performed by Kayden Lopez MD at [facility] OR    TONGUE BIOPSY  10/2010    UPPER GASTROINTESTINAL ENDOSCOPY N/A 7/2/2024    ESOPHAGOGASTRODUODENOSCOPY BIOPSY performed by Ajay Lees MD at [facility] ENDOSCOPY       Allergies   Allergen Reactions    Tamsulosin      Other Reaction(s): Unknown    Tamsulosin Hcl Other (See Comments) and Headaches    Amoxicillin-Pot Clavulanate Nausea And Vomiting     Other Reaction(s): GI Upset    Tetanus Toxoids Nausea And Vomiting     Other Reaction(s): Not available       Current Facility-Administered Medications   Medication Dose Route Frequency Provider Last Rate Last Admin    predniSONE (DELTASONE) tablet 40 mg  40 mg Oral Daily Boaz Schofield MD        ipratropium 
bilateral airspace disease, unchanged from prior examination.  No new   dominant or infiltrate noted.             I BC YBARRA MD have reviewed the chart on Héctor Valverde and personally interviewed and examined patient, reviewed the data (labs and imaging) and after discussion with my PA formulated the plan.  Agree with note with the following edits.    HPI:       The patient is a 70 hide white male with a history of tongue cancer recurrent pneumonia history of DVT, history of PE, history of dysphagia, history of hypothyroidism and GERD who came to the ED with shortness of breath and weakness.  He is not a very good historian.  Workup in the emergency room showed he had a pneumonia.  He was found to be tachycardic.  He is mildly hypertensive.  He was admitted to the hospital.  Patient has had issues with his G-tube and J-tube.  He has been admitted in the past for similar problems.    During his last hospital admission he was diagnosed with pulmonary embolism.    I reviewed the patient's Past Medical History, Past Surgical History, Medications, and Allergies.     Physical exam:    /77   Pulse (!) 115   Temp 99.9 °F (37.7 °C) (Oral)   Wt 63.5 kg (140 lb)   SpO2 94%   BMI 17.97 kg/m²     Gen: Ill-appearing  Eyes: PERRL. No sclera icterus. No conjunctival injection.   ENT: No discharge. Pharynx clear.   Neck: Trachea midline. Normal thyroid.   Resp: minimal accessory muscle use. + crackles. No wheezes. + rhonchi. No dullness on percussion.  CV: Regular rate. Regular rhythm. No murmur or rub. No edema.   GI: Non-tender. Non-distended. No masses. No organomegaly. Normal bowel sounds. No hernia. G tube present, J tube present  Skin: Warm and dry. No nodule on exposed extremities. No rash on exposed extremities.   Lymph: No cervical LAD. No supraclavicular LAD.   M/S: No cyanosis. No joint deformity. No clubbing.   Neuro: Awake. Reflexes 2+ symmetric bilaterally. Moves all 4 extremities, non

## 2024-11-07 NOTE — FLOWSHEET NOTE
11/06/24 1951   Vital Signs   Temp 98.8 °F (37.1 °C)   Temp Source Oral   Pulse (!) 105   Heart Rate Source Monitor   Respirations 18   BP (!) 89/58   MAP (Calculated) 68   BP Location Left upper arm   BP Method Automatic   Patient Position Semi fowlers   Pain Assessment   Pain Assessment None - Denies Pain   Oxygen Therapy   SpO2 95 %   O2 Device Nasal cannula   O2 Flow Rate (L/min) 2 L/min

## 2024-11-08 ENCOUNTER — APPOINTMENT (OUTPATIENT)
Dept: GENERAL RADIOLOGY | Age: 79
DRG: 871 | End: 2024-11-08
Payer: MEDICARE

## 2024-11-08 LAB
ACID FAST STN SPEC QL: NORMAL
ACID FAST STN SPEC QL: NORMAL
ALBUMIN SERPL-MCNC: 2.5 G/DL (ref 3.4–5)
ALBUMIN/GLOB SERPL: 0.8 {RATIO} (ref 1.1–2.2)
ALP SERPL-CCNC: 68 U/L (ref 40–129)
ALT SERPL-CCNC: <5 U/L (ref 10–40)
ANION GAP SERPL CALCULATED.3IONS-SCNC: 11 MMOL/L (ref 3–16)
AST SERPL-CCNC: 13 U/L (ref 15–37)
BASOPHILS # BLD: 0 K/UL (ref 0–0.2)
BASOPHILS NFR BLD: 0.5 %
BILIRUB SERPL-MCNC: 0.6 MG/DL (ref 0–1)
BUN SERPL-MCNC: 17 MG/DL (ref 7–20)
CALCIUM SERPL-MCNC: 7.9 MG/DL (ref 8.3–10.6)
CHLORIDE SERPL-SCNC: 96 MMOL/L (ref 99–110)
CO2 SERPL-SCNC: 29 MMOL/L (ref 21–32)
CREAT SERPL-MCNC: 0.7 MG/DL (ref 0.8–1.3)
DEPRECATED RDW RBC AUTO: 15.4 % (ref 12.4–15.4)
EOSINOPHIL # BLD: 0 K/UL (ref 0–0.6)
EOSINOPHIL NFR BLD: 0.1 %
GFR SERPLBLD CREATININE-BSD FMLA CKD-EPI: >90 ML/MIN/{1.73_M2}
GLUCOSE BLD-MCNC: 104 MG/DL (ref 70–99)
GLUCOSE BLD-MCNC: 104 MG/DL (ref 70–99)
GLUCOSE BLD-MCNC: 107 MG/DL (ref 70–99)
GLUCOSE BLD-MCNC: 108 MG/DL (ref 70–99)
GLUCOSE BLD-MCNC: 92 MG/DL (ref 70–99)
GLUCOSE BLD-MCNC: 96 MG/DL (ref 70–99)
GLUCOSE SERPL-MCNC: 93 MG/DL (ref 70–99)
HCT VFR BLD AUTO: 30.3 % (ref 40.5–52.5)
HGB BLD-MCNC: 10.2 G/DL (ref 13.5–17.5)
LOEFFLER MB STN SPEC: NORMAL
LOEFFLER MB STN SPEC: NORMAL
LYMPHOCYTES # BLD: 0.5 K/UL (ref 1–5.1)
LYMPHOCYTES NFR BLD: 6.1 %
MAGNESIUM SERPL-MCNC: 1.99 MG/DL (ref 1.8–2.4)
MCH RBC QN AUTO: 25.4 PG (ref 26–34)
MCHC RBC AUTO-ENTMCNC: 33.5 G/DL (ref 31–36)
MCV RBC AUTO: 75.9 FL (ref 80–100)
MONOCYTES # BLD: 0.5 K/UL (ref 0–1.3)
MONOCYTES NFR BLD: 6.2 %
NEUTROPHILS # BLD: 6.6 K/UL (ref 1.7–7.7)
NEUTROPHILS NFR BLD: 87.1 %
PERFORMED ON: ABNORMAL
PERFORMED ON: NORMAL
PERFORMED ON: NORMAL
PLATELET # BLD AUTO: 208 K/UL (ref 135–450)
PMV BLD AUTO: 7.2 FL (ref 5–10.5)
POTASSIUM SERPL-SCNC: 3.3 MMOL/L (ref 3.5–5.1)
PROT SERPL-MCNC: 5.7 G/DL (ref 6.4–8.2)
RBC # BLD AUTO: 4 M/UL (ref 4.2–5.9)
SODIUM SERPL-SCNC: 136 MMOL/L (ref 136–145)
VANCOMYCIN TROUGH SERPL-MCNC: 12.4 UG/ML (ref 10–20)
WBC # BLD AUTO: 7.5 K/UL (ref 4–11)

## 2024-11-08 PROCEDURE — 6370000000 HC RX 637 (ALT 250 FOR IP)

## 2024-11-08 PROCEDURE — 80202 ASSAY OF VANCOMYCIN: CPT

## 2024-11-08 PROCEDURE — 6360000002 HC RX W HCPCS

## 2024-11-08 PROCEDURE — 94640 AIRWAY INHALATION TREATMENT: CPT

## 2024-11-08 PROCEDURE — 2580000003 HC RX 258

## 2024-11-08 PROCEDURE — 94761 N-INVAS EAR/PLS OXIMETRY MLT: CPT

## 2024-11-08 PROCEDURE — 80053 COMPREHEN METABOLIC PANEL: CPT

## 2024-11-08 PROCEDURE — 99232 SBSQ HOSP IP/OBS MODERATE 35: CPT | Performed by: INTERNAL MEDICINE

## 2024-11-08 PROCEDURE — 36415 COLL VENOUS BLD VENIPUNCTURE: CPT

## 2024-11-08 PROCEDURE — 6360000002 HC RX W HCPCS: Performed by: INTERNAL MEDICINE

## 2024-11-08 PROCEDURE — 74018 RADEX ABDOMEN 1 VIEW: CPT

## 2024-11-08 PROCEDURE — 6370000000 HC RX 637 (ALT 250 FOR IP): Performed by: INTERNAL MEDICINE

## 2024-11-08 PROCEDURE — 99233 SBSQ HOSP IP/OBS HIGH 50: CPT | Performed by: INTERNAL MEDICINE

## 2024-11-08 PROCEDURE — 85025 COMPLETE CBC W/AUTO DIFF WBC: CPT

## 2024-11-08 PROCEDURE — 83735 ASSAY OF MAGNESIUM: CPT

## 2024-11-08 PROCEDURE — 2580000003 HC RX 258: Performed by: INTERNAL MEDICINE

## 2024-11-08 PROCEDURE — 2060000000 HC ICU INTERMEDIATE R&B

## 2024-11-08 PROCEDURE — 2700000000 HC OXYGEN THERAPY PER DAY

## 2024-11-08 RX ADMIN — VANCOMYCIN 1000 MG: 1 INJECTION, SOLUTION INTRAVENOUS at 12:31

## 2024-11-08 RX ADMIN — VANCOMYCIN 1000 MG: 1 INJECTION, SOLUTION INTRAVENOUS at 00:19

## 2024-11-08 RX ADMIN — Medication 4 ML: at 20:07

## 2024-11-08 RX ADMIN — CEFEPIME 2000 MG: 2 INJECTION, POWDER, FOR SOLUTION INTRAVENOUS at 20:07

## 2024-11-08 RX ADMIN — ACETAMINOPHEN 650 MG: 325 TABLET ORAL at 18:11

## 2024-11-08 RX ADMIN — SUCRALFATE 1 G: 1 TABLET ORAL at 18:23

## 2024-11-08 RX ADMIN — IPRATROPIUM BROMIDE AND ALBUTEROL SULFATE 1 DOSE: 2.5; .5 SOLUTION RESPIRATORY (INHALATION) at 07:54

## 2024-11-08 RX ADMIN — SUCRALFATE 1 G: 1 TABLET ORAL at 20:06

## 2024-11-08 RX ADMIN — CEFEPIME 2000 MG: 2 INJECTION, POWDER, FOR SOLUTION INTRAVENOUS at 06:06

## 2024-11-08 RX ADMIN — CEFEPIME 2000 MG: 2 INJECTION, POWDER, FOR SOLUTION INTRAVENOUS at 14:02

## 2024-11-08 RX ADMIN — IPRATROPIUM BROMIDE AND ALBUTEROL SULFATE 1 DOSE: 2.5; .5 SOLUTION RESPIRATORY (INHALATION) at 20:07

## 2024-11-08 RX ADMIN — PANTOPRAZOLE SODIUM 40 MG: 40 INJECTION, POWDER, FOR SOLUTION INTRAVENOUS at 18:11

## 2024-11-08 RX ADMIN — ENOXAPARIN SODIUM 60 MG: 100 INJECTION SUBCUTANEOUS at 20:06

## 2024-11-08 RX ADMIN — PANTOPRAZOLE SODIUM 40 MG: 40 INJECTION, POWDER, FOR SOLUTION INTRAVENOUS at 06:00

## 2024-11-08 RX ADMIN — Medication 4 ML: at 07:54

## 2024-11-08 ASSESSMENT — PAIN SCALES - GENERAL
PAINLEVEL_OUTOF10: 1
PAINLEVEL_OUTOF10: 3
PAINLEVEL_OUTOF10: 0

## 2024-11-09 LAB
ALBUMIN SERPL-MCNC: 2.7 G/DL (ref 3.4–5)
ALBUMIN/GLOB SERPL: 0.8 {RATIO} (ref 1.1–2.2)
ALP SERPL-CCNC: 73 U/L (ref 40–129)
ALT SERPL-CCNC: <5 U/L (ref 10–40)
ANION GAP SERPL CALCULATED.3IONS-SCNC: 9 MMOL/L (ref 3–16)
AST SERPL-CCNC: 12 U/L (ref 15–37)
BACTERIA SPEC RESP CULT: ABNORMAL
BILIRUB SERPL-MCNC: 0.4 MG/DL (ref 0–1)
BUN SERPL-MCNC: 18 MG/DL (ref 7–20)
CALCIUM SERPL-MCNC: 8.3 MG/DL (ref 8.3–10.6)
CHLORIDE SERPL-SCNC: 98 MMOL/L (ref 99–110)
CO2 SERPL-SCNC: 30 MMOL/L (ref 21–32)
CREAT SERPL-MCNC: 0.6 MG/DL (ref 0.8–1.3)
GFR SERPLBLD CREATININE-BSD FMLA CKD-EPI: >90 ML/MIN/{1.73_M2}
GLUCOSE BLD-MCNC: 124 MG/DL (ref 70–99)
GLUCOSE BLD-MCNC: 130 MG/DL (ref 70–99)
GLUCOSE BLD-MCNC: 141 MG/DL (ref 70–99)
GLUCOSE BLD-MCNC: 150 MG/DL (ref 70–99)
GLUCOSE SERPL-MCNC: 155 MG/DL (ref 70–99)
GRAM STN SPEC: ABNORMAL
GRAM STN SPEC: ABNORMAL
MAGNESIUM SERPL-MCNC: 1.84 MG/DL (ref 1.8–2.4)
ORGANISM: ABNORMAL
PERFORMED ON: ABNORMAL
POTASSIUM SERPL-SCNC: 3.3 MMOL/L (ref 3.5–5.1)
PROT SERPL-MCNC: 6.1 G/DL (ref 6.4–8.2)
SODIUM SERPL-SCNC: 137 MMOL/L (ref 136–145)

## 2024-11-09 PROCEDURE — 99232 SBSQ HOSP IP/OBS MODERATE 35: CPT | Performed by: INTERNAL MEDICINE

## 2024-11-09 PROCEDURE — 36415 COLL VENOUS BLD VENIPUNCTURE: CPT

## 2024-11-09 PROCEDURE — 2580000003 HC RX 258: Performed by: INTERNAL MEDICINE

## 2024-11-09 PROCEDURE — 6360000002 HC RX W HCPCS

## 2024-11-09 PROCEDURE — 87070 CULTURE OTHR SPECIMN AEROBIC: CPT

## 2024-11-09 PROCEDURE — 99222 1ST HOSP IP/OBS MODERATE 55: CPT | Performed by: SURGERY

## 2024-11-09 PROCEDURE — 6370000000 HC RX 637 (ALT 250 FOR IP)

## 2024-11-09 PROCEDURE — 2580000003 HC RX 258

## 2024-11-09 PROCEDURE — 94761 N-INVAS EAR/PLS OXIMETRY MLT: CPT

## 2024-11-09 PROCEDURE — 87077 CULTURE AEROBIC IDENTIFY: CPT

## 2024-11-09 PROCEDURE — 87186 SC STD MICRODIL/AGAR DIL: CPT

## 2024-11-09 PROCEDURE — 6370000000 HC RX 637 (ALT 250 FOR IP): Performed by: INTERNAL MEDICINE

## 2024-11-09 PROCEDURE — 6360000002 HC RX W HCPCS: Performed by: INTERNAL MEDICINE

## 2024-11-09 PROCEDURE — 2060000000 HC ICU INTERMEDIATE R&B

## 2024-11-09 PROCEDURE — 83735 ASSAY OF MAGNESIUM: CPT

## 2024-11-09 PROCEDURE — 87205 SMEAR GRAM STAIN: CPT

## 2024-11-09 PROCEDURE — 2700000000 HC OXYGEN THERAPY PER DAY

## 2024-11-09 PROCEDURE — 99233 SBSQ HOSP IP/OBS HIGH 50: CPT | Performed by: INTERNAL MEDICINE

## 2024-11-09 PROCEDURE — 80053 COMPREHEN METABOLIC PANEL: CPT

## 2024-11-09 PROCEDURE — 94640 AIRWAY INHALATION TREATMENT: CPT

## 2024-11-09 RX ORDER — SODIUM CHLORIDE 9 MG/ML
INJECTION, SOLUTION INTRAVENOUS CONTINUOUS
Status: DISCONTINUED | OUTPATIENT
Start: 2024-11-09 | End: 2024-11-10 | Stop reason: HOSPADM

## 2024-11-09 RX ADMIN — MICONAZOLE NITRATE: 2 OINTMENT TOPICAL at 10:24

## 2024-11-09 RX ADMIN — PANTOPRAZOLE SODIUM 40 MG: 40 INJECTION, POWDER, FOR SOLUTION INTRAVENOUS at 05:11

## 2024-11-09 RX ADMIN — SUCRALFATE 1 G: 1 TABLET ORAL at 15:27

## 2024-11-09 RX ADMIN — GUAIFENESIN 600 MG: 600 TABLET, EXTENDED RELEASE ORAL at 10:23

## 2024-11-09 RX ADMIN — VANCOMYCIN 1000 MG: 1 INJECTION, SOLUTION INTRAVENOUS at 00:09

## 2024-11-09 RX ADMIN — Medication 4 ML: at 20:18

## 2024-11-09 RX ADMIN — CEFEPIME 2000 MG: 2 INJECTION, POWDER, FOR SOLUTION INTRAVENOUS at 20:29

## 2024-11-09 RX ADMIN — SODIUM CHLORIDE 1000 ML: 9 INJECTION, SOLUTION INTRAVENOUS at 19:02

## 2024-11-09 RX ADMIN — IPRATROPIUM BROMIDE AND ALBUTEROL SULFATE 1 DOSE: 2.5; .5 SOLUTION RESPIRATORY (INHALATION) at 08:24

## 2024-11-09 RX ADMIN — ENOXAPARIN SODIUM 60 MG: 100 INJECTION SUBCUTANEOUS at 10:23

## 2024-11-09 RX ADMIN — LEVOTHYROXINE SODIUM 137 MCG: 0.11 TABLET ORAL at 05:12

## 2024-11-09 RX ADMIN — SUCRALFATE 1 G: 1 TABLET ORAL at 05:12

## 2024-11-09 RX ADMIN — CEFEPIME 2000 MG: 2 INJECTION, POWDER, FOR SOLUTION INTRAVENOUS at 15:30

## 2024-11-09 RX ADMIN — IPRATROPIUM BROMIDE AND ALBUTEROL SULFATE 1 DOSE: 2.5; .5 SOLUTION RESPIRATORY (INHALATION) at 20:18

## 2024-11-09 RX ADMIN — APIXABAN 5 MG: 5 TABLET, FILM COATED ORAL at 20:27

## 2024-11-09 RX ADMIN — PANTOPRAZOLE SODIUM 40 MG: 40 INJECTION, POWDER, FOR SOLUTION INTRAVENOUS at 15:27

## 2024-11-09 RX ADMIN — PREDNISONE 40 MG: 20 TABLET ORAL at 10:23

## 2024-11-09 RX ADMIN — CEFEPIME 2000 MG: 2 INJECTION, POWDER, FOR SOLUTION INTRAVENOUS at 05:17

## 2024-11-09 RX ADMIN — Medication 4 ML: at 08:24

## 2024-11-09 RX ADMIN — MICONAZOLE NITRATE: 2 OINTMENT TOPICAL at 20:28

## 2024-11-09 RX ADMIN — SUCRALFATE 1 G: 1 TABLET ORAL at 20:27

## 2024-11-09 NOTE — FLOWSHEET NOTE
11/08/24 2351   Vital Signs   Temp 98.2 °F (36.8 °C)   Temp Source Oral   Pulse 98   Heart Rate Source Monitor   Respirations 18   BP (!) 91/57   MAP (Calculated) 68   Oxygen Therapy   SpO2 96 %   O2 Device Nasal cannula   O2 Flow Rate (L/min) 2 L/min     Resting with respirations WNL on 2L NC.  J tube continues leaking greenish/brown as reported per Grecia, day shift RN.  Dressing changed prn.  Call in easy reach.  Bed alarm on for safety.  Continue to monitor closely.  Humera Leblanc RN

## 2024-11-09 NOTE — CONSULTS
Department of General Surgery Consult    PATIENT NAME: Héctor Valverde   YOB: 1945    ADMISSION DATE: 11/6/2024 10:18 AM      TODAY'S DATE: 11/9/2024    Reason for Consult:  malfunctioning J tube    Chief Complaint: leakage from J tube    Historian: patient    Requesting Practitioner:  Duc    HISTORY OF PRESENT ILLNESS:              The patient is a 79 y.o. male who presents with long-standing J tube used to support nutrition due to patient's known, advanced laryngeal tumor.  Had red rubber 14 Fr feeding tube placed 4 months ago.  Patient states the tube has never worked properly; he is currently admitted for pneumonia management; per the Hospitalist team the pneumonia is responding well.  Attempts at using the feeding tube have resulted in high amounts of leakage from the tube connections  .    Past Medical History:        Diagnosis Date    Acute exacerbation of emphysema (HCC) 11/7/2024    Arthritis     Benign hypertrophy of prostate     Cancer (HCC)     growth on tongue    COVID-19 12/07/2020    Dry mouth     s/p radiation treatment    DVT, lower extremity (HCC)     5/2011    GERD (gastroesophageal reflux disease)     acid reflux    Hip fracture (HCC)     5/2011, RIGHT    Hx of blood clots     Hyperlipidemia     Pneumonia 6/2/2011    Prolonged emergence from general anesthesia     Thyroid disease        Past Surgical History:        Procedure Laterality Date    ABDOMEN SURGERY      gallbladder removed    BRONCHOSCOPY N/A 11/7/2024    BRONCHOSCOPY ALVEOLAR LAVAGE performed by Boaz Schofield MD at [facility]    BRONCHOSCOPY  11/7/2024    BRONCHOSCOPY THERAPEUTIC ASPIRATION INITIAL performed by Boaz Schofield MD at [facility]    CHOLECYSTECTOMY      COLONOSCOPY      EYE SURGERY Right     TORN RETINA, LASER    HERNIA REPAIR      HIP ARTHROPLASTY Right 08/08/2016    HIP FRACTURE SURGERY Right     JOINT REPLACEMENT      OTHER SURGICAL HISTORY  05/18/2011    right hip IM 
    Gastroenterology Consult Note    Patient:   Héctor Valverde   :    1945   Facility:   [facility]  Referring/PCP: Suraj De La Torre, DO  Date:     2024  Consultant:   JOHN Giraldo - CNP      Chief Complaint   Patient presents with    G Tube Complications     Pt was here on .  Had g tube and j tube replaced in the ed.  Home health nurse concerned that it had slipped out a bit again and maybe was leaking.        History of Present illness   Pt. Is a 80 yo male with pmx of GERD, DVT, CCY, head and neck cancer s/p chemoradiation, and recurrent aspiration pneumonia s/p G-J tube who presented to ED  with c/o weakness and shortness of breath. He reports having issues with both G and J tubes. He reports coming to ER twice over the weekend where his G tube was replaced both times. He had X-ray confirmation of satisfactory G tube placement both  and 11/3. He reports chronic leaking around both G and J tubes. He reports his J tube has \"fallen out\" roughly 15 times since having this placed. He had EGD 2024 with Dr. Lees with gastritis and intact GJ tube. He then had open J tube placement bu Surgery. His J tube has become dysfunctional one month later, and J tube was exchanged. His G tube was originally placed . His G tube was converted to G-J tube in IR in  due to significant regurgitation and aspiration.     Past Medical History:   Diagnosis Date    Acute exacerbation of emphysema (HCC) 2024    Arthritis     Benign hypertrophy of prostate     Cancer (HCC)     growth on tongue    COVID-19 2020    Dry mouth     s/p radiation treatment    DVT, lower extremity (HCC)     2011    GERD (gastroesophageal reflux disease)     acid reflux    Hip fracture (HCC)     2011, RIGHT    Hx of blood clots     Hyperlipidemia     Pneumonia 2011    Prolonged emergence from general anesthesia     Thyroid disease      Past Surgical History:   Procedure Laterality 
Pharmacy to dose IV Vanco - HCAP x7 days  Pharmacy to manage IV Vanco for HCAP x7 days on admission.  Continue dosing at 1g IV Q12hrs and chack a Trough 11/8 at 1100.  Pred , tr 15.5 at SS if renal function remains stable.   Gutierrez [facility] PharmD 11/6/2024 4:11 PM  
Pt seen for Jtube and Gtube site.  Plan is to begin Jtube feedings today.  Gtube bumper is flush with skin, no leaking noted at this time although receiving meds through it.  Peritubular skin with scattered redness and scabs.  Gtube currently to GD bag.  Jtube leaking small amount of brown effluent, HTAD device is soiled.  Jtube insertion site with hypergranulation, skin intact.  Old HTAD removed, skin cleansed around both tubes and patted dry.  Antifungal powder applied under bumper and around jtube, then skin prep wipe.  New HTAD dece applied to Jtube.  Jtube positioned into abdomen at designated black mathew and secured.  Additional HTAD left at bedside.  Reconsult for continued leaking once tube feeds started.  Recommend antifungal moisture barrier around gtube and under bumper.         
has never used smokeless tobacco.    Scheduled Meds:   sodium chloride flush  5-40 mL IntraVENous 2 times per day    guaiFENesin  600 mg Oral BID    cefepime  2,000 mg IntraVENous Q8H    apixaban  5 mg Oral BID    levothyroxine  137 mcg Oral Daily    Omeprazole-Sodium Bicarbonate  20 mg Oral Daily    sucralfate  1 g Oral 4x Daily AC & HS    vancomycin  1,000 mg IntraVENous Q12H     Continuous Infusions:   sodium chloride      sodium chloride 100 mL/hr at 11/07/24 1038     PRN Meds:  sodium chloride flush, sodium chloride, ondansetron **OR** ondansetron, polyethylene glycol, acetaminophen **OR** acetaminophen, benzonatate, simethicone    ALLERGIES:  Patient is allergic to tamsulosin, tamsulosin hcl, amoxicillin-pot clavulanate, and tetanus toxoids.    REVIEW OF SYSTEMS:  Constitutional: Negative for fever  HENT: Negative for sore throat  Eyes: Negative for redness   Respiratory: + Dyspnea, cough  Cardiovascular: Negative for chest pain  Gastrointestinal: Negative for vomiting, diarrhea   Genitourinary: Negative for hematuria   Musculoskeletal: + Arthralgias   Skin: Negative for rash  Neurological: Negative for syncope  Hematological: Negative for adenopathy  Psychiatric/Behavorial: Negative for anxiety    PHYSICAL EXAM:  Blood pressure (!) 92/53, pulse 88, temperature 98.2 °F (36.8 °C), temperature source Oral, resp. rate 18, height 1.88 m (6' 2\"), weight 62.6 kg (138 lb), SpO2 100%.' on 2 L  Gen: No distress.   Eyes: No sclera icterus. No conjunctival injection.   Neck: Trachea midline. No obvious mass.    Resp: + Accessory muscle use. No crackles.  Scattered wheezes.  Bilateral rhonchi. No dullness on percussion.  CV: Regular rate. Regular rhythm. No murmur or rub. No edema.  GI: Non-tender. Non-distended.   Skin: Warm and dry. No nodule on exposed extremities.   Lymph: No cervical LAD. No supraclavicular LAD.   M/S: No cyanosis. No joint deformity. No clubbing.   Neuro: Awake. Alert. Moves all four extremities. 
by mouth nightly (Patient not taking: Reported on 11/2/2024)    Allergies:  Tamsulosin, Tamsulosin hcl, Amoxicillin-pot clavulanate, and Tetanus toxoids    Social History:    TOBACCO: reports that he has never smoked. He has never used smokeless tobacco.  ETOH:   reports that he does not currently use alcohol.  Patient currently lives with family spouse      Objective:          Physical Exam     Palliative Performance Scale:  [] 60% Ambulation reduced; Significant disease; Can't do hobbies/housework; intake normal or reduced; occasional assist; LOC full/confusion  [] 50% Mainly sit/lie; Extensive disease; Can't do any work; Considerable assist; intake normal  Or reduced; LOC full/confusion  [] 40% Mainly in bed; Extensive disease; Mainly assist; intake normal or reduced; occasional assist; LOC full/confusion  [x] 30% Bed Bound; Extensive disease; Total care; intake reduced; LOC full/confusion  [] 20% Bed Bound; Extensive disease; Total care; intake minimal; Drowsy/coma  [] 10% Bed Bound; Extensive disease; Total care; Mouth care only; Drowsy/coma  [] 0% Death    PPS:     Vitals:    BP (!) 88/60   Pulse (!) 113   Temp 98.1 °F (36.7 °C) (Oral)   Resp 18   Ht 1.88 m (6' 2.02\")   Wt 62.6 kg (138 lb)   SpO2 95%   BMI 17.71 kg/m²     Labs:    BMP:   Recent Labs     11/06/24  1140 11/07/24  0507   * 130*   K 3.8 3.6   CL 79* 88*   CO2 38* 35*   BUN 21* 17   CREATININE 0.7* 0.7*   GLUCOSE 118* 97     CBC:   Recent Labs     11/06/24  1140 11/07/24  0507   WBC 16.1* 9.7   HGB 12.6* 10.7*   HCT 38.1* 32.0*    224       LFT's:   Recent Labs     11/06/24  1140 11/07/24  0507   AST 18 13*   ALT 8* 6*   BILITOT 0.6 0.7   ALKPHOS 90 68     Troponin: No results for input(s): \"TROPONINI\" in the last 72 hours.  BNP: No results for input(s): \"BNP\" in the last 72 hours.  ABGs: No results for input(s): \"PHART\", \"SGL1LUV\", \"PO2ART\" in the last 72 hours.  INR: No results for input(s): \"INR\" in the last 72

## 2024-11-10 ENCOUNTER — HOSPITAL ENCOUNTER (INPATIENT)
Age: 79
LOS: 7 days | Discharge: HOSPICE | DRG: 393 | End: 2024-11-17
Attending: INTERNAL MEDICINE | Admitting: STUDENT IN AN ORGANIZED HEALTH CARE EDUCATION/TRAINING PROGRAM
Payer: MEDICARE

## 2024-11-10 VITALS
HEIGHT: 74 IN | OXYGEN SATURATION: 96 % | TEMPERATURE: 97.9 F | HEART RATE: 84 BPM | SYSTOLIC BLOOD PRESSURE: 106 MMHG | DIASTOLIC BLOOD PRESSURE: 73 MMHG | WEIGHT: 134.7 LBS | RESPIRATION RATE: 16 BRPM | BODY MASS INDEX: 17.29 KG/M2

## 2024-11-10 DIAGNOSIS — Z51.5 HOSPICE CARE: Primary | ICD-10-CM

## 2024-11-10 PROBLEM — K94.13 MALFUNCTIONING JEJUNOSTOMY TUBE (HCC): Status: ACTIVE | Noted: 2024-11-10

## 2024-11-10 PROBLEM — K94.13 MALFUNCTION OF JEJUNOSTOMY TUBE (HCC): Status: ACTIVE | Noted: 2024-11-10

## 2024-11-10 LAB
ANION GAP SERPL CALCULATED.3IONS-SCNC: 12 MMOL/L (ref 3–16)
BACTERIA BLD CULT ORG #2: NORMAL
BACTERIA BLD CULT: NORMAL
BUN SERPL-MCNC: 23 MG/DL (ref 7–20)
CALCIUM SERPL-MCNC: 8.2 MG/DL (ref 8.3–10.6)
CHLORIDE SERPL-SCNC: 102 MMOL/L (ref 99–110)
CO2 SERPL-SCNC: 26 MMOL/L (ref 21–32)
CREAT SERPL-MCNC: 0.6 MG/DL (ref 0.8–1.3)
GFR SERPLBLD CREATININE-BSD FMLA CKD-EPI: >90 ML/MIN/{1.73_M2}
GLUCOSE BLD-MCNC: 115 MG/DL (ref 70–99)
GLUCOSE BLD-MCNC: 92 MG/DL (ref 70–99)
GLUCOSE BLD-MCNC: 99 MG/DL (ref 70–99)
GLUCOSE SERPL-MCNC: 89 MG/DL (ref 70–99)
PERFORMED ON: ABNORMAL
PERFORMED ON: NORMAL
PERFORMED ON: NORMAL
POTASSIUM SERPL-SCNC: 3.6 MMOL/L (ref 3.5–5.1)
SODIUM SERPL-SCNC: 140 MMOL/L (ref 136–145)

## 2024-11-10 PROCEDURE — 2700000000 HC OXYGEN THERAPY PER DAY

## 2024-11-10 PROCEDURE — 6370000000 HC RX 637 (ALT 250 FOR IP): Performed by: INTERNAL MEDICINE

## 2024-11-10 PROCEDURE — 6360000002 HC RX W HCPCS

## 2024-11-10 PROCEDURE — 6360000002 HC RX W HCPCS: Performed by: NURSE PRACTITIONER

## 2024-11-10 PROCEDURE — 99233 SBSQ HOSP IP/OBS HIGH 50: CPT | Performed by: INTERNAL MEDICINE

## 2024-11-10 PROCEDURE — 2580000003 HC RX 258: Performed by: NURSE PRACTITIONER

## 2024-11-10 PROCEDURE — 99239 HOSP IP/OBS DSCHRG MGMT >30: CPT | Performed by: INTERNAL MEDICINE

## 2024-11-10 PROCEDURE — 2580000003 HC RX 258

## 2024-11-10 PROCEDURE — 80048 BASIC METABOLIC PNL TOTAL CA: CPT

## 2024-11-10 PROCEDURE — 94761 N-INVAS EAR/PLS OXIMETRY MLT: CPT

## 2024-11-10 PROCEDURE — 1200000000 HC SEMI PRIVATE

## 2024-11-10 PROCEDURE — 99231 SBSQ HOSP IP/OBS SF/LOW 25: CPT | Performed by: SURGERY

## 2024-11-10 PROCEDURE — 6370000000 HC RX 637 (ALT 250 FOR IP): Performed by: STUDENT IN AN ORGANIZED HEALTH CARE EDUCATION/TRAINING PROGRAM

## 2024-11-10 PROCEDURE — 6370000000 HC RX 637 (ALT 250 FOR IP)

## 2024-11-10 PROCEDURE — 94640 AIRWAY INHALATION TREATMENT: CPT

## 2024-11-10 PROCEDURE — 36415 COLL VENOUS BLD VENIPUNCTURE: CPT

## 2024-11-10 RX ORDER — SIMETHICONE 80 MG
80 TABLET,CHEWABLE ORAL EVERY 6 HOURS PRN
Status: DISCONTINUED | OUTPATIENT
Start: 2024-11-10 | End: 2024-11-17 | Stop reason: HOSPADM

## 2024-11-10 RX ORDER — ENOXAPARIN SODIUM 100 MG/ML
1 INJECTION SUBCUTANEOUS 2 TIMES DAILY
Status: DISCONTINUED | OUTPATIENT
Start: 2024-11-10 | End: 2024-11-13

## 2024-11-10 RX ORDER — LACTOBACILLUS RHAMNOSUS GG 10B CELL
1 CAPSULE ORAL
Status: DISCONTINUED | OUTPATIENT
Start: 2024-11-11 | End: 2024-11-17 | Stop reason: HOSPADM

## 2024-11-10 RX ORDER — ACETAMINOPHEN 325 MG/1
650 TABLET ORAL EVERY 6 HOURS PRN
Status: DISCONTINUED | OUTPATIENT
Start: 2024-11-10 | End: 2024-11-17 | Stop reason: HOSPADM

## 2024-11-10 RX ORDER — ENOXAPARIN SODIUM 100 MG/ML
40 INJECTION SUBCUTANEOUS DAILY
Status: DISCONTINUED | OUTPATIENT
Start: 2024-11-10 | End: 2024-11-10

## 2024-11-10 RX ORDER — SODIUM CHLORIDE 0.9 % (FLUSH) 0.9 %
5-40 SYRINGE (ML) INJECTION EVERY 12 HOURS SCHEDULED
Status: DISCONTINUED | OUTPATIENT
Start: 2024-11-10 | End: 2024-11-17 | Stop reason: HOSPADM

## 2024-11-10 RX ORDER — OMEGA-3/DHA/EPA/FISH OIL 1000 MG
1 CAPSULE ORAL DAILY
Status: DISCONTINUED | OUTPATIENT
Start: 2024-11-10 | End: 2024-11-10 | Stop reason: CLARIF

## 2024-11-10 RX ORDER — ONDANSETRON 4 MG/1
4 TABLET, ORALLY DISINTEGRATING ORAL EVERY 8 HOURS PRN
Status: DISCONTINUED | OUTPATIENT
Start: 2024-11-10 | End: 2024-11-17 | Stop reason: HOSPADM

## 2024-11-10 RX ORDER — DEXTROSE, SODIUM CHLORIDE, SODIUM LACTATE, POTASSIUM CHLORIDE, AND CALCIUM CHLORIDE 5; .6; .31; .03; .02 G/100ML; G/100ML; G/100ML; G/100ML; G/100ML
INJECTION, SOLUTION INTRAVENOUS CONTINUOUS
Status: DISCONTINUED | OUTPATIENT
Start: 2024-11-10 | End: 2024-11-12

## 2024-11-10 RX ORDER — IPRATROPIUM BROMIDE AND ALBUTEROL SULFATE 2.5; .5 MG/3ML; MG/3ML
1 SOLUTION RESPIRATORY (INHALATION)
Status: ACTIVE | OUTPATIENT
Start: 2024-11-10 | End: 2024-11-11

## 2024-11-10 RX ORDER — SODIUM CHLORIDE FOR INHALATION 3 %
4 VIAL, NEBULIZER (ML) INHALATION PRN
Status: DISCONTINUED | OUTPATIENT
Start: 2024-11-10 | End: 2024-11-10 | Stop reason: HOSPADM

## 2024-11-10 RX ORDER — SODIUM CHLORIDE 9 MG/ML
INJECTION, SOLUTION INTRAVENOUS PRN
Status: DISCONTINUED | OUTPATIENT
Start: 2024-11-10 | End: 2024-11-17 | Stop reason: HOSPADM

## 2024-11-10 RX ORDER — SODIUM CHLORIDE 0.9 % (FLUSH) 0.9 %
5-40 SYRINGE (ML) INJECTION PRN
Status: DISCONTINUED | OUTPATIENT
Start: 2024-11-10 | End: 2024-11-17 | Stop reason: HOSPADM

## 2024-11-10 RX ORDER — ACETAMINOPHEN 650 MG/1
650 SUPPOSITORY RECTAL EVERY 6 HOURS PRN
Status: DISCONTINUED | OUTPATIENT
Start: 2024-11-10 | End: 2024-11-17 | Stop reason: HOSPADM

## 2024-11-10 RX ORDER — SUCRALFATE 1 G/1
1 TABLET ORAL EVERY 6 HOURS SCHEDULED
Status: DISCONTINUED | OUTPATIENT
Start: 2024-11-10 | End: 2024-11-17 | Stop reason: HOSPADM

## 2024-11-10 RX ORDER — POLYETHYLENE GLYCOL 3350 17 G/17G
17 POWDER, FOR SOLUTION ORAL DAILY PRN
Status: DISCONTINUED | OUTPATIENT
Start: 2024-11-10 | End: 2024-11-17 | Stop reason: HOSPADM

## 2024-11-10 RX ORDER — SUCRALFATE 1 G/1
1 TABLET ORAL DAILY
Status: DISCONTINUED | OUTPATIENT
Start: 2024-11-10 | End: 2024-11-10

## 2024-11-10 RX ORDER — ONDANSETRON 2 MG/ML
4 INJECTION INTRAMUSCULAR; INTRAVENOUS EVERY 6 HOURS PRN
Status: DISCONTINUED | OUTPATIENT
Start: 2024-11-10 | End: 2024-11-17 | Stop reason: HOSPADM

## 2024-11-10 RX ADMIN — LEVOTHYROXINE SODIUM 137 MCG: 0.11 TABLET ORAL at 05:05

## 2024-11-10 RX ADMIN — SODIUM CHLORIDE, SODIUM LACTATE, POTASSIUM CHLORIDE, CALCIUM CHLORIDE AND DEXTROSE MONOHYDRATE: 5; 600; 310; 30; 20 INJECTION, SOLUTION INTRAVENOUS at 16:31

## 2024-11-10 RX ADMIN — CEFEPIME 2000 MG: 2 INJECTION, POWDER, FOR SOLUTION INTRAVENOUS at 20:48

## 2024-11-10 RX ADMIN — PANTOPRAZOLE SODIUM 40 MG: 40 INJECTION, POWDER, FOR SOLUTION INTRAVENOUS at 05:05

## 2024-11-10 RX ADMIN — SUCRALFATE 1 G: 1 TABLET ORAL at 05:05

## 2024-11-10 RX ADMIN — ENOXAPARIN SODIUM 60 MG: 100 INJECTION SUBCUTANEOUS at 15:21

## 2024-11-10 RX ADMIN — CEFEPIME 2000 MG: 2 INJECTION, POWDER, FOR SOLUTION INTRAVENOUS at 15:20

## 2024-11-10 RX ADMIN — CEFEPIME 2000 MG: 2 INJECTION, POWDER, FOR SOLUTION INTRAVENOUS at 05:15

## 2024-11-10 RX ADMIN — IPRATROPIUM BROMIDE AND ALBUTEROL SULFATE 1 DOSE: 2.5; .5 SOLUTION RESPIRATORY (INHALATION) at 19:37

## 2024-11-10 RX ADMIN — IPRATROPIUM BROMIDE AND ALBUTEROL SULFATE 1 DOSE: 2.5; .5 SOLUTION RESPIRATORY (INHALATION) at 07:58

## 2024-11-10 RX ADMIN — MICONAZOLE NITRATE: 2 OINTMENT TOPICAL at 08:39

## 2024-11-10 ASSESSMENT — PAIN SCALES - GENERAL
PAINLEVEL_OUTOF10: 0
PAINLEVEL_OUTOF10: 0

## 2024-11-10 NOTE — FLOWSHEET NOTE
11/10/24 0343   Vital Signs   Temp 98.2 °F (36.8 °C)   Temp Source Oral   Pulse 92   Heart Rate Source Monitor   Respirations 18   /76   MAP (Calculated) 85   Oxygen Therapy   SpO2 98 %   O2 Device None (Room air)   Height and Weight   Weight - Scale 61.1 kg (134 lb 11.2 oz)   Weight Method Bed scale   BMI (Calculated) 17.3     Resting quietly with respirations easy/even on RA.  Call in easy reach.  Bed alarm on for safety.  Continue to monitor closely.  Humera Leblanc RN

## 2024-11-10 NOTE — H&P
Hospital Medicine History & Physical      Date of Admission: 11/10/2024    Date of Service:  Pt seen/examined on 11/10/24     [x]Admitted to Inpatient with expected LOS greater than two midnights due to medical therapy.  []Placed in Observation status.    Chief Admission Complaint:  \"J tube malfunctioning\"    Presenting Admission History:      79 y.o. male who presented to [facility] from [facility] with complaint of malfunctioning J tube.  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 , GERD, Dysphagia, Hypothyroid, DVT/PE and Protein Calorie Malnutrition on tube feeds.  He uses a wheelchair at baseline. He was admitted at [facility] 11/2/2024 with chief complaint of drainage coming from his feeding tube. During his admission there he was treated for Sepsis, Pneumonia (possibly aspiration related), acute hypoxic respiratory failure and hyponatremia. 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 ENT was consulted on Friday 11/8/24, however there was no coverage available to see the patient in hospital until Monday so the plan was for him to follow up outpatient with them. Per chart review it appears that the patient continued to have issues with drainage and leakage from his J-tube site. The patient requested to be transferred to [facility] for management and possibly replacement of his J-tube by Dr. Marcial's team.     The patient and his wife state that the patient has had multiple issues with his Jtube since it was first placed. The main concern they mention is that it becomes clogged often despite frequent water flushes. They are \"fed up\" with it. The patient also has a Gtube that is to gravity drainage. The patient and his wife tell us this is because he has GERD which was causing pneumonia and someone had suggested that he have both these tubes. The  present with the nurse practitioner student during the history and exam. I discussed the findings and plans with the nurse practitioner student and agree as documented in her note     Electronically signed by JOHN Meyer CNP on 11/10/24 at 2:51 PM EST

## 2024-11-10 NOTE — DISCHARGE SUMMARY
Result   1. Jejunostomy tube is adequate.   2. Suggestion of gastrostomy tube however contrast was not injected through   the G-tube.         CT CHEST PULMONARY EMBOLISM W CONTRAST   Final Result   No evidence of acute pulmonary embolus.  Possible very small chronic   appearing PEs versus motion artifact in the left lower lobe.      Mucous plugging in the distal bilateral lower lobe bronchi with bronchiolitis   and multifocal pneumonia in the left greater than right lower lobes.  Trace   bilateral pleural effusions.      1.5 cm left paraesophageal lymph node, stable, nonspecific.      Other nonacute findings as above.         XR CHEST PORTABLE   Final Result   Patchy bilateral airspace disease, unchanged from prior examination.  No new   dominant or infiltrate noted.                Discharge Medications     Medication List        ASK your doctor about these medications      albuterol sulfate  (90 Base) MCG/ACT inhaler  Commonly known as: PROVENTIL;VENTOLIN;PROAIR  INHALE 2 PUFFS INTO THE LUNGS 4 TIMES DAILY AS NEEDED (PRIOR TO ACTIVITY THAT CAUSES SHORTNESS OF BREATH)     * apixaban 5 MG Tabs tablet  Commonly known as: ELIQUIS  Take 2 tablets by mouth 2 times daily for 13 doses     * apixaban 5 MG Tabs tablet  Commonly known as: ELIQUIS  Take 1 tablet by mouth 2 times daily     fluticasone 50 MCG/ACT nasal spray  Commonly known as: FLONASE     Konvomep 2-84 MG/ML Susr  Generic drug: Omeprazole-Sodium Bicarbonate     levothyroxine 137 MCG tablet  Commonly known as: SYNTHROID     NONFORMULARY     omeprazole 40 MG delayed release capsule  Commonly known as: PRILOSEC     ondansetron 4 MG disintegrating tablet  Commonly known as: ZOFRAN-ODT     PROBIOTIC ACIDOPHILUS PO     simethicone 80 MG chewable tablet  Commonly known as: MYLICON     sucralfate 1 GM tablet  Commonly known as: CARAFATE           * This list has 2 medication(s) that are the same as other medications prescribed for you. Read the directions

## 2024-11-10 NOTE — PROGRESS NOTES
Pt is a direct admit from [facility] to  220. Pt orientated to rm and use of call light. Plan of care reviewed with pt ans spouse. Assessment completed. MD notified of arrival for admission orders.

## 2024-11-10 NOTE — PROGRESS NOTES
11/09/24 0828   RT Protocol   History Pulmonary Disease 2   Respiratory pattern 0   Breath sounds 2   Cough 3   Bronchodilator Assessment Score 7       
   11/10/24 0802   RT Protocol   History Pulmonary Disease 2   Respiratory pattern 0   Breath sounds 2   Cough 0   Indications for Bronchodilator Therapy On home bronchodilators   Bronchodilator Assessment Score 4       
4 Eyes Skin Assessment     NAME:  Héctor Valverde  YOB: 1945  MEDICAL RECORD NUMBER:  [id number]    The patient is being assessed for  Admission    I agree that at least one RN has performed a thorough Head to Toe Skin Assessment on the patient. ALL assessment sites listed below have been assessed.      Areas assessed by both nurses:    Head, Face, Ears, Shoulders, Back, Chest, Arms, Elbows, Hands, Sacrum. Buttock, Coccyx, Ischium, Legs. Feet and Heels, and Under Medical Devices         Does the Patient have a Wound? Yes wound(s) were present on assessment. LDA wound assessment was Initiated and completed by RN    LDA created for DTI on buttock and RT/ LT heel.    Scattered abrasions  Redness to bilateral elbows/ spine  Maceration/ redness to G/J tube sites  Scattered bruising  BL feet toes purple/ cool        [place]ugene Prevention initiated by RN: Yes  Wound Care Orders initiated by RN: Yes    Pressure Injury (Stage 3,4, Unstageable, DTI, NWPT, and Complex wounds) if present, place Wound referral order by RN under : No    New Ostomies, if present place, Ostomy referral order under : No     Nurse 1 eSignature: Electronically signed by Britany Shoemaker RN on 11/6/24 at 5:32 PM EST    **SHARE this note so that the co-signing nurse can place an eSignature**    Nurse 2 eSignature: Electronically signed by Lolis Contreras RN on 11/6/24 at 7:06 PM EST    
Blood pressure 106/73, pulse 84, temperature 97.9 °F (36.6 °C), temperature source Oral, resp. rate 16, height 1.88 m (6' 2.02\"), weight 61.1 kg (134 lb 11.2 oz), SpO2 96%.    Shift assessment completed see flow sheet. Patient in bed alert and oriented x4. Patient on RA, showing no signs of distress. Patient doesn't want to take his eliquis in case of surgery tomorrow, and he doesn't want his steroid. Patient has no other needs at this time. Standard safety measures in place.    
CMU called. Spoke to Diane, verified that pt is visible on tele monitor at this time.   
Comprehensive Nutrition Assessment    Type and Reason for Visit:  Initial, Positive nutrition screen, Consult (MSt=2 and consult for TF)    Nutrition Recommendations/Plan:   Continue NPO status  Recommend start TF orders once medically appropriate ; ADULT TUBE FEEDING; PEG-J tube; standard with fiber; Jevity 1.5 at 65 ml/hr x 20 hours + 30 ml water flushes every 4 hours; Please start TF at 40 ml/hr and increase by 15 ml every 2 hours as tolerated by patient until goal rate is obtained  Monitor for initiation, rate and tolerance of TF  Monitor nutrition related labs and weight trends     Malnutrition Assessment:  Malnutrition Status:  Severe malnutrition (11/07/24 1120)    Context:  Chronic Illness     Findings of the 6 clinical characteristics of malnutrition:  Energy Intake:  No decrease in energy intake  Weight Loss:  No weight loss     Body Fat Loss:  Severe body fat loss Orbital, Triceps   Muscle Mass Loss:  Severe muscle mass loss Temples (temporalis), Clavicles (pectoralis & deltoids), Calf (gastrocnemius)  Fluid Accumulation:  No fluid accumulation     Strength:  Not Performed    Nutrition Assessment:    patient is severely malnourished r/t inadequate protein and energyy intake and inadequate enteral nutrition infusion and altered GI function AEB home EN regimen, NPO status, severe fat loss and muscle wasting; Patient is at risk for further compromise d/t need for EN as sole source of nutrition, altered GI function and hx of tounge cancer and altered lab values; will continue NPO status and start TF orders    Nutrition Related Findings:    Patient is A&O x 4 and presented with G tube complications; patient reported having a PEG-J tube and recieving TF for past 3 years; pt has hx of tounge cancer and has trouble talking; patient reported that he recieves 2 cartons of osmolite and 1 carton of jevity in the morning and same amount in afternoon + 50ml water flushes/hr; patient is from home with his wife and 
Consult done for GI 11-7-24 @1135. Rafaela Luo  
Consult done for Otolaryngology 11-7-24 @1379. Rafaela Luo  
Consult done for Pulmonary 11-7-24 @1130. Rafaela Luo  
Discussed with patient and Hospital Medicine team.  Patient needs definitive functioning feeding access.  He wishes to have care for the tube with our service at [place].  Dr Xavier will arrange transfer to [place] and we will address his tube needs further there.    YUDY  
Handoff report given to Humera NUNES.  Patient is in stable condition and has no needs at this time. Call light is in reach and bed is in the lowest position.  Care is transferred at this time.    
Handoff report given to Keyshawn NUNES.  Patient is in stable condition and has no needs at this time. Call light is in reach and bed is in the lowest position.  Care is transferred at this time.    
I was called to PACU patient desaturating to the 70s on 2 L.  Soft blood pressure.  Evaluated saturation high 90s on 100% nonrebreather.  Some wheezes on exam.  Given breathing treatment.  Given also bolus normal saline 500 cc.  Patient was coherent follow commands.  Likely mouth breathing on 2 L nasal cannula.  Will continue to monitor closely.  
Nursing handoff to DES Decker.  Humera Leblanc RN  
Nursing handoff to DES Dueñas.  Humera Leblanc RN  
Okay to start TF via J-tube today.     TF order: Jevity 1.5 (Standard with Fiber formula in Epic) with a goal rate of 65 ml/hr x 20 hours. Start with 30 ml/hr and increase by 20 ml every 6 hours, as tolerated by patient, until goal rate can be achieved and maintained. Water flushes, 60 ml every 4 hours for tube patency.     Thank you,   Aundrea Cartagena RD, LD  [phone]    
PACU RN called out for Endo RN. Patient was satting 74% on a Non-rebreather mask. Once RN arrived in PACU patient was satting 98% with RN at bedside performing jaw thrust. Dr. Schofield notified and is at bedside.   
PACU report called to Brianne NUNES.   
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  ENT consulted.  There is no ENT coverage at [facility] on Friday and over the weekend.  If patient remains in house he will be seen on Monday if patient is ready for discharge sooner he can be discharged with outpatient follow-up.    Heidy Gao, DO  Otolaryngology  
Pre-Operative:  1.  Patient/Caregiver identifies - states name and date of birth.  2.  The patient is free from signs and symptoms of injury.  3.  The patient receives appropriate medication(s), safely administered during the Perioperative period.  4.  The patients's fluid, electrolyte, and acid-base balances are established preoperatively.  5.  The patient's pulmonary function is established preoperatively.  6.  The patient's cardiovascular status is established preoperatively.  7.  The patient / caregiver demonstrates knowledge of nutritional management related to the operative or other invasive procedure.  8.  The patient/caregiver demonstrates knowledge of medication management.  9.  The patient/caregiver demonstrates knowledge of pain management.  10.  The patient/caregiver participates in decisions affection his or her Perioperative plan of care.  11.  The patient's care is consistent with the individualized Perioperative plan of care.  12.  The patient's right to privacy is maintained.  13.  The patient is the recipient of competent and ethical care within legal standards of practice.  14.  The patient's value system, lifestyle, ethnicity, and culture are considered, respected, and incorporated in the Perioperative plan of care and understands special services available.  15.  The patient demonstrates and/or reports adequate pain control throughout the the Perioperative period.  16.  The patient's neurological status is established preoperatively.  17.  The patient/caregiver demonstrates knowledge of the expected responses to the endoscopy procedure.  18.  Patient/Caregiver has reduced anxiety.  Interventions- Familiarize with environment and equipment.  19. Patient/Caregiver verbalizes understanding of Phase II and/or Phase I process.  20.  Patient pain level is established preoperatively using age appropriate pain scale.  21.  The patient will move to fall risk upon sedation- during and through the recovery 
Progress Note    Admit Date:  11/6/2024    79 y.o. male with PMHX of tongue cancer, recurrent pneumonia, DVT/PE, dysphagia, hypothyroidism, GERD who presents to [facility] with G-tube issue, SOB, weakness.     Subjective:  Mr. Valverde is feeling some better.  His respiratory status is improved.  GI seen the patient.  Wound care nurse consulted.    Objective:   Vitals:    11/08/24 1533   BP: 109/69   Pulse: 98   Resp: 16   Temp: 97.9 °F (36.6 °C)   SpO2: 96%            Intake/Output Summary (Last 24 hours) at 11/8/2024 1542  Last data filed at 11/8/2024 1533  Gross per 24 hour   Intake 0 ml   Output 1250 ml   Net -1250 ml       Physical Exam:    Gen: No distress. Alert. Chronically ill-appearing, cachectic.   Eyes: PERRL. No sclera icterus. No conjunctival injection.   ENT: No discharge. Pharynx clear.   Neck: No JVD.  No Carotid Bruit. Trachea midline.  Resp: On 2L O2 via NC. +rhonchi with bibasilar crackles.    CV: Regular rate. Regular rhythm. No murmur.  No rub. No edema.   GI: Non-tender. Non-distended. Normal bowel sounds. No hernia. +PEG tube/J-tube present  Skin: Warm and dry. No nodule on exposed extremities. No rash on exposed extremities.   M/S: No cyanosis. No joint deformity. No clubbing.   Neuro: Awake. Grossly nonfocal    Psych: Oriented x 3. No anxiety or agitation.     Data:  CBC:   Recent Labs     11/06/24  1140 11/07/24  0507 11/08/24  0514   WBC 16.1* 9.7 7.5   HGB 12.6* 10.7* 10.2*   HCT 38.1* 32.0* 30.3*   MCV 75.4* 75.6* 75.9*    224 208     BMP:   Recent Labs     11/06/24  1140 11/07/24  0507 11/08/24  0514   * 130* 136   K 3.8 3.6 3.3*   CL 79* 88* 96*   CO2 38* 35* 29   BUN 21* 17 17   CREATININE 0.7* 0.7* 0.7*     LIVER PROFILE:   Recent Labs     11/06/24  1140 11/07/24  0507 11/08/24  0514   AST 18 13* 13*   ALT 8* 6* <5*   BILITOT 0.6 0.7 0.6   ALKPHOS 90 68 68     PT/INR: No results for input(s): \"PROTIME\", \"INR\" in the last 72 hours.    CULTURES  Results       
Pt A&Ox 4 on 3L NC. AM assessment and vitals completed and put into flowsheets. AM medications held. Pt with no questions or concerns voiced to RN at this time. Fall precautions in place and call light within reach.   Vitals:    11/08/24 0022 11/08/24 0403 11/08/24 0748 11/08/24 0755   BP: 93/60 (!) 97/57 (!) 91/56    Pulse: (!) 102 (!) 104 (!) 103    Resp: 15 20 18    Temp: 98.1 °F (36.7 °C) 98.6 °F (37 °C) 97.5 °F (36.4 °C)    TempSrc: Oral Axillary Oral    SpO2: 98% 100% 96% 95%   Weight:  62.4 kg (137 lb 9.6 oz)     Height:             
Pt appears to have brown fluid leaking around the J tube insertion site. Perfect serve sent to hospitalist. Leslie Solorzano RN    No new orders at this time. Electronically signed by Leslie Solorzano RN on 11/7/2024 at 6:17 AM    
Pt arrived to PACU from ENDO in stable condition. Report received from Blanquita NUNES. Phase II. Care of pt transferred at this time. Pt  placed on cont pulse ox and BP. Pt arrived to unit with oxygen requirements.  
Pt arrived to PACU on 2L O2 saturation in low 80s. Oxygen increased to 5L, pt still unable to recover. Pt O2 quickly dropped in the 70s she was then placed on a nonrebreather at 15 L. Dr. Schofield at bedside to evaluate pt.   
Pt leaving PACU in stable condition. Pt being transported back to PCU room 316 at this time.   
Pt states that Dr. Marcial came to talk to him and said that he doesn't cover this hospital and we need to figure out who is actually on call to come asess him. I'm confused by this, and there are no notes from anybody from surgery. Hospitalist has been notified.  
Pulmonary Progress Note  CC: pneumonia    Subjective:  no voice      EXAM: BP (!) 86/56   Pulse (!) 105   Temp 97.9 °F (36.6 °C) (Oral)   Resp 17   Ht 1.88 m (6' 2.02\")   Wt 62.4 kg (137 lb 9.6 oz)   SpO2 95%   BMI 17.66 kg/m²  on 3L  Constitutional:  No acute distress.   Eyes: PERRL. Conjunctivae anicteric.   ENT: Normal nose. Normal tongue.    Neck:  Trachea is midline.   Respiratory: No accessory muscle usage.   decreased breath sounds. No wheezes. No rales. No Rhonchi.  Cardiovascular: Normal S1S2. No digit clubbing. No digit cyanosis. No LE edema.   Psychiatric: No anxiety or Agitation. Alert and Oriented to person, place and time.    Scheduled Meds:   predniSONE  40 mg Oral Daily    pantoprazole (PROTONIX) 40 mg in sodium chloride (PF) 0.9 % 10 mL injection  40 mg IntraVENous Q12H    ipratropium 0.5 mg-albuterol 2.5 mg  1 Dose Inhalation BID RT    enoxaparin  1 mg/kg SubCUTAneous BID    sodium chloride (Inhalant)  4 mL Nebulization BID    sodium chloride flush  5-40 mL IntraVENous 2 times per day    guaiFENesin  600 mg Oral BID    cefepime  2,000 mg IntraVENous Q8H    [Held by provider] apixaban  5 mg Oral BID    levothyroxine  137 mcg Oral Daily    sucralfate  1 g Oral 4x Daily AC & HS    vancomycin  1,000 mg IntraVENous Q12H     Continuous Infusions:   sodium chloride       PRN Meds:  diatrizoate meglumine-sodium, sodium chloride flush, sodium chloride, ondansetron **OR** ondansetron, polyethylene glycol, acetaminophen **OR** acetaminophen, benzonatate, simethicone    Labs:  CBC:   Recent Labs     11/06/24  1140 11/07/24  0507 11/08/24  0514   WBC 16.1* 9.7 7.5   HGB 12.6* 10.7* 10.2*   HCT 38.1* 32.0* 30.3*   MCV 75.4* 75.6* 75.9*    224 208     BMP:   Recent Labs     11/06/24  1140 11/07/24  0507 11/08/24  0514   * 130* 136   K 3.8 3.6 3.3*   CL 79* 88* 96*   CO2 38* 35* 29   BUN 21* 17 17   CREATININE 0.7* 0.7* 0.7*       Microbiology:  11/6 BC NGTD  11/6 strep and Legionella 
RT Inhaler-Nebulizer Bronchodilator Protocol Note    There is a bronchodilator order in the chart from a provider indicating to follow the RT Bronchodilator Protocol and there is an “Initiate RT Inhaler-Nebulizer Bronchodilator Protocol” order as well (see protocol at bottom of note).    CXR Findings:  No results found.    The findings from the last RT Protocol Assessment were as follows:   History Pulmonary Disease: Smoker 15 pack years or more  Respiratory Pattern: Regular pattern and RR 12-20 bpm  Breath Sounds: Inspiratory and expiratory or bilateral wheezing and/or rhonchi  Cough: Strong, spontaneous, non-productive  Indication for Bronchodilator Therapy: Decreased or absent breath sounds  Bronchodilator Assessment Score: 7    Aerosolized bronchodilator medication orders have been revised according to the RT Inhaler-Nebulizer Bronchodilator Protocol below.    Respiratory Therapist to perform RT Therapy Protocol Assessment initially then follow the protocol.  Repeat RT Therapy Protocol Assessment PRN for score 0-3 or on second treatment, BID, and PRN for scores above 3.    No Indications - adjust the frequency to every 6 hours PRN wheezing or bronchospasm, if no treatments needed after 48 hours then discontinue using Per Protocol order mode.     If indication present, adjust the RT bronchodilator orders based on the Bronchodilator Assessment Score as indicated below.  Use Inhaler orders unless patient has one or more of the following: on home nebulizer, not able to hold breath for 10 seconds, is not alert and oriented, cannot activate and use MDI correctly, or respiratory rate 25 breaths per minute or more, then use the equivalent nebulizer order(s) with same Frequency and PRN reasons based on the score.  If a patient is on this medication at home then do not decrease Frequency below that used at home.    0-3 - enter or revise RT bronchodilator order(s) to equivalent RT Bronchodilator order with Frequency of 
RT Inhaler-Nebulizer Bronchodilator Protocol Note    There is a bronchodilator order in the chart from a provider indicating to follow the RT Bronchodilator Protocol and there is an “Initiate RT Inhaler-Nebulizer Bronchodilator Protocol” order as well (see protocol at bottom of note).    CXR Findings:  XR CHEST PORTABLE    Result Date: 11/6/2024  Patchy bilateral airspace disease, unchanged from prior examination.  No new dominant or infiltrate noted.       The findings from the last RT Protocol Assessment were as follows:   History Pulmonary Disease: Smoker 15 pack years or more  Respiratory Pattern: Regular pattern and RR 12-20 bpm  Breath Sounds: Slightly diminished and/or crackles  Cough: Strong, spontaneous, non-productive  Indication for Bronchodilator Therapy: Decreased or absent breath sounds  Bronchodilator Assessment Score: 3    Aerosolized bronchodilator medication orders have been revised according to the RT Inhaler-Nebulizer Bronchodilator Protocol below.    Respiratory Therapist to perform RT Therapy Protocol Assessment initially then follow the protocol.  Repeat RT Therapy Protocol Assessment PRN for score 0-3 or on second treatment, BID, and PRN for scores above 3.    No Indications - adjust the frequency to every 6 hours PRN wheezing or bronchospasm, if no treatments needed after 48 hours then discontinue using Per Protocol order mode.     If indication present, adjust the RT bronchodilator orders based on the Bronchodilator Assessment Score as indicated below.  Use Inhaler orders unless patient has one or more of the following: on home nebulizer, not able to hold breath for 10 seconds, is not alert and oriented, cannot activate and use MDI correctly, or respiratory rate 25 breaths per minute or more, then use the equivalent nebulizer order(s) with same Frequency and PRN reasons based on the score.  If a patient is on this medication at home then do not decrease Frequency below that used at 
RT Inhaler-Nebulizer Bronchodilator Protocol Note    There is a bronchodilator order in the chart from a provider indicating to follow the RT Bronchodilator Protocol and there is an “Initiate RT Inhaler-Nebulizer Bronchodilator Protocol” order as well (see protocol at bottom of note).    CXR Findings:  XR CHEST PORTABLE    Result Date: 11/6/2024  Patchy bilateral airspace disease, unchanged from prior examination.  No new dominant or infiltrate noted.       The findings from the last RT Protocol Assessment were as follows:   History Pulmonary Disease: Smoker 15 pack years or more  Respiratory Pattern: Regular pattern and RR 12-20 bpm  Breath Sounds: Slightly diminished and/or crackles  Cough: Strong, spontaneous, non-productive  Indication for Bronchodilator Therapy: Decreased or absent breath sounds  Bronchodilator Assessment Score: 3    Aerosolized bronchodilator medication orders have been revised according to the RT Inhaler-Nebulizer Bronchodilator Protocol below.    Respiratory Therapist to perform RT Therapy Protocol Assessment initially then follow the protocol.  Repeat RT Therapy Protocol Assessment PRN for score 0-3 or on second treatment, BID, and PRN for scores above 3.    No Indications - adjust the frequency to every 6 hours PRN wheezing or bronchospasm, if no treatments needed after 48 hours then discontinue using Per Protocol order mode.     If indication present, adjust the RT bronchodilator orders based on the Bronchodilator Assessment Score as indicated below.  Use Inhaler orders unless patient has one or more of the following: on home nebulizer, not able to hold breath for 10 seconds, is not alert and oriented, cannot activate and use MDI correctly, or respiratory rate 25 breaths per minute or more, then use the equivalent nebulizer order(s) with same Frequency and PRN reasons based on the score.  If a patient is on this medication at home then do not decrease Frequency below that used at 
Radiology at bedside. Xray of abdomen KUB obtained.   
Report called to PCU Claudia Decker. Procedure findings reported. RN aware that patient is currently in PACU recovery.   
Report called to chanel NUNES at [facility] 220  
Shift assessment completed. Vital signs stable. Call light in reach and standard safety measures in place. Pt resting in bed; no needs or complaints at this time.    
Shift assessment completed. Vital signs stable. Call light in reach and standard safety measures in place. Pt resting in bed; no needs or complaints at this time.    
Shift reassessment completed. Vital signs stable. Call light in reach and standard safety measures in place. Pt resting in bed; no needs or complaints at this time.    
Spoke with Jessica from the [facility] re patient to transport to [facility] room 220-1 excepting MD is Dr Sherly Young. I gave the nurse here the information along with number to call report [phone]. Bernadette Staples 11:10 11-10   
Transport here for patient. Patient agrees to dnr form for transport. Patient to [place] at this time. Telebox removed  
Vancomycin Day 4/7  Current dose = 1000 mg q12h  Indication:  HCAP  SeCr slightly improve.  Continue current dose and schedule.  AUC = 489 with a trough at Steady state of 12.5 mcg/ml.      
We started tubefeeds through the J tube, but it has been leaking so much that we have to change the dressing every hour if it's going at 30mls/hr and every 30mins if it's going at 50ml/hr. Tube feeds stopped for now. GI notified.  
    Labs:  CBC:   Recent Labs     11/07/24  0507 11/08/24  0514   WBC 9.7 7.5   HGB 10.7* 10.2*   HCT 32.0* 30.3*   MCV 75.6* 75.9*    208     BMP:   Recent Labs     11/07/24  0507 11/08/24  0514 11/09/24  0411   * 136 137   K 3.6 3.3* 3.3*   CL 88* 96* 98*   CO2 35* 29 30   BUN 17 17 18   CREATININE 0.7* 0.7* 0.6*     LIVER PROFILE:   Recent Labs     11/07/24  0507 11/08/24  0514 11/09/24  0411   AST 13* 13* 12*   ALT 6* <5* <5*   BILITOT 0.7 0.6 0.4   ALKPHOS 68 68 73     PT/INR: No results for input(s): \"INR\" in the last 72 hours.    Invalid input(s): \"PT\"    IMAGING:  XR ABDOMEN (KUB) (SINGLE AP VIEW)    Result Date: 11/8/2024  EXAMINATION: ONE SUPINE XRAY VIEW(S) OF THE ABDOMEN 11/8/2024 9:24 am COMPARISON: Abdominal radiograph 11/07/2024 HISTORY: ORDERING SYSTEM PROVIDED HISTORY: G tube leaking, check placement TECHNOLOGIST PROVIDED HISTORY: Inject contrast into G tube Reason for exam:->G tube leaking, check placement FINDINGS: On this single view of the abdomen there is a gastrostomy tube with contrast injected through the tube opacifying rugal folds of the stomach.  The bowel gas pattern is nonobstructive.  No acute osseous abnormality.     1. Gastrostomy tube appears to opacify rugal folds of the stomach.  If there is continued concern for dislodged gastrostomy tube, consider obtaining a lateral view. 2. Nonobstructive bowel gas pattern.        Hospital Problems             Last Modified POA    * (Principal) Sepsis (HCC) 11/6/2024 Yes    History of tongue cancer 11/6/2024 Yes    Acute respiratory failure with hypoxia and hypercapnia 11/6/2024 Yes    Hypothyroidism 11/6/2024 Yes    Severe protein-calorie malnutrition (HCC) (Chronic) 11/7/2024 Yes    Severe malnutrition (HCC) 11/6/2024 Yes    Hyponatremia 11/6/2024 Yes    Pneumonia due to organism 11/6/2024 Yes    Abnormal CT of the chest 11/7/2024 Yes    Hypoxia 11/7/2024 Yes    Acute exacerbation of emphysema (HCC) 11/7/2024 Yes    Pulmonary 
95% 95% 96%   Weight:       Height:           Physical Examination:   General - ill-appearing and cachectic  Mental status - alert, oriented to person, place, and time  Eyes - sclera anicteric  Neck - supple, no significant adenopathy  Heart - normal rate and regular rhythm  Abdomen - soft, ND, NT. PEG site with discharge. External bumper tightened  Extremities - no edema       Assessment  78 yo male with pmx of GERD, DVT, CCY, emphysema, head and neck cancer s/p chemoradiation, and recurrent aspiration pneumonia s/p G-J tube admitted with acute respiratory failure and G tube leakage. KUB with G tube in adequate position.      Plan  Continue supportive care  PPI BID  Okay to administer medications via G tube  TF to be given via J tube   Aspiration precautions  Continue antibiotics for pneumonia  Recommend General surgery consult for J tube leakage  Wound care consulted for GJ tube site care    Carina Vazquez, APRN - CNP  9:48 AM 11/8/2024                      79 year old male with history of DVT, GERD, head and neck cancer s/p chemoradiation and aspiration pneumonia s/p G tube and surgical J tube admitted with acute respiratory failure. G tube leakage is likely due to loose bumper, which has now been adjusted    Continue supportive care. Wound care consult. Keep G Tube site clean and dry. Keep external bumper tight to skin. Consider surgery consult for management of J tube complications.     Eleno Ji MD          (O) [phone]  (O) [phone]   
August 2024  CAD  Remote h/o tobacco abuse   Dysphonia  Seen by Dr. 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 Vocal cord paralysis.      PLAN:  Supplemental oxygen to maintain SaO2 >92%; wean as tolerated  Inhaled bronchodilators  Prednisone taper  Continue cefepime  ENT consult  Home Eliquis  Pt is being transferred to [facility] for a surgical procedure regarding his Gtube  He could have evaluation by ENT there as well.  
baseline levels established preoperatively.  19.  The patient/caregiver demonstrates knowledge of the expected responses to the operative or invasive procedure.  20.  Patient/Caregiver has reduced anxiety.  Interventions- Familiarize with environment and equipment.  21. Other:  22. Other:     
enter or revise RT bronchodilator order(s) to equivalent RT Bronchodilator order with Frequency of every 4 hours PRN for wheezing or increased work of breathing using Per Protocol order mode.        4-6 - enter or revise RT Bronchodilator order(s) to two equivalent RT bronchodilator orders with one order with BID Frequency and one order with Frequency of every 4 hours PRN wheezing or increased work of breathing using Per Protocol order mode.        7-10 - enter or revise RT Bronchodilator order(s) to two equivalent RT bronchodilator orders with one order with TID Frequency and one order with Frequency of every 4 hours PRN wheezing or increased work of breathing using Per Protocol order mode.       11-13 - enter or revise RT Bronchodilator order(s) to one equivalent RT bronchodilator order with QID Frequency and an Albuterol order with Frequency of every 4 hours PRN wheezing or increased work of breathing using Per Protocol order mode.      Greater than 13 - enter or revise RT Bronchodilator order(s) to one equivalent RT bronchodilator order with every 4 hours Frequency and an Albuterol order with Frequency of every 2 hours PRN wheezing or increased work of breathing using Per Protocol order mode.     RT to enter RT Home Evaluation for COPD & MDI Assessment order using Per Protocol order mode.    Electronically signed by Christ Canas RCP on 11/7/2024 at 5:27 PM  
of breathing using Per Protocol order mode.        4-6 - enter or revise RT Bronchodilator order(s) to two equivalent RT bronchodilator orders with one order with BID Frequency and one order with Frequency of every 4 hours PRN wheezing or increased work of breathing using Per Protocol order mode.        7-10 - enter or revise RT Bronchodilator order(s) to two equivalent RT bronchodilator orders with one order with TID Frequency and one order with Frequency of every 4 hours PRN wheezing or increased work of breathing using Per Protocol order mode.       11-13 - enter or revise RT Bronchodilator order(s) to one equivalent RT bronchodilator order with QID Frequency and an Albuterol order with Frequency of every 4 hours PRN wheezing or increased work of breathing using Per Protocol order mode.      Greater than 13 - enter or revise RT Bronchodilator order(s) to one equivalent RT bronchodilator order with every 4 hours Frequency and an Albuterol order with Frequency of every 2 hours PRN wheezing or increased work of breathing using Per Protocol order mode.     RT to enter RT Home Evaluation for COPD & MDI Assessment order using Per Protocol order mode.    Electronically signed by Lorena Buckley RCP on 11/9/2024 at 8:21 PM  
reviewed by me and showed:  No evidence of acute pulmonary embolus.  Possible very small chronic   appearing PEs versus motion artifact in the left lower lobe.   Mucous plugging in the distal bilateral lower lobe bronchi with bronchiolitis and multifocal pneumonia in the left greater than right lower lobes.  Trace bilateral pleural effusions.   1.5 cm left paraesophageal lymph node, stable, nonspecific.   Other nonacute findings as above.         ASSESSMENT:  Multifocal pneumonia: E. Coli and Proteus  Abnormal CT chest with mucous plugs  Hypoxia-secondary to above  Pulmonary emphysema with acute exacerbation  Uncontrolled GERD, dysphagia, post J-tube n.p.o. 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 Vocal cord paralysis.      PLAN:  Supplemental oxygen to maintain SaO2 >92%; wean as tolerated  Inhaled bronchodilators  Prednisone taper  Monitor blood glucose for hyperglycemia.  Stop vancomycin. Continue cefepime  ENT consult  Home Eliquis  
    STREP PNEUMONIAE ANTIGEN, URINE --     Presumptive Negative  Presumptive negative suggests no current or recent  pneumococcal infection. Infection due to Strep pneumoniae  cannot be ruled out since the antigen present in the sample  may be below the detection limit of the test.  Normal Range:Presumptive Negative      Narrative:      ORDER#: [id number]                          ORDERED BY: BC YBARRA  SOURCE: Urine Clean Catch                  COLLECTED:  11/06/24 13:04  ANTIBIOTICS AT KAI.:                      RECEIVED :  11/06/24 16:23    Blood Culture 1 [[id number]] Collected: 11/06/24 1140    Order Status: Completed Specimen: Blood Updated: 11/07/24 1215     Blood Culture, Routine No Growth to date.  Any change in status will be called.    Narrative:      ORDER#: [id number]                          ORDERED BY: MONTEZ BRYAN  SOURCE: Blood left a/c                     COLLECTED:  11/06/24 11:40  ANTIBIOTICS AT KAI.:                      RECEIVED :  11/06/24 11:50  If child <=2 yrs old please draw pediatric bottle.~Blood Culture 1    Blood Culture 2 [[id number]] Collected: 11/06/24 1140    Order Status: Completed Specimen: Blood Updated: 11/07/24 1615     Culture, Blood 2 No Growth to date.  Any change in status will be called.    Narrative:      ORDER#: [id number]                          ORDERED BY: MONTEZ BRYAN  SOURCE: Blood                              COLLECTED:  11/06/24 11:40  ANTIBIOTICS AT KAI.:                      RECEIVED :  11/06/24 15:52  If child <=2 yrs old please draw pediatric bottle.~Blood Culture #2    COVID-19 & Influenza Combo [[id number]] Collected: 11/06/24 1140    Order Status: Completed Specimen: Nasopharyngeal Swab Updated: 11/06/24 1216     SARS-CoV-2 RNA, RT PCR NOT DETECTED     Comment: Not Detected results do not preclude SARS-CoV-2 infection and  should not be used as the sole basis for patient management  decisions.  Results must be combined with clinical 
  Dysphagia s/p jejunostomy tube placement 7/3/24  Severe PCM  - dietician consulted to order/manage tube feeds.  -BMI 17.97.     Hypothyroidism.  -continue home synthroid.       GERD.  -continue PPI and carafate.      Hx tongue cancer     Note above makes patient higher risk for morbidity and mortality requiring testing and treatment.     DVT Prophylaxis: Eliquis    Diet: Diet NPO  Code Status: Full Code    Taryn RAO-DIALLO  11/7/2024      BC YBARRA MD 11/7/2024 11:56 AM

## 2024-11-10 NOTE — PROGRESS NOTES
4 Eyes Skin Assessment     NAME:  Héctor Valverde  YOB: 1945  MEDICAL RECORD NUMBER:  [id number]    The patient is being assessed for  Admission    I agree that at least one RN has performed a thorough Head to Toe Skin Assessment on the patient. ALL assessment sites listed below have been assessed.      Areas assessed by both nurses:    Head, Face, Ears, Shoulders, Back, Chest, Arms, Elbows, Hands, Sacrum. Buttock, Coccyx, Ischium, and Legs. Feet and Heels        Does the Patient have a Wound? Yes wound(s) were present on assessment. LDA wound assessment was Initiated and completed by RN       Eugene Prevention initiated by RN: Yes  Wound Care Orders initiated by RN: Yes    Pressure Injury (Stage 3,4, Unstageable, DTI, NWPT, and Complex wounds) if present, place Wound referral order by RN under : No    New Ostomies, if present place, Ostomy referral order under : No     Nurse 1 eSignature: Electronically signed by Evette Madrid RN on 11/10/24 at 3:03 PM EST    **SHARE this note so that the co-signing nurse can place an eSignature**    Nurse 2 eSignature: Electronically signed by Faraz Rust RN on 11/10/24 at 3:53 PM EST

## 2024-11-10 NOTE — PLAN OF CARE
Problem: Discharge Planning  Goal: Discharge to home or other facility with appropriate resources  11/8/2024 1947 by Humera Leblanc RN  Outcome: Progressing     Problem: Skin/Tissue Integrity  Goal: Absence of new skin breakdown  Description: 1.  Monitor for areas of redness and/or skin breakdown  2.  Assess vascular access sites hourly  3.  Every 4-6 hours minimum:  Change oxygen saturation probe site  4.  Every 4-6 hours:  If on nasal continuous positive airway pressure, respiratory therapy assess nares and determine need for appliance change or resting period.  11/8/2024 1947 by Humera Leblanc RN  Outcome: Progressing     Problem: Safety - Adult  Goal: Free from fall injury  11/8/2024 1947 by Humera Leblanc RN  Outcome: Progressing     Problem: ABCDS Injury Assessment  Goal: Absence of physical injury  11/8/2024 1947 by Humera Leblanc RN  Outcome: Progressing     Problem: Respiratory - Adult  Goal: Achieves optimal ventilation and oxygenation  11/8/2024 1947 by Humera Leblanc RN  Outcome: Progressing     Problem: Neurosensory - Adult  Goal: Achieves stable or improved neurological status  11/8/2024 1947 by Humera Leblanc RN  Outcome: Progressing    Goal: Absence of seizures  11/8/2024 1947 by Humera Leblanc RN  Outcome: Progressing     Problem: Cardiovascular - Adult  Goal: Maintains optimal cardiac output and hemodynamic stability  11/8/2024 1947 by Humera Leblanc RN  Outcome: Progressing    Goal: Absence of cardiac dysrhythmias or at baseline  11/8/2024 1947 by Humera Leblanc RN  Outcome: Progressing    Problem: Pain  Goal: Verbalizes/displays adequate comfort level or baseline comfort level  11/8/2024 1947 by Humera Leblanc RN  Outcome: Progressing     
  Problem: Discharge Planning  Goal: Discharge to home or other facility with appropriate resources  Outcome: Progressing     Problem: Skin/Tissue Integrity  Goal: Absence of new skin breakdown  Description: 1.  Monitor for areas of redness and/or skin breakdown  2.  Assess vascular access sites hourly  3.  Every 4-6 hours minimum:  Change oxygen saturation probe site  4.  Every 4-6 hours:  If on nasal continuous positive airway pressure, respiratory therapy assess nares and determine need for appliance change or resting period.  Outcome: Progressing     Problem: Safety - Adult  Goal: Free from fall injury  Outcome: Progressing     Problem: ABCDS Injury Assessment  Goal: Absence of physical injury  Outcome: Progressing     Problem: Respiratory - Adult  Goal: Achieves optimal ventilation and oxygenation  Outcome: Progressing     
  Problem: Discharge Planning  Goal: Discharge to home or other facility with appropriate resources  Outcome: Progressing     Problem: Skin/Tissue Integrity  Goal: Absence of new skin breakdown  Description: 1.  Monitor for areas of redness and/or skin breakdown  2.  Assess vascular access sites hourly  3.  Every 4-6 hours minimum:  Change oxygen saturation probe site  4.  Every 4-6 hours:  If on nasal continuous positive airway pressure, respiratory therapy assess nares and determine need for appliance change or resting period.  Outcome: Progressing     Problem: Safety - Adult  Goal: Free from fall injury  Outcome: Progressing     Problem: ABCDS Injury Assessment  Goal: Absence of physical injury  Outcome: Progressing     Problem: Respiratory - Adult  Goal: Achieves optimal ventilation and oxygenation  Outcome: Progressing     Problem: Neurosensory - Adult  Goal: Achieves stable or improved neurological status  Outcome: Progressing  Goal: Absence of seizures  Outcome: Progressing  Goal: Remains free of injury related to seizures activity  Outcome: Progressing     Problem: Cardiovascular - Adult  Goal: Maintains optimal cardiac output and hemodynamic stability  Outcome: Progressing  Goal: Absence of cardiac dysrhythmias or at baseline  Outcome: Progressing     Problem: Gastrointestinal - Adult  Goal: Minimal or absence of nausea and vomiting  Outcome: Progressing     Problem: Pain  Goal: Verbalizes/displays adequate comfort level or baseline comfort level  Outcome: Progressing     
  Problem: Discharge Planning  Goal: Discharge to home or other facility with appropriate resources  Outcome: Progressing  Flowsheets (Taken 11/6/2024 1645 by Britany Shoemaker, RN)  Discharge to home or other facility with appropriate resources:   Identify barriers to discharge with patient and caregiver   Arrange for needed discharge resources and transportation as appropriate   Identify discharge learning needs (meds, wound care, etc)     Problem: Skin/Tissue Integrity  Goal: Absence of new skin breakdown  Description: 1.  Monitor for areas of redness and/or skin breakdown  2.  Assess vascular access sites hourly  3.  Every 4-6 hours minimum:  Change oxygen saturation probe site  4.  Every 4-6 hours:  If on nasal continuous positive airway pressure, respiratory therapy assess nares and determine need for appliance change or resting period.  Outcome: Progressing     Problem: Safety - Adult  Goal: Free from fall injury  11/6/2024 2103 by Leslie Solorzano RN  Outcome: Progressing  11/6/2024 1912 by Britany Shoemaker RN  Outcome: Progressing  Flowsheets (Taken 11/6/2024 1912)  Free From Fall Injury: Instruct family/caregiver on patient safety     Problem: ABCDS Injury Assessment  Goal: Absence of physical injury  Outcome: Progressing     Problem: Respiratory - Adult  Goal: Achieves optimal ventilation and oxygenation  11/6/2024 2103 by Leslie Solorzano RN  Outcome: Progressing  11/6/2024 1912 by Britany Shoemaker RN  Outcome: Progressing  Flowsheets (Taken 11/6/2024 1912)  Achieves optimal ventilation and oxygenation:   Assess for changes in respiratory status   Assess for changes in mentation and behavior   Position to facilitate oxygenation and minimize respiratory effort   Oxygen supplementation based on oxygen saturation or arterial blood gases   Encourage broncho-pulmonary hygiene including cough, deep breathe, incentive spirometry   Assess the need for suctioning and aspirate as needed  Note: Suction at 
  Problem: Safety - Adult  Goal: Free from fall injury  Outcome: Progressing  Flowsheets (Taken 11/6/2024 1912)  Free From Fall Injury: Instruct family/caregiver on patient safety     Problem: Respiratory - Adult  Goal: Achieves optimal ventilation and oxygenation  Outcome: Progressing  Flowsheets (Taken 11/6/2024 1912)  Achieves optimal ventilation and oxygenation:   Assess for changes in respiratory status   Assess for changes in mentation and behavior   Position to facilitate oxygenation and minimize respiratory effort   Oxygen supplementation based on oxygen saturation or arterial blood gases   Encourage broncho-pulmonary hygiene including cough, deep breathe, incentive spirometry   Assess the need for suctioning and aspirate as needed  Note: Suction at bedside  Remains on 2-3L NC.  Very thick cough.      Problem: Discharge Planning  Goal: Discharge to home or other facility with appropriate resources  Recent Flowsheet Documentation  Taken 11/6/2024 1645 by Britany Shoemaker, RN  Discharge to home or other facility with appropriate resources:   Identify barriers to discharge with patient and caregiver   Arrange for needed discharge resources and transportation as appropriate   Identify discharge learning needs (meds, wound care, etc)     
Acute hypoxic respiratory failure, requiring 2L.  Sepsis 2/2 pneumonia.  -admit PCU.  -dietician consult for tube feeds.  -continue cefepime and vanc.  -sputum culture, strep/legionella urine pending.    Marla Caban PA-C 11/6/2024 3:06 PM   
Progressing  Flowsheets (Taken 11/8/2024 0820)  Incisions, Wounds, or Drain Sites Healing Without Sign and Symptoms of Infection:   ADMISSION and DAILY: Assess and document risk factors for pressure ulcer development   TWICE DAILY: Assess and document skin integrity   TWICE DAILY: Assess and document dressing/incision, wound bed, drain sites and surrounding tissue  11/7/2024 2233 by Keyshawn Flannery RN  Outcome: Progressing  Goal: Oral mucous membranes remain intact  11/8/2024 0946 by Grecia Ley RN  Outcome: Progressing  Flowsheets (Taken 11/8/2024 0820)  Oral Mucous Membranes Remain Intact:   Assess oral mucosa and hygiene practices   Implement preventative oral hygiene regimen   Implement oral medicated treatments as ordered  11/7/2024 2233 by Keyshawn Flannery RN  Outcome: Progressing     Problem: Musculoskeletal - Adult  Goal: Return mobility to safest level of function  11/8/2024 0946 by Grecia Ley RN  Outcome: Progressing  Flowsheets (Taken 11/8/2024 0820)  Return Mobility to Safest Level of Function:   Assess patient stability and activity tolerance for standing, transferring and ambulating with or without assistive devices   Assist with transfers and ambulation using safe patient handling equipment as needed  11/7/2024 2233 by Keyshawn Flannery RN  Outcome: Progressing  Goal: Maintain proper alignment of affected body part  11/8/2024 0946 by Grecia Ley RN  Outcome: Progressing  Flowsheets (Taken 11/8/2024 0820)  Maintain proper alignment of affected body part:   Instruct and reinforce with patient and family use of appropriate assistive device and precautions (e.g. spinal or hip dislocation precautions)   Support and protect limb and body alignment per provider's orders  11/7/2024 2233 by Keyshawn Flannery RN  Outcome: Progressing  Goal: Return ADL status to a safe level of function  11/8/2024 0946 by Grecia Ley RN  Outcome: Progressing  Flowsheets (Taken 11/8/2024 0820)  Return ADL Status to a Safe

## 2024-11-10 NOTE — FLOWSHEET NOTE
Resting quietly with respirations easy/even on RA.  VSS.  Call in easy reach.  Bed alarm on for safety.  Continue to monitor closely.  Humera Leblanc RN

## 2024-11-11 ENCOUNTER — APPOINTMENT (OUTPATIENT)
Dept: GENERAL RADIOLOGY | Age: 79
DRG: 393 | End: 2024-11-11
Attending: INTERNAL MEDICINE
Payer: MEDICARE

## 2024-11-11 LAB
ANION GAP SERPL CALCULATED.3IONS-SCNC: 11 MMOL/L (ref 3–16)
BASOPHILS # BLD: 0 K/UL (ref 0–0.2)
BASOPHILS NFR BLD: 0.4 %
BUN SERPL-MCNC: 21 MG/DL (ref 7–20)
CALCIUM SERPL-MCNC: 8.4 MG/DL (ref 8.3–10.6)
CHLORIDE SERPL-SCNC: 103 MMOL/L (ref 99–110)
CO2 SERPL-SCNC: 29 MMOL/L (ref 21–32)
CREAT SERPL-MCNC: 0.6 MG/DL (ref 0.8–1.3)
DEPRECATED RDW RBC AUTO: 15.5 % (ref 12.4–15.4)
EOSINOPHIL # BLD: 0.1 K/UL (ref 0–0.6)
EOSINOPHIL NFR BLD: 2 %
GFR SERPLBLD CREATININE-BSD FMLA CKD-EPI: >90 ML/MIN/{1.73_M2}
GLUCOSE SERPL-MCNC: 85 MG/DL (ref 70–99)
HCT VFR BLD AUTO: 32.4 % (ref 40.5–52.5)
HGB BLD-MCNC: 10.3 G/DL (ref 13.5–17.5)
LYMPHOCYTES # BLD: 0.5 K/UL (ref 1–5.1)
LYMPHOCYTES NFR BLD: 10.4 %
MAGNESIUM SERPL-MCNC: 1.82 MG/DL (ref 1.8–2.4)
MCH RBC QN AUTO: 24.6 PG (ref 26–34)
MCHC RBC AUTO-ENTMCNC: 31.8 G/DL (ref 31–36)
MCV RBC AUTO: 77.4 FL (ref 80–100)
MONOCYTES # BLD: 0.4 K/UL (ref 0–1.3)
MONOCYTES NFR BLD: 9.2 %
NEUTROPHILS # BLD: 3.6 K/UL (ref 1.7–7.7)
NEUTROPHILS NFR BLD: 78 %
PLATELET # BLD AUTO: 277 K/UL (ref 135–450)
PMV BLD AUTO: 6.7 FL (ref 5–10.5)
POTASSIUM SERPL-SCNC: 3 MMOL/L (ref 3.5–5.1)
RBC # BLD AUTO: 4.19 M/UL (ref 4.2–5.9)
SODIUM SERPL-SCNC: 143 MMOL/L (ref 136–145)
WBC # BLD AUTO: 4.7 K/UL (ref 4–11)

## 2024-11-11 PROCEDURE — 97530 THERAPEUTIC ACTIVITIES: CPT

## 2024-11-11 PROCEDURE — 80048 BASIC METABOLIC PNL TOTAL CA: CPT

## 2024-11-11 PROCEDURE — 6360000004 HC RX CONTRAST MEDICATION

## 2024-11-11 PROCEDURE — 2580000003 HC RX 258: Performed by: NURSE PRACTITIONER

## 2024-11-11 PROCEDURE — 97166 OT EVAL MOD COMPLEX 45 MIN: CPT

## 2024-11-11 PROCEDURE — 97162 PT EVAL MOD COMPLEX 30 MIN: CPT

## 2024-11-11 PROCEDURE — 0D2DXUZ CHANGE FEEDING DEVICE IN LOWER INTESTINAL TRACT, EXTERNAL APPROACH: ICD-10-PCS

## 2024-11-11 PROCEDURE — 99232 SBSQ HOSP IP/OBS MODERATE 35: CPT | Performed by: SURGERY

## 2024-11-11 PROCEDURE — 1200000000 HC SEMI PRIVATE

## 2024-11-11 PROCEDURE — 85025 COMPLETE CBC W/AUTO DIFF WBC: CPT

## 2024-11-11 PROCEDURE — 6370000000 HC RX 637 (ALT 250 FOR IP): Performed by: STUDENT IN AN ORGANIZED HEALTH CARE EDUCATION/TRAINING PROGRAM

## 2024-11-11 PROCEDURE — 83735 ASSAY OF MAGNESIUM: CPT

## 2024-11-11 PROCEDURE — 6360000002 HC RX W HCPCS: Performed by: NURSE PRACTITIONER

## 2024-11-11 PROCEDURE — 87641 MR-STAPH DNA AMP PROBE: CPT

## 2024-11-11 PROCEDURE — 6360000002 HC RX W HCPCS: Performed by: INTERNAL MEDICINE

## 2024-11-11 PROCEDURE — 36415 COLL VENOUS BLD VENIPUNCTURE: CPT

## 2024-11-11 PROCEDURE — 94640 AIRWAY INHALATION TREATMENT: CPT

## 2024-11-11 PROCEDURE — 74018 RADEX ABDOMEN 1 VIEW: CPT

## 2024-11-11 RX ORDER — VANCOMYCIN 1.75 G/350ML
1250 INJECTION, SOLUTION INTRAVENOUS
Status: DISCONTINUED | OUTPATIENT
Start: 2024-11-12 | End: 2024-11-11

## 2024-11-11 RX ORDER — POTASSIUM CHLORIDE 1500 MG/1
40 TABLET, EXTENDED RELEASE ORAL PRN
Status: CANCELLED | OUTPATIENT
Start: 2024-11-11

## 2024-11-11 RX ORDER — VANCOMYCIN HYDROCHLORIDE 1.5 G/300ML
25 INJECTION, SOLUTION INTRAVITREAL ONCE
Status: DISCONTINUED | OUTPATIENT
Start: 2024-11-11 | End: 2024-11-11 | Stop reason: SDUPTHER

## 2024-11-11 RX ORDER — DIATRIZOATE MEGLUMINE AND DIATRIZOATE SODIUM 660; 100 MG/ML; MG/ML
60 SOLUTION ORAL; RECTAL
Status: DISCONTINUED | OUTPATIENT
Start: 2024-11-11 | End: 2024-11-17 | Stop reason: HOSPADM

## 2024-11-11 RX ORDER — VANCOMYCIN 1.75 G/350ML
1250 INJECTION, SOLUTION INTRAVENOUS
Status: DISCONTINUED | OUTPATIENT
Start: 2024-11-12 | End: 2024-11-12

## 2024-11-11 RX ORDER — MAGNESIUM SULFATE IN WATER 40 MG/ML
2000 INJECTION, SOLUTION INTRAVENOUS PRN
Status: DISCONTINUED | OUTPATIENT
Start: 2024-11-11 | End: 2024-11-17 | Stop reason: HOSPADM

## 2024-11-11 RX ORDER — MAGNESIUM SULFATE IN WATER 40 MG/ML
2000 INJECTION, SOLUTION INTRAVENOUS PRN
Status: CANCELLED | OUTPATIENT
Start: 2024-11-11

## 2024-11-11 RX ORDER — VANCOMYCIN 1.5 G/300ML
1500 INJECTION, SOLUTION INTRAVENOUS ONCE
Status: COMPLETED | OUTPATIENT
Start: 2024-11-11 | End: 2024-11-11

## 2024-11-11 RX ORDER — POTASSIUM CHLORIDE 1500 MG/1
40 TABLET, EXTENDED RELEASE ORAL PRN
Status: DISCONTINUED | OUTPATIENT
Start: 2024-11-11 | End: 2024-11-17 | Stop reason: HOSPADM

## 2024-11-11 RX ORDER — POTASSIUM CHLORIDE 7.45 MG/ML
10 INJECTION INTRAVENOUS PRN
Status: CANCELLED | OUTPATIENT
Start: 2024-11-11

## 2024-11-11 RX ORDER — IPRATROPIUM BROMIDE AND ALBUTEROL SULFATE 2.5; .5 MG/3ML; MG/3ML
1 SOLUTION RESPIRATORY (INHALATION) EVERY 4 HOURS PRN
Status: DISCONTINUED | OUTPATIENT
Start: 2024-11-11 | End: 2024-11-11

## 2024-11-11 RX ORDER — IPRATROPIUM BROMIDE AND ALBUTEROL SULFATE 2.5; .5 MG/3ML; MG/3ML
1 SOLUTION RESPIRATORY (INHALATION) EVERY 4 HOURS PRN
Status: DISCONTINUED | OUTPATIENT
Start: 2024-11-11 | End: 2024-11-13

## 2024-11-11 RX ORDER — VANCOMYCIN HYDROCHLORIDE 1.5 G/300ML
25 INJECTION, SOLUTION INTRAVITREAL ONCE
Status: DISCONTINUED | OUTPATIENT
Start: 2024-11-11 | End: 2024-11-11

## 2024-11-11 RX ORDER — VANCOMYCIN 1 G/200ML
1000 INJECTION, SOLUTION INTRAVENOUS EVERY 12 HOURS
Status: DISCONTINUED | OUTPATIENT
Start: 2024-11-12 | End: 2024-11-11 | Stop reason: DRUGHIGH

## 2024-11-11 RX ORDER — POTASSIUM CHLORIDE 7.45 MG/ML
10 INJECTION INTRAVENOUS PRN
Status: DISCONTINUED | OUTPATIENT
Start: 2024-11-11 | End: 2024-11-17 | Stop reason: HOSPADM

## 2024-11-11 RX ADMIN — POTASSIUM CHLORIDE 10 MEQ: 10 INJECTION, SOLUTION INTRAVENOUS at 14:56

## 2024-11-11 RX ADMIN — SODIUM CHLORIDE, SODIUM LACTATE, POTASSIUM CHLORIDE, CALCIUM CHLORIDE AND DEXTROSE MONOHYDRATE: 5; 600; 310; 30; 20 INJECTION, SOLUTION INTRAVENOUS at 11:10

## 2024-11-11 RX ADMIN — POTASSIUM CHLORIDE 10 MEQ: 10 INJECTION, SOLUTION INTRAVENOUS at 23:38

## 2024-11-11 RX ADMIN — CEFEPIME 2000 MG: 2 INJECTION, POWDER, FOR SOLUTION INTRAVENOUS at 14:20

## 2024-11-11 RX ADMIN — SODIUM CHLORIDE, SODIUM LACTATE, POTASSIUM CHLORIDE, CALCIUM CHLORIDE AND DEXTROSE MONOHYDRATE: 5; 600; 310; 30; 20 INJECTION, SOLUTION INTRAVENOUS at 14:55

## 2024-11-11 RX ADMIN — ENOXAPARIN SODIUM 60 MG: 100 INJECTION SUBCUTANEOUS at 08:49

## 2024-11-11 RX ADMIN — DIATRIZOATE MEGLUMINE AND DIATRIZOATE SODIUM 60 ML: 660; 100 LIQUID ORAL; RECTAL at 13:45

## 2024-11-11 RX ADMIN — POTASSIUM CHLORIDE 10 MEQ: 10 INJECTION, SOLUTION INTRAVENOUS at 09:14

## 2024-11-11 RX ADMIN — VANCOMYCIN 1500 MG: 1.5 INJECTION, SOLUTION INTRAVENOUS at 21:24

## 2024-11-11 RX ADMIN — CEFEPIME 2000 MG: 2 INJECTION, POWDER, FOR SOLUTION INTRAVENOUS at 23:00

## 2024-11-11 RX ADMIN — IPRATROPIUM BROMIDE AND ALBUTEROL SULFATE 1 DOSE: 2.5; .5 SOLUTION RESPIRATORY (INHALATION) at 08:27

## 2024-11-11 RX ADMIN — ENOXAPARIN SODIUM 60 MG: 100 INJECTION SUBCUTANEOUS at 21:25

## 2024-11-11 RX ADMIN — CEFEPIME 2000 MG: 2 INJECTION, POWDER, FOR SOLUTION INTRAVENOUS at 05:37

## 2024-11-11 ASSESSMENT — PAIN DESCRIPTION - LOCATION: LOCATION: ABDOMEN

## 2024-11-11 ASSESSMENT — PAIN SCALES - GENERAL
PAINLEVEL_OUTOF10: 0
PAINLEVEL_OUTOF10: 5
PAINLEVEL_OUTOF10: 0

## 2024-11-11 ASSESSMENT — PAIN DESCRIPTION - DESCRIPTORS: DESCRIPTORS: ITCHING

## 2024-11-11 NOTE — PROGRESS NOTES
Physical Therapy  Facility/Department: [facility] A2 CARD TELEMETRY  Physical Therapy Initial Assessment    Name: Héctor Valverde  : 1945  MRN: [id number]  Date of Service: 2024    Discharge Recommendations:  24 hour supervision or assist, Home with Home health PT   PT Equipment Recommendations  Equipment Needed: No  Other: Pt has RW for home use      Patient Diagnosis(es): None  Past Medical History:  has a past medical history of Acute exacerbation of emphysema (HCC), Arthritis, Benign hypertrophy of prostate, Cancer (HCC), COVID-19, Dry mouth, DVT, lower extremity (HCC), GERD (gastroesophageal reflux disease), Hip fracture (HCC), Hx of blood clots, Hyperlipidemia, Pneumonia, Prolonged emergence from general anesthesia, and Thyroid disease.  Past Surgical History:  has a past surgical history that includes Abdomen surgery; Tongue Biopsy (10/2010); shoulder surgery (Right, rotater cuff); Cholecystectomy; other surgical history (2011); Hip fracture surgery (Right); eye surgery (Right); Colonoscopy; Hip Arthroplasty (Right, 2016); hernia repair; joint replacement; Upper gastrointestinal endoscopy (N/A, 2024); Stomach surgery (N/A, 7/3/2024); bronchoscopy (N/A, 2024); and bronchoscopy (2024).    Assessment  Body Structures, Functions, Activity Limitations Requiring Skilled Therapeutic Intervention: Decreased functional mobility ;Decreased ROM;Decreased strength;Decreased endurance;Decreased balance;Decreased coordination;Decreased fine motor control;Decreased posture  Assessment: Pt referred for PT evaluation during current hospital stay with dx of J tube malfunction. Pt with hx oral CA for which he has received chemo and radiation in the past. Pt relies on G tube/J tube for nutrition and states he hasn't eaten by mouth in 7 years. Pt reports typically relying on wife for assist with transfers/gait given his weakness and high fall risk and also recieves family assist with ADL's and  understanding;Continued education needed      Therapy Time   Individual Concurrent Group Co-treatment   Time In 0745         Time Out 0825         Minutes 40         Timed Code Treatment Minutes: 30 Minutes (10 minute eval + 30 minutes treatment)       Trinh Mosher, PT, DPT #[id number]    If pt is unable to be seen after this session, please let this note serve as discharge summary.  Please see case management note for discharge disposition.  Thank you.

## 2024-11-11 NOTE — PROGRESS NOTES
Occupational Therapy  Facility/Department: [facility] A2 CARD TELEMETRY  Occupational Therapy Initial Assessment    Name: Héctor Valverde  : 1945  MRN: [id number]  Date of Service: 2024    Discharge Recommendations:  24 hour supervision or assist, Home with Home health OT  OT Equipment Recommendations  Equipment Needed: No       Patient Diagnosis(es): Malfunction of jejunostomy tube  Past Medical History:  has a past medical history of Acute exacerbation of emphysema (HCC), Arthritis, Benign hypertrophy of prostate, Cancer (HCC), COVID-19, Dry mouth, DVT, lower extremity (HCC), GERD (gastroesophageal reflux disease), Hip fracture (HCC), Hx of blood clots, Hyperlipidemia, Pneumonia, Prolonged emergence from general anesthesia, and Thyroid disease.  Past Surgical History:  has a past surgical history that includes Abdomen surgery; Tongue Biopsy (10/2010); shoulder surgery (Right, rotater cuff); Cholecystectomy; other surgical history (2011); Hip fracture surgery (Right); eye surgery (Right); Colonoscopy; Hip Arthroplasty (Right, 2016); hernia repair; joint replacement; Upper gastrointestinal endoscopy (N/A, 2024); Stomach surgery (N/A, 7/3/2024); bronchoscopy (N/A, 2024); and bronchoscopy (2024).       Therapy discharge recommendations are subject to collaboration from the patient’s interdisciplinary healthcare team, including MD and case management recommendations.    If pt is unable to be seen after this session, please let this note serve as discharge summary.  Please see case management note for discharge disposition.  Thank you.      Assessment  Performance deficits / Impairments: Decreased ADL status;Decreased balance;Decreased coordination;Decreased functional mobility ;Decreased high-level IADLs;Decreased strength;Decreased endurance  Assessment: Pt [facility] to [facility] with malfunction of jejunostomy tube. Pt PTA required assistance from wife and home health with ADLs, required

## 2024-11-11 NOTE — PROGRESS NOTES
[facility] Medicine Progress Note  V 10.25      Date of Admission: 11/10/2024    Hospital Day: 2      Chief Admission Complaint:  \"J tube malfunctioning\"     Subjective:      Patient seen sitting in bedside chair. IV Potassium is infusing and he states it is burning. Rate of IV potassium infusion decreased and RN notified to reassess. Patient has not other complaints at this time and is still waiting for General Surgery to evaluate his Jtube.     Presenting Admission History:       This is a 79 y.o. male who presented to [facility] from [facility] with complaint of malfunctioning J tube.  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 , GERD, Dysphagia, Hypothyroid, DVT/PE and Protein Calorie Malnutrition on tube feeds.  He uses a wheelchair at baseline. He was admitted at [facility] 11/2/2024 with chief complaint of drainage coming from his feeding tube. During his admission there he was treated for Sepsis, Pneumonia (possibly aspiration related), acute hypoxic respiratory failure and hyponatremia. 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 ENT was consulted on Friday 11/8/24, however there was no coverage available to see the patient in hospital until Monday so the plan was for him to follow up outpatient with them. Per chart review it appears that the patient continued to have issues with drainage and leakage from his J-tube site. The patient requested to be transferred to [facility] for management and possibly replacement of his J-tube by Dr. Marcial's team.      The patient and his wife state that the patient has had multiple issues with his Jtube since it was first placed. The main concern they mention is that it becomes clogged often despite frequent water flushes. They are \"fed up\" with it. The patient also has a Gtube that is to gravity drainage.  Normal respiratory effort.   Cardiovascular:  Regular rate and rhythm.  Abdomen:  Soft, non-tender, non-distended. G- tube and J-tube present.  Musculoskeletal:  No edema  Neurologic:  Non-focal  Psychiatric:  Alert and oriented    /77   Pulse 94   Temp 98.1 °F (36.7 °C) (Oral)   Resp 20   SpO2 98%     Telemetry:      Personally reviewed and interpreted telemetry (Rhythm Strip) on 11/11/2024 with the following findings: SR, rate 99    Diet: Diet NPO    DVT Prophylaxis: [x]PPx LMWH  []SQ Heparin  []IPC/SCDs  []Eliquis  []Xarelto  []Coumadin  [] Heparin Drip  []Other -      Code status: Limited    PT/OT Eval Status:   []NOT yet ordered  []Ordered and Pending   [x]Seen with Recommendations for:   []Home independently  [x]Home w/ assist  [x]HHC  []SNF  []Acute Rehab    Multi-Disciplinary Rounds with Case Management completed on 11/11/2024 with the following recommendations:  Anticipated Discharge Location: [x]Home w/ [x]HHC vs []SNF  []Acute Rehab  []LTAC  []Hospice  []Other -    Anticipated Discharge Day/Date:  11/13/2024  Barriers to Discharge: Feeding Tube Malfunction  --------------------------------------------------    MDM (any 2 required for High level billing)    A. Problems (any 1)  [x] Acute/Chronic Illness/injury posing ongoing threat to life and/or bodily function without ongoing treatment    [] Severe exacerbation of chronic illness    --------------------------------------------------  B. Risk of Treatment (any 1)    [x] Drugs/treatments that require intensive monitoring for toxicity    [x] IV ABX (Vancomycin, Aminoglycosides, etc)     [] Post-Cath/Contrast study requiring serial monitoring    [] IV Narcotic analgesia    [] Aggressive IV diuresis    [] Hypertonic Saline    [] Critical electrolyte abnormalities requiring IV replacement    [] Insulin - Scheduled/SSI or Insulin gtt    [] Anticoagulation (Heparin gtt or Coumadin - other anticoagulants in special circumstances)    [] Cardiac

## 2024-11-11 NOTE — PLAN OF CARE
Problem: Discharge Planning  Goal: Discharge to home or other facility with appropriate resources  Outcome: Progressing     Problem: Pain  Goal: Verbalizes/displays adequate comfort level or baseline comfort level  11/11/2024 0238 by Mavis Cruz RN  Outcome: Progressing     Problem: Safety - Adult  Goal: Free from fall injury  11/11/2024 0238 by Mavis Cruz, RN  Outcome: Progressing     Problem: Skin/Tissue Integrity  Goal: Absence of new skin breakdown  Description: 1.  Monitor for areas of redness and/or skin breakdown  2.  Assess vascular access sites hourly  3.  Every 4-6 hours minimum:  Change oxygen saturation probe site  4.  Every 4-6 hours:  If on nasal continuous positive airway pressure, respiratory therapy assess nares and determine need for appliance change or resting period.  11/11/2024 0238 by Mavis Cruz RN  Outcome: Progressing

## 2024-11-11 NOTE — PROGRESS NOTES
[facility] SURGERY    PATIENT NAME: Héctor Valverde     TODAY'S DATE: 11/11/2024    CHIEF COMPLAINT: leaking around jtube    INTERVAL HISTORY/HPI:    Pt with leakage around jtube, minor pain at site, no nausea or emesis.     REVIEW OF SYSTEMS:  Pertinent positives and negatives as per interval history section    OBJECTIVE:  VITALS:  BP (!) 115/59   Pulse 97   Temp 97.8 °F (36.6 °C) (Oral)   Resp 18   SpO2 95%     INTAKE/OUTPUT:    I/O last 3 completed shifts:  In: 20 [P.O.:20]  Out: 700 [Urine:700]  No intake/output data recorded.    CONSTITUTIONAL:  awake and alert  LUNGS:  Respirations easy and unlabored  CARD:  regular rate  ABDOMEN:  normal bowel sounds, soft, non-distended, minimal tender around jtube with small rings of erythema, gtube secure     Data:  CBC:   Recent Labs     11/11/24 0418   WBC 4.7   HGB 10.3*   HCT 32.4*        BMP:    Recent Labs     11/09/24  0411 11/10/24  0430 11/11/24  0418    140 143   K 3.3* 3.6 3.0*   CL 98* 102 103   CO2 30 26 29   BUN 18 23* 21*   CREATININE 0.6* 0.6* 0.6*   GLUCOSE 155* 89 85     Hepatic:   Recent Labs     11/09/24 0411   AST 12*   ALT <5*   BILITOT 0.4   ALKPHOS 73     Mag:      Recent Labs     11/09/24 0411 11/11/24 0418   MG 1.84 1.82      Phos:   No results for input(s): \"PHOS\" in the last 72 hours.   INR: No results for input(s): \"INR\" in the last 72 hours.    Radiology Review:  *Imaging personally reviewed by me.   CT from August reviewed with abdominal wall length measuring about 2.5cm at jtube site      ASSESSMENT AND PLAN:  80 yo with malfunctioning jtube  Discussed options with patient and family including operative revision, replacement with larger red rubber catheter, or replacement with kendal-jtube.  After discussing these options, their advantages and risks, decided on trying kendal-jtube first.  Old tube removed and kendal-j (14F 2.5cm low profile tube) cut to length of prior tube with several side holes as well.  It was placed

## 2024-11-11 NOTE — PLAN OF CARE
Problem: Discharge Planning  Goal: Discharge to home or other facility with appropriate resources  11/11/2024 1207 by Evette Madrid RN  Outcome: Progressing     Problem: Pain  Goal: Verbalizes/displays adequate comfort level or baseline comfort level  11/11/2024 1207 by Evette Madrid RN  Outcome: Progressing     Problem: Safety - Adult  Goal: Free from fall injury  11/11/2024 1207 by Evette Madrid RN  Outcome: Progressing     Problem: Skin/Tissue Integrity  Goal: Absence of new skin breakdown  Description: 1.  Monitor for areas of redness and/or skin breakdown  2.  Assess vascular access sites hourly  3.  Every 4-6 hours minimum:  Change oxygen saturation probe site  4.  Every 4-6 hours:  If on nasal continuous positive airway pressure, respiratory therapy assess nares and determine need for appliance change or resting period.  11/11/2024 1207 by Evette Madrid, RN  Outcome: Progressing

## 2024-11-11 NOTE — PROGRESS NOTES
Speech Language Pathology  Attempt Note     Name: Héctor Valverde  : 1945  Medical Diagnosis: Malfunction of jejunostomy tube (HCC) [K94.13]  Malfunctioning jejunostomy tube (HCC) [K94.13]      SLP attempted to see pt for bedside swallow evaluation (BSE), reviewed pt's chart, spoke with RN.  Pt has PMHx of head and neck cancer, hx of recurrent aspiration PNA, and extensive dysphagia hx with both MBSS and FEES completed in the past (NPO with alternative means of nutrition recommended at that time).  Pt has not had PO intake \"for several years\" per chart review.      Note consults for general surgery and ENT in place.  Pt is considered at an increased risk of aspiration with all PO intake at this time.  If PO intake is desired, would recommend completion of instrumental swallow study via MBSS or FEES to correlate clinical findings (vs BSE).  SLP sent Perfect Serve message to MD.  RN aware.  No charges.  ST to sign-off at this time, please re-refer ST / place order for instrumental swallow study as clinically indicated.      Evette Marks M.S. CCC-SLP  Speech-language pathologist  [phone]

## 2024-11-11 NOTE — CARE COORDINATION
Case Management Assessment  Initial Evaluation    Date/Time of Evaluation: 11/11/2024 11:03 AM  Assessment Completed by: Savi Colbert RN    If patient is discharged prior to next notation, then this note serves as note for discharge by case management.    Patient Name: Héctor Valverde                   YOB: 1945  Diagnosis: Malfunction of jejunostomy tube (HCC) [K94.13]  Malfunctioning jejunostomy tube (HCC) [K94.13]                   Date / Time: 11/10/2024 12:40 PM    Patient Admission Status: Inpatient   Readmission Risk (Low < 19, Mod (19-27), High > 27): Readmission Risk Score: 26.4    Current PCP: Suraj De La Torre, DO  PCP verified by CM? Yes    Chart Reviewed: Yes      History Provided by: Patient  Patient Orientation: Alert and Oriented    Patient Cognition: Alert    Hospitalization in the last 30 days (Readmission):  No    If yes, Readmission Assessment in CM Navigator will be completed.    Advance Directives:      Code Status: Limited   Patient's Primary Decision Maker is:      Primary Decision Maker (Active): Wendy Valverde - Spouse - [phone]    Secondary Decision Maker: Rosas Valverde - Child - [phone]    Discharge Planning:    Patient lives with: Spouse/Significant Other Type of Home: House  Primary Care Giver: Spouse  Patient Support Systems include: Family Members, Spouse/Significant Other   Current Financial resources: Medicare  Current community resources: ECF/Home Care, Other (Comment) ([facility] for Jtube supplies)  Current services prior to admission: Durable Medical Equipment            Current DME: Wheelchair, Walker            Type of Home Care services:  Nursing Services    ADLS  Prior functional level: Assistance with the following:, Bathing, Dressing, Toileting  Current functional level: Assistance with the following:, Bathing, Dressing, Toileting    PT AM-PAC:   /24  OT AM-PAC:   /24    Family can provide assistance at DC: Yes  Would you like Case Management

## 2024-11-11 NOTE — ACP (ADVANCE CARE PLANNING)
Patient is a limited code with \"no\" x 4.  Spoke with NP who states she has placed a Palliative consult.

## 2024-11-11 NOTE — DISCHARGE INSTR - COC
Continuity of Care Form    Patient Name: Héctor Valverde   :  1945  MRN:  [id number]    Admit date:  11/10/2024  Discharge date:  ***    Code Status Order: Limited   Advance Directives:   Advance Care Flowsheet Documentation             Admitting Physician:  London Chandler DO  PCP: Suraj De La Torre DO    Discharging Nurse: ***  Discharging Hospital Unit/Room#: 0220/0220-01  Discharging Unit Phone Number: ***    Emergency Contact:   Extended Emergency Contact Information  Primary Emergency Contact: Wendy Valverde  Address: [address] [place] [address]  Home Phone: [phone]  Mobile Phone: [phone]  Relation: Spouse  Secondary Emergency Contact: Rosas Valverde  Home Phone: [phone]  Mobile Phone: [phone]  Relation: Child    Past Surgical History:  Past Surgical History:   Procedure Laterality Date    ABDOMEN SURGERY      gallbladder removed    BRONCHOSCOPY N/A 2024    BRONCHOSCOPY ALVEOLAR LAVAGE [facility]    BRONCHOSCOPY  2024    BRONCHOSCOPY THERAPEUTIC ASPIRATION INITIAL performed [facility]    CHOLECYSTECTOMY      COLONOSCOPY      EYE SURGERY Right     TORN RETINA, LASER    HERNIA REPAIR      HIP ARTHROPLASTY Right 2016    HIP FRACTURE SURGERY Right     JOINT REPLACEMENT      OTHER SURGICAL HISTORY  2011    right hip IM nailing    SHOULDER SURGERY Right rotater cuff    STOMACH SURGERY N/A 7/3/2024    JEJUNOSTOMY TUBE INSERTION/CHANGE performed by Kayden Lopez MD at [facility]    TONGUE BIOPSY  10/2010    UPPER GASTROINTESTINAL ENDOSCOPY N/A 2024    ESOPHAGOGASTRODUODENOSCOPY BIOPSY performed by Ajay Lees MD at [facility] ENDOSCOPY       Immunization History:   Immunization History   Administered Date(s) Administered    Influenza Virus Vaccine 10/06/2010, 2012, 10/07/2013, 2014, 2021, 2021    Influenza Whole  with patient):  {CHP DME Belongings:[id number]}    RN SIGNATURE:  {Esignature:[id number]}    CASE MANAGEMENT/SOCIAL WORK SECTION    Inpatient Status Date: ***    Readmission Risk Assessment Score:  Readmission Risk              Risk of Unplanned Readmission:  40           Discharging to Facility/ Agency   Name:   Address:  Phone:  Fax:    Dialysis Facility (if applicable)   Name:  Address:  Dialysis Schedule:  Phone:  Fax:    / signature: {Esignature:[id number]}    PHYSICIAN SECTION    Prognosis: Fair    Condition at Discharge: Stable    Rehab Potential (if transferring to Rehab): Fair    Recommended Labs or Other Treatments After Discharge: Home with [place] and PT/OT/RN/Aide  Wound care:  Clean peritubular (G-tube & J tube) skin with foamy cleanser.  Pat dry.  Crust with stoma powder (light dusting), seal with barrier film spray.  Allow to dry.  Using pattern left in room, cut hydrocolloid and place under and around G tube and J tube.  Change when the hydrocolloid turns white from excessive leakage or every 3-4 days.  Monitor for drainage, oder, edema or signs and symptoms of infection.     Physician Certification: I certify the above information and transfer of Héctor Valverde  is necessary for the continuing treatment of the diagnosis listed and that he requires Home Care for greater 30 days.     Update Admission H&P: No change in H&P    PHYSICIAN SIGNATURE:  Electronically signed by JOHN Meyer CNP/ Porfirio Rod MD on 11/11/24 at 2:45 PM EST

## 2024-11-11 NOTE — PROGRESS NOTES
Continuous D5 in LR and IV abxs are not compatible. Pt refusing second PIV, will pause IVFs while abx infusing.

## 2024-11-11 NOTE — CONSULTS
Palliative Care Initial Note  Palliative Care Admit date: 11/11/24    ACP/palcare referral noted

## 2024-11-11 NOTE — CONSULTS
Pharmacy Note  Vancomycin Consult    Héctor Valverde is a 79 y.o. male started on Vancomycin for CAP; consult received from JOHN Meyer CNP to manage therapy. Also receiving the following antibiotics: Cefepime.    Allergies:  Tamsulosin, Tamsulosin hcl, Amoxicillin-pot clavulanate, and Tetanus toxoids     Tmax: 98.1 F    Micro:     Date Culture Results   11/6 Blood x2 (at [facility]) NGTD   11/7 Respiratory; Bronchial Washing (at [facility]) E. Coli; Proteus mirabilis   11/9 Respiratory; sputum 4+ Gram positive cocci   11/11 MRSA; nares Ordered; not collected yet       Recent Labs     11/10/24  0430 11/11/24  0418   CREATININE 0.6* 0.6*       Recent Labs     11/11/24  0418   WBC 4.7       Estimated Creatinine Clearance: 86 mL/min (A) (based on SCr of 0.6 mg/dL (L)).      Intake/Output Summary (Last 24 hours) at 11/11/2024 1450  Last data filed at 11/11/2024 0837  Gross per 24 hour   Intake 20 ml   Output 700 ml   Net -680 ml       Wt Readings from Last 1 Encounters:   11/10/24 61.1 kg (134 lb 11.2 oz)         There is no height or weight on file to calculate BMI.    Loading dose (critically ill or in ICU, require dialysis or renal replacement therapy): Vancomycin 25 mg/kg IVPB x 1 (maximum 2500 mg).  Maintenance dose: 10-20 mg/kg (maximum: 2000 mg/dose and 4500 mg/day) starting at the next dosing interval determined by renal function  Pulse dose: fluctuating renal function, NEMESIO, ESRD  CRRT: 7.5-10 mg/kg q12h   Goal Vancomycin trough: 15-20 mcg/mL   Goal Vancomycin AUC: 400-600     Assessment/Plan:  Will initiate Vancomycin with a one time loading dose of 1500 mg x1, followed by 1250 mg IV every 18 hours. Calculated Vancomycin AUC = 479 mg/L*h with an estimated steady-state vancomycin trough = 13.2 mcg/mL. Vancomycin level ordered for tomorrow (11/12) at 0900. Timing of trough level will be determined based on culture results, renal function, and clinical response.     Thank you for the  consult.    Carolina Deluca PharmD 11/11/2024 2:50 PM    ---------------------------------  RN requested to start Vanc at 2100   1st dose 2100, start maintenance dose at 1500 tomorrow  Vanc level adjusted to 1300 tomorrow (11/12)  Carolina Deluca PharmD 11/11/2024  3:22 PM  ---------------------------------

## 2024-11-12 LAB
ALBUMIN SERPL-MCNC: 3 G/DL (ref 3.4–5)
ALP SERPL-CCNC: 60 U/L (ref 40–129)
ALT SERPL-CCNC: <5 U/L (ref 10–40)
ANION GAP SERPL CALCULATED.3IONS-SCNC: 10 MMOL/L (ref 3–16)
AST SERPL-CCNC: 14 U/L (ref 15–37)
BASOPHILS # BLD: 0 K/UL (ref 0–0.2)
BASOPHILS NFR BLD: 0.8 %
BILIRUB DIRECT SERPL-MCNC: <0.1 MG/DL (ref 0–0.3)
BILIRUB INDIRECT SERPL-MCNC: 0.2 MG/DL (ref 0–1)
BILIRUB SERPL-MCNC: 0.3 MG/DL (ref 0–1)
BUN SERPL-MCNC: 16 MG/DL (ref 7–20)
CALCIUM SERPL-MCNC: 8.5 MG/DL (ref 8.3–10.6)
CHLORIDE SERPL-SCNC: 106 MMOL/L (ref 99–110)
CO2 SERPL-SCNC: 30 MMOL/L (ref 21–32)
CREAT SERPL-MCNC: 0.5 MG/DL (ref 0.8–1.3)
DEPRECATED RDW RBC AUTO: 15.2 % (ref 12.4–15.4)
EOSINOPHIL # BLD: 0.1 K/UL (ref 0–0.6)
EOSINOPHIL NFR BLD: 2.8 %
FERRITIN SERPL IA-MCNC: 333 NG/ML (ref 30–400)
FOLATE SERPL-MCNC: 13.7 NG/ML (ref 4.78–24.2)
GFR SERPLBLD CREATININE-BSD FMLA CKD-EPI: >90 ML/MIN/{1.73_M2}
GLUCOSE SERPL-MCNC: 142 MG/DL (ref 70–99)
HCT VFR BLD AUTO: 32.7 % (ref 40.5–52.5)
HGB BLD-MCNC: 10.4 G/DL (ref 13.5–17.5)
IRON SATN MFR SERPL: 15 % (ref 20–50)
IRON SERPL-MCNC: 25 UG/DL (ref 59–158)
LYMPHOCYTES # BLD: 0.5 K/UL (ref 1–5.1)
LYMPHOCYTES NFR BLD: 11.4 %
MAGNESIUM SERPL-MCNC: 1.99 MG/DL (ref 1.8–2.4)
MCH RBC QN AUTO: 24.7 PG (ref 26–34)
MCHC RBC AUTO-ENTMCNC: 31.7 G/DL (ref 31–36)
MCV RBC AUTO: 77.8 FL (ref 80–100)
MONOCYTES # BLD: 0.4 K/UL (ref 0–1.3)
MONOCYTES NFR BLD: 10 %
MRSA DNA SPEC QL NAA+PROBE: NORMAL
NEUTROPHILS # BLD: 3.4 K/UL (ref 1.7–7.7)
NEUTROPHILS NFR BLD: 75 %
PHOSPHATE SERPL-MCNC: 2.1 MG/DL (ref 2.5–4.9)
PLATELET # BLD AUTO: 284 K/UL (ref 135–450)
PMV BLD AUTO: 6.7 FL (ref 5–10.5)
POTASSIUM SERPL-SCNC: 3.7 MMOL/L (ref 3.5–5.1)
POTASSIUM SERPL-SCNC: 3.7 MMOL/L (ref 3.5–5.1)
PROT SERPL-MCNC: 6.2 G/DL (ref 6.4–8.2)
RBC # BLD AUTO: 4.2 M/UL (ref 4.2–5.9)
SODIUM SERPL-SCNC: 146 MMOL/L (ref 136–145)
TIBC SERPL-MCNC: 169 UG/DL (ref 260–445)
TRIGL SERPL-MCNC: 104 MG/DL (ref 0–150)
VIT B12 SERPL-MCNC: 1116 PG/ML (ref 211–911)
WBC # BLD AUTO: 4.5 K/UL (ref 4–11)

## 2024-11-12 PROCEDURE — 82746 ASSAY OF FOLIC ACID SERUM: CPT

## 2024-11-12 PROCEDURE — 82728 ASSAY OF FERRITIN: CPT

## 2024-11-12 PROCEDURE — 2580000003 HC RX 258: Performed by: NURSE PRACTITIONER

## 2024-11-12 PROCEDURE — 36415 COLL VENOUS BLD VENIPUNCTURE: CPT

## 2024-11-12 PROCEDURE — 83735 ASSAY OF MAGNESIUM: CPT

## 2024-11-12 PROCEDURE — 6360000002 HC RX W HCPCS: Performed by: NURSE PRACTITIONER

## 2024-11-12 PROCEDURE — 82607 VITAMIN B-12: CPT

## 2024-11-12 PROCEDURE — 97110 THERAPEUTIC EXERCISES: CPT

## 2024-11-12 PROCEDURE — 80076 HEPATIC FUNCTION PANEL: CPT

## 2024-11-12 PROCEDURE — 2700000000 HC OXYGEN THERAPY PER DAY

## 2024-11-12 PROCEDURE — 99231 SBSQ HOSP IP/OBS SF/LOW 25: CPT | Performed by: SURGERY

## 2024-11-12 PROCEDURE — 84478 ASSAY OF TRIGLYCERIDES: CPT

## 2024-11-12 PROCEDURE — 1200000000 HC SEMI PRIVATE

## 2024-11-12 PROCEDURE — 83540 ASSAY OF IRON: CPT

## 2024-11-12 PROCEDURE — 6370000000 HC RX 637 (ALT 250 FOR IP): Performed by: NURSE PRACTITIONER

## 2024-11-12 PROCEDURE — 85025 COMPLETE CBC W/AUTO DIFF WBC: CPT

## 2024-11-12 PROCEDURE — 94761 N-INVAS EAR/PLS OXIMETRY MLT: CPT

## 2024-11-12 PROCEDURE — 83550 IRON BINDING TEST: CPT

## 2024-11-12 PROCEDURE — 97530 THERAPEUTIC ACTIVITIES: CPT

## 2024-11-12 PROCEDURE — 80048 BASIC METABOLIC PNL TOTAL CA: CPT

## 2024-11-12 PROCEDURE — 84100 ASSAY OF PHOSPHORUS: CPT

## 2024-11-12 RX ADMIN — SODIUM CHLORIDE, SODIUM LACTATE, POTASSIUM CHLORIDE, CALCIUM CHLORIDE AND DEXTROSE MONOHYDRATE: 5; 600; 310; 30; 20 INJECTION, SOLUTION INTRAVENOUS at 10:16

## 2024-11-12 RX ADMIN — CEFEPIME 2000 MG: 2 INJECTION, POWDER, FOR SOLUTION INTRAVENOUS at 05:39

## 2024-11-12 RX ADMIN — ENOXAPARIN SODIUM 60 MG: 100 INJECTION SUBCUTANEOUS at 21:23

## 2024-11-12 RX ADMIN — Medication 1 CAPSULE: at 05:39

## 2024-11-12 RX ADMIN — CEFEPIME 2000 MG: 2 INJECTION, POWDER, FOR SOLUTION INTRAVENOUS at 16:14

## 2024-11-12 RX ADMIN — POTASSIUM CHLORIDE 10 MEQ: 10 INJECTION, SOLUTION INTRAVENOUS at 07:03

## 2024-11-12 RX ADMIN — Medication 10 ML: at 21:23

## 2024-11-12 RX ADMIN — POTASSIUM CHLORIDE 10 MEQ: 10 INJECTION, SOLUTION INTRAVENOUS at 06:11

## 2024-11-12 RX ADMIN — ACETAMINOPHEN 650 MG: 325 TABLET ORAL at 21:35

## 2024-11-12 RX ADMIN — SODIUM CHLORIDE 200 MG: 9 INJECTION, SOLUTION INTRAVENOUS at 16:55

## 2024-11-12 RX ADMIN — LEVOTHYROXINE SODIUM 137 MCG: 0.03 TABLET ORAL at 05:39

## 2024-11-12 RX ADMIN — ENOXAPARIN SODIUM 60 MG: 100 INJECTION SUBCUTANEOUS at 08:33

## 2024-11-12 RX ADMIN — SIMETHICONE 80 MG: 80 TABLET, CHEWABLE ORAL at 16:21

## 2024-11-12 RX ADMIN — CEFEPIME 2000 MG: 2 INJECTION, POWDER, FOR SOLUTION INTRAVENOUS at 21:26

## 2024-11-12 RX ADMIN — POTASSIUM CHLORIDE 10 MEQ: 10 INJECTION, SOLUTION INTRAVENOUS at 03:08

## 2024-11-12 ASSESSMENT — PAIN DESCRIPTION - DESCRIPTORS
DESCRIPTORS: BURNING;ITCHING
DESCRIPTORS: BURNING;DISCOMFORT
DESCRIPTORS: BURNING;DISCOMFORT

## 2024-11-12 ASSESSMENT — PAIN DESCRIPTION - LOCATION
LOCATION: ABDOMEN

## 2024-11-12 ASSESSMENT — PAIN SCALES - GENERAL
PAINLEVEL_OUTOF10: 5
PAINLEVEL_OUTOF10: 3
PAINLEVEL_OUTOF10: 0
PAINLEVEL_OUTOF10: 0
PAINLEVEL_OUTOF10: 5
PAINLEVEL_OUTOF10: 0

## 2024-11-12 NOTE — ACP (ADVANCE CARE PLANNING)
Advance Care Planning     Palliative Team Advance Care Planning (ACP) Conversation    Date of Conversation: 11/12/24    Individuals present for the conversation: Patient with decision making capacity     ACP documents on file prior to discussion:  -Living Will    Previously completed document/s not on file: Not discussed.    Healthcare Decision Maker:    Primary Decision Maker (Active): Wendy Valverde - Spouse - [phone]    Secondary Decision Maker: Rosas Valvered - Child - [phone]    Resuscitation Status:   Code Status: Limited To reflect DNR/DNI    Documentation Completed:  -No new documents completed.    Palliative Care Initial Note  Palliative Care Admit date:  11/12/24  Reason for c/s: GOC, E/s dz, Overwhelming sx    Advance Directives:  In the absence of a HCPOA, pts spouse is viewed as his NOK    Plan of care/goals:  Met w/ pt, no other visitors present.  Did attempt to reach spouse after talking w/ pt but n/a.  Mr. Valverde is very pleasant and communicative.  He acknowledged his numerous readmissions though is of the mind he continues to reap benefit from his hospitalizations.  He isn't ready to shift to comfort care and not come into the hospital when warranted.  He did share that his [facility] nurse had broached discussion w/ he and wife about involving community palcare though [facility]  Pt also shared that he has an upcoming appt @ [facility] \"w/ an ENT.\"  He isn't sure as to what he can expect or recommendations that might be made but he is eager for that appt.     Rec'd f/u call from Mrs. Valverde.  She affirmed having talked w/ C about community palcare but they hadn't yet made a decision though \"I'll talk to my  about it and see\" if they wish to elect palcare support.  Mrs. Valverde had a better understanding of the upcoming ENT visit.  She shared that, at the recommendation of their ENT, it was suggested that pt see the  ent

## 2024-11-12 NOTE — PROGRESS NOTES
Physical Therapy  Facility/Department: [facility] A2 CARD TELEMETRY  Daily Treatment Note  NAME: Héctor Valverde  : 1945  MRN: [id number]    Date of Service: 2024    Discharge Recommendations:  24 hour supervision or assist, Home with Home health PT   PT Equipment Recommendations  Equipment Needed: No  Other: Pt has RW for home use    Patient Diagnosis(es):     Assessment  Assessment: Pt demos sba for bed mobility. Pt too weak to perform transfers/ambulation this session. Pt will benefit from further PT in acute setting to address above deficits and return to baseline LOF. Pt states he has been to rehab twice recently and is not interesting to going back to rehab. Pt states much of his deficits are related to weakness and neuropathy from past CA treatments -- \"and they've told me I likely won't ever get most of that back again.\" Recommend pt return home with continued 24-hr sup/assist from wife and home PT services.  Activity Tolerance: Patient limited by fatigue;Patient limited by endurance  Equipment Needed: No  Other: Pt has RW for home use    Plan  Physical Therapy Plan  General Plan: 3-5 times per week  Specific Instructions for Next Treatment: Progress ther ex and mobility as tolerated  Current Treatment Recommendations: Strengthening;ROM;Balance training;Functional mobility training;Transfer training;Endurance training;Gait training;Home exercise program;Safety education & training;Patient/Caregiver education & training;Equipment evaluation, education, & procurement;Therapeutic activities    Restrictions  Restrictions/Precautions  Restrictions/Precautions: NPO, Fall Risk, General Precautions  Position Activity Restriction  Other position/activity restrictions: J tube, G tube to gravity drainange, IV lines, PureWick, NPO     Subjective   Subjective  Subjective: Pt resting in bed  Pain: Rpts no new soreness or pain  Orientation  Overall Orientation Status: Within Functional

## 2024-11-12 NOTE — PROGRESS NOTES
Comprehensive Nutrition Assessment    Type and Reason for Visit:  Initial    Nutrition Recommendations/Plan:   Continue current enteral feeds via J-tube: Jevity 1.5 @65 ml/hr  Increase H20 flush to 150 ml Q4 hrs, monitor Na+ and need to adjust flush     Malnutrition Assessment:  Malnutrition Status:  Severe malnutrition (11/12/24 1322)    Context:  Chronic Illness     Findings of the 6 clinical characteristics of malnutrition:  Energy Intake:  Mild decrease in energy intake  Weight Loss:  No weight loss     Body Fat Loss:  Severe body fat loss Orbital, Triceps, Fat Overlying Ribs   Muscle Mass Loss:  Severe muscle mass loss Temples (temporalis), Calf (gastrocnemius), Clavicles (pectoralis & deltoids)  Fluid Accumulation:  No fluid accumulation     Strength:  Not Performed    Nutrition Assessment:    Patient with PMH including GERD, dysphagia and PCM as well as lymphoepithelial carcinoma of left tongue. Patient meets criteria for severe PCM and had J-tube placed July 2024, now presented with j-tube malfunction. GI replaced jtube and pt now awaiting order for abx r/t new bacterial culture per MD note. Noted pt hypernatremia, RD will increase free water flush in EN order. Patient is tolerating EN at goal rate and remains NPO as he is unable to tolerate any po. RD will continue to monitor nutritional status and provide intervention as needed.    Nutrition Related Findings:    Na 146, , +BM 11/11 Wound Type: Pressure Injury, Stage I       Current Nutrition Intake & Therapies:    Average Meal Intake: NPO  Average Supplements Intake: NPO  Current Tube Feeding (TF) Orders:  Feeding Route: Jejunostomy  Formula: Standard with Fiber  Schedule: Continuous  Current TF Provides: Jevity 1.5 at 65 ml/hr x20 hrs provides 1300 ml TV, 1950 kcal, 83 gm protein and 988 ml free water    Anthropometric Measures  Current Body Weight: 61.1 kg (134 lb 11.2 oz),   IBW. Weight Source: Bed scale  Current BMI (kg/m2): 17.3    Estimated

## 2024-11-12 NOTE — PROGRESS NOTES
4 Eyes Skin Assessment     The patient is being assess for  Shift Handoff    I agree that 2 RN's have performed a thorough Head to Toe Skin Assessment on the patient. ALL assessment sites listed below have been assessed.       Areas assessed by both nurses:   [x]   Head, Face, and Ears   [x]   Shoulders, Back, and Chest  [x]   Arms, Elbows, and Hands   [x]   Coccyx, Sacrum, and Ischum  [x]   Legs, Feet, and Heels        Does the Patient have Skin Breakdown?  Yes a wound was noted on the Admission Assessment and an WOUND LDA was Initiated documentation include the Daphney-wound, Wound Assessment, Measurements, Dressing Treatment, Drainage, and Color\",         Eugene Prevention initiated:  Yes   Wound Care Orders initiated:  Yes      WOC nurse consulted for Pressure Injury (Stage 3,4, Unstageable, DTI, NWPT, and Complex wounds):  No      Nurse 1 eSignature: Electronically signed by Nubia Hansen RN on 11/12/24 at 12:37 AM EST    **SHARE this note so that the co-signing nurse is able to place an eSignature**    Nurse 2 eSignature: Electronically signed by NATALIE OLIVEIRA RN on 11/12/24 at 12:39 AM EST

## 2024-11-12 NOTE — PROGRESS NOTES
[facility] GENERAL SURGERY    PATIENT NAME: Héctor Valverde     TODAY'S DATE: 11/12/2024    CHIEF COMPLAINT: leaking around jtube    INTERVAL HISTORY/HPI:    Pt without leakage around jtube, no nausea or emesis, no fevers     REVIEW OF SYSTEMS:  Pertinent positives and negatives as per interval history section    OBJECTIVE:  VITALS:  /86   Pulse 88   Temp 97.7 °F (36.5 °C) (Oral)   Resp 16   SpO2 97%     INTAKE/OUTPUT:    I/O last 3 completed shifts:  In: 645 [P.O.:20; NG/GT:625]  Out: 1880 [Urine:1500; Emesis/NG output:380]  No intake/output data recorded.    CONSTITUTIONAL:  awake and alert  LUNGS:  Respirations easy and unlabored  CARD:  regular rate  ABDOMEN:  normal bowel sounds, soft, non-distended, minimal tender around jtube with less erythema and no appreciable drainage    Data:  CBC:   Recent Labs     11/11/24 0418 11/12/24 0428   WBC 4.7 4.5   HGB 10.3* 10.4*   HCT 32.4* 32.7*    284     BMP:    Recent Labs     11/10/24  0430 11/11/24  0418 11/12/24  0428    143 146*   K 3.6 3.0* 3.7  3.7    103 106   CO2 26 29 30   BUN 23* 21* 16   CREATININE 0.6* 0.6* 0.5*   GLUCOSE 89 85 142*     Hepatic:   Recent Labs     11/12/24 0428   AST 14*   ALT <5*   BILITOT 0.3   ALKPHOS 60     Mag:      Recent Labs     11/11/24 0418 11/12/24 0428   MG 1.82 1.99      Phos:     Recent Labs     11/12/24 0428   PHOS 2.1*      INR: No results for input(s): \"INR\" in the last 72 hours.    Radiology Review:  *Imaging personally reviewed by me.   NA      ASSESSMENT AND PLAN:  78 yo with malfunctioning jtube  Low profile kendal-jtube working well without leakage  Tube feeds at goal rate  Ok to discharge from my standpoint     Electronically signed by Shiv Estrada MD     [id number]

## 2024-11-12 NOTE — PROGRESS NOTES
Occupational Therapy  Patient declined occupational therapy due to fatigue from previous tx session and just returning to bed. OT to follow up as able.     Brittanie Davis MS, OTR/L

## 2024-11-12 NOTE — PROGRESS NOTES
[facility] Medicine Progress Note  V 10.25      Date of Admission: 11/10/2024    Hospital Day: 3      Chief Admission Complaint:  \"J tube malfunctioning\"     Subjective:      Patient seen resting in bed. He would like to go home. The Jtube and Gtube sites are open to air with no drainage. He has no complaints related to his feeding tubes. He is happy with Dr. Sean pina.     Discussed with patient that his respiratory culture has grown a new bacteria and we are waiting to adjust his antibiotics more. Mentioned a PICC line and patient does not want this. He states that last time he was in the hospital he had 3 different antibiotic pills he went home on.    Presenting Admission History:       This is a 79 y.o. male who presented to [facility] from [facility] with complaint of malfunctioning J tube.  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 , GERD, Dysphagia, Hypothyroid, DVT/PE and Protein Calorie Malnutrition on tube feeds.  He uses a wheelchair at baseline. He was admitted at [facility] 11/2/2024 with chief complaint of drainage coming from his feeding tube. During his admission there he was treated for Sepsis, Pneumonia (possibly aspiration related), acute hypoxic respiratory failure and hyponatremia. 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 ENT was consulted on Friday 11/8/24, however there was no coverage available to see the patient in hospital until Monday so the plan was for him to follow up outpatient with them. Per chart review it appears that the patient continued to have issues with drainage and leakage from his J-tube site. The patient requested to be transferred to [facility] for management and possibly replacement of his J-tube by Dr. Marcial's team.      The patient and his wife state that the patient has had multiple issues with  Management completed on 11/12/2024 with the following recommendations:  Anticipated Discharge Location: [x]Home w/ [x]HHC vs []SNF  []Acute Rehab  []LTAC  []Hospice  []Other -    Anticipated Discharge Day/Date:  11/13/2024  Barriers to Discharge: Pneumonia, New culture result pending sensitivity to adjust antibiotic coverage  --------------------------------------------------    MDM (any 2 required for High level billing)    A. Problems (any 1)  [x] Acute/Chronic Illness/injury posing ongoing threat to life and/or bodily function without ongoing treatment    [] Severe exacerbation of chronic illness    --------------------------------------------------  B. Risk of Treatment (any 1)    [x] Drugs/treatments that require intensive monitoring for toxicity    [x] IV ABX (Vancomycin, Aminoglycosides, etc)     [] Post-Cath/Contrast study requiring serial monitoring    [] IV Narcotic analgesia    [] Aggressive IV diuresis    [] Hypertonic Saline    [] Critical electrolyte abnormalities requiring IV replacement    [] Insulin - Scheduled/SSI or Insulin gtt    [] Anticoagulation (Heparin gtt or Coumadin - other anticoagulants in special circumstances)    [] Cardiac Medications (IV Amiodarone/Diltiazem, Tikosyn, etc)    [] Hemodialysis    [x] Other -  Venofer x 3  [] Change in code status    [] Decision to escalate care    [] Major surgery/procedure with associated risk factors    --------------------------------------------------  C. Data (any 2)    [x] Data Review (any 3)    [x] Consultant notes from yesterday/today    [x] All available current labs reviewed interpreted for clinical significance    [x] Appropriate follow-up labs were ordered  [] Collateral history obtained     [x] Independent Interpretation of tests (any 1)    [x] Telemetry (Rhythm Strip) personally reviewed and interpreted        [x] Imaging personally reviewed and interpreted     [x] Discussion (any 1)  [x] Multi-Disciplinary Rounds with Case Management  []

## 2024-11-13 ENCOUNTER — TELEPHONE (OUTPATIENT)
Dept: PULMONOLOGY | Age: 79
End: 2024-11-13

## 2024-11-13 ENCOUNTER — APPOINTMENT (OUTPATIENT)
Dept: GENERAL RADIOLOGY | Age: 79
DRG: 393 | End: 2024-11-13
Attending: INTERNAL MEDICINE
Payer: MEDICARE

## 2024-11-13 ENCOUNTER — APPOINTMENT (OUTPATIENT)
Dept: CT IMAGING | Age: 79
DRG: 393 | End: 2024-11-13
Attending: INTERNAL MEDICINE
Payer: MEDICARE

## 2024-11-13 DIAGNOSIS — R13.19 DYSPHAGIA CAUSING PULMONARY ASPIRATION WITH SWALLOWING: Primary | ICD-10-CM

## 2024-11-13 LAB
ALBUMIN SERPL-MCNC: 3.1 G/DL (ref 3.4–5)
ALP SERPL-CCNC: 58 U/L (ref 40–129)
ALT SERPL-CCNC: 7 U/L (ref 10–40)
ANION GAP SERPL CALCULATED.3IONS-SCNC: 9 MMOL/L (ref 3–16)
ANISOCYTOSIS BLD QL SMEAR: ABNORMAL
AST SERPL-CCNC: 14 U/L (ref 15–37)
BACTERIA SPEC RESP CULT: ABNORMAL
BACTERIA SPEC RESP CULT: ABNORMAL
BASOPHILS # BLD: 0 K/UL (ref 0–0.2)
BASOPHILS NFR BLD: 0 %
BILIRUB DIRECT SERPL-MCNC: 0.2 MG/DL (ref 0–0.3)
BILIRUB INDIRECT SERPL-MCNC: 0.2 MG/DL (ref 0–1)
BILIRUB SERPL-MCNC: 0.4 MG/DL (ref 0–1)
BUN SERPL-MCNC: 17 MG/DL (ref 7–20)
CALCIUM SERPL-MCNC: 8.7 MG/DL (ref 8.3–10.6)
CHLORIDE SERPL-SCNC: 104 MMOL/L (ref 99–110)
CO2 SERPL-SCNC: 30 MMOL/L (ref 21–32)
CREAT SERPL-MCNC: 0.6 MG/DL (ref 0.8–1.3)
DEPRECATED RDW RBC AUTO: 15.6 % (ref 12.4–15.4)
EKG ATRIAL RATE: 105 BPM
EKG DIAGNOSIS: NORMAL
EKG P AXIS: 77 DEGREES
EKG P-R INTERVAL: 122 MS
EKG Q-T INTERVAL: 354 MS
EKG QRS DURATION: 80 MS
EKG QTC CALCULATION (BAZETT): 467 MS
EKG R AXIS: 37 DEGREES
EKG T AXIS: 58 DEGREES
EKG VENTRICULAR RATE: 105 BPM
EOSINOPHIL # BLD: 0 K/UL (ref 0–0.6)
EOSINOPHIL NFR BLD: 0 %
GFR SERPLBLD CREATININE-BSD FMLA CKD-EPI: >90 ML/MIN/{1.73_M2}
GLUCOSE SERPL-MCNC: 115 MG/DL (ref 70–99)
GRAM STN SPEC: ABNORMAL
HCT VFR BLD AUTO: 36.2 % (ref 40.5–52.5)
HGB BLD-MCNC: 11.7 G/DL (ref 13.5–17.5)
LACTATE BLDV-SCNC: 1 MMOL/L (ref 0.4–2)
LACTATE BLDV-SCNC: 1.7 MMOL/L (ref 0.4–2)
LYMPHOCYTES # BLD: 0.3 K/UL (ref 1–5.1)
LYMPHOCYTES NFR BLD: 4 %
MACROCYTES BLD QL SMEAR: ABNORMAL
MAGNESIUM SERPL-MCNC: 1.75 MG/DL (ref 1.8–2.4)
MCH RBC QN AUTO: 25.1 PG (ref 26–34)
MCHC RBC AUTO-ENTMCNC: 32.2 G/DL (ref 31–36)
MCV RBC AUTO: 77.8 FL (ref 80–100)
METAMYELOCYTES NFR BLD MANUAL: 1 %
MICROCYTES BLD QL SMEAR: ABNORMAL
MONOCYTES # BLD: 0.1 K/UL (ref 0–1.3)
MONOCYTES NFR BLD: 2 %
NEUTROPHILS # BLD: 6.2 K/UL (ref 1.7–7.7)
NEUTROPHILS NFR BLD: 79 %
NEUTS BAND NFR BLD MANUAL: 14 % (ref 0–7)
ORGANISM: ABNORMAL
OVALOCYTES BLD QL SMEAR: ABNORMAL
PHOSPHATE SERPL-MCNC: 2.6 MG/DL (ref 2.5–4.9)
PLATELET # BLD AUTO: 273 K/UL (ref 135–450)
PLATELET BLD QL SMEAR: ADEQUATE
PMV BLD AUTO: 6.8 FL (ref 5–10.5)
POIKILOCYTOSIS BLD QL SMEAR: ABNORMAL
POTASSIUM SERPL-SCNC: 4.4 MMOL/L (ref 3.5–5.1)
PROT SERPL-MCNC: 6.4 G/DL (ref 6.4–8.2)
RBC # BLD AUTO: 4.65 M/UL (ref 4.2–5.9)
SLIDE REVIEW: ABNORMAL
SODIUM SERPL-SCNC: 143 MMOL/L (ref 136–145)
WBC # BLD AUTO: 6.6 K/UL (ref 4–11)

## 2024-11-13 PROCEDURE — 2580000003 HC RX 258: Performed by: NURSE PRACTITIONER

## 2024-11-13 PROCEDURE — 2580000003 HC RX 258: Performed by: INTERNAL MEDICINE

## 2024-11-13 PROCEDURE — 36415 COLL VENOUS BLD VENIPUNCTURE: CPT

## 2024-11-13 PROCEDURE — 99223 1ST HOSP IP/OBS HIGH 75: CPT | Performed by: INTERNAL MEDICINE

## 2024-11-13 PROCEDURE — 99233 SBSQ HOSP IP/OBS HIGH 50: CPT | Performed by: INTERNAL MEDICINE

## 2024-11-13 PROCEDURE — 94761 N-INVAS EAR/PLS OXIMETRY MLT: CPT

## 2024-11-13 PROCEDURE — 84100 ASSAY OF PHOSPHORUS: CPT

## 2024-11-13 PROCEDURE — 94669 MECHANICAL CHEST WALL OSCILL: CPT

## 2024-11-13 PROCEDURE — 93005 ELECTROCARDIOGRAM TRACING: CPT | Performed by: NURSE PRACTITIONER

## 2024-11-13 PROCEDURE — 6360000002 HC RX W HCPCS: Performed by: INTERNAL MEDICINE

## 2024-11-13 PROCEDURE — 83735 ASSAY OF MAGNESIUM: CPT

## 2024-11-13 PROCEDURE — 80048 BASIC METABOLIC PNL TOTAL CA: CPT

## 2024-11-13 PROCEDURE — 94640 AIRWAY INHALATION TREATMENT: CPT

## 2024-11-13 PROCEDURE — 6370000000 HC RX 637 (ALT 250 FOR IP): Performed by: NURSE PRACTITIONER

## 2024-11-13 PROCEDURE — 71250 CT THORAX DX C-: CPT

## 2024-11-13 PROCEDURE — 1200000000 HC SEMI PRIVATE

## 2024-11-13 PROCEDURE — 83605 ASSAY OF LACTIC ACID: CPT

## 2024-11-13 PROCEDURE — 6360000002 HC RX W HCPCS: Performed by: NURSE PRACTITIONER

## 2024-11-13 PROCEDURE — 85025 COMPLETE CBC W/AUTO DIFF WBC: CPT

## 2024-11-13 PROCEDURE — 80076 HEPATIC FUNCTION PANEL: CPT

## 2024-11-13 PROCEDURE — 71045 X-RAY EXAM CHEST 1 VIEW: CPT

## 2024-11-13 PROCEDURE — 2700000000 HC OXYGEN THERAPY PER DAY

## 2024-11-13 PROCEDURE — 93010 ELECTROCARDIOGRAM REPORT: CPT | Performed by: INTERNAL MEDICINE

## 2024-11-13 RX ORDER — SODIUM CHLORIDE 9 MG/ML
INJECTION, SOLUTION INTRAVENOUS CONTINUOUS
Status: DISCONTINUED | OUTPATIENT
Start: 2024-11-13 | End: 2024-11-14

## 2024-11-13 RX ORDER — BUDESONIDE 0.5 MG/2ML
0.5 INHALANT ORAL
Status: DISCONTINUED | OUTPATIENT
Start: 2024-11-13 | End: 2024-11-17 | Stop reason: HOSPADM

## 2024-11-13 RX ORDER — ACETYLCYSTEINE 200 MG/ML
600 SOLUTION ORAL; RESPIRATORY (INHALATION)
Status: DISCONTINUED | OUTPATIENT
Start: 2024-11-13 | End: 2024-11-17 | Stop reason: HOSPADM

## 2024-11-13 RX ORDER — FUROSEMIDE 10 MG/ML
20 INJECTION INTRAMUSCULAR; INTRAVENOUS ONCE
Status: DISCONTINUED | OUTPATIENT
Start: 2024-11-13 | End: 2024-11-13

## 2024-11-13 RX ORDER — 0.9 % SODIUM CHLORIDE 0.9 %
500 INTRAVENOUS SOLUTION INTRAVENOUS ONCE
Status: COMPLETED | OUTPATIENT
Start: 2024-11-13 | End: 2024-11-13

## 2024-11-13 RX ORDER — SODIUM CHLORIDE FOR INHALATION 0.9 %
3 VIAL, NEBULIZER (ML) INHALATION PRN
Status: DISCONTINUED | OUTPATIENT
Start: 2024-11-13 | End: 2024-11-17 | Stop reason: HOSPADM

## 2024-11-13 RX ORDER — ARFORMOTEROL TARTRATE 15 UG/2ML
15 SOLUTION RESPIRATORY (INHALATION)
Status: DISCONTINUED | OUTPATIENT
Start: 2024-11-13 | End: 2024-11-17 | Stop reason: HOSPADM

## 2024-11-13 RX ORDER — IPRATROPIUM BROMIDE AND ALBUTEROL SULFATE 2.5; .5 MG/3ML; MG/3ML
1 SOLUTION RESPIRATORY (INHALATION)
Status: DISCONTINUED | OUTPATIENT
Start: 2024-11-13 | End: 2024-11-15

## 2024-11-13 RX ADMIN — PIPERACILLIN AND TAZOBACTAM 4500 MG: 4; .5 INJECTION, POWDER, FOR SOLUTION INTRAVENOUS; PARENTERAL at 12:37

## 2024-11-13 RX ADMIN — ACETAMINOPHEN 650 MG: 325 TABLET ORAL at 18:39

## 2024-11-13 RX ADMIN — SODIUM CHLORIDE 500 ML: 9 INJECTION, SOLUTION INTRAVENOUS at 08:40

## 2024-11-13 RX ADMIN — BUDESONIDE 500 MCG: 0.5 SUSPENSION RESPIRATORY (INHALATION) at 19:53

## 2024-11-13 RX ADMIN — SODIUM CHLORIDE: 9 INJECTION, SOLUTION INTRAVENOUS at 16:23

## 2024-11-13 RX ADMIN — Medication 1 CAPSULE: at 05:31

## 2024-11-13 RX ADMIN — IPRATROPIUM BROMIDE AND ALBUTEROL SULFATE 1 DOSE: 2.5; .5 SOLUTION RESPIRATORY (INHALATION) at 16:01

## 2024-11-13 RX ADMIN — LEVOTHYROXINE SODIUM 137 MCG: 0.03 TABLET ORAL at 05:31

## 2024-11-13 RX ADMIN — SODIUM CHLORIDE 200 MG: 9 INJECTION, SOLUTION INTRAVENOUS at 15:52

## 2024-11-13 RX ADMIN — ACETYLCYSTEINE 600 MG: 200 INHALANT RESPIRATORY (INHALATION) at 19:47

## 2024-11-13 RX ADMIN — Medication 10 ML: at 09:49

## 2024-11-13 RX ADMIN — SODIUM CHLORIDE 500 ML: 9 INJECTION, SOLUTION INTRAVENOUS at 12:23

## 2024-11-13 RX ADMIN — SODIUM CHLORIDE: 9 INJECTION, SOLUTION INTRAVENOUS at 10:43

## 2024-11-13 RX ADMIN — PIPERACILLIN AND TAZOBACTAM 4500 MG: 4; .5 INJECTION, POWDER, FOR SOLUTION INTRAVENOUS; PARENTERAL at 18:55

## 2024-11-13 RX ADMIN — IPRATROPIUM BROMIDE AND ALBUTEROL SULFATE 1 DOSE: 2.5; .5 SOLUTION RESPIRATORY (INHALATION) at 19:47

## 2024-11-13 RX ADMIN — IPRATROPIUM BROMIDE AND ALBUTEROL SULFATE 1 DOSE: 2.5; .5 SOLUTION RESPIRATORY (INHALATION) at 11:57

## 2024-11-13 RX ADMIN — ARFORMOTEROL TARTRATE 15 MCG: 15 SOLUTION RESPIRATORY (INHALATION) at 19:51

## 2024-11-13 RX ADMIN — ENOXAPARIN SODIUM 60 MG: 100 INJECTION SUBCUTANEOUS at 09:49

## 2024-11-13 RX ADMIN — CEFEPIME 2000 MG: 2 INJECTION, POWDER, FOR SOLUTION INTRAVENOUS at 05:37

## 2024-11-13 ASSESSMENT — PAIN DESCRIPTION - LOCATION
LOCATION: ABDOMEN
LOCATION: ABDOMEN

## 2024-11-13 ASSESSMENT — PAIN DESCRIPTION - DESCRIPTORS
DESCRIPTORS: BURNING;DISCOMFORT
DESCRIPTORS: BURNING;DISCOMFORT

## 2024-11-13 ASSESSMENT — PAIN SCALES - GENERAL
PAINLEVEL_OUTOF10: 3
PAINLEVEL_OUTOF10: 4
PAINLEVEL_OUTOF10: 4

## 2024-11-13 NOTE — TELEPHONE ENCOUNTER
Patient's wife called asking for the names of providers who can care for the mass that was found during patient's bronch.

## 2024-11-13 NOTE — PROGRESS NOTES
Pts most recent BPs 71/49, 73/54, 87/59 with HR in the 120s. Pt states he does not feel symptomatic, but appears slightly more lethargic. MD paged for orders & notified of change in status. MD put in for lactic, CXR, & one time IV lasix. No new orders for BP support despite asking several times. IV lasix not given d/t contraindicated with pts BP. Will continue to trend BPs & monitor pt.

## 2024-11-13 NOTE — CONSULTS
PULMONARY AND CRITICAL CARE INPATIENT NOTE        Héctor Valverde   : 1945  MRN: [id number]     Admitting Physician: London Chandler DO  Attending Physician: Porfirio Rod MD  PCP: Suraj De La Torre DO    Admission: 11/10/2024   Date of Service: 2024    No chief complaint on file.          ASSESSMENT & PLAN       79 y.o. pleasant  male patient with:    Assessment:  Multifocal pneumonia.  Positive E. coli, Proteus, Pseudomonas from respiratory cultures/bronc at [facility] on  Dr. 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  ENT consulted  Chronic dysphagia/reflux with GJ-tube in place after head and neck surgery for base of tongue cancer with metastasis to the neck lymph nodes status post chemoradiation  GJ tube malfunction.  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  Will try noninvasive bronchial hygiene measures with vibrating vest, Mucomyst and DuoNebs overnight and tomorrow.  If oxygen needs and chest x-ray do not improve we will arrange for bronchoscopy on Friday  Hold apixaban  Strict aspiration precautions  ENT consult follow-up for the laryngeal mass  Brovana and Pulmicort    LOS: Hospital Day: 4     Code:Limited          Subjective/Objective           CC/Reason for Consult: Multifactorial pneumonia,?  Need for bronc        HPI: 2024  79-year-old male patient with PMH of HLD, throat cancer status post

## 2024-11-13 NOTE — PROGRESS NOTES
Occupational Therapy  Pt attempted to see for OT follow up session this date. Pt with low BP this date, spoke with RN, just started fluids and asked to hold this AM. Will follow up as pt status allows.     Ely Rouse, OTR/L

## 2024-11-13 NOTE — RT PROTOCOL NOTE
RT Inhaler-Nebulizer Bronchodilator Protocol Note    There is a bronchodilator order in the chart from a provider indicating to follow the RT Bronchodilator Protocol and there is an “Initiate RT Inhaler-Nebulizer Bronchodilator Protocol” order as well (see protocol at bottom of note).    CXR Findings:  XR CHEST PORTABLE    Result Date: 11/13/2024  Patchy bilateral airspace opacities are slightly more conspicuous than on the prior exam.       The findings from the last RT Protocol Assessment were as follows:   History Pulmonary Disease: Chronic pulmonary disease  Respiratory Pattern: Dyspnea on exertion or RR 21-25 bpm  Breath Sounds: Intermittent or unilateral wheezes  Cough: Weak, non-productive  Indication for Bronchodilator Therapy: Decreased or absent breath sounds  Bronchodilator Assessment Score: 11    Aerosolized bronchodilator medication orders have been revised according to the RT Inhaler-Nebulizer Bronchodilator Protocol below.    Respiratory Therapist to perform RT Therapy Protocol Assessment initially then follow the protocol.  Repeat RT Therapy Protocol Assessment PRN for score 0-3 or on second treatment, BID, and PRN for scores above 3.    No Indications - adjust the frequency to every 6 hours PRN wheezing or bronchospasm, if no treatments needed after 48 hours then discontinue using Per Protocol order mode.     If indication present, adjust the RT bronchodilator orders based on the Bronchodilator Assessment Score as indicated below.  Use Inhaler orders unless patient has one or more of the following: on home nebulizer, not able to hold breath for 10 seconds, is not alert and oriented, cannot activate and use MDI correctly, or respiratory rate 25 breaths per minute or more, then use the equivalent nebulizer order(s) with same Frequency and PRN reasons based on the score.  If a patient is on this medication at home then do not decrease Frequency below that used at home.    0-3 - enter or revise RT

## 2024-11-13 NOTE — CONSULTS
Infectious Diseases   Consult Note      Reason for Consult:  MDR Psa in resp culture    Requesting Physician:  Lisa      Date of Admission: 11/10/2024    Subjective:   CHIEF COMPLAINT:  none given       HPI:    Héctor Valverde is a 79yoM with history of throat cancer, BPH, DVT, GERD, HLD  No oxygen requirement at baseline.                 ED 11/3/24 - G and J tubes dislodged and replaced.      Back to ED and admitted 11/6/24 - weak, SOB, low grad fever  WBC was 16   Imaging suggested pna with mucous plugging   Therapeutic bronch on 11/7/24 - Proteus, E coli in culture.  Note of \"fungating mass above the VC w radiation changes, VC paralysis.\"  Path w \"atypical cells\"    Had expectorated sputum culture collected 11/9, indication for repeat testing not indicated in notes.  Progress note that date suggests he was clinically improved.      Transferred to [facility] on 11/10/24 for surgical mgmt of G tube dysfunction.     Sgy replaced the Gtube bedside 11/11/24 with low profile kendal-jtube.     He is consistently AF   On 4L NC   BP soft  Says he was coughing a lot overnight, doesn't feel well.    CXR 11/13 - \"patchy opacities, slightly more conspicuous\"    Abx to be changed today to meropenem based on resp culture showing cefepime and FQ-R PSa  ID is consulted for e/m.        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    COVID-19 12/07/2020    Dry mouth     s/p radiation treatment    DVT, lower extremity (HCC)     5/2011    GERD (gastroesophageal reflux disease)     acid reflux    Hip fracture (HCC)     5/2011, RIGHT    Hx of blood clots     Hyperlipidemia     Pneumonia 06/02/2011    Prolonged emergence from general anesthesia     Thyroid disease          Procedure  cancer    Status post total replacement of right hip    NSTEMI (non-ST elevated myocardial infarction) (HCC)    Septicemia (HCC)    Hypothyroidism    Severe protein-calorie malnutrition (HCC)    Pneumonia    Fever    Hematuria    Dysphagia    Sepsis (HCC)    Severe malnutrition (HCC)    Septic shock (HCC)    Acute respiratory failure with hypoxia and hypercapnia    Abdominal pain    Intra-abdominal free air of unknown etiology    Acute cystitis with hematuria    Nausea and vomiting    Hypotension    Tongue cancer (HCC)    Dysphagia causing pulmonary aspiration with swallowing    Multifocal pneumonia    Head and neck cancer (HCC)    Weakness    Acute pulmonary embolism without acute cor pulmonale, unspecified pulmonary embolism type (HCC)    Hyponatremia    Pneumonia due to organism    Abnormal CT of the chest    Hypoxia    Acute exacerbation of emphysema (HCC)    Pulmonary congestion    Malfunction of jejunostomy tube (HCC)    Malfunctioning jejunostomy tube (HCC)       History of tongue cancer s/p radiation   Vocal cord paralysis   Dysphagia s/p G/J tube     Admitted 11/6/24 with Gtube malfunction, recurrent   Now s/p replacement     Suspected pna in the context of aspiration, mucous plugging   -S/p therapeutic bronch on 11/7/24 with evacuation of mucous plugs.  Noted to have \"fungating\" mass above the VC, path w atypical cells.  E coli and Proteus sp were isolation in BAL culture.  He has been receiving effective abx against those organisms since admission on 11/6  - Repeat sputum culture collected on 11/9, indication not clear, as at the time is was charted he was clinically improved.  That culture is positive with MDR Psa.   -No fever, normal WBC but continued oxygen requirement and patchy opacities on XR concerning for persistent pna     Listed intolerance of Augmentin w N/V      Recs:  Change to Zosyn and monitor for clinical improvement  Wean oxygen as tolerated  Aspiration precautions including HOB elevated,

## 2024-11-13 NOTE — PROGRESS NOTES
Pt with sudden onset of a weak, persistent, congested/productive cough and increased SOB. Pt given yankeur to suction & HOB elevated. SpO2 82% on RA. Pt placed on 4 L NC at 91%. HR jumped up to 140's and sustaining. ST on monitor.    Tube feeds turned off & J tube clamped. Cross cover notified of increased HR & change in respiratory status. Saline nebs ordered to help loosen secretions d/t pt unable to cough adequately.    RN offered NT suction, but pt refused. Saline nebs deferred at this time as well, per pt request.

## 2024-11-13 NOTE — PROGRESS NOTES
Start of shift for this RN ([id number]), Pt was hypotensive with systolic in the 70's/80's.  NP notified.  500 Bolus given and feeds restarted.  Cont fluids running at 75 ml/hr.  Rechecked BP after bolus, 80/52.  PS NP.  Pt arousable but still lethargic.  Will watch for new orders and cont to monitor. BP checks more frequent.

## 2024-11-13 NOTE — PROGRESS NOTES
Pts coccyx progressed from stage 1 to stage 2 & now slow to leonie. Pt has been refusing turns. RN educated pt on importance of Q2 turns and relieving pressure off sacrum to prevent further skin breakdown. Pt demonstrated understanding & agreeable to turning.

## 2024-11-13 NOTE — PROGRESS NOTES
embolus.  Possible very small chronic  appearing PEs versus motion artifact in the left lower lobe.  Mucous plugging in the distal bilateral lower lobe bronchi with bronchiolitis  and multifocal pneumonia in the left greater than right lower lobes.  Trace  bilateral pleural effusions.  1.5 cm left paraesophageal lymph node, stable, nonspecific.  - Bronchoscopy 11/7/24 completed by Dr. Schofield reported to have finding of fungating mass above the vocal cord, vocal cord paralysis, thin purulent secretions throughout bilateral airways with small mucous plugs.   - Bronchial Wash Cultures collected 11/7/2024 reported to have growth of E. Coli and Proteus Mirabilis, both are sensitive to Cefepime.  - Respiratory Culture collected 11/9/2024 reported to have growth of Pseudomonas aeruginosa, sensitivities pending  - Treated with Vancomycin and Cefepime at [facility].  - Vancomycin dc'd 11/9/24 with no MRSA nasal swab obtained,   - Reordered Vancomycin 11/11/2024 and MRSA swab ordered  - Nasal swab for MRSA Negative 11/11/2024  - Vancomycin d/c'd 11/12/2024  - Received Cefepime since admission at [facility], now now with Pseudomonas growing in culture, will change to Meropenem   - Patient will need PICC line placed and Meropenem at home as this is the only antibiotic that will cover all 3 bacteria.  - Infectious disease consulted for antx choice assistance  - ENT consulted and appreciated  - Pulmonology consulted and appreciated.      Microcytic Anemia - etiology clinically unable to determine,   - w/out evidence of active bleeding and/or hemolysis.   - Asymptomatic w/out indication for transfusion.   - Hgb 10.4, MCV 77.8 11/12/2024   - Ferritin 333.0, Iron 25, TIBC 169, Iron Saturation 15 11/12/2024  - Folate 13.70, Vitamin B12 1116 11/12/2024  - Venofer x 3 started 11/12/24   - Daily CBC.      Hypotension and tachycardia with concern for sepsis 2/2 multi bacterial pneumonia  - EKG revealed sinus tachycardia.  - IVF bolus x 2  Date/Time    LABURIN 50,000 CFU/ml 08/06/2024 03:00 PM    LABURIN 50,000 CFU/ml 08/06/2024 03:00 PM    LABURIN >100,000 CFU/ml  No further workup   08/06/2024 03:00 PM     Blood Cultures:   Lab Results   Component Value Date/Time    BC No Growth after 4 days of incubation. 11/06/2024 11:40 AM     Lab Results   Component Value Date/Time    BLOODCULT2 No Growth after 4 days of incubation. 11/06/2024 11:40 AM     Organism:   Lab Results   Component Value Date/Time    ORG Pseudomonas aeruginosa 11/09/2024 06:00 PM         Inocencia Gonzalez, JOHN - CNP

## 2024-11-13 NOTE — PROGRESS NOTES
Pt resting comfortably at this time. Remains on 4 L NC at 90%. HR sustaining in the 120's currently. Tube feeds remain off.

## 2024-11-13 NOTE — PROGRESS NOTES
Pt complaining that IV hurts during iron admin.  RN flushed IV, slowed the rate down twice, and also ran with NS. Pt still complaining that it hurt.  IV stopped for now.  Pt adamant that he does not want another IV.  Called pharm for possible Iron infiltration.  Awaiting call back.    Per pharm, aspirate as much as possible and cold compress.

## 2024-11-13 NOTE — PROGRESS NOTES
4 Eyes Skin Assessment     The patient is being assess for  Shift Handoff    I agree that 2 RN's have performed a thorough Head to Toe Skin Assessment on the patient. ALL assessment sites listed below have been assessed.       Areas assessed by both nurses:   [x]   Head, Face, and Ears   [x]   Shoulders, Back, and Chest  [x]   Arms, Elbows, and Hands   [x]   Coccyx, Sacrum, and Ischum  [x]   Legs, Feet, and Heels        Does the Patient have Skin Breakdown?  Yes a wound was noted on the Admission Assessment and an WOUND LDA was Initiated documentation include the Daphney-wound, Wound Assessment, Measurements, Dressing Treatment, Drainage, and Color\",         Eugene Prevention initiated:  Yes   Wound Care Orders initiated:  Yes      WOC nurse consulted for Pressure Injury (Stage 3,4, Unstageable, DTI, NWPT, and Complex wounds):  No      Nurse 1 eSignature: Electronically signed by Nubia Hansen RN on 11/13/24 at 3:04 AM EST    **SHARE this note so that the co-signing nurse is able to place an eSignature**    Nurse 2 eSignature: Electronically signed by An Lira RN on 11/13/24 at 3:05 AM EST

## 2024-11-13 NOTE — CARE COORDINATION
Spoke with RN who states ID has been consulted.   Patient is getting fluids for low BP.  Patient is active with [facility] and [facility] and plans to return with resumption of care through both.   CM will continue to follow and assist as needed.

## 2024-11-14 LAB
ANION GAP SERPL CALCULATED.3IONS-SCNC: 10 MMOL/L (ref 3–16)
BASOPHILS # BLD: 0 K/UL (ref 0–0.2)
BASOPHILS NFR BLD: 0.2 %
BUN SERPL-MCNC: 21 MG/DL (ref 7–20)
CALCIUM SERPL-MCNC: 7.5 MG/DL (ref 8.3–10.6)
CHLORIDE SERPL-SCNC: 111 MMOL/L (ref 99–110)
CO2 SERPL-SCNC: 28 MMOL/L (ref 21–32)
CREAT SERPL-MCNC: 0.6 MG/DL (ref 0.8–1.3)
DEPRECATED RDW RBC AUTO: 15.9 % (ref 12.4–15.4)
EOSINOPHIL # BLD: 0.1 K/UL (ref 0–0.6)
EOSINOPHIL NFR BLD: 2.3 %
GFR SERPLBLD CREATININE-BSD FMLA CKD-EPI: >90 ML/MIN/{1.73_M2}
GLUCOSE SERPL-MCNC: 144 MG/DL (ref 70–99)
HCT VFR BLD AUTO: 27.5 % (ref 40.5–52.5)
HGB BLD-MCNC: 8.8 G/DL (ref 13.5–17.5)
LYMPHOCYTES # BLD: 0.4 K/UL (ref 1–5.1)
LYMPHOCYTES NFR BLD: 7.9 %
MAGNESIUM SERPL-MCNC: 2.09 MG/DL (ref 1.8–2.4)
MCH RBC QN AUTO: 25.2 PG (ref 26–34)
MCHC RBC AUTO-ENTMCNC: 32.1 G/DL (ref 31–36)
MCV RBC AUTO: 78.4 FL (ref 80–100)
MONOCYTES # BLD: 0.3 K/UL (ref 0–1.3)
MONOCYTES NFR BLD: 5.4 %
NEUTROPHILS # BLD: 4.3 K/UL (ref 1.7–7.7)
NEUTROPHILS NFR BLD: 84.2 %
PHOSPHATE SERPL-MCNC: 2 MG/DL (ref 2.5–4.9)
PLATELET # BLD AUTO: 225 K/UL (ref 135–450)
PMV BLD AUTO: 6.8 FL (ref 5–10.5)
POTASSIUM SERPL-SCNC: 3.8 MMOL/L (ref 3.5–5.1)
RBC # BLD AUTO: 3.51 M/UL (ref 4.2–5.9)
SODIUM SERPL-SCNC: 149 MMOL/L (ref 136–145)
WBC # BLD AUTO: 5.1 K/UL (ref 4–11)

## 2024-11-14 PROCEDURE — 31575 DIAGNOSTIC LARYNGOSCOPY: CPT | Performed by: STUDENT IN AN ORGANIZED HEALTH CARE EDUCATION/TRAINING PROGRAM

## 2024-11-14 PROCEDURE — 2700000000 HC OXYGEN THERAPY PER DAY

## 2024-11-14 PROCEDURE — 36415 COLL VENOUS BLD VENIPUNCTURE: CPT

## 2024-11-14 PROCEDURE — 6360000002 HC RX W HCPCS: Performed by: INTERNAL MEDICINE

## 2024-11-14 PROCEDURE — 6370000000 HC RX 637 (ALT 250 FOR IP): Performed by: NURSE PRACTITIONER

## 2024-11-14 PROCEDURE — 94761 N-INVAS EAR/PLS OXIMETRY MLT: CPT

## 2024-11-14 PROCEDURE — 94640 AIRWAY INHALATION TREATMENT: CPT

## 2024-11-14 PROCEDURE — 99233 SBSQ HOSP IP/OBS HIGH 50: CPT | Performed by: INTERNAL MEDICINE

## 2024-11-14 PROCEDURE — 99221 1ST HOSP IP/OBS SF/LOW 40: CPT | Performed by: STUDENT IN AN ORGANIZED HEALTH CARE EDUCATION/TRAINING PROGRAM

## 2024-11-14 PROCEDURE — 83735 ASSAY OF MAGNESIUM: CPT

## 2024-11-14 PROCEDURE — 85025 COMPLETE CBC W/AUTO DIFF WBC: CPT

## 2024-11-14 PROCEDURE — 94669 MECHANICAL CHEST WALL OSCILL: CPT

## 2024-11-14 PROCEDURE — 99232 SBSQ HOSP IP/OBS MODERATE 35: CPT | Performed by: INTERNAL MEDICINE

## 2024-11-14 PROCEDURE — 2580000003 HC RX 258: Performed by: NURSE PRACTITIONER

## 2024-11-14 PROCEDURE — 1200000000 HC SEMI PRIVATE

## 2024-11-14 PROCEDURE — 84100 ASSAY OF PHOSPHORUS: CPT

## 2024-11-14 PROCEDURE — 6360000002 HC RX W HCPCS: Performed by: NURSE PRACTITIONER

## 2024-11-14 PROCEDURE — 2580000003 HC RX 258: Performed by: INTERNAL MEDICINE

## 2024-11-14 PROCEDURE — 80048 BASIC METABOLIC PNL TOTAL CA: CPT

## 2024-11-14 RX ORDER — SODIUM CHLORIDE 450 MG/100ML
INJECTION, SOLUTION INTRAVENOUS CONTINUOUS
Status: DISCONTINUED | OUTPATIENT
Start: 2024-11-14 | End: 2024-11-17 | Stop reason: HOSPADM

## 2024-11-14 RX ORDER — MORPHINE SULFATE 4 MG/ML
4 INJECTION, SOLUTION INTRAMUSCULAR; INTRAVENOUS
Status: DISCONTINUED | OUTPATIENT
Start: 2024-11-14 | End: 2024-11-17 | Stop reason: HOSPADM

## 2024-11-14 RX ORDER — MORPHINE SULFATE 2 MG/ML
2 INJECTION, SOLUTION INTRAMUSCULAR; INTRAVENOUS
Status: DISCONTINUED | OUTPATIENT
Start: 2024-11-14 | End: 2024-11-17 | Stop reason: HOSPADM

## 2024-11-14 RX ADMIN — PIPERACILLIN AND TAZOBACTAM 4500 MG: 4; .5 INJECTION, POWDER, FOR SOLUTION INTRAVENOUS; PARENTERAL at 10:37

## 2024-11-14 RX ADMIN — ARFORMOTEROL TARTRATE 15 MCG: 15 SOLUTION RESPIRATORY (INHALATION) at 20:06

## 2024-11-14 RX ADMIN — ACETYLCYSTEINE 600 MG: 200 INHALANT RESPIRATORY (INHALATION) at 20:06

## 2024-11-14 RX ADMIN — ACETYLCYSTEINE 600 MG: 200 INHALANT RESPIRATORY (INHALATION) at 09:02

## 2024-11-14 RX ADMIN — ACETAMINOPHEN 650 MG: 325 TABLET ORAL at 08:22

## 2024-11-14 RX ADMIN — ACETAMINOPHEN 650 MG: 325 TABLET ORAL at 14:39

## 2024-11-14 RX ADMIN — IPRATROPIUM BROMIDE AND ALBUTEROL SULFATE 1 DOSE: 2.5; .5 SOLUTION RESPIRATORY (INHALATION) at 16:16

## 2024-11-14 RX ADMIN — IPRATROPIUM BROMIDE AND ALBUTEROL SULFATE 1 DOSE: 2.5; .5 SOLUTION RESPIRATORY (INHALATION) at 09:03

## 2024-11-14 RX ADMIN — ACETAMINOPHEN 650 MG: 325 TABLET ORAL at 23:30

## 2024-11-14 RX ADMIN — SODIUM CHLORIDE: 4.5 INJECTION, SOLUTION INTRAVENOUS at 15:45

## 2024-11-14 RX ADMIN — IPRATROPIUM BROMIDE AND ALBUTEROL SULFATE 1 DOSE: 2.5; .5 SOLUTION RESPIRATORY (INHALATION) at 20:06

## 2024-11-14 RX ADMIN — BUDESONIDE 500 MCG: 0.5 SUSPENSION RESPIRATORY (INHALATION) at 09:02

## 2024-11-14 RX ADMIN — PIPERACILLIN AND TAZOBACTAM 4500 MG: 4; .5 INJECTION, POWDER, FOR SOLUTION INTRAVENOUS; PARENTERAL at 18:38

## 2024-11-14 RX ADMIN — SODIUM CHLORIDE 200 MG: 9 INJECTION, SOLUTION INTRAVENOUS at 15:46

## 2024-11-14 RX ADMIN — ARFORMOTEROL TARTRATE 15 MCG: 15 SOLUTION RESPIRATORY (INHALATION) at 09:03

## 2024-11-14 RX ADMIN — Medication 1 CAPSULE: at 06:29

## 2024-11-14 RX ADMIN — LEVOTHYROXINE SODIUM 137 MCG: 0.03 TABLET ORAL at 06:29

## 2024-11-14 RX ADMIN — BUDESONIDE 500 MCG: 0.5 SUSPENSION RESPIRATORY (INHALATION) at 20:06

## 2024-11-14 RX ADMIN — PIPERACILLIN AND TAZOBACTAM 4500 MG: 4; .5 INJECTION, POWDER, FOR SOLUTION INTRAVENOUS; PARENTERAL at 02:27

## 2024-11-14 RX ADMIN — IPRATROPIUM BROMIDE AND ALBUTEROL SULFATE 1 DOSE: 2.5; .5 SOLUTION RESPIRATORY (INHALATION) at 12:17

## 2024-11-14 ASSESSMENT — PAIN SCALES - GENERAL
PAINLEVEL_OUTOF10: 3
PAINLEVEL_OUTOF10: 0
PAINLEVEL_OUTOF10: 3
PAINLEVEL_OUTOF10: 3

## 2024-11-14 ASSESSMENT — PAIN DESCRIPTION - DESCRIPTORS: DESCRIPTORS: ACHING

## 2024-11-14 ASSESSMENT — PAIN DESCRIPTION - LOCATION: LOCATION: ABDOMEN

## 2024-11-14 ASSESSMENT — PAIN DESCRIPTION - ORIENTATION: ORIENTATION: LEFT

## 2024-11-14 ASSESSMENT — PAIN - FUNCTIONAL ASSESSMENT: PAIN_FUNCTIONAL_ASSESSMENT: ACTIVITIES ARE NOT PREVENTED

## 2024-11-14 NOTE — CARE COORDINATION
Spoke with NP who states she has asked Palliative RN, Declan, to speak with patient and wife again to see if goals of care have changed.   Some subspecialties still following.  Patient is from home with wife.  He is active with [facility] and [facility].

## 2024-11-14 NOTE — PROGRESS NOTES
[facility] Medicine Progress Note  V 10.25      Date of Admission: 11/10/2024    Hospital Day: 5      Chief Admission Complaint:  \"J tube malfunctioning\"     Subjective:  EMR and notes reviewed pt seen and examined. Sitting up awake and alert in bed. Having increased drainage  and leakage around TF, reviewed ENT notes       Presenting Admission History:       This is a 79 y.o. male who presented to [facility] from [facility] with complaint of malfunctioning J tube.  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 , GERD, Dysphagia, Hypothyroid, DVT/PE and Protein Calorie Malnutrition on tube feeds.  He uses a wheelchair at baseline. He was admitted at [facility] 11/2/2024 with chief complaint of drainage coming from his feeding tube. During his admission there he was treated for Sepsis, Pneumonia (possibly aspiration related), acute hypoxic respiratory failure and hyponatremia. 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 ENT was consulted on Friday 11/8/24, however there was no coverage available to see the patient in hospital until Monday so the plan was for him to follow up outpatient with them. Per chart review it appears that the patient continued to have issues with drainage and leakage from his J-tube site. The patient requested to be transferred to [facility] for management and possibly replacement of his J-tube by Dr. Marcial's team.      The patient and his wife state that the patient has had multiple issues with his Jtube since it was first placed. The main concern they mention is that it becomes clogged often despite frequent water flushes. They are \"fed up\" with it. The patient also has a Gtube that is to gravity drainage. The patient and his wife tell us this is because he has GERD which was causing pneumonia and someone had suggested that he have   3.7 4.4 3.8    104 111*   CO2 30 30 28   BUN 16 17 21*   CREATININE 0.5* 0.6* 0.6*   CALCIUM 8.5 8.7 7.5*   MG 1.99 1.75* 2.09   PHOS 2.1* 2.6 2.0*     No results for input(s): \"PROBNP\", \"TROPHS\" in the last 72 hours.  No results for input(s): \"LABA1C\" in the last 72 hours.  Recent Labs     11/12/24  0428 11/13/24  0419   AST 14* 14*   ALT <5* 7*   BILIDIR <0.1 0.2   BILITOT 0.3 0.4   ALKPHOS 60 58     Recent Labs     11/13/24  0609 11/13/24  1126   LACTA 1.0 1.7       Urine Cultures:   Lab Results   Component Value Date/Time    LABURIN 50,000 CFU/ml 08/06/2024 03:00 PM    LABURIN 50,000 CFU/ml 08/06/2024 03:00 PM    LABURIN >100,000 CFU/ml  No further workup   08/06/2024 03:00 PM     Blood Cultures:   Lab Results   Component Value Date/Time    BC No Growth after 4 days of incubation. 11/06/2024 11:40 AM     Lab Results   Component Value Date/Time    BLOODCULT2 No Growth after 4 days of incubation. 11/06/2024 11:40 AM     Organism:   Lab Results   Component Value Date/Time    ORG Pseudomonas aeruginosa 11/09/2024 06:00 PM         Azeb Chacon, APRN - CNP

## 2024-11-14 NOTE — PROGRESS NOTES
Pt G tube has been leaking slightly around the tube overnight and this morning. Hospitalist notified.  Will cont to monitor

## 2024-11-14 NOTE — ACP (ADVANCE CARE PLANNING)
Palliative Care Progress Note  Palliative Care Admit date: 24    Advance Directives: 'Limited' code to reflect DNR/DNI    Plan of care/goals:  After reviewing the notes from Dr. Siddiqi, writer met w/ pt, no other visitors @ BS.  Pt had good recollection of his d/w ENT and denied having additional questions since their discussion.    While assuring pt of writer's goal to respect his decisions for no further treatment or surgery, writer also expressed concern for comfort in his dying process w/ an obstructed airway.  Writer offered that a tracheostomy would be considered a palliative measure to promote comfort.  Pt became emphatic stating \"David  on the cross, I can die w/ this.\"  He denied feeling as though his lot was to suffer, but rather, he expressed feeling more pragmatic by saying \"I don't want to go through another surgery, I don't want the inconvenience of a hole in my throat & I don't want to put myself and my wife through all that.\"   He had been clear w/ ENT that his preference was to forego \"a hole in his throat.\"      Initially, pt expressed his intention to resume home support w/ [facility].  However, he was receptive to writer reinforcing the benefit to both, he and his wife as his caregiver, that involving hospice sooner than later would benefit them.  If they were to delay hospice involvement, it may be that they end up back in the hospital or worse, he dies w/o comfort care.  Mr. Valverde acquiesced and said he'd be agreeable to hospice and that writer could f/u w/ his spouse to continue this discussion.      Social/Spiritual:  He said he and his wife and dtr's family are planning to drive down to [facility] \"on Dec 21st\" for a family [place].  We discussed that hospice up here could arrange services w/ a hospice in [facility] to follow pt while he is down there.  It seems premature to know if pt can even get down to [facility] in another month but this trip is extremely important to pt and I wanted to

## 2024-11-14 NOTE — PROGRESS NOTES
Comprehensive Nutrition Assessment    Type and Reason for Visit:  Reassess    Nutrition Recommendations/Plan:   Continue Jevity 1.5 at goal rate of 65 mL/hr via J-tube   Increase FWF to 200 mL q 4 hrs. Monitor need for further adjustments   Recommend replacing phos  Monitor nutrition adequacy, pertinent labs, bowel habits, wt changes, and clinical progress     Malnutrition Assessment:  Malnutrition Status:  Severe malnutrition (11/12/24 1322)    Context:  Chronic Illness     Findings of the 6 clinical characteristics of malnutrition:  Energy Intake:  Mild decrease in energy intake  Weight Loss:  No weight loss     Body Fat Loss:  Severe body fat loss Orbital, Triceps, Fat Overlying Ribs   Muscle Mass Loss:  Severe muscle mass loss Temples (temporalis), Calf (gastrocnemius), Clavicles (pectoralis & deltoids)  Fluid Accumulation:  No fluid accumulation     Strength:  Not Performed    Nutrition Assessment:    Follow up: ENT consulted. S/p flex larynogoscopy with erosive mass and R vocal cord paralysis. Pt J-tube leaking again, general surgery notified. Continues on TF of Jevity 1.5 at 65 mL/hr via J-tube. Hypernatremic today, recommend increasing FWF further. Hypophosphatemic today, recommend replacing. Will continue to monitor.    Nutrition Related Findings:    Na 149. Phos 2.0. BM on 11/14. Active BS. Wound Type: Pressure Injury, Stage I, Stage II       Current Nutrition Intake & Therapies:    Average Meal Intake: NPO  Average Supplements Intake: NPO  Diet NPO  ADULT TUBE FEEDING; Jejunostomy; Standard with Fiber; Continuous; 30; Yes; 20; Q 6 hours; 65; 150; Q 4 hours  Current Tube Feeding (TF) Orders:  Feeding Route: Jejunostomy  Formula: Standard with Fiber  Schedule: Continuous  Current TF Provides: Jevity 1.5 at 65 ml/hr x20 hrs provides 1300 ml TV, 1950 kcal, 83 gm protein and 988 ml free water    Anthropometric Measures:  Height: 188 cm (6' 2\")  Ideal Body Weight (IBW): 190 lbs (86 kg)       Current Body

## 2024-11-14 NOTE — PROGRESS NOTES
PULMONARY AND CRITICAL CARE INPATIENT NOTE        Héctor Valverde   : 1945  MRN: [id number]     Admitting Physician: London Chandler DO  Attending Physician: Porfirio Rod MD  PCP: Suraj De La Torre DO    Admission: 11/10/2024   Date of Service: 2024    No chief complaint on file.          ASSESSMENT & PLAN       79 y.o. pleasant  male patient with:    Assessment:  Multifocal pneumonia.  Positive E. coli, Proteus, Pseudomonas from respiratory cultures/bronc at [facility] on  Dr. 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  ENT: There is a mass has eaten all epiglottis with ulcerative edges over the right false vocal cord and paralysis of the right vocal cord.  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  Patient weaned off oxygen completely.  No need for bronchoscopy  Okay to resume apixaban  Strict aspiration precautions  Brovana and Pulmicort  Discussed with the patient the current findings and the expected course and I agree with ENT assessment.  If respiratory status worsens again due to

## 2024-11-14 NOTE — PROGRESS NOTES
Spoke with Dr. Marcial.  Rearranged G tube so it was more flush with skin and added 3ml more into balloon.  Will watch for more leakage.

## 2024-11-14 NOTE — CONSULTS
[facility]  DIVISION OF OTOLARYNGOLOGY- HEAD & NECK SURGERY  CONSULT    Patient Name: Héctor Valverde  Medical Record Number:  [id number]  Primary Care Physician:  Suraj De La Torre DO  Date of Consultation: 11/14/2024    Chief Complaint:      HISTORY OF PRESENT ILLNESS  Héctor is a(n) 79 y.o. male who presented to the hospital on November 7 and was found to have multifocal pneumonia.  Bronchoscopy was performed and showed a mass around the larynx.  Patient has a history of a tongue base lesion that was treated with chemotherapy and radiation approximately 9 years ago.  He is having difficulty with swallowing and has had a feeding tube for some time.  Patient Active Problem List   Diagnosis    Fever and chills    SOB (shortness of breath)    BPH (benign prostatic hyperplasia)    Chronic GERD    Benign prostatic hyperplasia    Hyperlipidemia    DVT, lower extremity (HCC)    Hip fracture (HCC)    Dry mouth    History of tongue cancer    Status post total replacement of right hip    NSTEMI (non-ST elevated myocardial infarction) (HCC)    Septicemia (HCC)    Hypothyroidism    Severe protein-calorie malnutrition (HCC)    Pneumonia    Fever    Hematuria    Dysphagia    Sepsis (HCC)    Severe malnutrition (HCC)    Septic shock (HCC)    Acute respiratory failure with hypoxia and hypercapnia    Abdominal pain    Intra-abdominal free air of unknown etiology    Acute cystitis with hematuria    Nausea and vomiting    Hypotension    Tongue cancer (HCC)    Dysphagia causing pulmonary aspiration with swallowing    Multifocal pneumonia    Head and neck cancer (HCC)    Weakness    Acute pulmonary embolism without acute cor pulmonale, unspecified pulmonary embolism type (HCC)    Hyponatremia    Pneumonia due to organism    Abnormal CT of the chest    Hypoxia    Acute exacerbation of emphysema (HCC)    Pulmonary congestion    Malfunction of jejunostomy tube (HCC)    Malfunctioning jejunostomy tube (HCC)     Past Surgical History:  Ran Out of Food in the Last Year: Never true   Transportation Needs: No Transportation Needs (11/10/2024)    PRAPARE - Transportation     Lack of Transportation (Medical): No     Lack of Transportation (Non-Medical): No   Physical Activity: Not on file   Stress: Not on file   Social Connections: Not on file   Intimate Partner Violence: Not At Risk (2/23/2024)    Received from [facility]    Humiliation, Afraid, Rape, and Kick questionnaire     Fear of Current or Ex-Partner: No     Emotionally Abused: No     Physically Abused: No     Sexually Abused: No   Housing Stability: Low Risk  (11/10/2024)    Housing Stability Vital Sign     Unable to Pay for Housing in the Last Year: No     Number of Times Moved in the Last Year: 1     Homeless in the Last Year: No     DRUG/FOOD ALLERGIES: Tamsulosin, Tamsulosin hcl, Amoxicillin-pot clavulanate, and Tetanus toxoids  CURRENT MEDICATIONS  Prior to Admission medications    Medication Sig Start Date End Date Taking? Authorizing Provider   ondansetron (ZOFRAN-ODT) 4 MG disintegrating tablet 1 tablet by Per G Tube route every 8 hours as needed for Vomiting or Nausea 10/14/24   Santiago Eduardo MD   NONFORMULARY 30 mLs by Gastrostomy Tube route nightly Acid Reflux complete: complete natural products  Active ingredients: Hops, apple cider vincarlos, ginger    Santiago Eduardo MD   sucralfate (CARAFATE) 1 GM tablet Take 1 tablet by mouth daily At 6 PM 10/16/24   Santiago Eduardo MD   apixaban (ELIQUIS) 5 MG TABS tablet Take 2 tablets by mouth 2 times daily for 13 doses 8/8/24 8/15/24  Randa Xavier MD   apixaban (ELIQUIS) 5 MG TABS tablet Take 1 tablet by mouth 2 times daily 8/14/24   Randa Xavier MD   Lactobacillus (PROBIOTIC ACIDOPHILUS PO) Take 1 tablet by mouth daily    Santiago Eduardo MD   simethicone (MYLICON) 80 MG chewable tablet Take 1 tablet by mouth every 6 hours as needed for Flatulence (GERD)

## 2024-11-14 NOTE — PROGRESS NOTES
Infectious Disease Follow up Notes    CC :  Psa pna      Antibiotics:  Zosyn 4.5 q8, s 11/13  Culturelle      Admit Date:   11/10/2024  Hospital Day: 5    Subjective:   No fever   Weaned to 1L   He says he is feeling better  He spoke w ENT re laryngeal mass and at this point does not plan to seek further evaluation or treatment.  [facility] was consulted.     Objective:     Patient Vitals for the past 8 hrs:   BP Temp Temp src Pulse Resp SpO2   11/14/24 1309 101/67 98.2 °F (36.8 °C) Oral 95 18 94 %   11/14/24 1252 -- -- -- -- -- 93 %   11/14/24 0903 -- -- -- 86 18 98 %   11/14/24 0813 106/71 97.5 °F (36.4 °C) Oral 90 18 98 %   11/14/24 0700 -- -- -- -- -- 100 %   11/14/24 0630 104/67 97.4 °F (36.3 °C) -- -- -- 100 %       EXAM:  General:  alert, conversant  NAD   Voice hoarse     HEENT:   PERRL, sclera anicteric     NECK:   Supple       LUNGS:   non-labored breathing     ABD:  soft, scaphoid, NT   EXT:  no focal rash exposed skin         LINE:   PIV in place         Scheduled Meds:   ipratropium 0.5 mg-albuterol 2.5 mg  1 Dose Inhalation 4x Daily RT    apixaban  5 mg Oral BID    piperacillin-tazobactam  4,500 mg IntraVENous Q8H    acetylcysteine  600 mg Inhalation BID RT    arformoterol tartrate  15 mcg Nebulization BID RT    budesonide  0.5 mg Nebulization BID RT    iron sucrose (VENOFER) 200 mg in sodium chloride 0.9 % 100 mL IVPB  200 mg IntraVENous Q24H    sodium chloride flush  5-40 mL IntraVENous 2 times per day    levothyroxine  137 mcg Oral Daily    lactobacillus  1 capsule Oral Daily with breakfast    sucralfate  1 g Oral 4 times per day       Continuous Infusions:   sodium chloride      sodium chloride            Data Review:    Lab Results   Component Value Date    WBC 5.1 11/14/2024    HGB 8.8 (L) 11/14/2024    HCT 27.5 (L) 11/14/2024    MCV 78.4 (L) 11/14/2024     11/14/2024     Lab Results   Component Value Date

## 2024-11-14 NOTE — PROGRESS NOTES
Physical/Occupational Therapy  Per pt's RN, pt on hold for PT/OT d/t poor tolerance of activities today.  Will continue to follow.  David Charles, OTR/L

## 2024-11-15 LAB
ALBUMIN SERPL-MCNC: 2.9 G/DL (ref 3.4–5)
ALP SERPL-CCNC: 53 U/L (ref 40–129)
ALT SERPL-CCNC: <5 U/L (ref 10–40)
ANION GAP SERPL CALCULATED.3IONS-SCNC: 8 MMOL/L (ref 3–16)
AST SERPL-CCNC: 10 U/L (ref 15–37)
BASOPHILS # BLD: 0 K/UL (ref 0–0.2)
BASOPHILS NFR BLD: 0.3 %
BILIRUB DIRECT SERPL-MCNC: <0.1 MG/DL (ref 0–0.3)
BILIRUB INDIRECT SERPL-MCNC: ABNORMAL MG/DL (ref 0–1)
BILIRUB SERPL-MCNC: <0.2 MG/DL (ref 0–1)
BUN SERPL-MCNC: 18 MG/DL (ref 7–20)
CALCIUM SERPL-MCNC: 8.5 MG/DL (ref 8.3–10.6)
CHLORIDE SERPL-SCNC: 106 MMOL/L (ref 99–110)
CO2 SERPL-SCNC: 29 MMOL/L (ref 21–32)
CREAT SERPL-MCNC: 0.6 MG/DL (ref 0.8–1.3)
DEPRECATED RDW RBC AUTO: 16.1 % (ref 12.4–15.4)
EOSINOPHIL # BLD: 0.2 K/UL (ref 0–0.6)
EOSINOPHIL NFR BLD: 2.7 %
GFR SERPLBLD CREATININE-BSD FMLA CKD-EPI: >90 ML/MIN/{1.73_M2}
GLUCOSE BLD-MCNC: 140 MG/DL (ref 70–99)
GLUCOSE SERPL-MCNC: 107 MG/DL (ref 70–99)
HCT VFR BLD AUTO: 30.3 % (ref 40.5–52.5)
HGB BLD-MCNC: 9.6 G/DL (ref 13.5–17.5)
LYMPHOCYTES # BLD: 0.6 K/UL (ref 1–5.1)
LYMPHOCYTES NFR BLD: 9.5 %
MAGNESIUM SERPL-MCNC: 1.96 MG/DL (ref 1.8–2.4)
MCH RBC QN AUTO: 24.9 PG (ref 26–34)
MCHC RBC AUTO-ENTMCNC: 31.8 G/DL (ref 31–36)
MCV RBC AUTO: 78.4 FL (ref 80–100)
MONOCYTES # BLD: 0.4 K/UL (ref 0–1.3)
MONOCYTES NFR BLD: 6.5 %
NEUTROPHILS # BLD: 5.1 K/UL (ref 1.7–7.7)
NEUTROPHILS NFR BLD: 81 %
PERFORMED ON: ABNORMAL
PHOSPHATE SERPL-MCNC: 2.7 MG/DL (ref 2.5–4.9)
PLATELET # BLD AUTO: 265 K/UL (ref 135–450)
PMV BLD AUTO: 6.9 FL (ref 5–10.5)
POTASSIUM SERPL-SCNC: 4.7 MMOL/L (ref 3.5–5.1)
PROT SERPL-MCNC: 6.2 G/DL (ref 6.4–8.2)
RBC # BLD AUTO: 3.87 M/UL (ref 4.2–5.9)
SODIUM SERPL-SCNC: 143 MMOL/L (ref 136–145)
WBC # BLD AUTO: 6.3 K/UL (ref 4–11)

## 2024-11-15 PROCEDURE — 83735 ASSAY OF MAGNESIUM: CPT

## 2024-11-15 PROCEDURE — 6360000002 HC RX W HCPCS: Performed by: INTERNAL MEDICINE

## 2024-11-15 PROCEDURE — 2580000003 HC RX 258: Performed by: INTERNAL MEDICINE

## 2024-11-15 PROCEDURE — 6370000000 HC RX 637 (ALT 250 FOR IP): Performed by: NURSE PRACTITIONER

## 2024-11-15 PROCEDURE — 94640 AIRWAY INHALATION TREATMENT: CPT

## 2024-11-15 PROCEDURE — 36415 COLL VENOUS BLD VENIPUNCTURE: CPT

## 2024-11-15 PROCEDURE — 84100 ASSAY OF PHOSPHORUS: CPT

## 2024-11-15 PROCEDURE — 85025 COMPLETE CBC W/AUTO DIFF WBC: CPT

## 2024-11-15 PROCEDURE — 80048 BASIC METABOLIC PNL TOTAL CA: CPT

## 2024-11-15 PROCEDURE — 6370000000 HC RX 637 (ALT 250 FOR IP): Performed by: INTERNAL MEDICINE

## 2024-11-15 PROCEDURE — 1200000000 HC SEMI PRIVATE

## 2024-11-15 PROCEDURE — 94669 MECHANICAL CHEST WALL OSCILL: CPT

## 2024-11-15 PROCEDURE — 80076 HEPATIC FUNCTION PANEL: CPT

## 2024-11-15 RX ORDER — IPRATROPIUM BROMIDE AND ALBUTEROL SULFATE 2.5; .5 MG/3ML; MG/3ML
1 SOLUTION RESPIRATORY (INHALATION)
Status: DISCONTINUED | OUTPATIENT
Start: 2024-11-15 | End: 2024-11-17 | Stop reason: HOSPADM

## 2024-11-15 RX ADMIN — IPRATROPIUM BROMIDE AND ALBUTEROL SULFATE 1 DOSE: 2.5; .5 SOLUTION RESPIRATORY (INHALATION) at 19:23

## 2024-11-15 RX ADMIN — PIPERACILLIN AND TAZOBACTAM 4500 MG: 4; .5 INJECTION, POWDER, FOR SOLUTION INTRAVENOUS; PARENTERAL at 17:48

## 2024-11-15 RX ADMIN — IPRATROPIUM BROMIDE AND ALBUTEROL SULFATE 1 DOSE: 2.5; .5 SOLUTION RESPIRATORY (INHALATION) at 11:23

## 2024-11-15 RX ADMIN — ACETYLCYSTEINE 600 MG: 200 INHALANT RESPIRATORY (INHALATION) at 19:23

## 2024-11-15 RX ADMIN — IPRATROPIUM BROMIDE AND ALBUTEROL SULFATE 1 DOSE: 2.5; .5 SOLUTION RESPIRATORY (INHALATION) at 07:36

## 2024-11-15 RX ADMIN — BUDESONIDE 500 MCG: 0.5 SUSPENSION RESPIRATORY (INHALATION) at 19:23

## 2024-11-15 RX ADMIN — SUCRALFATE 1 G: 1 TABLET ORAL at 21:00

## 2024-11-15 RX ADMIN — ARFORMOTEROL TARTRATE 15 MCG: 15 SOLUTION RESPIRATORY (INHALATION) at 19:23

## 2024-11-15 RX ADMIN — LEVOTHYROXINE SODIUM 137 MCG: 0.03 TABLET ORAL at 06:24

## 2024-11-15 RX ADMIN — APIXABAN 5 MG: 5 TABLET, FILM COATED ORAL at 21:00

## 2024-11-15 RX ADMIN — BUDESONIDE 500 MCG: 0.5 SUSPENSION RESPIRATORY (INHALATION) at 07:36

## 2024-11-15 RX ADMIN — PIPERACILLIN AND TAZOBACTAM 4500 MG: 4; .5 INJECTION, POWDER, FOR SOLUTION INTRAVENOUS; PARENTERAL at 01:18

## 2024-11-15 RX ADMIN — APIXABAN 5 MG: 5 TABLET, FILM COATED ORAL at 08:44

## 2024-11-15 RX ADMIN — SUCRALFATE 1 G: 1 TABLET ORAL at 10:53

## 2024-11-15 RX ADMIN — ACETYLCYSTEINE 600 MG: 200 INHALANT RESPIRATORY (INHALATION) at 07:36

## 2024-11-15 RX ADMIN — ACETAMINOPHEN 650 MG: 325 TABLET ORAL at 17:42

## 2024-11-15 RX ADMIN — PIPERACILLIN AND TAZOBACTAM 4500 MG: 4; .5 INJECTION, POWDER, FOR SOLUTION INTRAVENOUS; PARENTERAL at 10:56

## 2024-11-15 RX ADMIN — ARFORMOTEROL TARTRATE 15 MCG: 15 SOLUTION RESPIRATORY (INHALATION) at 07:36

## 2024-11-15 RX ADMIN — Medication 1 CAPSULE: at 08:44

## 2024-11-15 RX ADMIN — SUCRALFATE 1 G: 1 TABLET ORAL at 16:37

## 2024-11-15 ASSESSMENT — PAIN SCALES - GENERAL
PAINLEVEL_OUTOF10: 0
PAINLEVEL_OUTOF10: 2
PAINLEVEL_OUTOF10: 0

## 2024-11-15 ASSESSMENT — PAIN DESCRIPTION - LOCATION: LOCATION: GENERALIZED

## 2024-11-15 NOTE — CONSULTS
[facility] Wound Ostomy Continence Nurse  Consult Note       NAME:  Héctor Valverde  MEDICAL RECORD NUMBER:  [id number]  AGE: 79 y.o.   GENDER: male  : 1945  TODAY'S DATE:  11/15/2024    Subjective It hurts when you touch it.   Reason for WOCN Evaluation and Assessment: leaking around gastrostomy tube and jejunostomy tubes.   Héctor Valverde is a 79 y.o. male referred by:   [x] Physician  [] Nursing  [] Other:     Wound Identification:  Wound Type: Irritant contact dermatitis from leaking G tube and J tubes.  Contributing Factors: edema, chronic pressure, decreased mobility, shear force, decreased tissue oxygenation, malnutrition, and leaking gastrostomy and jejunostomy tubes.  On anticoagulation (Eliquis).    Wound History:   Current Wound Care Treatment:  open to air.    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    COVID-19 2020    Dry mouth     s/p radiation treatment    DVT, lower extremity (HCC)     2011    GERD (gastroesophageal reflux disease)     acid reflux    Hip fracture (HCC)     2011, RIGHT    Hx of blood clots     Hyperlipidemia     Pneumonia 2011    Prolonged emergence from general anesthesia     Thyroid disease        PAST SURGICAL HISTORY    Past Surgical History:   Procedure Laterality Date    ABDOMEN SURGERY      gallbladder removed    BRONCHOSCOPY N/A 2024    BRONCHOSCOPY ALVEOLAR LAVAGE performed by Boaz Schofield MD at [facility] ENDOSCOPY    BRONCHOSCOPY  2024    BRONCHOSCOPY THERAPEUTIC ASPIRATION INITIAL performed by Boaz Schofield MD at [facility] ENDOSCOPY    CHOLECYSTECTOMY      COLONOSCOPY      EYE SURGERY Right     TORN RETINA, LASER

## 2024-11-15 NOTE — PROGRESS NOTES
....4 Eyes Skin Assessment     The patient is being assess for  Transfer to New Unit    I agree that 2 RN's have performed a thorough Head to Toe Skin Assessment on the patient. ALL assessment sites listed below have been assessed.       Areas assessed by both nurses: RN Bradford/ DES Lona  [x]   Head, Face, and Ears   [x]   Shoulders, Back, and Chest  [x]   Arms, Elbows, and Hands   [x]   Coccyx, Sacrum, and IschIum  [x]   Legs, Feet, and Heels        Does the Patient have Skin Breakdown?  Yes LDA WOUND CARE was Initiated documentation include the Daphney-wound, Wound Assessment, Measurements, Dressing Treatment, Drainage, and Color\",         Eugene Prevention initiated:  Yes   Wound Care Orders initiated:  No      WOC nurse consulted for Pressure Injury (Stage 3,4, Unstageable, DTI, NWPT, and Complex wounds), New and Established Ostomies:  Yes      Nurse 1 eSignature: Electronically signed by Bradford Price RN on 11/15/24 at 6:00 AM EST    **SHARE this note so that the co-signing nurse is able to place an eSignature**    Nurse 2 eSignature: {Esignature:[id number]}

## 2024-11-15 NOTE — PROGRESS NOTES
someone had suggested that he have both these tubes. The patient has not been fed through his Jtube for several days.  The patient states he is aware of the mass found on his bronchoscopy 11/7/2024 and has an appointment already scheduled with ENT o/p but that he does not plan to pursue treatment for it. The patient states that he wishes to be a Limited Code (no intubation, no cpr, no resuscitative meds, no defib) and that if it is his time he wants to pass peacefully and comfortably.     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 S/p Chemo, radiation.  Severe Protein Calorie Malnutrition  Dysphagia s/p jejunostomy tube placement 7/3/2024 by Dr. Lopez, replaced 11/11/2024 by GS  Jejunostomy tube malfunction  - General Surgery consulted and appreciated.  - IVF reordered with tachycardia, possibly dry.  - General Surgery,  Jtube was removed and replaced with  kendal-j (14F 2.5cm low profile tube). A contrast placement study was ordered which confirmed tube placement 11/11/2024   - Tube feeding have been restarted and patient is currently tolerating this with no complaints. New drainage noted around tube site, green tint thick and copious. Will Ask GS to eval - note reviewed no further leakage as \" bolster was loose. Monitor area for breakdown- wound care following   - ENT consulted and notes reviewed: pt declined treatment options that include chemo/radiation, salvage surgery, bx, aware that his airway is at high risk for compromise and declined tracheostomy.   - 11/15 further discussion today with ENT for spouse and goals of care meeting      Bacterial Pneumonia with ECOLI, Pseudomonas and Proteus mirabilis  Fungating mass above the vocal cord found by Bronchoscopy 11/7/24  - CT Chest 11/6/2024 IMPRESSION:  No evidence of acute pulmonary embolus.  Possible very small  Nebulization BID RT    sodium chloride flush  5-40 mL IntraVENous 2 times per day    levothyroxine  137 mcg Oral Daily    lactobacillus  1 capsule Oral Daily with breakfast    sucralfate  1 g Oral 4 times per day     PRN Meds: morphine **OR** morphine, sodium chloride nebulizer, potassium chloride **OR** potassium alternative oral replacement **OR** potassium chloride, magnesium sulfate, diatrizoate meglumine-sodium, sodium chloride flush, sodium chloride, ondansetron **OR** ondansetron, polyethylene glycol, acetaminophen **OR** acetaminophen, simethicone      Physical Exam Performed:      General appearance:  No apparent distress. Chronically ill appearing, frail, cachetic elderly male  Respiratory:  Normal respiratory effort. Decreased bilaterally   Cardiovascular:  Irregular rate with regular rhythm.  Abdomen:  Soft, non-tender, non-distended. G- tube and J-tube present, open to air and intact. No leaking noted from new J tube  Musculoskeletal:  No edema  Neurologic:  Non-focal  Psychiatric:  Alert and oriented     /67   Pulse 92   Temp 98 °F (36.7 °C) (Oral)   Resp 18   Ht 1.88 m (6' 2\")   SpO2 97%   BMI 17.29 kg/m²     Telemetry:      Personally reviewed and interpreted telemetry (Rhythm Strip) on 11/15/2024 with the following findings: EKG: Tachycardia, rate 94    Diet: Diet NPO  ADULT TUBE FEEDING; Jejunostomy; Standard with Fiber; Continuous; 30; Yes; 20; Q 6 hours; 65; 200; Q 4 hours    DVT Prophylaxis: [x]PPx LMWH  []SQ Heparin  []IPC/SCDs  [x]Eliquis  []Xarelto  []Coumadin  [] Heparin Drip  []Other -      Code status: Limited    PT/OT Eval Status:   []NOT yet ordered  []Ordered and Pending   [x]Seen with Recommendations for:   []Home independently  [x]Home w/ assist  [x]HHC  []SNF  []Acute Rehab    Multi-Disciplinary Rounds with Case Management completed on 11/15/2024 with the following recommendations:  Anticipated Discharge Location: [x]Home w/ [x]HHC vs []SNF  []Acute Rehab  []LTAC

## 2024-11-15 NOTE — CARE COORDINATION
Chart reviewed day 5. Care managed by ID, otolaryngology and IM. Patient moved to C3. ID with recs for for IVATBX for 5 days, would be completed Sunday the 17th.  Patient declined trach. Per notes, active with [facility] and [facility] prior to admission, tube feeds. Palliative care meeting planned today at 1400. Will follow for outcome. Shanda Trimble RN

## 2024-11-15 NOTE — ACP (ADVANCE CARE PLANNING)
PATIENT/FAMILY PALLIATIVE CARE CONFERENCE    Date 11/15/2024   Time 2:56 PM                          Attending Porfirio Haley MD     Facilitator: Lizz RN                                        Diagnosis: Malfunction of jejunostomy tube (HCC)     Purpose:   ? __ information sharing     ? _X_ goal setting     ? _X_ EOL planning     ? __ f/u meeting   ? __ other     Decision Maker:  ? _X_ patient     ? __ DPOA-HC     ? __ family consensus     ? __ guardian    Participants:   & Mrs. Valverde, their son and 2 dtr's, Rosas, Evette & Marina, respectively     Current Challenges:  ? _X_ aspiration     ? __ cognitive failure     ? __ pneumonia     __ sepsis     ? __ vent removal    __ hypotension     _X_? other Pna, Fungating mass above vocal cord, Aspiration, Severe PCM, GERD    Prognosis:  ? __ gradual improvement      _X_ expected continued challenges     Estimated time for recovery:  ? __ days/weeks     ? __ weeks/months     __ less than 1 year     __ depends on goals   ?   __ unknown  Not expected    Estimated length of life:  ? __days/weeks     __ weeks/months     __ less than 1 year     __ depends on goals     _X_ unknown      __? death likely during this hospitalization    Process:  The current medical information including test results, various treatment   options, risk/benefits of all treatment options and understanding of patient   expressed/documented wishes were reviewed.  The family had opportunity to ask   questions.  Comments: Upon writer's arrival and w/o provocation, pt stated \"hospice.\"  He remains consistent in his decision to forego any more treatment and does not wish to consider (at least at this time) a palliative tracheostomy.  Family received update from the hospitalist and Mrs. Valverde had the benefit of d/w Dr. Siddiqi.  We discussed, at a time when pts respiratory status worsens likely from encroaching cancer, that hospice will focus on easing pts WOB, perhaps he'll need IV morphine  or perhaps he'll need the IPU but, at that time, he'll be at the EOL.  All understood.  Mrs. Valverde voiced concern for pts recurrent/ongoing pna which has caused him to endure recurrent hospitalizations.   They understand that coming into the hospital for IV abx isn't, ultimately, going to mitigate the issue.  Therefore, we discussed talking to provider to verify if there might be a benefit of pt taking a scheduled abx (per tube) to help quell the secretions for improved palliation or if he would better benefit from say an anticholinergic to dry up secretions?   HOC will f/u w/ family tomorrow to schedule mtg for tomorrow.     Pt is still on the PT caseload.  They will f/u tomorrow to offer any additional recommendations to pt for home safety and to offer support to the spouse who worries about his functional status since he has been in the hospital.     Pt has suction in the home.  He sleeps in his recliner and unsure right now if they want a hospital bed  He does not have home O2.      Patient/family goals:  ? __ prolong life     ? __ short tern skilled ECF     ? __ return home     ? __long term ECF     _X_? comfort care    Upcoming important events:  The holidays;  this family have a planned family augusto, leaving for [place] Dec 21st.  In the event they decide to try to make that trip, hospice may need to coordinate w/ a hospice down there to provide any continuity of care he may need.    ? Hospice referral/locale/Provider:  Referral called to HOC per family request for home hospice.    Code options desired:  ? __ Full code     ? __ DNRCC-A     ? __ DNRCC     ? _X_ LIMITED  To reflect DNR/DNI.  Anticipate code status will be further amended to DNR-CC

## 2024-11-15 NOTE — RT PROTOCOL NOTE
RT Inhaler-Nebulizer Bronchodilator Protocol Note    There is a bronchodilator order in the chart from a provider indicating to follow the RT Bronchodilator Protocol and there is an “Initiate RT Inhaler-Nebulizer Bronchodilator Protocol” order as well (see protocol at bottom of note).    CXR Findings:  No results found.    The findings from the last RT Protocol Assessment were as follows:   History Pulmonary Disease: Chronic pulmonary disease  Respiratory Pattern: Regular pattern and RR 12-20 bpm  Breath Sounds: Slightly diminished and/or crackles  Cough: Strong, spontaneous, non-productive  Indication for Bronchodilator Therapy: None  Bronchodilator Assessment Score: 4    Aerosolized bronchodilator medication orders have been revised according to the RT Inhaler-Nebulizer Bronchodilator Protocol below.    Respiratory Therapist to perform RT Therapy Protocol Assessment initially then follow the protocol.  Repeat RT Therapy Protocol Assessment PRN for score 0-3 or on second treatment, BID, and PRN for scores above 3.    No Indications - adjust the frequency to every 6 hours PRN wheezing or bronchospasm, if no treatments needed after 48 hours then discontinue using Per Protocol order mode.     If indication present, adjust the RT bronchodilator orders based on the Bronchodilator Assessment Score as indicated below.  Use Inhaler orders unless patient has one or more of the following: on home nebulizer, not able to hold breath for 10 seconds, is not alert and oriented, cannot activate and use MDI correctly, or respiratory rate 25 breaths per minute or more, then use the equivalent nebulizer order(s) with same Frequency and PRN reasons based on the score.  If a patient is on this medication at home then do not decrease Frequency below that used at home.    0-3 - enter or revise RT bronchodilator order(s) to equivalent RT Bronchodilator order with Frequency of every 4 hours PRN for wheezing or increased work of breathing  using Per Protocol order mode.        4-6 - enter or revise RT Bronchodilator order(s) to two equivalent RT bronchodilator orders with one order with BID Frequency and one order with Frequency of every 4 hours PRN wheezing or increased work of breathing using Per Protocol order mode.        7-10 - enter or revise RT Bronchodilator order(s) to two equivalent RT bronchodilator orders with one order with TID Frequency and one order with Frequency of every 4 hours PRN wheezing or increased work of breathing using Per Protocol order mode.       11-13 - enter or revise RT Bronchodilator order(s) to one equivalent RT bronchodilator order with QID Frequency and an Albuterol order with Frequency of every 4 hours PRN wheezing or increased work of breathing using Per Protocol order mode.      Greater than 13 - enter or revise RT Bronchodilator order(s) to one equivalent RT bronchodilator order with every 4 hours Frequency and an Albuterol order with Frequency of every 2 hours PRN wheezing or increased work of breathing using Per Protocol order mode.     RT to enter RT Home Evaluation for COPD & MDI Assessment order using Per Protocol order mode.    Electronically signed by Grecia Ferris RCP on 11/15/2024 at 11:24 AM

## 2024-11-15 NOTE — PROGRESS NOTES
Physician Progress Note      PATIENT:               JUNAID SPARKS  CSN #:                  [id number]  :                       1945  ADMIT DATE:       11/10/2024 12:40 PM  DISCH DATE:  RESPONDING  PROVIDER #:        Pofririo Rod MD          QUERY TEXT:    Pt admitted with J-tube malfunction.  Pt noted to have Bacterial Pneumonia (MD   notes ). If possible, please document in the progress notes and   discharge summary if you are evaluating and/or treating any of the following:    Note: CAP and HCAP indicate where the pneumonia was acquired, not a specific   type.    The medical record reflects the following:  Risk Factors: chemo/radiation, \"malnutrition\"  Clinical Indicators: H/P \"Bacterial Pneumonia. Severe Protein Calorie   Malnutrition. \"; -respiratory culture (+) Pseudomonas Aeruginosa  Treatment: CBC, CMP, General surgery/Palliative care consults, Meds-IV   Cefepime  Options provided:  -- PNA  treated as Gram negative pneumonia  -- Other - I will add my own diagnosis  -- Disagree - Not applicable / Not valid  -- Disagree - Clinically unable to determine / Unknown  -- Refer to Clinical Documentation Reviewer    PROVIDER RESPONSE TEXT:    PNA treated as Gram negative pneumonia    Query created by: Varsha Santillan on 2024 11:16 AM      Electronically signed by:  Porfirio Rod MD 11/15/2024 4:53 PM

## 2024-11-15 NOTE — PLAN OF CARE
Problem: Discharge Planning  Goal: Discharge to home or other facility with appropriate resources  11/14/2024 2028 by Clair Betancourt, RN  Outcome: Progressing  11/14/2024 1631 by Raffi Villatoro, RN  Outcome: Progressing

## 2024-11-15 NOTE — PROGRESS NOTES
\[facility]\"" OF [place]    Call to patient's wife who has already left hospital for today.  HOC to call her again in AM to set up meeting.

## 2024-11-15 NOTE — CARE COORDINATION
Chart reviewed. Palliative care consult completed plan to meet with HOC tomorrow. Will plan for home with Hospice. Shanda Trimble RN

## 2024-11-15 NOTE — CONSULTS
Consult Call Back    Who:Néstor Hicks MD   Date:11/15/2024,  Time:8:05 AM    Electronically signed by Shanelle Martines on 11/15/24 at 8:05 AM EST

## 2024-11-15 NOTE — CONSULTS
Consult Call Back    Who:Declan Bartholomew RN   Date:11/15/2024,  Time:8:06 AM    Electronically signed by Shanelle Martines on 11/15/24 at 8:06 AM EST

## 2024-11-15 NOTE — PROGRESS NOTES
Leakage around gastrostomy tube.  Bolster appears to becoming loose frequently which loosens vice like affect and allows for drainage.  This was tightened and tape applied to tubing to keep from sliding.    Diogo Estrada MD

## 2024-11-15 NOTE — CONSULTS
Consult Call Back    Who:Ok Siddiqi DO   Date:11/15/2024,  Time:8:04 AM    Electronically signed by Shanelle Martines on 11/15/24 at 8:04 AM EST

## 2024-11-15 NOTE — CONSULTS
Consult Placed   Consult re-called dt tube still leaking  Who: Dr. Marcial   Date: 11/15/2024  Time: 8:54 AM       Electronically signed by Shanelle Martines on 11/15/2024 at 8:53 AM

## 2024-11-15 NOTE — PROGRESS NOTES
Patient admitted to C3 room 331 from A2. Transported via bed. Belongings at bedside. Patient oriented to room,POC and call light use. 4 eye skin assessment done with Nurse Lona. Nonskid footware on. Bed alarm on. Bed in low position, wheels locked, SR x2, call light and bedside table within reach.    -A/O X4, VSS.  -G-tube hooked to gravity bag with greenish to brownish discharge. Noted some discharges around site, cleaned, patient refused gauze and wants to place wash cloth at site instead.  -With J-tube on continuous feeds at 65ml/hr.No leak at site.  -new male wick in place.   -kept on NPO.  -with bedside oral suction.  -denies further needs at this time.

## 2024-11-15 NOTE — PROGRESS NOTES
11/15/24 1124   RT Protocol   History Pulmonary Disease 2   Respiratory pattern 0   Breath sounds 2   Cough 0   Indications for Bronchodilator Therapy None   Bronchodilator Assessment Score 4

## 2024-11-15 NOTE — PROGRESS NOTES
Report called to [facility] NUNES on C3 all questions answered, pt to transfer to room 331 from room 220. Pt aware, called pt's spouse Wendy at 0413 to make her aware with no answer, called child Rosas next to make her aware with no answer, called child Evette to make her aware with no answer.

## 2024-11-15 NOTE — PLAN OF CARE
Problem: Discharge Planning  Goal: Discharge to home or other facility with appropriate resources  11/15/2024 0910 by Jennifer Mccall, RN  Outcome: Progressing  11/14/2024 2028 by Clair Betancourt, RN  Outcome: Progressing     Problem: Pain  Goal: Verbalizes/displays adequate comfort level or baseline comfort level  Outcome: Progressing     Problem: Safety - Adult  Goal: Free from fall injury  Outcome: Progressing     Problem: Skin/Tissue Integrity  Goal: Absence of new skin breakdown  Description: 1.  Monitor for areas of redness and/or skin breakdown  2.  Assess vascular access sites hourly  3.  Every 4-6 hours minimum:  Change oxygen saturation probe site  4.  Every 4-6 hours:  If on nasal continuous positive airway pressure, respiratory therapy assess nares and determine need for appliance change or resting period.  Outcome: Progressing     Problem: Nutrition Deficit:  Goal: Optimize nutritional status  Outcome: Progressing

## 2024-11-15 NOTE — PROGRESS NOTES
Assessment completed. Pt's Gtube site red, and leaking large amounts of green drainage. Site maccerated and some bleeding noted. Cleansed site with Normal saline applied to cream. Will see if Dr Marcial can come by and see patient.

## 2024-11-16 LAB
ANION GAP SERPL CALCULATED.3IONS-SCNC: 9 MMOL/L (ref 3–16)
BASOPHILS # BLD: 0 K/UL (ref 0–0.2)
BASOPHILS NFR BLD: 0.4 %
BUN SERPL-MCNC: 13 MG/DL (ref 7–20)
CALCIUM SERPL-MCNC: 8.4 MG/DL (ref 8.3–10.6)
CHLORIDE SERPL-SCNC: 100 MMOL/L (ref 99–110)
CO2 SERPL-SCNC: 26 MMOL/L (ref 21–32)
CREAT SERPL-MCNC: 0.5 MG/DL (ref 0.8–1.3)
DEPRECATED RDW RBC AUTO: 15.6 % (ref 12.4–15.4)
EOSINOPHIL # BLD: 0.2 K/UL (ref 0–0.6)
EOSINOPHIL NFR BLD: 2.3 %
GFR SERPLBLD CREATININE-BSD FMLA CKD-EPI: >90 ML/MIN/{1.73_M2}
GLUCOSE BLD-MCNC: 122 MG/DL (ref 70–99)
GLUCOSE BLD-MCNC: 79 MG/DL (ref 70–99)
GLUCOSE SERPL-MCNC: 94 MG/DL (ref 70–99)
HCT VFR BLD AUTO: 30.4 % (ref 40.5–52.5)
HGB BLD-MCNC: 9.8 G/DL (ref 13.5–17.5)
LYMPHOCYTES # BLD: 0.7 K/UL (ref 1–5.1)
LYMPHOCYTES NFR BLD: 9.1 %
MAGNESIUM SERPL-MCNC: 2.08 MG/DL (ref 1.8–2.4)
MCH RBC QN AUTO: 25.2 PG (ref 26–34)
MCHC RBC AUTO-ENTMCNC: 32.3 G/DL (ref 31–36)
MCV RBC AUTO: 78.1 FL (ref 80–100)
MONOCYTES # BLD: 0.3 K/UL (ref 0–1.3)
MONOCYTES NFR BLD: 4.4 %
NEUTROPHILS # BLD: 6.3 K/UL (ref 1.7–7.7)
NEUTROPHILS NFR BLD: 83.8 %
PERFORMED ON: ABNORMAL
PERFORMED ON: NORMAL
PHOSPHATE SERPL-MCNC: 3.4 MG/DL (ref 2.5–4.9)
PLATELET # BLD AUTO: 271 K/UL (ref 135–450)
PMV BLD AUTO: 7.2 FL (ref 5–10.5)
POTASSIUM SERPL-SCNC: 5.1 MMOL/L (ref 3.5–5.1)
RBC # BLD AUTO: 3.89 M/UL (ref 4.2–5.9)
SODIUM SERPL-SCNC: 135 MMOL/L (ref 136–145)
WBC # BLD AUTO: 7.5 K/UL (ref 4–11)

## 2024-11-16 PROCEDURE — 94669 MECHANICAL CHEST WALL OSCILL: CPT

## 2024-11-16 PROCEDURE — 36415 COLL VENOUS BLD VENIPUNCTURE: CPT

## 2024-11-16 PROCEDURE — 2580000003 HC RX 258: Performed by: INTERNAL MEDICINE

## 2024-11-16 PROCEDURE — 1200000000 HC SEMI PRIVATE

## 2024-11-16 PROCEDURE — 6370000000 HC RX 637 (ALT 250 FOR IP): Performed by: INTERNAL MEDICINE

## 2024-11-16 PROCEDURE — 2580000003 HC RX 258: Performed by: NURSE PRACTITIONER

## 2024-11-16 PROCEDURE — 84100 ASSAY OF PHOSPHORUS: CPT

## 2024-11-16 PROCEDURE — 6360000002 HC RX W HCPCS: Performed by: INTERNAL MEDICINE

## 2024-11-16 PROCEDURE — 6360000002 HC RX W HCPCS: Performed by: NURSE PRACTITIONER

## 2024-11-16 PROCEDURE — 85025 COMPLETE CBC W/AUTO DIFF WBC: CPT

## 2024-11-16 PROCEDURE — 97110 THERAPEUTIC EXERCISES: CPT

## 2024-11-16 PROCEDURE — 97530 THERAPEUTIC ACTIVITIES: CPT

## 2024-11-16 PROCEDURE — 83735 ASSAY OF MAGNESIUM: CPT

## 2024-11-16 PROCEDURE — 6370000000 HC RX 637 (ALT 250 FOR IP): Performed by: NURSE PRACTITIONER

## 2024-11-16 PROCEDURE — 80048 BASIC METABOLIC PNL TOTAL CA: CPT

## 2024-11-16 PROCEDURE — 94640 AIRWAY INHALATION TREATMENT: CPT

## 2024-11-16 RX ADMIN — ACETYLCYSTEINE 600 MG: 200 INHALANT RESPIRATORY (INHALATION) at 07:44

## 2024-11-16 RX ADMIN — PIPERACILLIN AND TAZOBACTAM 4500 MG: 4; .5 INJECTION, POWDER, FOR SOLUTION INTRAVENOUS; PARENTERAL at 17:08

## 2024-11-16 RX ADMIN — APIXABAN 5 MG: 5 TABLET, FILM COATED ORAL at 09:00

## 2024-11-16 RX ADMIN — IPRATROPIUM BROMIDE AND ALBUTEROL SULFATE 1 DOSE: 2.5; .5 SOLUTION RESPIRATORY (INHALATION) at 19:47

## 2024-11-16 RX ADMIN — SUCRALFATE 1 G: 1 TABLET ORAL at 10:59

## 2024-11-16 RX ADMIN — SUCRALFATE 1 G: 1 TABLET ORAL at 05:12

## 2024-11-16 RX ADMIN — SUCRALFATE 1 G: 1 TABLET ORAL at 17:00

## 2024-11-16 RX ADMIN — SODIUM CHLORIDE: 4.5 INJECTION, SOLUTION INTRAVENOUS at 16:59

## 2024-11-16 RX ADMIN — ARFORMOTEROL TARTRATE 15 MCG: 15 SOLUTION RESPIRATORY (INHALATION) at 07:44

## 2024-11-16 RX ADMIN — Medication 1 CAPSULE: at 05:12

## 2024-11-16 RX ADMIN — ACETAMINOPHEN 650 MG: 325 TABLET ORAL at 05:20

## 2024-11-16 RX ADMIN — BUDESONIDE 500 MCG: 0.5 SUSPENSION RESPIRATORY (INHALATION) at 20:03

## 2024-11-16 RX ADMIN — PIPERACILLIN AND TAZOBACTAM 4500 MG: 4; .5 INJECTION, POWDER, FOR SOLUTION INTRAVENOUS; PARENTERAL at 03:37

## 2024-11-16 RX ADMIN — IPRATROPIUM BROMIDE AND ALBUTEROL SULFATE 1 DOSE: 2.5; .5 SOLUTION RESPIRATORY (INHALATION) at 07:44

## 2024-11-16 RX ADMIN — PIPERACILLIN AND TAZOBACTAM 4500 MG: 4; .5 INJECTION, POWDER, FOR SOLUTION INTRAVENOUS; PARENTERAL at 09:04

## 2024-11-16 RX ADMIN — ARFORMOTEROL TARTRATE 15 MCG: 15 SOLUTION RESPIRATORY (INHALATION) at 19:47

## 2024-11-16 RX ADMIN — SUCRALFATE 1 G: 1 TABLET ORAL at 20:48

## 2024-11-16 RX ADMIN — APIXABAN 5 MG: 5 TABLET, FILM COATED ORAL at 20:48

## 2024-11-16 RX ADMIN — LEVOTHYROXINE SODIUM 137 MCG: 0.03 TABLET ORAL at 05:12

## 2024-11-16 RX ADMIN — ONDANSETRON 4 MG: 2 INJECTION INTRAMUSCULAR; INTRAVENOUS at 13:23

## 2024-11-16 RX ADMIN — ACETYLCYSTEINE 600 MG: 200 INHALANT RESPIRATORY (INHALATION) at 19:47

## 2024-11-16 RX ADMIN — Medication 10 ML: at 09:00

## 2024-11-16 ASSESSMENT — PAIN DESCRIPTION - LOCATION
LOCATION: FOOT
LOCATION: FOOT

## 2024-11-16 ASSESSMENT — PAIN SCALES - GENERAL
PAINLEVEL_OUTOF10: 5
PAINLEVEL_OUTOF10: 7
PAINLEVEL_OUTOF10: 0

## 2024-11-16 ASSESSMENT — PAIN DESCRIPTION - ORIENTATION
ORIENTATION: RIGHT;LEFT
ORIENTATION: LEFT;RIGHT

## 2024-11-16 ASSESSMENT — PAIN DESCRIPTION - DESCRIPTORS
DESCRIPTORS: ACHING;DISCOMFORT;TENDER
DESCRIPTORS: STABBING

## 2024-11-16 ASSESSMENT — PAIN SCALES - WONG BAKER: WONGBAKER_NUMERICALRESPONSE: NO HURT

## 2024-11-16 ASSESSMENT — PAIN - FUNCTIONAL ASSESSMENT: PAIN_FUNCTIONAL_ASSESSMENT: ACTIVITIES ARE NOT PREVENTED

## 2024-11-16 NOTE — CONSULTS
Nutrition Note    RD received consult for TF ordering and management. Pt currently ordered on Jevity 1.5 at goal rate of 65 ml/hr. Pt tolerating TF per chart review.  Na 135, will decreased free water flush. Dietitians following with nutrition assessment and recommendations in RD progress notes. See note from 11/14 for further details.     Will continue to monitor per nutrition standards of care.     Electronically signed by Meena Aguilar RD, LD on 11/16/24 at 11:48 AM EST    Contact: [phone]

## 2024-11-16 NOTE — PROGRESS NOTES
[facility] Progress Note  V 10.25      Date of Admission: 11/10/2024    Hospital Day: 7      Chief Admission Complaint:  J tube malfunctioning    Subjective:  Patient seen at bedside this morning. TF in place 65 ml/hr. No acute concerns. Patient no nausea, vomiting, bloating, pain anywhere, fevers, chills.    Presenting Admission History:       This is a 79 y.o. male who presented to [facility] from [facility] with complaint of malfunctioning J tube.  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 , GERD, Dysphagia, Hypothyroid, DVT/PE and Protein Calorie Malnutrition on tube feeds.  He uses a wheelchair at baseline. He was admitted at [facility] 11/2/2024 with chief complaint of drainage coming from his feeding tube. During his admission there he was treated for Sepsis, Pneumonia (possibly aspiration related), acute hypoxic respiratory failure and hyponatremia. 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 ENT was consulted on Friday 11/8/24, however there was no coverage available to see the patient in hospital until Monday so the plan was for him to follow up outpatient with them. Per chart review it appears that the patient continued to have issues with drainage and leakage from his J-tube site. The patient requested to be transferred to [facility] for management and possibly replacement of his J-tube by Dr. Marcial's team.      The patient and his wife state that the patient has had multiple issues with his Jtube since it was first placed. The main concern they mention is that it becomes clogged often despite frequent water flushes. They are \"fed up\" with it. The patient also has a Gtube that is to gravity drainage. The patient and his wife tell us this is because he has GERD which was causing pneumonia and someone had suggested that he have both

## 2024-11-16 NOTE — PLAN OF CARE
Problem: Discharge Planning  Goal: Discharge to home or other facility with appropriate resources  11/16/2024 0921 by Michaela Sharma RN  Outcome: Progressing  11/15/2024 2231 by Madai Gould RN  Outcome: Progressing  Flowsheets (Taken 11/15/2024 2000)  Discharge to home or other facility with appropriate resources: Identify barriers to discharge with patient and caregiver     Problem: Pain  Goal: Verbalizes/displays adequate comfort level or baseline comfort level  11/16/2024 0921 by Michaela Sharma RN  Outcome: Progressing  Flowsheets (Taken 11/16/2024 0921)  Verbalizes/displays adequate comfort level or baseline comfort level:   Encourage patient to monitor pain and request assistance   Assess pain using appropriate pain scale   Administer analgesics based on type and severity of pain and evaluate response   Implement non-pharmacological measures as appropriate and evaluate response  11/15/2024 2231 by Madai Gould RN  Outcome: Progressing  Flowsheets (Taken 11/15/2024 2115)  Verbalizes/displays adequate comfort level or baseline comfort level: Assess pain using appropriate pain scale     Problem: Safety - Adult  Goal: Free from fall injury  11/16/2024 0921 by Michaela Sharma RN  Outcome: Progressing  Flowsheets  Taken 11/16/2024 0921 by Michaela Sharma RN  Free From Fall Injury:   Instruct family/caregiver on patient safety   Based on caregiver fall risk screen, instruct family/caregiver to ask for assistance with transferring infant if caregiver noted to have fall risk factors  Taken 11/15/2024 2248 by Madai Gould RN  Free From Fall Injury: Instruct family/caregiver on patient safety  11/15/2024 2231 by Madai Gould RN  Outcome: Progressing     Problem: Skin/Tissue Integrity  Goal: Absence of new skin breakdown  Description: 1.  Monitor for areas of redness and/or skin breakdown  2.  Assess vascular access sites hourly  3.  Every 4-6 hours minimum:  Change oxygen saturation probe site  4.

## 2024-11-16 NOTE — PLAN OF CARE
Problem: Discharge Planning  Goal: Discharge to home or other facility with appropriate resources  11/15/2024 2231 by Madai Gould RN  Outcome: Progressing  11/15/2024 0910 by Jennifer Mccall RN  Outcome: Progressing   Continuing to work with patient and health care team on discharge plan. Discharge instructions and medication management will be reviewed prior to discharge.    Problem: Pain  Goal: Verbalizes/displays adequate comfort level or baseline comfort level  11/15/2024 2231 by Madai Gould RN  Outcome: Progressing  Flowsheets (Taken 11/15/2024 2115)  Verbalizes/displays adequate comfort level or baseline comfort level: Assess pain using appropriate pain scale  11/15/2024 0910 by Jennifer Mccall RN  Outcome: Progressing   Pt able to express presence/absence of pain and rate pain appropriately using numerical scale. Pain/discomfort being managed with PRN analgesics per MD orders (see MAR). Pain assessed every shift and after interventions.    Problem: Safety - Adult  Goal: Free from fall injury  11/15/2024 2231 by Madai Gould RN  Outcome: Progressing  11/15/2024 0910 by Jennifer Mccall RN  Outcome: Progressing   Pt free from falls this shift. Fall precautions in place at all times. Call light always within reach. Pt able and agreeable to contact for safety appropriately.    Problem: Skin/Tissue Integrity  Goal: Absence of new skin breakdown  Description: 1.  Monitor for areas of redness and/or skin breakdown  2.  Assess vascular access sites hourly  3.  Every 4-6 hours minimum:  Change oxygen saturation probe site  4.  Every 4-6 hours:  If on nasal continuous positive airway pressure, respiratory therapy assess nares and determine need for appliance change or resting period.  11/15/2024 2231 by Madai Gould RN  Outcome: Progressing  11/15/2024 0910 by Jennifer Mccall RN  Outcome: Progressing   Skin assessment performed each shift per protocol.  Patient turned and repositioned every two  hours and prn with pillow support. Patient checked for incontence every two hours.     Problem: Nutrition Deficit:  Goal: Optimize nutritional status  11/15/2024 2231 by Madai Gould, RN  Outcome: Progressing  11/15/2024 0910 by Jennifer Mccall, RN  Outcome: Progressing

## 2024-11-16 NOTE — PROGRESS NOTES
[facility]    Pt and wife in agreement to start hospice services with HOC upon discharge. Pt requesting discharge tomorrow Sunday. DME ordered. Transport set with CMT at 3/3:30pm to home.    Thanks for this referral,    Anamaria Carlson Admissions RN  Office: [phone]  Cell: [phone]

## 2024-11-16 NOTE — FLOWSHEET NOTE
Pt slept off and on overnight. Frequent BM's and adjustments to pillows for comfort. TF infusing per orders. G/J tube patent and functioning properly. Tylenol given this a.m. for c/o L foot neuropathy. Fluids infusing per orders. Antibiotics given with no adverse effects. Pleasant and cooperative. All fall precautions in place. Call light in reach. Pt possibly going home with hospice today.

## 2024-11-16 NOTE — PROGRESS NOTES
Physical Therapy  Facility/Department: [facility] C3 TELE/MED SURG/ONC  Daily Treatment Note  NAME: Héctor Valverde  : 1945  MRN: [id number]    Date of Service: 2024    Discharge Recommendations:  24 hour supervision or assist, Home with Home health PT   PT Equipment Recommendations  Equipment Needed: No  Other: Pt has RW for home use    Patient Diagnosis(es):     Assessment  Assessment: pt found in be,d agreeable to PT treatment, cleared for treatment by RN.  pt demonstrates SBA for bed mobility and CGA for functional transfers with RW.  pt very unsteady on his feet in standing.  peerforms two bouts of standing, each ~1 minute long.  performs standing march during first bout and performs ~3 small sidesteps toward HOB during second bout.  pt demonstrates notable decreased strength, mobility, activity tolernace, balance, and functional capacity and would continue to benefit from skilled PT to address the above stated deficits during his stay in the acute care setting.  continue to recommend DC to home wtih 24 hour assist and HHPT when medically stable.  Activity Tolerance: Patient limited by fatigue;Patient limited by endurance  Equipment Needed: No  Other: Pt has RW for home use    Plan  Physical Therapy Plan  General Plan: 3-5 times per week  Specific Instructions for Next Treatment: Progress ther ex and mobility as tolerated  Current Treatment Recommendations: Strengthening;ROM;Balance training;Functional mobility training;Transfer training;Endurance training;Gait training;Home exercise program;Safety education & training;Patient/Caregiver education & training;Equipment evaluation, education, & procurement;Therapeutic activities;Stair training    Restrictions  Restrictions/Precautions  Restrictions/Precautions: NPO, Fall Risk, General Precautions  Position Activity Restriction  Other position/activity restrictions: J tube, G tube to gravity drainange, IV lines, PureWick, NPO     Subjective  11/18/24 (unless otherwise specified) -continue 11/12  Short Term Goal 1: Pt will transfer supine <-> sit with supervision.  11/16 GOAL NOT MET.  Short Term Goal 2: Pt will transfer sit <-> stand and bed>chair using RW with SBA. 11/16 GOAL NOT MET.  Short Term Goal 3: Pt will ambulate x 30 feet using RW with CGA/SBA x 1. 11/16 GOAL NOT MET.  Short Term Goal 4: By 11/14/24: Pt will tolerate 12-15 reps BLE exercise for strengthening, balance, and endurance -met 11/12. 11/16 GOAL NOT MET.  Patient Goals   Patient Goals : \"To go home\"    Education  Patient Education  Education Given To: Patient  Education Provided: Role of Therapy;Plan of Care;Precautions;Transfer Training;Fall Prevention Strategies;Equipment;Energy Conservation  Education Provided Comments: Pt educated on role of PT in acute setting and benefits of regular OOB activity in order to improve strength and lessen likelihood of developing hospital-acquired weakness; pt verbalizes understanding.  educated on purpose and benefits of HEP, pt states he understands HEP and has written HEP, declines any further written HEP.  Education Method: Demonstration;Verbal  Barriers to Learning: None  Education Outcome: Verbalized understanding;Demonstrated understanding;Continued education needed    AM-PAC - Mobility    AM-PAC Basic Mobility - Inpatient   How much help is needed turning from your back to your side while in a flat bed without using bedrails?: A Little  How much help is needed moving from lying on your back to sitting on the side of a flat bed without using bedrails?: A Little  How much help is needed moving to and from a bed to a chair?: A Lot  How much help is needed standing up from a chair using your arms?: A Lot  How much help is needed walking in hospital room?: A Lot  How much help is needed climbing 3-5 steps with a railing?: Total  AM-PAC Inpatient Mobility Raw Score : 13  AM-PAC Inpatient T-Scale Score : 36.74  Mobility Inpatient CMS 0-100% Score:

## 2024-11-16 NOTE — PROGRESS NOTES
Pt assessment completed and charted. VSS. Pt a/o. Pt using bedside suction. Both G and J tubes have small amount of drainage. Bed in lowest position and wheels locked. Call light within reach. Bedside table within reach. Non-skid socks in place. Pt denies any other needs at this time.  Pt calls out appropriately.

## 2024-11-17 VITALS
HEIGHT: 74 IN | TEMPERATURE: 97.9 F | RESPIRATION RATE: 18 BRPM | BODY MASS INDEX: 17.29 KG/M2 | SYSTOLIC BLOOD PRESSURE: 102 MMHG | HEART RATE: 93 BPM | DIASTOLIC BLOOD PRESSURE: 66 MMHG | OXYGEN SATURATION: 93 %

## 2024-11-17 LAB
ANION GAP SERPL CALCULATED.3IONS-SCNC: 9 MMOL/L (ref 3–16)
BASOPHILS # BLD: 0 K/UL (ref 0–0.2)
BASOPHILS NFR BLD: 0.3 %
BUN SERPL-MCNC: 15 MG/DL (ref 7–20)
CALCIUM SERPL-MCNC: 8.3 MG/DL (ref 8.3–10.6)
CHLORIDE SERPL-SCNC: 98 MMOL/L (ref 99–110)
CO2 SERPL-SCNC: 26 MMOL/L (ref 21–32)
CREAT SERPL-MCNC: 0.7 MG/DL (ref 0.8–1.3)
DEPRECATED RDW RBC AUTO: 15.9 % (ref 12.4–15.4)
EOSINOPHIL # BLD: 0.1 K/UL (ref 0–0.6)
EOSINOPHIL NFR BLD: 1.6 %
GFR SERPLBLD CREATININE-BSD FMLA CKD-EPI: >90 ML/MIN/{1.73_M2}
GLUCOSE SERPL-MCNC: 136 MG/DL (ref 70–99)
HCT VFR BLD AUTO: 31.8 % (ref 40.5–52.5)
HGB BLD-MCNC: 10.1 G/DL (ref 13.5–17.5)
LYMPHOCYTES # BLD: 0.6 K/UL (ref 1–5.1)
LYMPHOCYTES NFR BLD: 6.2 %
MAGNESIUM SERPL-MCNC: 2.08 MG/DL (ref 1.8–2.4)
MCH RBC QN AUTO: 24.7 PG (ref 26–34)
MCHC RBC AUTO-ENTMCNC: 31.8 G/DL (ref 31–36)
MCV RBC AUTO: 77.8 FL (ref 80–100)
MONOCYTES # BLD: 0.4 K/UL (ref 0–1.3)
MONOCYTES NFR BLD: 4.4 %
NEUTROPHILS # BLD: 7.8 K/UL (ref 1.7–7.7)
NEUTROPHILS NFR BLD: 87.5 %
PHOSPHATE SERPL-MCNC: 3.6 MG/DL (ref 2.5–4.9)
PLATELET # BLD AUTO: 283 K/UL (ref 135–450)
PMV BLD AUTO: 7.1 FL (ref 5–10.5)
POTASSIUM SERPL-SCNC: 4.5 MMOL/L (ref 3.5–5.1)
RBC # BLD AUTO: 4.09 M/UL (ref 4.2–5.9)
SODIUM SERPL-SCNC: 133 MMOL/L (ref 136–145)
WBC # BLD AUTO: 9 K/UL (ref 4–11)

## 2024-11-17 PROCEDURE — 2580000003 HC RX 258: Performed by: INTERNAL MEDICINE

## 2024-11-17 PROCEDURE — 6370000000 HC RX 637 (ALT 250 FOR IP): Performed by: INTERNAL MEDICINE

## 2024-11-17 PROCEDURE — 2580000003 HC RX 258: Performed by: NURSE PRACTITIONER

## 2024-11-17 PROCEDURE — 94669 MECHANICAL CHEST WALL OSCILL: CPT

## 2024-11-17 PROCEDURE — 6360000002 HC RX W HCPCS: Performed by: INTERNAL MEDICINE

## 2024-11-17 PROCEDURE — 94640 AIRWAY INHALATION TREATMENT: CPT

## 2024-11-17 PROCEDURE — 36415 COLL VENOUS BLD VENIPUNCTURE: CPT

## 2024-11-17 PROCEDURE — 80048 BASIC METABOLIC PNL TOTAL CA: CPT

## 2024-11-17 PROCEDURE — 83735 ASSAY OF MAGNESIUM: CPT

## 2024-11-17 PROCEDURE — 85025 COMPLETE CBC W/AUTO DIFF WBC: CPT

## 2024-11-17 PROCEDURE — 6370000000 HC RX 637 (ALT 250 FOR IP): Performed by: NURSE PRACTITIONER

## 2024-11-17 PROCEDURE — 84100 ASSAY OF PHOSPHORUS: CPT

## 2024-11-17 RX ORDER — BUDESONIDE 0.5 MG/2ML
0.5 INHALANT ORAL
Qty: 60 EACH | Refills: 3 | Status: SHIPPED | OUTPATIENT
Start: 2024-11-17

## 2024-11-17 RX ORDER — IPRATROPIUM BROMIDE AND ALBUTEROL SULFATE 2.5; .5 MG/3ML; MG/3ML
3 SOLUTION RESPIRATORY (INHALATION)
Qty: 360 ML | Refills: 0 | Status: SHIPPED | OUTPATIENT
Start: 2024-11-17

## 2024-11-17 RX ORDER — POLYETHYLENE GLYCOL 3350 17 G/17G
17 POWDER, FOR SOLUTION ORAL DAILY PRN
Qty: 527 G | Refills: 0 | Status: SHIPPED | OUTPATIENT
Start: 2024-11-17 | End: 2024-12-17

## 2024-11-17 RX ORDER — MORPHINE SULFATE 100 MG/5ML
5 SOLUTION ORAL
Qty: 30 EACH | Refills: 0 | Status: SHIPPED | OUTPATIENT
Start: 2024-11-17 | End: 2024-11-27

## 2024-11-17 RX ADMIN — ACETAMINOPHEN 650 MG: 325 TABLET ORAL at 06:20

## 2024-11-17 RX ADMIN — ARFORMOTEROL TARTRATE 15 MCG: 15 SOLUTION RESPIRATORY (INHALATION) at 07:27

## 2024-11-17 RX ADMIN — PIPERACILLIN AND TAZOBACTAM 4500 MG: 4; .5 INJECTION, POWDER, FOR SOLUTION INTRAVENOUS; PARENTERAL at 02:13

## 2024-11-17 RX ADMIN — BUDESONIDE 500 MCG: 0.5 SUSPENSION RESPIRATORY (INHALATION) at 07:27

## 2024-11-17 RX ADMIN — PIPERACILLIN AND TAZOBACTAM 4500 MG: 4; .5 INJECTION, POWDER, FOR SOLUTION INTRAVENOUS; PARENTERAL at 08:55

## 2024-11-17 RX ADMIN — APIXABAN 5 MG: 5 TABLET, FILM COATED ORAL at 08:52

## 2024-11-17 RX ADMIN — ACETYLCYSTEINE 600 MG: 200 INHALANT RESPIRATORY (INHALATION) at 07:27

## 2024-11-17 RX ADMIN — SUCRALFATE 1 G: 1 TABLET ORAL at 06:46

## 2024-11-17 RX ADMIN — Medication 1 CAPSULE: at 06:46

## 2024-11-17 RX ADMIN — SUCRALFATE 1 G: 1 TABLET ORAL at 10:49

## 2024-11-17 RX ADMIN — LEVOTHYROXINE SODIUM 137 MCG: 0.03 TABLET ORAL at 06:46

## 2024-11-17 RX ADMIN — IPRATROPIUM BROMIDE AND ALBUTEROL SULFATE 1 DOSE: 2.5; .5 SOLUTION RESPIRATORY (INHALATION) at 07:27

## 2024-11-17 RX ADMIN — Medication 10 ML: at 08:56

## 2024-11-17 ASSESSMENT — PAIN SCALES - GENERAL
PAINLEVEL_OUTOF10: 8
PAINLEVEL_OUTOF10: 4

## 2024-11-17 ASSESSMENT — PAIN DESCRIPTION - ORIENTATION
ORIENTATION: RIGHT;LEFT
ORIENTATION: LEFT;RIGHT

## 2024-11-17 ASSESSMENT — PAIN DESCRIPTION - DESCRIPTORS: DESCRIPTORS: ACHING;BURNING

## 2024-11-17 ASSESSMENT — PAIN DESCRIPTION - LOCATION
LOCATION: FOOT
LOCATION: FOOT

## 2024-11-17 NOTE — PROGRESS NOTES
Dr. Azeb Chacon made aware of BP 88/60. Message read, no new orders at this time.  Patient alert and oriented, asymptomatic. Patient stated \" my blood pressure usually runs low\".

## 2024-11-17 NOTE — PROGRESS NOTES
Pt assessment completed and charted. VSS. Pt a/o. Pt requesting foot care. Pt feet cleansed and elevated. Bed in lowest position and wheels locked. Call light within reach. Bedside table within reach. Non-skid socks in place. Pt denies any other needs at this time.  Pt calls out appropriately.

## 2024-11-17 NOTE — DISCHARGE SUMMARY
bilateral lower lobe bronchi with bronchiolitis and multifocal pneumonia in the left greater than right lower lobes.  Trace bilateral pleural effusions. 1.5 cm left paraesophageal lymph node, stable, nonspecific. Other nonacute findings as above.     XR CHEST PORTABLE    Result Date: 11/6/2024  EXAMINATION: ONE XRAY VIEW OF THE CHEST 11/6/2024 11:25 am COMPARISON: CT, 08/06/2024 HISTORY: ORDERING SYSTEM PROVIDED HISTORY: Sepsis TECHNOLOGIST PROVIDED HISTORY: Reason for exam:->Sepsis Reason for Exam: sepsis FINDINGS: There are patchy bilateral foci of airspace disease, similar to the prior CT. There is no evidence for any dominant or new infiltrate.  The heart appears normal.  Osteophytes are noted in the thoracic spine.     Patchy bilateral airspace disease, unchanged from prior examination.  No new dominant or infiltrate noted.     XR INJ CONTRAST GASTRIC TUBE PERC    Result Date: 11/3/2024  EXAMINATION: ONE SUPINE XRAY VIEW(S) OF THE ABDOMEN 11/3/2024 4:34 pm COMPARISON: November 2, 2024 HISTORY: ORDERING SYSTEM PROVIDED HISTORY: g tube replacement TECHNOLOGIST PROVIDED HISTORY: Reason for exam:->g tube replacement Reason for Exam: g tube placement FINDINGS: Injection of the gastrostomy catheter results in opacification of gastric lumen.  Additional contrast in the colon is probably residual contrast from a prior injection.     1. Balloon tip catheter opacifies the gastric lumen.  There is no evidence of extraluminal contrast. 2. Additional contrast in the colon and probably residual contrast from prior injection.     XR INJ CONTRAST GASTRIC TUBE PERC    Result Date: 11/2/2024  EXAMINATION: ONE SUPINE XRAY VIEW(S) OF THE ABDOMEN 11/2/2024 7:39 am COMPARISON: None. HISTORY: ORDERING SYSTEM PROVIDED HISTORY: confirm bedside replacement TECHNOLOGIST PROVIDED HISTORY: Reason for exam:->confirm bedside replacement Reason for Exam: confirm bedside replacement FINDINGS: Water-soluble contrast was injected through the  patient's indwelling gastrostomy tube demonstrating intraluminal placement with antegrade flow of contrast into the proximal duodenum.  There is no extravasation.     Satisfactory G-tube placement.       Consults:     IP CONSULT TO GENERAL SURGERY  IP CONSULT TO PALLIATIVE CARE  IP CONSULT TO PHARMACY  IP CONSULT TO HOME CARE NEEDS  IP CONSULT TO INFECTIOUS DISEASES  IP CONSULT TO OTOLARYNGOLOGY  IP CONSULT TO PULMONOLOGY  IP CONSULT TO OTOLARYNGOLOGY  IP CONSULT TO HOSPICE  IP CONSULT TO DIETITIAN    Labs:     Recent Labs     11/15/24  0415 11/16/24  0442 11/17/24  0525   WBC 6.3 7.5 9.0   HGB 9.6* 9.8* 10.1*   HCT 30.3* 30.4* 31.8*    271 283     Recent Labs     11/15/24  0415 11/16/24  0442 11/17/24  0525    135* 133*   K 4.7 5.1 4.5    100 98*   CO2 29 26 26   BUN 18 13 15   CREATININE 0.6* 0.5* 0.7*   CALCIUM 8.5 8.4 8.3   MG 1.96 2.08 2.08   PHOS 2.7 3.4 3.6     No results for input(s): \"PROBNP\", \"TROPHS\" in the last 72 hours.  No results for input(s): \"LABA1C\" in the last 72 hours.  Recent Labs     11/15/24  0415   AST 10*   ALT <5*   BILIDIR <0.1   BILITOT <0.2   ALKPHOS 53     No results for input(s): \"INR\", \"LACTA\", \"TSH\" in the last 72 hours.    Urine Cultures:   Lab Results   Component Value Date/Time    LABURIN 50,000 CFU/ml 08/06/2024 03:00 PM    LABURIN 50,000 CFU/ml 08/06/2024 03:00 PM    LABURIN >100,000 CFU/ml  No further workup   08/06/2024 03:00 PM     Blood Cultures:   Lab Results   Component Value Date/Time    BC No Growth after 4 days of incubation. 11/06/2024 11:40 AM     Lab Results   Component Value Date/Time    BLOODCULT2 No Growth after 4 days of incubation. 11/06/2024 11:40 AM     Organism:   Lab Results   Component Value Date/Time    ORG Pseudomonas aeruginosa 11/09/2024 06:00 PM       Signed:    Roly Canales MD

## 2024-11-17 NOTE — PROGRESS NOTES
Pt a/o x4. VSS. All prescriptions, discharge and follow up instructions given to the patient and hospice nurse.  The patient verbalizes understanding about going home with hospice and denies questions.  All belongings collected and sent with the patient. The patient was discharged off the unit by transport in stable condition.

## 2024-11-17 NOTE — PLAN OF CARE
Problem: Discharge Planning  Goal: Discharge to home or other facility with appropriate resources  Outcome: Progressing  Flowsheets (Taken 11/17/2024 0929)  Discharge to home or other facility with appropriate resources:   Identify barriers to discharge with patient and caregiver   Arrange for needed discharge resources and transportation as appropriate   Identify discharge learning needs (meds, wound care, etc)   Refer to discharge planning if patient needs post-hospital services based on physician order or complex needs related to functional status, cognitive ability or social support system     Problem: Pain  Goal: Verbalizes/displays adequate comfort level or baseline comfort level  11/17/2024 0929 by Michaela Sharma, RN  Outcome: Progressing  Flowsheets (Taken 11/17/2024 0929)  Verbalizes/displays adequate comfort level or baseline comfort level:   Administer analgesics based on type and severity of pain and evaluate response   Assess pain using appropriate pain scale   Encourage patient to monitor pain and request assistance   Implement non-pharmacological measures as appropriate and evaluate response  11/16/2024 2259 by Cara Howard RN  Outcome: Progressing     Problem: Safety - Adult  Goal: Free from fall injury  11/17/2024 0929 by Michaela Sharma RN  Outcome: Progressing  Flowsheets (Taken 11/17/2024 0929)  Free From Fall Injury:   Instruct family/caregiver on patient safety   Based on caregiver fall risk screen, instruct family/caregiver to ask for assistance with transferring infant if caregiver noted to have fall risk factors  11/16/2024 2259 by Cara Howard, RN  Outcome: Progressing     Problem: Skin/Tissue Integrity  Goal: Absence of new skin breakdown  Description: 1.  Monitor for areas of redness and/or skin breakdown  2.  Assess vascular access sites hourly  3.  Every 4-6 hours minimum:  Change oxygen saturation probe site  4.  Every 4-6 hours:  If on nasal continuous positive airway

## 2024-11-17 NOTE — PLAN OF CARE
Problem: Discharge Planning  Goal: Discharge to home or other facility with appropriate resources  11/16/2024 0921 by Michaela Sharma RN  Outcome: Progressing     Problem: Pain  Goal: Verbalizes/displays adequate comfort level or baseline comfort level  11/16/2024 2259 by Cara Howard RN  Outcome: Progressing  11/16/2024 0921 by Michaela Sharma RN  Outcome: Progressing  Flowsheets (Taken 11/16/2024 0921)  Verbalizes/displays adequate comfort level or baseline comfort level:   Encourage patient to monitor pain and request assistance   Assess pain using appropriate pain scale   Administer analgesics based on type and severity of pain and evaluate response   Implement non-pharmacological measures as appropriate and evaluate response     Problem: Safety - Adult  Goal: Free from fall injury  11/16/2024 2259 by Cara Howard RN  Outcome: Progressing  11/16/2024 0921 by Michaela Sharma RN  Outcome: Progressing  Flowsheets  Taken 11/16/2024 0921 by Michaela Sharma RN  Free From Fall Injury:   Instruct family/caregiver on patient safety   Based on caregiver fall risk screen, instruct family/caregiver to ask for assistance with transferring infant if caregiver noted to have fall risk factors  Taken 11/15/2024 2248 by Madai Gould RN  Free From Fall Injury: Instruct family/caregiver on patient safety     Problem: Skin/Tissue Integrity  Goal: Absence of new skin breakdown  Description: 1.  Monitor for areas of redness and/or skin breakdown  2.  Assess vascular access sites hourly  3.  Every 4-6 hours minimum:  Change oxygen saturation probe site  4.  Every 4-6 hours:  If on nasal continuous positive airway pressure, respiratory therapy assess nares and determine need for appliance change or resting period.  11/16/2024 2259 by Cara Howard, RN  Outcome: Progressing  11/16/2024 0921 by Michaela Sharma RN  Outcome: Progressing     Problem: Nutrition Deficit:  Goal: Optimize nutritional status  11/16/2024 2259

## 2024-11-17 NOTE — CARE COORDINATION
Case Management Discharge Summary- Hospice Discharge    Destination:   home with HOC    Hospice Agency name and contact: [facility]/ [employer]    Durable Equipment arrangements are completed by the Hospice nurse.    [place] DNR signed and placed in discharge packet AND patient's hard chart. (This is required for DNR patients.)    Signed ECOC/AVS faxed to above agency/facility. Per HOC    Transportation arrangements per Hospice RN.  Transport agency name and  time: 1500    Transport form completed and signed by:  [facility]  Transport form placed with discharge packet: per HOC    Notified Family: per HOC  Contact name:    Patient's RN notified of plan: per HOC    Note:    Discharging nurse to complete nursing portion of ECOC, reconcile AVS, place final copy with patient's discharge packet.  RN to ensure signed prescriptions are sent home with patient or the facility as per nursing protocol.

## 2024-11-18 LAB
FUNGUS SPEC CULT: NORMAL
FUNGUS SPEC CULT: NORMAL
LOEFFLER MB STN SPEC: NORMAL
LOEFFLER MB STN SPEC: NORMAL

## 2024-11-19 LAB
ACID FAST STN SPEC QL: NORMAL
ACID FAST STN SPEC QL: NORMAL
MYCOBACTERIUM SPEC CULT: NORMAL
MYCOBACTERIUM SPEC CULT: NORMAL

## 2024-11-27 ENCOUNTER — HOSPITAL ENCOUNTER (EMERGENCY)
Age: 79
Discharge: HOME | End: 2024-11-27
Attending: STUDENT IN AN ORGANIZED HEALTH CARE EDUCATION/TRAINING PROGRAM
Payer: MEDICARE

## 2024-11-27 VITALS
HEART RATE: 120 BPM | DIASTOLIC BLOOD PRESSURE: 81 MMHG | SYSTOLIC BLOOD PRESSURE: 112 MMHG | RESPIRATION RATE: 24 BRPM | OXYGEN SATURATION: 96 %

## 2024-11-27 DIAGNOSIS — K94.13: Primary | ICD-10-CM

## 2024-11-27 PROCEDURE — 99284 EMERGENCY DEPT VISIT MOD MDM: CPT

## 2024-11-27 NOTE — ED PROVIDER NOTES
Emergency Department Attending Provider Note  Location: [facility] ED  11/27/2024     Patient Identification  Héctor Valverde is a 79 y.o. male      Héctor Valverde was evaluated in the Emergency Department for J tube clog. Although initial history and physical exam information was obtained by Ne VAN  (who also dictated a record of this visit), I personally saw the patient and performed a substantive portion of the visit including all aspects of the medical decision making.    Patient seen and evaluated.  Relevant records reviewed.  Briefly patient is a 79-year-old male with history of throat cancer and hospice who presents with J-tube complication.  Wife reports that she was unable to get his J-tube unclogged today.  He has no other complaints.  It was replaced November 11, 2024.    Patient presented tachycardic but vitals otherwise normal.  On exam his abdomen is nontender nondistended G-tube site with no surrounding erythema or fluctuance.    Chronic medical conditions reviewed  Social determinants reviewed    No diagnostic tests ordered.  Declogging medicine and administered and tube able to be flushed.  Given this we felt patient stable for discharge back to facility.      I, Dr. Hough am the primary clinician of record.   I personally saw the patient and independently provided 0 minutes of non-concurrent critical care out of the total shared critical care time provided.    This chart was generated in part by using Dragon Dictation system and may contain errors related to that system including errors in grammar, punctuation, and spelling, as well as words and phrases that may be inappropriate. If there are any questions or concerns please feel free to contact the dictating provider for clarification.     Jyothi Hough MD   [facility]        Jyothi Hough MD  11/27/24 0049    
HISTORY       Social History     Tobacco Use    Smoking status: Never    Smokeless tobacco: Never   Vaping Use    Vaping status: Never Used   Substance Use Topics    Alcohol use: Not Currently     Alcohol/week: 0.0 standard drinks of alcohol     Comment: x1 half glass before bed    Drug use: No       SCREENINGS          Radha Coma Scale  Eye Opening: Spontaneous  Best Verbal Response: Oriented  Best Motor Response: Obeys commands  [place] Coma Scale Score: 15              CIWA Assessment  BP: 112/81  Pulse: (!) 120             PHYSICAL EXAM  1 or more Elements     ED Triage Vitals   BP Systolic BP Percentile Diastolic BP Percentile Temp Temp src Pulse Resp SpO2   -- -- -- -- -- -- -- --      Height Weight         -- --             Physical Exam  Vitals and nursing note reviewed.   Constitutional:       Appearance: Normal appearance. He is not toxic-appearing or diaphoretic.   HENT:      Head: Normocephalic and atraumatic.      Nose: Nose normal.   Eyes:      General:         Right eye: No discharge.         Left eye: No discharge.   Cardiovascular:      Rate and Rhythm: Tachycardia present.   Pulmonary:      Effort: Pulmonary effort is normal. No respiratory distress.   Abdominal:      General: There is no distension.      Palpations: Abdomen is soft.      Tenderness: There is no abdominal tenderness. There is no guarding or rebound.   Musculoskeletal:         General: Normal range of motion.      Cervical back: Normal range of motion and neck supple.   Skin:     General: Skin is warm and dry.      Comments: G tube site with no surrounding erythema, edema, or fluctuance    Neurological:      General: No focal deficit present.      Mental Status: He is alert and oriented to person, place, and time.   Psychiatric:         Mood and Affect: Mood normal.         Behavior: Behavior normal.           DIAGNOSTIC RESULTS   LABS:    Labs Reviewed - No data to display    When ordered only abnormal lab results are displayed.

## 2024-11-28 ENCOUNTER — HOSPITAL ENCOUNTER (EMERGENCY)
Age: 79
Discharge: HOME | End: 2024-11-28
Attending: EMERGENCY MEDICINE
Payer: MEDICARE

## 2024-11-28 VITALS
SYSTOLIC BLOOD PRESSURE: 92 MMHG | OXYGEN SATURATION: 96 % | DIASTOLIC BLOOD PRESSURE: 67 MMHG | HEART RATE: 90 BPM | TEMPERATURE: 97.5 F | RESPIRATION RATE: 14 BRPM

## 2024-11-28 DIAGNOSIS — T85.528A DISLODGED GASTROSTOMY TUBE: Primary | ICD-10-CM

## 2024-11-28 PROCEDURE — 99284 EMERGENCY DEPT VISIT MOD MDM: CPT

## 2024-11-28 PROCEDURE — 43762 RPLC GTUBE NO REVJ TRC: CPT

## 2024-11-28 PROCEDURE — 6370000000 HC RX 637 (ALT 250 FOR IP): Performed by: EMERGENCY MEDICINE

## 2024-11-28 RX ORDER — MORPHINE SULFATE 20 MG/ML
2.5 SOLUTION ORAL ONCE
Status: COMPLETED | OUTPATIENT
Start: 2024-11-28 | End: 2024-11-28

## 2024-11-28 RX ADMIN — Medication 2.5 MG: at 09:30

## 2024-11-28 ASSESSMENT — PAIN - FUNCTIONAL ASSESSMENT: PAIN_FUNCTIONAL_ASSESSMENT: NONE - DENIES PAIN

## 2024-11-28 NOTE — ED NOTES
Resting quietly in bed, wife at bedside. Supplies at bedside for tube replacement. Marina Miller RN

## 2024-11-28 NOTE — ED PROVIDER NOTES
tolerated the procedure well.      Sepsis:  Is this patient to be included in the SEP-1 Core Measure due to severe sepsis or septic shock?   No     Exclusion criteria - the patient is NOT to be included for SEP-1 Core Measure due to:  Infection is not suspected           ED COURSE/MDM:  Patient seen and evaluated. Here the patient is afebrile with tachycardia which is chronic for this patient.    Old records reviewed: None    Diagnoses affirmatively addressed, consideration of tests, treatments & admission, including shared decision making:    Here the patient's G-tube is dislodged.  It appears that the bulb broke.  The tube is in fact intact just with a broken bulb.  I replaced his G-tube with no difficulty with a 22 Citizen [place] G-tube.  Bulb was inflated with 20 mL of water.  He tolerated this well.  I immediately had gastric contents back through the tube therefore I did not feel that getting a formal G-tube study was necessary.  The wife also expressed concern over difficulty managing his urination at home.  He cannot stand to urinate and she states he is wetting his bed.  She is requesting an indwelling [place].  I explained to her that he is high risk for catheter associated UTI and I would recommend against an indwelling [place] but we have given him a meal pure wick to wear at home.  She does have suction already set up at home.  She will call the hospice nurse to show her how to hook this up to the pure wick.  He will be discharged home.    Consultation summary: None    Social determinants of health: None      Labs and imaging reviewed and results discussed with patient. Patient was reassessed as noted above . Plan of care discussed with patient. Patient in agreement with plan. Strict return precautions have been given.        Patient was given scripts for the following medications. I counseled patient how to take these medications.   New Prescriptions    No medications on file       None    CLINICAL IMPRESSION  1.

## 2024-11-28 NOTE — DISCHARGE INSTRUCTIONS
We replaced your G-tube here without difficulty.  We did not get an x-ray to confirm placement because you were getting gastric contents out of the tube.  Continue to use the tube as instructed.

## 2024-12-24 LAB
ACID FAST STN SPEC QL: NORMAL
ACID FAST STN SPEC QL: NORMAL
MYCOBACTERIUM SPEC CULT: NORMAL
MYCOBACTERIUM SPEC CULT: NORMAL

## (undated) DEVICE — TUBE GASTROSTMY 24FR MED GRD SIL FEED GAM RECESS DST TIP

## (undated) DEVICE — SUTURE PERMAHAND SZ 3-0 L18IN NONABSORBABLE BLK L26MM SH C013D

## (undated) DEVICE — GLOVE ORANGE PI 7 1/2   MSG9075

## (undated) DEVICE — SUTURE VICRYL 0 L27IN ABSRB VLT CT L40MM 1/2 CIR TAPERPOINT J352H

## (undated) DEVICE — VERTICAL TUBE ATTACHMENT DEVICE - ACCOMODATES 5FR TO 40FR TUBES: Brand: HOLLISTER

## (undated) DEVICE — SYRINGE MED 10ML SLIP TIP BLNT FILL AND LUERLOCK DISP

## (undated) DEVICE — SINGLE USE SUCTION VALVE MAJ-209: Brand: SINGLE USE SUCTION VALVE (STERILE)

## (undated) DEVICE — SHEET,DRAPE,40X58,STERILE: Brand: MEDLINE

## (undated) DEVICE — SINGLE USE BIOPSY VALVE MAJ-210: Brand: SINGLE USE BIOPSY VALVE (STERILE)

## (undated) DEVICE — Z INACTIVE USE 2635508 SOLUTION IRRIG 500ML 0.9% SOD CHL USP POUR PLAS BTL

## (undated) DEVICE — CONMED SCOPE SAVER BITE BLOCK, 20X27 MM: Brand: SCOPE SAVER

## (undated) DEVICE — FORCEP BX STD CAP 240CM RAD JAW 4

## (undated) DEVICE — ENDO CARRY-ON PROCEDURE KIT INCLUDES SUCTION TUBING, LUBRICANT, GAUZE, BIOHAZARD STICKER, TRANSPORT PAD AND INTERCEPT BEDSIDE KIT.: Brand: ENDO CARRY-ON PROCEDURE KIT

## (undated) DEVICE — MHCZ MINOR: Brand: MEDLINE INDUSTRIES, INC.

## (undated) DEVICE — SUTURE PERMAHAND SZ 2-0 L18IN NONABSORBABLE BLK L26MM SH C012D

## (undated) DEVICE — SYRINGE MED 50ML LUERSLIP TIP

## (undated) DEVICE — ELECTRODE ECG MONITR FOAM TEAR DROP ADLT RED